# Patient Record
Sex: MALE | Race: BLACK OR AFRICAN AMERICAN | NOT HISPANIC OR LATINO | Employment: OTHER | URBAN - METROPOLITAN AREA
[De-identification: names, ages, dates, MRNs, and addresses within clinical notes are randomized per-mention and may not be internally consistent; named-entity substitution may affect disease eponyms.]

---

## 2017-01-01 ENCOUNTER — HOSPITAL ENCOUNTER (OUTPATIENT)
Dept: RADIOLOGY | Facility: HOSPITAL | Age: 57
Discharge: HOME | End: 2017-12-14
Attending: STUDENT IN AN ORGANIZED HEALTH CARE EDUCATION/TRAINING PROGRAM
Payer: MEDICARE

## 2017-01-01 ENCOUNTER — TELEPHONE (OUTPATIENT)
Dept: RHEUMATOLOGY | Facility: CLINIC | Age: 57
End: 2017-01-01

## 2017-01-01 ENCOUNTER — OFFICE VISIT (OUTPATIENT)
Dept: RHEUMATOLOGY | Facility: CLINIC | Age: 57
End: 2017-01-01
Payer: MEDICARE

## 2017-01-01 ENCOUNTER — TELEPHONE (OUTPATIENT)
Dept: PHYSICAL MEDICINE AND REHAB | Facility: CLINIC | Age: 57
End: 2017-01-01

## 2017-01-01 ENCOUNTER — LAB VISIT (OUTPATIENT)
Dept: LAB | Facility: HOSPITAL | Age: 57
End: 2017-01-01
Attending: INTERNAL MEDICINE
Payer: MEDICARE

## 2017-01-01 VITALS
BODY MASS INDEX: 20.64 KG/M2 | HEIGHT: 70 IN | WEIGHT: 144.13 LBS | DIASTOLIC BLOOD PRESSURE: 69 MMHG | SYSTOLIC BLOOD PRESSURE: 122 MMHG | HEART RATE: 60 BPM

## 2017-01-01 DIAGNOSIS — M79.671 RIGHT FOOT PAIN: ICD-10-CM

## 2017-01-01 DIAGNOSIS — D50.0 IRON DEFICIENCY ANEMIA DUE TO CHRONIC BLOOD LOSS: ICD-10-CM

## 2017-01-01 DIAGNOSIS — Z79.1 NSAID LONG-TERM USE: ICD-10-CM

## 2017-01-01 DIAGNOSIS — G89.29 CHRONIC PAIN OF RIGHT ANKLE: ICD-10-CM

## 2017-01-01 DIAGNOSIS — M45.0 ANKYLOSING SPONDYLITIS OF MULTIPLE SITES IN SPINE: Primary | ICD-10-CM

## 2017-01-01 DIAGNOSIS — M25.571 CHRONIC PAIN OF RIGHT ANKLE: ICD-10-CM

## 2017-01-01 DIAGNOSIS — M45.0 ANKYLOSING SPONDYLITIS OF MULTIPLE SITES IN SPINE: ICD-10-CM

## 2017-01-01 DIAGNOSIS — E78.49 OTHER HYPERLIPIDEMIA: ICD-10-CM

## 2017-01-01 LAB
ALBUMIN SERPL BCP-MCNC: 2.8 G/DL
ALP SERPL-CCNC: 122 U/L
ALT SERPL W/O P-5'-P-CCNC: 14 U/L
ANION GAP SERPL CALC-SCNC: 8 MMOL/L
AST SERPL-CCNC: 15 U/L
BASOPHILS # BLD AUTO: 0.05 K/UL
BASOPHILS NFR BLD: 0.5 %
BILIRUB SERPL-MCNC: 0.3 MG/DL
BUN SERPL-MCNC: 11 MG/DL
CALCIUM SERPL-MCNC: 9.7 MG/DL
CHLORIDE SERPL-SCNC: 103 MMOL/L
CO2 SERPL-SCNC: 29 MMOL/L
CREAT SERPL-MCNC: 0.9 MG/DL
CRP SERPL-MCNC: 111 MG/L
DIFFERENTIAL METHOD: ABNORMAL
EOSINOPHIL # BLD AUTO: 0.3 K/UL
EOSINOPHIL NFR BLD: 2.9 %
ERYTHROCYTE [DISTWIDTH] IN BLOOD BY AUTOMATED COUNT: 21.6 %
ERYTHROCYTE [SEDIMENTATION RATE] IN BLOOD BY WESTERGREN METHOD: 106 MM/HR
EST. GFR  (AFRICAN AMERICAN): >60 ML/MIN/1.73 M^2
EST. GFR  (NON AFRICAN AMERICAN): >60 ML/MIN/1.73 M^2
GLUCOSE SERPL-MCNC: 87 MG/DL
HCT VFR BLD AUTO: 31.9 %
HGB BLD-MCNC: 10 G/DL
IMM GRANULOCYTES # BLD AUTO: 0.04 K/UL
IMM GRANULOCYTES NFR BLD AUTO: 0.4 %
LYMPHOCYTES # BLD AUTO: 2.8 K/UL
LYMPHOCYTES NFR BLD: 28.2 %
MCH RBC QN AUTO: 24 PG
MCHC RBC AUTO-ENTMCNC: 31.3 G/DL
MCV RBC AUTO: 77 FL
MONOCYTES # BLD AUTO: 1.1 K/UL
MONOCYTES NFR BLD: 11.2 %
NEUTROPHILS # BLD AUTO: 5.5 K/UL
NEUTROPHILS NFR BLD: 56.8 %
NRBC BLD-RTO: 0 /100 WBC
PLATELET # BLD AUTO: 472 K/UL
PMV BLD AUTO: 10.2 FL
POTASSIUM SERPL-SCNC: 4.1 MMOL/L
PROT SERPL-MCNC: 8.3 G/DL
RBC # BLD AUTO: 4.17 M/UL
SODIUM SERPL-SCNC: 140 MMOL/L
WBC # BLD AUTO: 9.75 K/UL

## 2017-01-01 PROCEDURE — 99214 OFFICE O/P EST MOD 30 MIN: CPT | Mod: S$GLB,,, | Performed by: INTERNAL MEDICINE

## 2017-01-01 PROCEDURE — 85025 COMPLETE CBC W/AUTO DIFF WBC: CPT

## 2017-01-01 PROCEDURE — 36415 COLL VENOUS BLD VENIPUNCTURE: CPT | Mod: PO

## 2017-01-01 PROCEDURE — 73630 X-RAY EXAM OF FOOT: CPT | Mod: TC,PO,RT

## 2017-01-01 PROCEDURE — 99499 UNLISTED E&M SERVICE: CPT | Mod: S$PBB,,, | Performed by: INTERNAL MEDICINE

## 2017-01-01 PROCEDURE — 85651 RBC SED RATE NONAUTOMATED: CPT

## 2017-01-01 PROCEDURE — 80053 COMPREHEN METABOLIC PANEL: CPT

## 2017-01-01 PROCEDURE — 73630 X-RAY EXAM OF FOOT: CPT | Mod: 26,RT,, | Performed by: RADIOLOGY

## 2017-01-01 PROCEDURE — 86140 C-REACTIVE PROTEIN: CPT

## 2017-01-01 PROCEDURE — 99999 PR PBB SHADOW E&M-EST. PATIENT-LVL III: CPT | Mod: PBBFAC,,, | Performed by: INTERNAL MEDICINE

## 2017-01-01 RX ORDER — CELECOXIB 100 MG/1
200 CAPSULE ORAL 2 TIMES DAILY
Qty: 180 CAPSULE | Refills: 3 | Status: SHIPPED | OUTPATIENT
Start: 2017-01-01 | End: 2017-01-01 | Stop reason: SDUPTHER

## 2017-01-01 RX ORDER — CELECOXIB 100 MG/1
100 CAPSULE ORAL 2 TIMES DAILY PRN
Qty: 180 CAPSULE | Refills: 1 | Status: SHIPPED | OUTPATIENT
Start: 2017-01-01 | End: 2018-01-01

## 2017-01-01 RX ORDER — SULFASALAZINE 500 MG/1
1500 TABLET, DELAYED RELEASE ORAL 2 TIMES DAILY
Qty: 540 TABLET | Refills: 0 | Status: SHIPPED | OUTPATIENT
Start: 2017-01-01 | End: 2018-01-01 | Stop reason: SDUPTHER

## 2017-01-01 ASSESSMENT — ROUTINE ASSESSMENT OF PATIENT INDEX DATA (RAPID3)
AM STIFFNESS SCORE: 1, YES
PATIENT GLOBAL ASSESSMENT SCORE: 5
TOTAL RAPID3 SCORE: 4.78
MDHAQ FUNCTION SCORE: 1
WHEN YOU AWAKENED IN THE MORNING OVER THE LAST WEEK, PLEASE INDICATE THE AMOUNT OF TIME IT TAKES UNTIL YOU ARE AS LIMBER AS YOU WILL BE FOR THE DAY: 1 HR
PSYCHOLOGICAL DISTRESS SCORE: 2.2
FATIGUE SCORE: 5
PAIN SCORE: 6

## 2017-01-01 ASSESSMENT — ANKYLOSING SPONDYLITIS DISEASE ACTIVITY SCORE (ASDAS-CRP)
CRP_MG_PER_LITER: 111
GLOBAL_ACTIVITY: 5
MORNING_STIFFNESS: 5
NBH_PAIN: 5
TOTAL_SCORE: 4.61
PAIN_SWELLING: 6

## 2017-01-17 ENCOUNTER — OFFICE VISIT (OUTPATIENT)
Dept: PHYSICAL MEDICINE AND REHAB | Facility: CLINIC | Age: 57
End: 2017-01-17
Payer: MEDICARE

## 2017-01-17 VITALS
BODY MASS INDEX: 22.86 KG/M2 | DIASTOLIC BLOOD PRESSURE: 71 MMHG | HEIGHT: 68 IN | WEIGHT: 150.81 LBS | HEART RATE: 69 BPM | SYSTOLIC BLOOD PRESSURE: 121 MMHG

## 2017-01-17 DIAGNOSIS — G89.29 CHRONIC PAIN OF RIGHT KNEE: ICD-10-CM

## 2017-01-17 DIAGNOSIS — M25.561 CHRONIC PAIN OF BOTH KNEES: Primary | ICD-10-CM

## 2017-01-17 DIAGNOSIS — M54.50 CHRONIC BILATERAL LOW BACK PAIN WITHOUT SCIATICA: ICD-10-CM

## 2017-01-17 DIAGNOSIS — G89.29 CHRONIC PAIN OF BOTH KNEES: Primary | ICD-10-CM

## 2017-01-17 DIAGNOSIS — M45.9 AS (ANKYLOSING SPONDYLITIS): ICD-10-CM

## 2017-01-17 DIAGNOSIS — M62.838 MUSCLE SPASMS OF NECK: ICD-10-CM

## 2017-01-17 DIAGNOSIS — G89.4 CHRONIC PAIN SYNDROME: ICD-10-CM

## 2017-01-17 DIAGNOSIS — M54.2 NECK PAIN: ICD-10-CM

## 2017-01-17 DIAGNOSIS — M25.561 CHRONIC PAIN OF RIGHT KNEE: ICD-10-CM

## 2017-01-17 DIAGNOSIS — M25.562 CHRONIC PAIN OF BOTH KNEES: Primary | ICD-10-CM

## 2017-01-17 DIAGNOSIS — G89.29 CHRONIC BILATERAL LOW BACK PAIN WITHOUT SCIATICA: ICD-10-CM

## 2017-01-17 PROCEDURE — 99499 UNLISTED E&M SERVICE: CPT | Mod: S$PBB,,, | Performed by: PHYSICAL MEDICINE & REHABILITATION

## 2017-01-17 PROCEDURE — 99214 OFFICE O/P EST MOD 30 MIN: CPT | Mod: S$GLB,,, | Performed by: PHYSICAL MEDICINE & REHABILITATION

## 2017-01-17 PROCEDURE — 99999 PR PBB SHADOW E&M-EST. PATIENT-LVL III: CPT | Mod: PBBFAC,,, | Performed by: PHYSICAL MEDICINE & REHABILITATION

## 2017-01-17 PROCEDURE — 1159F MED LIST DOCD IN RCRD: CPT | Mod: S$GLB,,, | Performed by: PHYSICAL MEDICINE & REHABILITATION

## 2017-01-17 PROCEDURE — 3074F SYST BP LT 130 MM HG: CPT | Mod: S$GLB,,, | Performed by: PHYSICAL MEDICINE & REHABILITATION

## 2017-01-17 PROCEDURE — 3078F DIAST BP <80 MM HG: CPT | Mod: S$GLB,,, | Performed by: PHYSICAL MEDICINE & REHABILITATION

## 2017-01-17 RX ORDER — DULOXETIN HYDROCHLORIDE 30 MG/1
30 CAPSULE, DELAYED RELEASE ORAL DAILY
Qty: 30 CAPSULE | Refills: 2 | Status: SHIPPED | OUTPATIENT
Start: 2017-01-17 | End: 2017-04-18 | Stop reason: SDUPTHER

## 2017-01-17 RX ORDER — HYDROCODONE BITARTRATE AND ACETAMINOPHEN 10; 325 MG/1; MG/1
1 TABLET ORAL EVERY 6 HOURS PRN
Qty: 120 TABLET | Refills: 0 | Status: SHIPPED | OUTPATIENT
Start: 2017-02-17 | End: 2017-03-19

## 2017-01-17 RX ORDER — HYDROCODONE BITARTRATE AND ACETAMINOPHEN 10; 325 MG/1; MG/1
1 TABLET ORAL EVERY 6 HOURS PRN
Qty: 120 TABLET | Refills: 0 | Status: SHIPPED | OUTPATIENT
Start: 2017-01-17 | End: 2017-04-18 | Stop reason: SDUPTHER

## 2017-01-17 RX ORDER — HYDROCODONE BITARTRATE AND ACETAMINOPHEN 10; 325 MG/1; MG/1
1 TABLET ORAL EVERY 6 HOURS PRN
Qty: 120 TABLET | Refills: 0 | Status: SHIPPED | OUTPATIENT
Start: 2017-03-17 | End: 2017-04-16

## 2017-01-17 RX ORDER — CELECOXIB 200 MG/1
200 CAPSULE ORAL 2 TIMES DAILY
Qty: 180 CAPSULE | Refills: 1 | Status: SHIPPED | OUTPATIENT
Start: 2017-01-17 | End: 2017-04-28

## 2017-01-17 NOTE — PROGRESS NOTES
Subjective:       Patient ID: Micheal Chirinos Jr. is a 56 y.o. male.    Chief Complaint: Back Pain; Neck Pain; and Hand Pain    Back Pain   Pertinent negatives include no abdominal pain, chest pain, fever or headaches.   Neck Pain    Pertinent negatives include no chest pain, fever, headaches or trouble swallowing.   Hand Pain    Pertinent negatives include no chest pain.   Knee Pain      Shoulder Pain    Pertinent negatives include no fever or headaches.   Hip Pain      Mr. Chirinos, returns to clinic for chronic neck, and back pain.  LCV 10/17/16.   He is on chronic pain management with me, takes Hydrocodone 10/325 mg , 3-4x/day, and Flexeril at bedtime for several years , very   Controled, with no need for increase dose, nor frequency.   He reports being same, with reasonable pain control. He also states that he has his good,a nd bad days.   Today, he complains about neck, right elbow, right shoulder and low back pain.   He also c/o swelling ad pain of Right arm, and knee.    Today neck pain is 4/10,and the worst  10/10.  Neck pain is present t the both side of the neck R > L, it feels as muscle spasms/ tightness,  It radiates to Right  shoulders, and shoulder blade  not to arm/hand.   No numbness, paraesthesias in arms/ hands.   Neck pain is on/ off, worse at night time, sometimes he has trouble placing head on pillow.   Neck pain is running to shoulder blades, not to arms, pain is more localized in joints, on right side,   Right shoulder, elbow, swell, when he does more exercise, and it is always on right side.     Current low  back pain is 4, worst pain lately 7/10.   No radiation to buttocks, legs, but back of legs are very tight.  He also c/o right knee pain, current pain is 6 ( hurts worst today).   Usually it swells, but it is not swollen today.   It crackles, but no clicking, popping or locking in Rt knee.   NO instability, no recent nor old injury to knee.   He has a severe ankylosing spondylitis  with axial neck and back  pain and upper thoracic and lower pelvic girdle pain.   He takes hydrocodone 10 mg and he usually takes three tablets a day; however, on bad days he takes  four tablets a day.    He failed almost all muscle relaxants Dantrium secondary to making him tired, Flexeril, makes him sleepy. (although still taking it).    He was given Celebrex by dr. Navas, but he is afraidl secondary to GI bleed, a year ago.  He is taking only Sulfasalazine and Celebrex 200 daily for his severe AS, that is always with high infllamatory markers,    Patient reports that he completed Physical therapy for neck pain, at [facility].   He does his own stretching,and goes 3x/week to fitness to [place].  Exercises regularly, and stretches his neck/upper/lower spine, lying prone on table for 20-30 minutes daily.   Here for follow up, re  evaluation and medications  adjustment.  Review of Systems   Constitutional: Negative for activity change, appetite change, chills, diaphoresis, fatigue, fever and unexpected weight change.   HENT: Negative for trouble swallowing and voice change.    Eyes: Negative for pain and visual disturbance.   Respiratory: Negative for chest tightness, shortness of breath and wheezing.    Cardiovascular: Negative for chest pain, palpitations and leg swelling.   Gastrointestinal: Negative for abdominal pain, constipation and diarrhea.   Genitourinary: Negative for difficulty urinating, frequency and urgency.   Musculoskeletal: Positive for back pain and neck pain. Negative for joint swelling, myalgias and neck stiffness. Arthralgias:  left knee hurts, no swelling, no warmth, no popping, clicking.    Skin: Negative for rash and wound.   Neurological: Negative for dizziness, facial asymmetry, speech difficulty, light-headedness and headaches.   Hematological: Negative for adenopathy.   Psychiatric/Behavioral: Negative for agitation, behavioral problems, confusion, decreased concentration,  dysphoric mood and sleep disturbance.           Objective:      Physical Exam    MUSCULOSKELETAL EXAM:  Gait is wobbling on wide bases with rotating pelvis        secondary to bilateral SI joints fusion and severe ankylosing spondylitis with      bamboo spine and moderate-to-severe kyphosis on the thoracic spine.    Center of gravity is moved forward         Walks with widely spread upper extremity to maintain dynamic balance.   He had no loss of balance.  Steps are wider and annette is normal.   The patient is not able to stand upright touching with his occiput the wall.   There is  significantly increased distance from the occiput to the wall, more than 20 cm.    Cervical spine has very limited range of motion, especially extension that lacks more than 30-35 degrees from neutral.    Cervical spine flexion is still down to 50, extension again lacks 30-35 degrees.    There is still very limited motion in flexion and extension as well as a decreased motion in     rotation and side bending, probably 10-15 degrees bilaterally.    The patient has moderate  tenderness in paravertebral cervical musculature and upper   trapezius.  No tenderness in palpation of the cervical or thoracic spine.    Full range of motion of bilateral upper and lower extremities,except for   Bilateral shoulders that have decreased abduction to 70 degrees, and flex forward to 80 degrees,  Decreased internal and external rotation to 30 degrees.  Decreased right elbow extension to 160-170 degrees.   FROM in both knees, no edema, no laxity, moderate  tenderness at medical plato line.   Muscle  strength is 5/5 throughout x4 extremities.    Minimal tenderness at right lateral epicondyle, with resisted wrist extension.  No joint laxity throughout x4  extremities.    Straight leg raising negative bilaterally.    Lumbar spine  flexion to 90 with holding on to table, extension to 0, side bending and     rotation decreased to about 20-25 degrees  bilaterally.    Fused SI joints  without tenderness on palpation.                                             NEUROLOGIC:  Cranial nerves II through XII intact.    Deep tendon reflexes increased +3, symmetrical throughout x4 extremities.    Sensory is intact to light touch and pinprick throughout x4 extremities.    Tight posterior  hamstrings and increased tone in muscles in posterior hamstrings group and   gastrocnemius soleus group on Sobia scale, 1+/4.      Assessment:       1. Chronic pain of both knees    2. AS (ankylosing spondylitis)    3. Muscle spasms of neck    4. Neck pain    5. Chronic pain of right knee    6. Chronic bilateral low back pain without sciatica    7. Chronic pain syndrome        Plan:       Chronic pain of both knees  -     duloxetine (CYMBALTA) 30 MG capsule; Take 1 capsule (30 mg total) by mouth once daily.  Dispense: 30 capsule; Refill: 2    AS (ankylosing spondylitis)  -     hydrocodone-acetaminophen 10-325mg (NORCO)  mg Tab; Take 1 tablet by mouth every 6 (six) hours as needed (msk pain).  Dispense: 120 tablet; Refill: 0  -     hydrocodone-acetaminophen 10-325mg (NORCO)  mg Tab; Take 1 tablet by mouth every 6 (six) hours as needed (msk pain).  Dispense: 120 tablet; Refill: 0  -     hydrocodone-acetaminophen 10-325mg (NORCO)  mg Tab; Take 1 tablet by mouth every 6 (six) hours as needed (msk pain).  Dispense: 120 tablet; Refill: 0  -     duloxetine (CYMBALTA) 30 MG capsule; Take 1 capsule (30 mg total) by mouth once daily.  Dispense: 30 capsule; Refill: 2    Muscle spasms of neck  -     hydrocodone-acetaminophen 10-325mg (NORCO)  mg Tab; Take 1 tablet by mouth every 6 (six) hours as needed (msk pain).  Dispense: 120 tablet; Refill: 0  -     hydrocodone-acetaminophen 10-325mg (NORCO)  mg Tab; Take 1 tablet by mouth every 6 (six) hours as needed (msk pain).  Dispense: 120 tablet; Refill: 0  -     hydrocodone-acetaminophen 10-325mg (NORCO)  mg Tab;  Take 1 tablet by mouth every 6 (six) hours as needed (msk pain).  Dispense: 120 tablet; Refill: 0  -     duloxetine (CYMBALTA) 30 MG capsule; Take 1 capsule (30 mg total) by mouth once daily.  Dispense: 30 capsule; Refill: 2    Neck pain  -     hydrocodone-acetaminophen 10-325mg (NORCO)  mg Tab; Take 1 tablet by mouth every 6 (six) hours as needed (msk pain).  Dispense: 120 tablet; Refill: 0  -     hydrocodone-acetaminophen 10-325mg (NORCO)  mg Tab; Take 1 tablet by mouth every 6 (six) hours as needed (msk pain).  Dispense: 120 tablet; Refill: 0  -     hydrocodone-acetaminophen 10-325mg (NORCO)  mg Tab; Take 1 tablet by mouth every 6 (six) hours as needed (msk pain).  Dispense: 120 tablet; Refill: 0  -     duloxetine (CYMBALTA) 30 MG capsule; Take 1 capsule (30 mg total) by mouth once daily.  Dispense: 30 capsule; Refill: 2    Chronic pain of right knee  -     hydrocodone-acetaminophen 10-325mg (NORCO)  mg Tab; Take 1 tablet by mouth every 6 (six) hours as needed (msk pain).  Dispense: 120 tablet; Refill: 0  -     hydrocodone-acetaminophen 10-325mg (NORCO)  mg Tab; Take 1 tablet by mouth every 6 (six) hours as needed (msk pain).  Dispense: 120 tablet; Refill: 0  -     hydrocodone-acetaminophen 10-325mg (NORCO)  mg Tab; Take 1 tablet by mouth every 6 (six) hours as needed (msk pain).  Dispense: 120 tablet; Refill: 0  -     duloxetine (CYMBALTA) 30 MG capsule; Take 1 capsule (30 mg total) by mouth once daily.  Dispense: 30 capsule; Refill: 2    Chronic bilateral low back pain without sciatica  -     duloxetine (CYMBALTA) 30 MG capsule; Take 1 capsule (30 mg total) by mouth once daily.  Dispense: 30 capsule; Refill: 2    Chronic pain syndrome    Other orders  -     celecoxib (CELEBREX) 200 MG capsule; Take 1 capsule (200 mg total) by mouth 2 (two) times daily.  Dispense: 180 capsule; Refill: 1      Patient with severe  AS, cx. With chronic neck and back pain associated with cervical  and lumbar spondylosis and muscle spasms - spasticity.     1. Chronic pain management.    The patient will resume Hydrocodone 10/325 mg one p.o. 6 hours p.r.n.( #120 tabs, with 2 refills).   Muscle relaxant Flexeril 5 mg  at bedtime, prn (helps better with relaxation and sleep).    Added Cymbalta ( trial) 30- mg daily.      2. Patient completed  outpatient Physical therapy for neck pain at [facility].   Now started [employer] .   Exercises regularly, and stretches his neck/upper/lower spine,   lying prone on table for 20-30 minutes daily.     3. Ankylosing Spondylitis, follows with Rheumatology, Dr. Navas.    Last appointment with Dr. Navas in 7/16.   His right hand pain and swelling, and Left foot pain and swelling, are most likely secondary to primary dz, AS.   Reviewing labs, he has CRP, and ESR permanently elevated.   He is currently taking less than prescribed Sulfasalazine, instead of 3 tabs BID, he takes it once a day, and also Celebrex instead BID, he takes once /day.   This is mainly secondary to UGI bleed one yr ago. Since on PPI  He has no problems.   I suggested him to take Celebrex 200 mg BID for next 5-7 days, until simptoms in hand./foot resolved, and to follow up with Dr. Navas.   Refused DMAR th.       RTC in 3 months.     Total time spent face to face with patient was 25 minutes.   More than 50% of that time was spent in counseling on diagnosis , prognosis and treatment options.   I also caunsel patient  on common and most usual side effect of prescribed medications.   Risk and benefits of opiates, possible risk of developing opiate dependence and tolerance, need of strict compliance with prescribed medications.  I reviewed Primary care , and other specialty's notes to better coordinate patient's  care.   All questions were answered, and patient voiced understanding.

## 2017-01-17 NOTE — MR AVS SNAPSHOT
[facility]  [address]  [facility] [address]  Phone: [phone]                  Micheal Chirinos Jr.   2017 9:00 AM   Office Visit    Description:  Male : 1960   Provider:  Janel Valdez MD   Department:  [facility]           Reason for Visit     Back Pain     Neck Pain     Hand Pain           Diagnoses this Visit        Comments    Chronic pain of both knees    -  Primary     AS (ankylosing spondylitis)         Muscle spasms of neck         Neck pain         Chronic pain of right knee         Chronic bilateral low back pain without sciatica         Chronic pain syndrome                To Do List           Goals (5 Years of Data)     Increase physical activity       Follow-Up and Disposition     Return in about 3 months (around 2017).       These Medications        Disp Refills Start End    celecoxib (CELEBREX) 200 MG capsule 180 capsule 1 2017    Take 1 capsule (200 mg total) by mouth 2 (two) times daily. - Oral    Pharmacy: [facility] [address] [place] [address] AVE AT [facility] Ph #: [phone]       hydrocodone-acetaminophen 10-325mg (NORCO)  mg Tab 120 tablet 0 2017    Take 1 tablet by mouth every 6 (six) hours as needed (msk pain). - Oral    Pharmacy: [facility] [address] [facility] [address] AT [facility] & [facility] Ph #: [phone]       hydrocodone-acetaminophen 10-325mg (NORCO)  mg Tab 120 tablet 0 2017 3/19/2017    Take 1 tablet by mouth every 6 (six) hours as needed (msk pain). - Oral    Pharmacy: [facility] [address] AT [facility] & [facility] Ph #: [phone]       hydrocodone-acetaminophen 10-325mg (NORCO)  mg Tab 120 tablet 0 3/17/2017 2017    Take 1 tablet by mouth every 6 (six) hours as needed  (msk pain). - Oral    Pharmacy: [facility] [address] [place], [place] - [address] AT [facility] Ph #: [phone]       duloxetine (CYMBALTA) 30 MG capsule 30 capsule 2 1/17/2017 2/16/2017    Take 1 capsule (30 mg total) by mouth once daily. - Oral    Pharmacy: [facility] [address] [place], [place] - [address] AT [facility] Ph #: [phone]         [facility] On [facility]     [facility] On Call Nurse Care Line - 24/7 Assistance  Registered nurses in the [facility] On Call Center provide clinical advisement, health education, appointment booking, and other advisory services.  Call for this free service at [phone].             Medications           Message regarding Medications     Verify the changes and/or additions to your medication regime listed below are the same as discussed with your clinician today.  If any of these changes or additions are incorrect, please notify your healthcare provider.        START taking these NEW medications        Refills    hydrocodone-acetaminophen 10-325mg (NORCO)  mg Tab 0    Starting on: 2/17/2017    Sig: Take 1 tablet by mouth every 6 (six) hours as needed (msk pain).    Class: Print    Route: Oral    hydrocodone-acetaminophen 10-325mg (NORCO)  mg Tab 0    Starting on: 3/17/2017    Sig: Take 1 tablet by mouth every 6 (six) hours as needed (msk pain).    Class: Print    Route: Oral    duloxetine (CYMBALTA) 30 MG capsule 2    Sig: Take 1 capsule (30 mg total) by mouth once daily.    Class: Print    Route: Oral           Verify that the below list of medications is an accurate representation of the medications you are currently taking.  If none reported, the list may be blank. If incorrect, please contact your healthcare provider. Carry this list with you in case of emergency.           Current Medications     amlodipine (NORVASC) 5 MG tablet Take 1 tablet (5 mg total) by mouth once  "daily.    celecoxib (CELEBREX) 200 MG capsule Take 1 capsule (200 mg total) by mouth 2 (two) times daily.    cetirizine (ZYRTEC) 10 mg TbDL 1 Tablet(s) Oral PRN Every day.      doxycycline (VIBRA-TABS) 100 MG tablet Take 1 tablet (100 mg total) by mouth every 12 (twelve) hours.    omeprazole (PRILOSEC) 20 MG capsule Take 1 capsule (20 mg total) by mouth once daily.    sulfaSALAzine (AZULFIDINE) 500 MG TbEC TAKE 3 TABLETS BY MOUTH TWICE DAILY    cyclobenzaprine (FLEXERIL) 10 MG tablet Take 1 tablet (10 mg total) by mouth 3 (three) times daily as needed for Muscle spasms.    diclofenac sodium (VOLTAREN) 1 % Gel Apply 2 g topically 4 (four) times daily. Apply topical to neck, elbow, shoulders, knee.    duloxetine (CYMBALTA) 30 MG capsule Take 1 capsule (30 mg total) by mouth once daily.    hydrocodone-acetaminophen 10-325mg (NORCO)  mg Tab Take 1 tablet by mouth every 6 (six) hours as needed (msk pain).    hydrocodone-acetaminophen 10-325mg (NORCO)  mg Tab Starting on Feb 17, 2017. Take 1 tablet by mouth every 6 (six) hours as needed (msk pain).    hydrocodone-acetaminophen 10-325mg (NORCO)  mg Tab Starting on Mar 17, 2017. Take 1 tablet by mouth every 6 (six) hours as needed (msk pain).           Clinical Reference Information           Vital Signs - Last Recorded  Most recent update: 1/17/2017  9:20 AM by Smita La MA    BP Pulse Ht Wt BMI    121/71 69 5' 8" (1.727 m) 68.4 kg (150 lb 12.7 oz) 22.93 kg/m2      Blood Pressure          Most Recent Value    BP  121/71      Allergies as of 1/17/2017     No Known Drug Allergies      Immunizations Administered on Date of Encounter - 1/17/2017     None      [facility] Sign-Up     Activating your [facility] account is as easy as 1-2-3!     1) Visit [email or web address], select Sign Up Now, enter this activation code and your date of birth, then select Next.  [email or web address]  Expires: 3/3/2017 10:06 AM      2) Create a username and password to use when you " visit [facility] in the future and select a security question in case you lose your password and select Next.    3) Enter your e-mail address and click Sign Up!    Additional Information  If you have questions, please e-mail [email or web address] or call [phone] to talk to our [facility] staff. Remember, [facility] is NOT to be used for urgent needs. For medical emergencies, dial 911.

## 2017-03-17 ENCOUNTER — HOSPITAL ENCOUNTER (OUTPATIENT)
Dept: RADIOLOGY | Facility: HOSPITAL | Age: 57
Discharge: HOME | End: 2017-03-17
Attending: FAMILY MEDICINE
Payer: MEDICARE

## 2017-03-17 ENCOUNTER — OFFICE VISIT (OUTPATIENT)
Dept: FAMILY MEDICINE | Facility: CLINIC | Age: 57
End: 2017-03-17
Payer: MEDICARE

## 2017-03-17 VITALS
BODY MASS INDEX: 24.08 KG/M2 | TEMPERATURE: 98 F | RESPIRATION RATE: 17 BRPM | HEART RATE: 78 BPM | OXYGEN SATURATION: 99 % | DIASTOLIC BLOOD PRESSURE: 80 MMHG | HEIGHT: 67 IN | SYSTOLIC BLOOD PRESSURE: 120 MMHG | WEIGHT: 153.44 LBS

## 2017-03-17 DIAGNOSIS — G89.29 CHRONIC WRIST PAIN, RIGHT: ICD-10-CM

## 2017-03-17 DIAGNOSIS — M45.9 AS (ANKYLOSING SPONDYLITIS): Primary | ICD-10-CM

## 2017-03-17 DIAGNOSIS — M25.531 CHRONIC WRIST PAIN, RIGHT: ICD-10-CM

## 2017-03-17 DIAGNOSIS — I10 ESSENTIAL HYPERTENSION: ICD-10-CM

## 2017-03-17 PROCEDURE — 3079F DIAST BP 80-89 MM HG: CPT | Mod: S$GLB,,, | Performed by: FAMILY MEDICINE

## 2017-03-17 PROCEDURE — 73110 X-RAY EXAM OF WRIST: CPT | Mod: 26,RT,, | Performed by: RADIOLOGY

## 2017-03-17 PROCEDURE — 1160F RVW MEDS BY RX/DR IN RCRD: CPT | Mod: S$GLB,,, | Performed by: FAMILY MEDICINE

## 2017-03-17 PROCEDURE — 73110 X-RAY EXAM OF WRIST: CPT | Mod: TC,PO,RT

## 2017-03-17 PROCEDURE — 99214 OFFICE O/P EST MOD 30 MIN: CPT | Mod: S$GLB,,, | Performed by: FAMILY MEDICINE

## 2017-03-17 PROCEDURE — 3074F SYST BP LT 130 MM HG: CPT | Mod: S$GLB,,, | Performed by: FAMILY MEDICINE

## 2017-03-17 PROCEDURE — 99499 UNLISTED E&M SERVICE: CPT | Mod: S$PBB,,, | Performed by: FAMILY MEDICINE

## 2017-03-17 PROCEDURE — 99999 PR PBB SHADOW E&M-EST. PATIENT-LVL III: CPT | Mod: PBBFAC,,, | Performed by: FAMILY MEDICINE

## 2017-03-17 RX ORDER — CYCLOBENZAPRINE HCL 10 MG
TABLET ORAL
Refills: 6 | COMMUNITY
Start: 2017-01-16 | End: 2017-04-28 | Stop reason: SDUPTHER

## 2017-03-17 NOTE — PROGRESS NOTES
Chief Complaint   Patient presents with    rt hand pain     RT HAND PAIN FOR PAST 2 MONTHS        Micheal Chirinos Jr. is a 56 y.o. male who presents per the Chief Complaint.  Pt is known to me and was last seen by me on 3/22/2016.  All known chronic medical issues have been documented.       Wrist Pain    The pain is present in the right wrist. This is a chronic problem. The current episode started more than 1 month ago. There has been no history of extremity trauma. The problem occurs daily. The problem has been waxing and waning. The quality of the pain is described as aching and sharp. The pain is at a severity of 5/10. The pain is moderate. Associated symptoms include joint swelling, a limited range of motion and stiffness. Pertinent negatives include no fever, inability to bear weight, itching, joint locking, numbness or tingling.        ROS  Review of Systems   Constitutional: Negative.  Negative for activity change, appetite change, chills, diaphoresis, fatigue, fever and unexpected weight change.   HENT: Negative.  Negative for congestion, ear pain, hearing loss, nosebleeds, postnasal drip, rhinorrhea, sinus pressure, sneezing, sore throat and trouble swallowing.    Eyes: Negative for pain and visual disturbance.   Respiratory: Negative for cough, choking, chest tightness and shortness of breath.    Cardiovascular: Negative for chest pain, palpitations and leg swelling.   Gastrointestinal: Negative for abdominal pain, constipation, diarrhea, nausea and vomiting.   Genitourinary: Negative for difficulty urinating, dysuria, frequency and urgency.   Musculoskeletal: Positive for back pain (stiff), myalgias, neck pain, neck stiffness and stiffness. Negative for arthralgias (right wrist), gait problem and joint swelling.   Skin: Negative.  Negative for itching.   Allergic/Immunologic: Negative for environmental allergies and food allergies.   Neurological: Negative.  Negative for dizziness, tingling,  "seizures, syncope, weakness, light-headedness, numbness and headaches.   Psychiatric/Behavioral: Negative.  Negative for confusion, decreased concentration, dysphoric mood and sleep disturbance. The patient is not nervous/anxious.        Physical Exam  Vitals:    03/17/17 1052   BP: 120/80   Pulse: 78   Resp: 17   Temp: 97.8 °F (36.6 °C)    Body mass index is 24.03 kg/(m^2).  Weight: 69.6 kg (153 lb 7 oz)   Height: 5' 7" (170.2 cm)     Physical Exam   Constitutional: He is oriented to person, place, and time. He appears well-developed and well-nourished. He is active and cooperative.  Non-toxic appearance. He does not have a sickly appearance. He does not appear ill. No distress.   HENT:   Head: Normocephalic and atraumatic.   Right Ear: Hearing, external ear and ear canal normal. No decreased hearing is noted.   Left Ear: Hearing and external ear normal. No decreased hearing is noted.   Nose: Nose normal. No rhinorrhea or nasal deformity.   Mouth/Throat: Uvula is midline and oropharynx is clear and moist. He does not have dentures. Normal dentition.   Eyes: Conjunctivae, EOM and lids are normal. Pupils are equal, round, and reactive to light. Right eye exhibits no chemosis, no discharge and no exudate. No foreign body present in the right eye. Left eye exhibits no chemosis, no discharge and no exudate. No foreign body present in the left eye. No scleral icterus.   Neck: Normal range of motion and full passive range of motion without pain. Neck supple. No thyromegaly present.   Cardiovascular: Normal rate, regular rhythm, S1 normal, S2 normal and normal heart sounds.  Exam reveals no gallop and no friction rub.    No murmur heard.  Pulmonary/Chest: Effort normal and breath sounds normal. No accessory muscle usage. No respiratory distress. He has no decreased breath sounds. He has no wheezes. He has no rhonchi. He has no rales.   Abdominal: Soft. Normal appearance. He exhibits no distension and no mass. There is no " hepatosplenomegaly. There is no tenderness. There is no rigidity, no rebound and no guarding.   Musculoskeletal:        Right wrist: He exhibits decreased range of motion, tenderness, bony tenderness and swelling. He exhibits no effusion, no crepitus, no deformity and no laceration.        Cervical back: He exhibits decreased range of motion, pain and spasm. He exhibits normal pulse.        Thoracic back: He exhibits decreased range of motion, bony tenderness, pain and spasm. He exhibits no tenderness.        Left foot: Normal. There is normal range of motion, no tenderness, no bony tenderness, no swelling, normal capillary refill, no crepitus, no deformity and no laceration.   Neurological: He is alert and oriented to person, place, and time. He has normal strength. No cranial nerve deficit or sensory deficit. He exhibits normal muscle tone. He displays no seizure activity. Coordination and gait normal.   Skin: Skin is warm, dry and intact. No rash noted. He is not diaphoretic. No pallor.   Psychiatric: He has a normal mood and affect. His speech is normal and behavior is normal. Judgment and thought content normal. Cognition and memory are normal. He is attentive.       Assessment & Plan    1. AS (ankylosing spondylitis)  Stable; no therapeutic changes at this time.  Managed by Rheumatology.  The current medical regimen is effective at this time and no changes to present plan or medications will be made at this visit.       2. Chronic wrist pain, right  X-ray was reviewed by me and shown to patient.  Findings were reviewed by me and discussed with patient.  No bony abnormality; likely soft tissue tendonitis.  Does not appear to be associated with AS.  - X-Ray Wrist Complete Right; Future    3. Essential hypertension  Patient was counseled and encouraged to maintain a low sodium diet, as well as increasing physical activity.  Recommend random BP checks at home on a regular basis.  Will hold medication at this time,  and follow up as recommended, or sooner if blood pressure is not well controlled.  Patient does not take medication consistently and rarely has BP above 130/80.        Follow up documented    ACTIVE MEDICAL ISSUES:  Documented in Problem List    PAST MEDICAL HISTORY  Documented    PAST SURGICAL HISTORY:  Documented    SOCIAL HISTORY:  Documented    FAMILY HISTORY:  Documented    ALLERGIES AND MEDICATIONS: updated and reviewed.  Documented    Health Maintenance       Date Due Completion Date    Influenza Vaccine 8/1/2016 3/22/2016 (Done)    Override on 3/22/2016: Done (not at this present time)    TETANUS VACCINE 2/14/2018 2/14/2008    Override on 2/14/2008: Done    Lipid Panel 7/26/2021 7/26/2016    Override on 3/22/2016: Done (future)    Colonoscopy 3/18/2023 3/18/2013

## 2017-04-18 ENCOUNTER — HOSPITAL ENCOUNTER (OUTPATIENT)
Dept: RADIOLOGY | Facility: HOSPITAL | Age: 57
Discharge: HOME | End: 2017-04-18
Attending: PHYSICAL MEDICINE & REHABILITATION
Payer: MEDICARE

## 2017-04-18 ENCOUNTER — OFFICE VISIT (OUTPATIENT)
Dept: PHYSICAL MEDICINE AND REHAB | Facility: CLINIC | Age: 57
End: 2017-04-18
Payer: MEDICARE

## 2017-04-18 VITALS
HEART RATE: 85 BPM | WEIGHT: 149.38 LBS | SYSTOLIC BLOOD PRESSURE: 125 MMHG | BODY MASS INDEX: 23.45 KG/M2 | DIASTOLIC BLOOD PRESSURE: 77 MMHG | HEIGHT: 67 IN

## 2017-04-18 DIAGNOSIS — G89.29 CHRONIC PAIN OF BOTH KNEES: ICD-10-CM

## 2017-04-18 DIAGNOSIS — M62.838 MUSCLE SPASMS OF NECK: ICD-10-CM

## 2017-04-18 DIAGNOSIS — G89.29 CHRONIC PAIN OF RIGHT KNEE: ICD-10-CM

## 2017-04-18 DIAGNOSIS — M25.561 CHRONIC PAIN OF BOTH KNEES: ICD-10-CM

## 2017-04-18 DIAGNOSIS — M25.551 HIP PAIN, BILATERAL: ICD-10-CM

## 2017-04-18 DIAGNOSIS — M19.019 SHOULDER ARTHRITIS: ICD-10-CM

## 2017-04-18 DIAGNOSIS — M25.551 HIP PAIN, BILATERAL: Primary | ICD-10-CM

## 2017-04-18 DIAGNOSIS — M45.9 ANKYLOSING SPONDYLITIS, UNSPECIFIED SITE OF SPINE: ICD-10-CM

## 2017-04-18 DIAGNOSIS — M54.2 NECK PAIN: ICD-10-CM

## 2017-04-18 DIAGNOSIS — G89.29 CHRONIC BILATERAL LOW BACK PAIN WITHOUT SCIATICA: ICD-10-CM

## 2017-04-18 DIAGNOSIS — M54.50 CHRONIC BILATERAL LOW BACK PAIN WITHOUT SCIATICA: ICD-10-CM

## 2017-04-18 DIAGNOSIS — M25.552 HIP PAIN, BILATERAL: Primary | ICD-10-CM

## 2017-04-18 DIAGNOSIS — M25.552 HIP PAIN, BILATERAL: ICD-10-CM

## 2017-04-18 DIAGNOSIS — M25.561 CHRONIC PAIN OF RIGHT KNEE: ICD-10-CM

## 2017-04-18 DIAGNOSIS — M25.562 CHRONIC PAIN OF BOTH KNEES: ICD-10-CM

## 2017-04-18 PROCEDURE — 99999 PR PBB SHADOW E&M-EST. PATIENT-LVL III: CPT | Mod: PBBFAC,,, | Performed by: PHYSICAL MEDICINE & REHABILITATION

## 2017-04-18 PROCEDURE — 3074F SYST BP LT 130 MM HG: CPT | Mod: S$GLB,,, | Performed by: PHYSICAL MEDICINE & REHABILITATION

## 2017-04-18 PROCEDURE — 1160F RVW MEDS BY RX/DR IN RCRD: CPT | Mod: S$GLB,,, | Performed by: PHYSICAL MEDICINE & REHABILITATION

## 2017-04-18 PROCEDURE — 99499 UNLISTED E&M SERVICE: CPT | Mod: S$PBB,,, | Performed by: PHYSICAL MEDICINE & REHABILITATION

## 2017-04-18 PROCEDURE — 73521 X-RAY EXAM HIPS BI 2 VIEWS: CPT | Mod: TC

## 2017-04-18 PROCEDURE — 3078F DIAST BP <80 MM HG: CPT | Mod: S$GLB,,, | Performed by: PHYSICAL MEDICINE & REHABILITATION

## 2017-04-18 PROCEDURE — 73521 X-RAY EXAM HIPS BI 2 VIEWS: CPT | Mod: 26,,, | Performed by: RADIOLOGY

## 2017-04-18 PROCEDURE — 99214 OFFICE O/P EST MOD 30 MIN: CPT | Mod: S$GLB,,, | Performed by: PHYSICAL MEDICINE & REHABILITATION

## 2017-04-18 RX ORDER — HYDROCODONE BITARTRATE AND ACETAMINOPHEN 10; 325 MG/1; MG/1
1 TABLET ORAL EVERY 6 HOURS PRN
Qty: 120 TABLET | Refills: 0 | Status: SHIPPED | OUTPATIENT
Start: 2017-06-18 | End: 2017-07-18 | Stop reason: SDUPTHER

## 2017-04-18 RX ORDER — HYDROCODONE BITARTRATE AND ACETAMINOPHEN 10; 325 MG/1; MG/1
1 TABLET ORAL EVERY 6 HOURS PRN
Qty: 120 TABLET | Refills: 0 | Status: SHIPPED | OUTPATIENT
Start: 2017-05-18 | End: 2017-06-17

## 2017-04-18 RX ORDER — DULOXETIN HYDROCHLORIDE 30 MG/1
30 CAPSULE, DELAYED RELEASE ORAL DAILY
Qty: 30 CAPSULE | Refills: 5 | Status: SHIPPED | OUTPATIENT
Start: 2017-04-18 | End: 2017-07-18

## 2017-04-18 RX ORDER — HYDROCODONE BITARTRATE AND ACETAMINOPHEN 10; 325 MG/1; MG/1
1 TABLET ORAL EVERY 6 HOURS PRN
Qty: 120 TABLET | Refills: 0 | Status: SHIPPED | OUTPATIENT
Start: 2017-04-18 | End: 2017-05-18

## 2017-04-18 NOTE — PROGRESS NOTES
Subjective:       Patient ID: Micheal Chirinos Jr. is a 56 y.o. male.    Chief Complaint: Wrist Pain and Hip Pain    Wrist Pain    Pertinent negatives include no fever.   Hip Pain      Back Pain   Pertinent negatives include no abdominal pain, chest pain, fever or headaches.   Neck Pain    Pertinent negatives include no chest pain, fever, headaches or trouble swallowing.   Hand Pain    Pertinent negatives include no chest pain.   Knee Pain      Shoulder Pain    Pertinent negatives include no fever or headaches.   Mr. Chirinos, returns to clinic for multiple MSK pains, chronic neck, and back pain.  LCV 01/17/17.   He states that he has his good, and bad days.   Today, he complains also about Rt wrist, and both groin's pain.  Chronic neck and low back pain, are always present.      #1 Neck pain: current pain is 4/10,and the worst  10/10.  Neck pain is present t the both side of the neck R > L, it feels as muscle spasms/ tightness,  It radiates to Right  shoulders, and shoulder blade  not to arm/hand.   No numbness, paraesthesias in arms/ hands.   Neck pain is on/ off, worse at night time, sometimes he has trouble placing head on pillow.   Neck pain is running to shoulder blades, not to arms, pain is more localized in joints, on right side,   Right shoulder, elbow, swell, when he does more exercise, and it is always on right side.     #2 Low  back pain, current pain is 4, worst pain lately 7/10.   No radiation to buttocks, legs, but back of legs are very tight.    #3 Groin pain, in both groin.   Current pain is 2 , worst pain was 10.   He states that groin pain started > 2 months ago, and was so severe that he could not walk, he states that he could not raise leg.  Pain was worst early in morning, could not walk, was very stiff.  Pain in groin was worse with walking, and moving.     #4 Rt wrist pain.   Current pain is 5, the worst pain is 5.  Rt hand and wrist were swollen, and now it feels much better.   Pain is  mainly in joint, and was hurting him to move hand, cannot hold anything heavier > 2 lbs.      He has a severe ankylosing spondylitis with axial neck and back  pain and upper thoracic and lower pelvic girdle pain.   He takes hydrocodone 10 mg and he usually takes three tablets a day; however, on bad days he takes  four tablets a day.    He failed almost all muscle relaxants Dantrium secondary to making him tired, Flexeril, makes him sleepy. (although still taking it).   He was given Celebrex by dr. Navas, but he is afraidl secondary to GI bleed, a year ago.  He is taking only Sulfasalazine and Celebrex 200 daily for his severe AS, that is always with high infllamatory markers,    Patient completed Physical therapy for neck pain, at [facility].   He does his own stretching,and goes 3x/week to fitness.  Exercises regularly, and stretches his neck/upper/lower spine, lying prone on table for 20-30 minutes daily.     He is on chronic pain management with me, takes Hydrocodone 10/325 mg , 3-4x/day, and Flexeril at bedtime for several years ,   with no need for increase dose, nor frequency. States that hydrocodone helps him to move, with reasonable pain control.   Here for follow up, re  evaluation and medications  adjustment.  Review of Systems   Constitutional: Negative for activity change, appetite change, chills, diaphoresis, fatigue, fever and unexpected weight change.   HENT: Negative for trouble swallowing and voice change.    Eyes: Negative for pain and visual disturbance.   Respiratory: Negative for chest tightness, shortness of breath and wheezing.    Cardiovascular: Negative for chest pain, palpitations and leg swelling.   Gastrointestinal: Negative for abdominal pain, constipation and diarrhea.   Genitourinary: Negative for difficulty urinating, frequency and urgency.   Musculoskeletal: Positive for back pain and neck pain. Negative for joint swelling, myalgias and neck stiffness. Arthralgias:  left  knee hurts, no swelling, no warmth, no popping, clicking.    Skin: Negative for rash and wound.   Neurological: Negative for dizziness, facial asymmetry, speech difficulty, light-headedness and headaches.   Hematological: Negative for adenopathy.   Psychiatric/Behavioral: Negative for agitation, behavioral problems, confusion, decreased concentration, dysphoric mood and sleep disturbance.           Objective:      Physical Exam    MUSCULOSKELETAL EXAM:  Gait is wobbling on wide bases with rotating pelvis        secondary to bilateral SI joints fusion and severe ankylosing spondylitis with      bamboo spine and moderate-to-severe kyphosis on the thoracic spine.    Center of gravity is moved forward         Walks with widely spread upper extremity to maintain dynamic balance.   He had no loss of balance.  Steps are wider and nanette is normal.   The patient is not able to stand upright touching with his occiput the wall.   There is  significantly increased distance from the occiput to the wall, more than 20 cm.    Cervical spine has very limited range of motion, especially extension that lacks more than 30-35 degrees from neutral.    Cervical spine flexion is still down to 50, extension again lacks 30-35 degrees.    There is still very limited motion in flexion and extension as well as a decreased motion in     rotation and side bending, probably 10-15 degrees bilaterally.    The patient has moderate  tenderness in paravertebral cervical musculature and upper   trapezius.  No tenderness in palpation of the cervical or thoracic spine.    Decreased active range of motion of bilateral upper and lower extremities.  Bilateral shoulders that have decreased abduction to 70 degrees, and flex forward to 80 degrees,  Decreased internal and external rotation to 30 degrees.  Decreased right elbow extension to 160-170 degrees.   FROM in both knees, no edema, no laxity, moderate  tenderness at medical plato line.   + pain with IR  in both hips, decreased AROM in both hips.  Minimally decreased AROM in Rt wrist.  Muscle  strength is 5/5 throughout x4 extremities.    Minimal tenderness at right lateral epicondyle, with resisted wrist extension.  No joint laxity throughout x4  extremities.    Straight leg raising negative bilaterally.    Lumbar spine  flexion to 90 with holding on to table, extension to 0, side bending and     rotation decreased to about 20-25 degrees bilaterally.    Fused SI joints  without tenderness on palpation.                                             NEUROLOGIC:  Cranial nerves II through XII intact.    Deep tendon reflexes increased +3, symmetrical throughout x4 extremities.    Sensory is intact to light touch and pinprick throughout x4 extremities.    Tight posterior  hamstrings and increased tone in muscles in posterior hamstrings group and   gastrocnemius soleus group on Sobia scale, 1+/4.          Assessment:       1. Neck pain    2. Chronic bilateral low back pain without sciatica    3. Chronic pain of both knees    4. Hip pain, bilateral    5. Ankylosing spondylitis, unspecified site of spine        Plan:       Hip pain, bilateral  -     hydrocodone-acetaminophen 10-325mg (NORCO)  mg Tab; Take 1 tablet by mouth every 6 (six) hours as needed (msk pain).  Dispense: 120 tablet; Refill: 0  -     hydrocodone-acetaminophen 10-325mg (NORCO)  mg Tab; Take 1 tablet by mouth every 6 (six) hours as needed (msk pain).  Dispense: 120 tablet; Refill: 0  -     hydrocodone-acetaminophen 10-325mg (NORCO)  mg Tab; Take 1 tablet by mouth every 6 (six) hours as needed (msk pain).  Dispense: 120 tablet; Refill: 0  -     X-Ray Hips Bilateral 2 View Inc AP Pelvis; Future    Neck pain  -     hydrocodone-acetaminophen 10-325mg (NORCO)  mg Tab; Take 1 tablet by mouth every 6 (six) hours as needed (msk pain).  Dispense: 120 tablet; Refill: 0  -     hydrocodone-acetaminophen 10-325mg (NORCO)  mg Tab; Take 1  tablet by mouth every 6 (six) hours as needed (msk pain).  Dispense: 120 tablet; Refill: 0  -     hydrocodone-acetaminophen 10-325mg (NORCO)  mg Tab; Take 1 tablet by mouth every 6 (six) hours as needed (msk pain).  Dispense: 120 tablet; Refill: 0  -     duloxetine (CYMBALTA) 30 MG capsule; Take 1 capsule (30 mg total) by mouth once daily.  Dispense: 30 capsule; Refill: 5  -     X-Ray Hips Bilateral 2 [employer] AP Pelvis; Future    Chronic bilateral low back pain without sciatica  -     hydrocodone-acetaminophen 10-325mg (NORCO)  mg Tab; Take 1 tablet by mouth every 6 (six) hours as needed (msk pain).  Dispense: 120 tablet; Refill: 0  -     hydrocodone-acetaminophen 10-325mg (NORCO)  mg Tab; Take 1 tablet by mouth every 6 (six) hours as needed (msk pain).  Dispense: 120 tablet; Refill: 0  -     hydrocodone-acetaminophen 10-325mg (NORCO)  mg Tab; Take 1 tablet by mouth every 6 (six) hours as needed (msk pain).  Dispense: 120 tablet; Refill: 0  -     duloxetine (CYMBALTA) 30 MG capsule; Take 1 capsule (30 mg total) by mouth once daily.  Dispense: 30 capsule; Refill: 5  -     X-Ray Hips Bilateral 2 View [employer] AP Pelvis; Future    Chronic pain of both knees  -     hydrocodone-acetaminophen 10-325mg (NORCO)  mg Tab; Take 1 tablet by mouth every 6 (six) hours as needed (msk pain).  Dispense: 120 tablet; Refill: 0  -     hydrocodone-acetaminophen 10-325mg (NORCO)  mg Tab; Take 1 tablet by mouth every 6 (six) hours as needed (msk pain).  Dispense: 120 tablet; Refill: 0  -     hydrocodone-acetaminophen 10-325mg (NORCO)  mg Tab; Take 1 tablet by mouth every 6 (six) hours as needed (msk pain).  Dispense: 120 tablet; Refill: 0  -     duloxetine (CYMBALTA) 30 MG capsule; Take 1 capsule (30 mg total) by mouth once daily.  Dispense: 30 capsule; Refill: 5  -     X-Ray Hips Bilateral 2 View [employer] AP Pelvis; Future    Ankylosing spondylitis, unspecified site of spine  -      hydrocodone-acetaminophen 10-325mg (NORCO)  mg Tab; Take 1 tablet by mouth every 6 (six) hours as needed (msk pain).  Dispense: 120 tablet; Refill: 0  -     hydrocodone-acetaminophen 10-325mg (NORCO)  mg Tab; Take 1 tablet by mouth every 6 (six) hours as needed (msk pain).  Dispense: 120 tablet; Refill: 0  -     hydrocodone-acetaminophen 10-325mg (NORCO)  mg Tab; Take 1 tablet by mouth every 6 (six) hours as needed (msk pain).  Dispense: 120 tablet; Refill: 0  -     duloxetine (CYMBALTA) 30 MG capsule; Take 1 capsule (30 mg total) by mouth once daily.  Dispense: 30 capsule; Refill: 5  -     X-Ray Hips Bilateral 2 View [employer] AP Pelvis; Future    Muscle spasms of neck  -     hydrocodone-acetaminophen 10-325mg (NORCO)  mg Tab; Take 1 tablet by mouth every 6 (six) hours as needed (msk pain).  Dispense: 120 tablet; Refill: 0  -     hydrocodone-acetaminophen 10-325mg (NORCO)  mg Tab; Take 1 tablet by mouth every 6 (six) hours as needed (msk pain).  Dispense: 120 tablet; Refill: 0  -     hydrocodone-acetaminophen 10-325mg (NORCO)  mg Tab; Take 1 tablet by mouth every 6 (six) hours as needed (msk pain).  Dispense: 120 tablet; Refill: 0  -     duloxetine (CYMBALTA) 30 MG capsule; Take 1 capsule (30 mg total) by mouth once daily.  Dispense: 30 capsule; Refill: 5  -     X-Ray Hips Bilateral 2 View [employer] AP Pelvis; Future    Chronic pain of right knee  -     hydrocodone-acetaminophen 10-325mg (NORCO)  mg Tab; Take 1 tablet by mouth every 6 (six) hours as needed (msk pain).  Dispense: 120 tablet; Refill: 0  -     hydrocodone-acetaminophen 10-325mg (NORCO)  mg Tab; Take 1 tablet by mouth every 6 (six) hours as needed (msk pain).  Dispense: 120 tablet; Refill: 0  -     hydrocodone-acetaminophen 10-325mg (NORCO)  mg Tab; Take 1 tablet by mouth every 6 (six) hours as needed (msk pain).  Dispense: 120 tablet; Refill: 0  -     duloxetine (CYMBALTA) 30 MG capsule; Take 1 capsule (30 mg  total) by mouth once daily.  Dispense: 30 capsule; Refill: 5  -     X-Ray Hips Bilateral 2 View Inc AP Pelvis; Future    Shoulder arthritis  -     X-Ray Hips Bilateral 2 View Inc AP Pelvis; Future      Patient with severe  AS, cx. with chronic neck and back pain associated with cervical and lumbar spondylosis and muscle spasms - tightness in paravertebral cervical  muscles, and hamstrings.   Today, wit Rt wrist pain that is edematous, but per patient is getting better.   Last week with b/l groin pain, most likely secondary to both hips DJD/OA.    1. Imaging: will order Xray of pelvis to evaluate hips.    2. Chronic pain management.    The patient will resume Hydrocodone 10/325 mg one p.o. 6 hours p.r.n.( #120 tabs, with 2 refills).   Muscle relaxant Flexeril 5 mg  at bedtime, prn (helps better with relaxation and sleep).    Added Cymbalta ( trial) 30 mg daily.      3. Patient completed PT, and going regularly  to gym/fitness .   Exercises regularly, and stretches his neck/upper/lower spine,   lying prone on table for 20-30 minutes daily.     4. Ankylosing Spondylitis, follows with Rheumatology, Dr. Navas.    Last appointment with Dr. Navas in 7/16.   His right wrist pain and swelling, and groin, are most likely secondary to primary dz, AS.   Reviewing labs, he has CRP, and ESR permanently elevated.   He is currently taking less than prescribed Sulfasalazine, instead of 3 tabs BID, he takes it once a day, and also Celebrex instead BID, he takes once /day.   This is mainly secondary to UGI bleed one yr ago. Since on PPI  He has no problems.   I suggested him to take Celebrex 200 mg qd for next 5-7 days, until simptoms in hand./foot resolved, and to follow up with Dr. Navas.   He refused DMAR th.       RTC in 3 months.     Total time spent face to face with patient was 25 minutes.   More than 50% of that time was spent in counseling on diagnosis , prognosis and treatment options.   I also caunsel patient  on common  and most usual side effect of prescribed medications.   Risk and benefits of opiates, possible risk of developing opiate dependence and tolerance, need of strict compliance with prescribed medications.  I reviewed Primary care , and other specialty's notes to better coordinate patient's  care.   All questions were answered, and patient voiced understanding.

## 2017-04-18 NOTE — MR AVS SNAPSHOT
[facility]  [address]  [facility] [address]  Phone: [phone]                  Micheal Chirinos Jr.   2017 8:40 AM   Office Visit    Description:  Male : 1960   Provider:  Janel Valdez MD   Department:  [facility]           Reason for Visit     Wrist Pain     Hip Pain           Diagnoses this Visit        Comments    Neck pain    -  Primary     Chronic bilateral low back pain without sciatica         Chronic pain of both knees         Hip pain, bilateral         Ankylosing spondylitis, unspecified site of spine         Muscle spasms of neck         Chronic pain of right knee         Shoulder arthritis                To Do List           Goals (5 Years of Data)     Increase physical activity       Follow-Up and Disposition     Return in about 3 months (around 2017).       These Medications        Disp Refills Start End    hydrocodone-acetaminophen 10-325mg (NORCO)  mg Tab 120 tablet 0 2017    Take 1 tablet by mouth every 6 (six) hours as needed (msk pain). - Oral    hydrocodone-acetaminophen 10-325mg (NORCO)  mg Tab 120 tablet 0 2017    Take 1 tablet by mouth every 6 (six) hours as needed (msk pain). - Oral    hydrocodone-acetaminophen 10-325mg (NORCO)  mg Tab 120 tablet 0 2017    Take 1 tablet by mouth every 6 (six) hours as needed (msk pain). - Oral    duloxetine (CYMBALTA) 30 MG capsule 30 capsule 5 2017    Take 1 capsule (30 mg total) by mouth once daily. - Oral    Pharmacy: [facility] [address] [address] AT [address] Ph #: [phone]         [facility]     [facility] On [facility] Nurse Care Line -  Assistance  Unless otherwise directed by your provider, please contact [facility], our nurse care line that is available for  assistance.     Registered nurses in the [facility] Center provide: appointment scheduling, clinical advisement, health education, and other advisory services.  Call: [phone] (toll free)               Medications           Message regarding Medications     Verify the changes and/or additions to your medication regime listed below are the same as discussed with your clinician today.  If any of these changes or additions are incorrect, please notify your healthcare provider.        START taking these NEW medications        Refills    hydrocodone-acetaminophen 10-325mg (NORCO)  mg Tab 0    Starting on: 5/18/2017    Sig: Take 1 tablet by mouth every 6 (six) hours as needed (msk pain).    Class: Print    Route: Oral    hydrocodone-acetaminophen 10-325mg (NORCO)  mg Tab 0    Starting on: 6/18/2017    Sig: Take 1 tablet by mouth every 6 (six) hours as needed (msk pain).    Class: Print    Route: Oral           Verify that the below list of medications is an accurate representation of the medications you are currently taking.  If none reported, the list may be blank. If incorrect, please contact your healthcare provider. Carry this list with you in case of emergency.           Current Medications     amlodipine (NORVASC) 5 MG tablet Take 1 tablet (5 mg total) by mouth once daily.    cetirizine (ZYRTEC) 10 mg TbDL 1 Tablet(s) Oral PRN Every day.      cyclobenzaprine (FLEXERIL) 10 MG tablet TK 1 T  PO TID PRF MUSCLE SPASMS    doxycycline (VIBRA-TABS) 100 MG tablet Take 1 tablet (100 mg total) by mouth every 12 (twelve) hours.    omeprazole (PRILOSEC) 20 MG capsule Take 1 capsule (20 mg total) by mouth once daily.    sulfaSALAzine (AZULFIDINE) 500 MG TbEC TAKE 3 TABLETS BY MOUTH TWICE DAILY    celecoxib (CELEBREX) 200 MG capsule Take 1 capsule (200 mg total) by mouth 2 (two) times daily.    diclofenac sodium (VOLTAREN) 1 % Gel Apply 2 g topically 4 (four) times daily. Apply topical to neck, elbow, shoulders, knee.    duloxetine (CYMBALTA) 30 MG capsule  "Take 1 capsule (30 mg total) by mouth once daily.    hydrocodone-acetaminophen 10-325mg (NORCO)  mg Tab Take 1 tablet by mouth every 6 (six) hours as needed (msk pain).    hydrocodone-acetaminophen 10-325mg (NORCO)  mg Tab Starting on May 18, 2017. Take 1 tablet by mouth every 6 (six) hours as needed (msk pain).    hydrocodone-acetaminophen 10-325mg (NORCO)  mg Tab Starting on Jun 18, 2017. Take 1 tablet by mouth every 6 (six) hours as needed (msk pain).           Clinical Reference Information           Your Vitals Were     BP Pulse Height Weight BMI    125/77 85 5' 7" (1.702 m) 67.8 kg (149 lb 5.8 oz) 23.39 kg/m2      Blood Pressure          Most Recent Value    BP  125/77      Allergies as of 4/18/2017     No Known Drug Allergies      Immunizations Administered on Date of Encounter - 4/18/2017     None      [facility] Sign-Up     Activating your [facility] account is as easy as 1-2-3!     1) Visit [email or web address], select Sign Up Now, enter this activation code and your date of birth, then select Next.  [email or web address]  Expires: 6/2/2017  9:14 AM      2) Create a username and password to use when you visit [facility] in the future and select a security question in case you lose your password and select Next.    3) Enter your e-mail address and click Sign Up!    Additional Information  If you have questions, please e-mail [email or web address] or call [phone] to talk to our [facility] staff. Remember, [facility] is NOT to be used for urgent needs. For medical emergencies, dial 911.         Language Assistance Services     ATTENTION: Language assistance services are available, free of charge. Please call [phone].      ATENCIÓN: Si habla español, tiene a vazquez disposición servicios gratuitos de asistencia lingüística. Llame al [phone].     CHÚ Ý: N?u b?n nói Ti?ng Vi?t, có các d?ch v? h? tr? ngôn ng? mi?n phí dành cho b?n. G?i s? [phone].         [facility] " complies with applicable Federal civil rights laws and does not discriminate on the basis of race, color, national origin, age, disability, or sex.

## 2017-04-20 ENCOUNTER — LAB VISIT (OUTPATIENT)
Dept: LAB | Facility: HOSPITAL | Age: 57
End: 2017-04-20
Attending: INTERNAL MEDICINE
Payer: MEDICARE

## 2017-04-20 ENCOUNTER — TELEPHONE (OUTPATIENT)
Dept: RHEUMATOLOGY | Facility: CLINIC | Age: 57
End: 2017-04-20

## 2017-04-20 DIAGNOSIS — D50.0 IRON DEFICIENCY ANEMIA DUE TO CHRONIC BLOOD LOSS: Primary | ICD-10-CM

## 2017-04-20 DIAGNOSIS — Z79.1 NSAID LONG-TERM USE: ICD-10-CM

## 2017-04-20 LAB
ALBUMIN SERPL BCP-MCNC: 2.9 G/DL
ALP SERPL-CCNC: 83 U/L
ALT SERPL W/O P-5'-P-CCNC: 8 U/L
ANION GAP SERPL CALC-SCNC: 13 MMOL/L
AST SERPL-CCNC: 10 U/L
BASOPHILS # BLD AUTO: 0.03 K/UL
BASOPHILS NFR BLD: 0.3 %
BILIRUB SERPL-MCNC: 0.2 MG/DL
BUN SERPL-MCNC: 12 MG/DL
CALCIUM SERPL-MCNC: 9.4 MG/DL
CHLORIDE SERPL-SCNC: 102 MMOL/L
CO2 SERPL-SCNC: 25 MMOL/L
CREAT SERPL-MCNC: 0.8 MG/DL
CRP SERPL-MCNC: 114.8 MG/L
DIFFERENTIAL METHOD: ABNORMAL
EOSINOPHIL # BLD AUTO: 0.3 K/UL
EOSINOPHIL NFR BLD: 2.6 %
ERYTHROCYTE [DISTWIDTH] IN BLOOD BY AUTOMATED COUNT: 18.8 %
ERYTHROCYTE [SEDIMENTATION RATE] IN BLOOD BY WESTERGREN METHOD: 17 MM/HR
EST. GFR  (AFRICAN AMERICAN): >60 ML/MIN/1.73 M^2
EST. GFR  (NON AFRICAN AMERICAN): >60 ML/MIN/1.73 M^2
FERRITIN SERPL-MCNC: 88 NG/ML
GLUCOSE SERPL-MCNC: 82 MG/DL
HCT VFR BLD AUTO: 30.2 %
HGB BLD-MCNC: 9.5 G/DL
IRON SERPL-MCNC: 14 UG/DL
LYMPHOCYTES # BLD AUTO: 2.5 K/UL
LYMPHOCYTES NFR BLD: 25.7 %
MCH RBC QN AUTO: 24.3 PG
MCHC RBC AUTO-ENTMCNC: 31.5 %
MCV RBC AUTO: 77 FL
MONOCYTES # BLD AUTO: 1.2 K/UL
MONOCYTES NFR BLD: 12.6 %
NEUTROPHILS # BLD AUTO: 5.6 K/UL
NEUTROPHILS NFR BLD: 58.4 %
PLATELET # BLD AUTO: 515 K/UL
PMV BLD AUTO: 9.4 FL
POTASSIUM SERPL-SCNC: 4.2 MMOL/L
PROT SERPL-MCNC: 8 G/DL
RBC # BLD AUTO: 3.91 M/UL
SATURATED IRON: 5 %
SODIUM SERPL-SCNC: 140 MMOL/L
TOTAL IRON BINDING CAPACITY: 260 UG/DL
TRANSFERRIN SERPL-MCNC: 176 MG/DL
WBC # BLD AUTO: 9.65 K/UL

## 2017-04-20 PROCEDURE — 86140 C-REACTIVE PROTEIN: CPT

## 2017-04-20 PROCEDURE — 85651 RBC SED RATE NONAUTOMATED: CPT

## 2017-04-20 PROCEDURE — 82728 ASSAY OF FERRITIN: CPT

## 2017-04-20 PROCEDURE — 80053 COMPREHEN METABOLIC PANEL: CPT

## 2017-04-20 PROCEDURE — 36415 COLL VENOUS BLD VENIPUNCTURE: CPT | Mod: PO

## 2017-04-20 PROCEDURE — 85025 COMPLETE CBC W/AUTO DIFF WBC: CPT

## 2017-04-20 PROCEDURE — 83540 ASSAY OF IRON: CPT

## 2017-04-20 NOTE — TELEPHONE ENCOUNTER
Please tell pt the iron deficiency anemia is worse. Please add ferritin, Fe/TIBC, folate, B12  to current labs and schedule ref to Gastro for iron deficiency anemia r/o GI bleeding. Thanks. SOFI

## 2017-04-21 ENCOUNTER — TELEPHONE (OUTPATIENT)
Dept: RHEUMATOLOGY | Facility: CLINIC | Age: 57
End: 2017-04-21

## 2017-04-21 NOTE — TELEPHONE ENCOUNTER
----- Message from Cr Navas MD sent at 4/21/2017  7:47 AM CDT -----  Please tell pt the iron is low with saturatio jof only 5% confirming iron deficiency. Please be sure he has appt next week in gastro. Tell him needs to stop Celebrex and continue omeprazole until he sees them next week. Thanks. SOFI

## 2017-04-28 ENCOUNTER — TELEPHONE (OUTPATIENT)
Dept: GASTROENTEROLOGY | Facility: CLINIC | Age: 57
End: 2017-04-28

## 2017-04-28 ENCOUNTER — OFFICE VISIT (OUTPATIENT)
Dept: RHEUMATOLOGY | Facility: CLINIC | Age: 57
End: 2017-04-28
Payer: MEDICARE

## 2017-04-28 VITALS
DIASTOLIC BLOOD PRESSURE: 69 MMHG | SYSTOLIC BLOOD PRESSURE: 115 MMHG | HEIGHT: 70 IN | WEIGHT: 151 LBS | BODY MASS INDEX: 21.62 KG/M2

## 2017-04-28 DIAGNOSIS — K92.2 UPPER GI BLEEDING: ICD-10-CM

## 2017-04-28 DIAGNOSIS — K21.9 GASTROESOPHAGEAL REFLUX DISEASE WITHOUT ESOPHAGITIS: ICD-10-CM

## 2017-04-28 DIAGNOSIS — D50.0 IRON DEFICIENCY ANEMIA DUE TO CHRONIC BLOOD LOSS: ICD-10-CM

## 2017-04-28 DIAGNOSIS — M45.9 ANKYLOSING SPONDYLITIS, UNSPECIFIED SITE OF SPINE: ICD-10-CM

## 2017-04-28 PROBLEM — D50.9 IRON DEFICIENCY ANEMIA: Status: ACTIVE | Noted: 2017-04-28

## 2017-04-28 PROCEDURE — 99214 OFFICE O/P EST MOD 30 MIN: CPT | Mod: S$GLB,,, | Performed by: INTERNAL MEDICINE

## 2017-04-28 PROCEDURE — 3074F SYST BP LT 130 MM HG: CPT | Mod: S$GLB,,, | Performed by: INTERNAL MEDICINE

## 2017-04-28 PROCEDURE — 3078F DIAST BP <80 MM HG: CPT | Mod: S$GLB,,, | Performed by: INTERNAL MEDICINE

## 2017-04-28 PROCEDURE — 1160F RVW MEDS BY RX/DR IN RCRD: CPT | Mod: S$GLB,,, | Performed by: INTERNAL MEDICINE

## 2017-04-28 PROCEDURE — 99499 UNLISTED E&M SERVICE: CPT | Mod: S$PBB,,, | Performed by: INTERNAL MEDICINE

## 2017-04-28 PROCEDURE — 99999 PR PBB SHADOW E&M-EST. PATIENT-LVL III: CPT | Mod: PBBFAC,,, | Performed by: INTERNAL MEDICINE

## 2017-04-28 RX ORDER — SULFASALAZINE 500 MG/1
1500 TABLET, DELAYED RELEASE ORAL 2 TIMES DAILY
Qty: 540 TABLET | Refills: 0 | Status: SHIPPED | OUTPATIENT
Start: 2017-04-28 | End: 2017-01-01 | Stop reason: SDUPTHER

## 2017-04-28 RX ORDER — OMEPRAZOLE 20 MG/1
20 CAPSULE, DELAYED RELEASE ORAL 2 TIMES DAILY
Qty: 180 CAPSULE | Refills: 1 | Status: SHIPPED | OUTPATIENT
Start: 2017-04-28 | End: 2017-04-28 | Stop reason: SDUPTHER

## 2017-04-28 RX ORDER — OMEPRAZOLE 20 MG/1
20 CAPSULE, DELAYED RELEASE ORAL 2 TIMES DAILY
Qty: 180 CAPSULE | Refills: 1 | Status: SHIPPED | OUTPATIENT
Start: 2017-04-28 | End: 2017-08-08 | Stop reason: SDUPTHER

## 2017-04-28 RX ORDER — CYCLOBENZAPRINE HCL 10 MG
10 TABLET ORAL 2 TIMES DAILY
Qty: 180 TABLET | Refills: 1 | Status: SHIPPED | OUTPATIENT
Start: 2017-04-28 | End: 2017-07-18 | Stop reason: SDUPTHER

## 2017-04-28 RX ORDER — SULFASALAZINE 500 MG/1
1500 TABLET, DELAYED RELEASE ORAL 2 TIMES DAILY
Qty: 540 TABLET | Refills: 0 | Status: SHIPPED | OUTPATIENT
Start: 2017-04-28 | End: 2017-04-28 | Stop reason: SDUPTHER

## 2017-04-28 ASSESSMENT — ANKYLOSING SPONDYLITIS DISEASE ACTIVITY SCORE (ASDAS-CRP)
MORNING_STIFFNESS: 5
GLOBAL_ACTIVITY: 4
NBH_PAIN: 5
TOTAL_SCORE: 4.58
PAIN_SWELLING: 7
CRP_MG_PER_LITER: 114

## 2017-04-28 ASSESSMENT — ROUTINE ASSESSMENT OF PATIENT INDEX DATA (RAPID3)
MDHAQ FUNCTION SCORE: .6
PATIENT GLOBAL ASSESSMENT SCORE: 5
TOTAL RAPID3 SCORE: 4
PAIN SCORE: 5
PSYCHOLOGICAL DISTRESS SCORE: 0
AM STIFFNESS SCORE: 1, YES
FATIGUE SCORE: 0
WHEN YOU AWAKENED IN THE MORNING OVER THE LAST WEEK, PLEASE INDICATE THE AMOUNT OF TIME IT TAKES UNTIL YOU ARE AS LIMBER AS YOU WILL BE FOR THE DAY: 1HR

## 2017-04-28 NOTE — PROGRESS NOTES
Subjective:       Patient ID: Micheal Chirinos Jr. is a 56 y.o. male.    Chief Complaint: AS    HPI    Had 2 day flare inner aspect left hip about a month ago, now subsided. Some pain and swelling right wrist, right>left knee. Some right elbow swelling and contracture but little pain. Back is unchanged. Has noted melena last week, intermittently. No hematochezia or hematemesis, not n/v no abd pain or cramps.. Stopped celecoxib 4/21 remains on omeprazole 20mg before breakfast. Off amlodipine as his BP has been normal. Dr. Lombard decreased his sulfasalazine to 1000mg twice daily for unclear reasons.      Review of Systems   Constitutional: Negative for appetite change, chills, fatigue, fever and unexpected weight change.   HENT: Negative for mouth sores.    Eyes: Negative for pain, redness and visual disturbance.   Respiratory: Negative for cough, shortness of breath and wheezing.    Cardiovascular: Negative for chest pain, palpitations and leg swelling.   Gastrointestinal: Negative for abdominal pain, blood in stool, constipation, diarrhea and nausea.   Genitourinary: Negative for difficulty urinating, dysuria and hematuria.   Musculoskeletal: Negative for arthralgias, back pain, gait problem, joint swelling, myalgias, neck pain and neck stiffness.   Skin: Negative for color change, pallor and rash.   Neurological: Negative for weakness and headaches.   Psychiatric/Behavioral: Negative for dysphoric mood and sleep disturbance.         Objective:   There were no vitals taken for this visit.     Physical Exam   Constitutional: He is oriented to person, place, and time and well-developed, well-nourished, and in no distress.   HENT:   Head: Normocephalic and atraumatic.   Mouth/Throat: Oropharynx is clear and moist.   Eyes: Conjunctivae and EOM are normal. Pupils are equal, round, and reactive to light.   Neck: Normal range of motion. Neck supple. No thyromegaly present.   No cervical bruits   Cardiovascular:  Normal rate, regular rhythm, normal heart sounds and intact distal pulses.  Exam reveals no gallop and no friction rub.    No murmur heard.  Pulmonary/Chest: Breath sounds normal. He has no wheezes. He has no rales. He exhibits no tenderness.   Abdominal: Soft. He exhibits no distension and no mass. There is no tenderness.       Right Side Rheumatological Exam     The patient is tender to palpation of the wrist and knee    He has swelling of the elbow, wrist and knee    The patient has an enlarged elbow, wrist and knee    Hip Exam   Tenderness Location: no tenderness    Range of Motion   Extension: normal   Internal Rotation:  10 abnormal   External Rotation:  10 abnormal     Left Side Rheumatological Exam     He has swelling of the knee    The patient has an enlarged knee.    Hip Exam     Range of Motion   Extension: normal   Internal Rotation: 10 abnormal   External Rotation:  10 abnormal       Lymphadenopathy:     He has no cervical adenopathy.   Neurological: He is alert and oriented to person, place, and time. He displays normal reflexes. He exhibits normal muscle tone. Gait normal.   Motor strength nl. UE and LE bilateral   Skin: Skin is warm and dry. No rash noted. No erythema. No pallor.     Psychiatric: Mood, memory, affect and judgment normal.         Schober's: 10-11.5 cm  Fixed forward flexion, -30 extension, 5  Lat flex  Chest expansion 2 cm  O-W 26 cm  Fabere neg     Results for CHELSEA BOYD JR. (MRN [id number]) as of 4/28/2017 11:06   Ref. Range 4/20/2017 08:38   WBC Latest Ref Range: 3.90 - 12.70 K/uL 9.65   RBC Latest Ref Range: 4.60 - 6.20 M/uL 3.91 (L)   Hemoglobin Latest Ref Range: 14.0 - 18.0 g/dL 9.5 (L)   Hematocrit Latest Ref Range: 40.0 - 54.0 % 30.2 (L)   MCV Latest Ref Range: 82 - 98 fL 77 (L)   MCH Latest Ref Range: 27.0 - 31.0 pg 24.3 (L)   MCHC Latest Ref Range: 32.0 - 36.0 % 31.5 (L)   RDW Latest Ref Range: 11.5 - 14.5 % 18.8 (H)   Platelets Latest Ref Range: 150 - 350 K/uL  515 (H)   MPV Latest Ref Range: 9.2 - 12.9 fL 9.4   Gran% Latest Ref Range: 38.0 - 73.0 % 58.4   Gran # Latest Ref Range: 1.8 - 7.7 K/uL 5.6   Lymph% Latest Ref Range: 18.0 - 48.0 % 25.7   Lymph # Latest Ref Range: 1.0 - 4.8 K/uL 2.5   Mono% Latest Ref Range: 4.0 - 15.0 % 12.6   Mono # Latest Ref Range: 0.3 - 1.0 K/uL 1.2 (H)   Eosinophil% Latest Ref Range: 0.0 - 8.0 % 2.6   Eos # Latest Ref Range: 0.0 - 0.5 K/uL 0.3   Basophil% Latest Ref Range: 0.0 - 1.9 % 0.3   Baso # Latest Ref Range: 0.00 - 0.20 K/uL 0.03   Iron Latest Ref Range: 45 - 160 ug/dL 14 (L)   TIBC Latest Ref Range: 250 - 450 ug/dL 260   Saturated Iron Latest Ref Range: 20 - 50 % 5 (L)   Transferrin Latest Ref Range: 200 - 375 mg/dL 176 (L)   Ferritin Latest Ref Range: 20.0 - 300.0 ng/mL 88   Sed Rate Latest Ref Range: 0 - 10 mm/Hr 17 (H)   Sodium Latest Ref Range: 136 - 145 mmol/L 140   Potassium Latest Ref Range: 3.5 - 5.1 mmol/L 4.2   Chloride Latest Ref Range: 95 - 110 mmol/L 102   CO2 Latest Ref Range: 23 - 29 mmol/L 25   Anion Gap Latest Ref Range: 8 - 16 mmol/L 13   BUN, Bld Latest Ref Range: 6 - 20 mg/dL 12   Creatinine Latest Ref Range: 0.5 - 1.4 mg/dL 0.8   eGFR if non African American Latest Ref Range: >60 mL/min/1.73 m^2 >60.0   eGFR if African American Latest Ref Range: >60 mL/min/1.73 m^2 >60.0   Glucose Latest Ref Range: 70 - 110 mg/dL 82   Calcium Latest Ref Range: 8.7 - 10.5 mg/dL 9.4   Alkaline Phosphatase Latest Ref Range: 55 - 135 U/L 83   Total Protein Latest Ref Range: 6.0 - 8.4 g/dL 8.0   Albumin Latest Ref Range: 3.5 - 5.2 g/dL 2.9 (L)   Total Bilirubin Latest Ref Range: 0.1 - 1.0 mg/dL 0.2   AST Latest Ref Range: 10 - 40 U/L 10   ALT Latest Ref Range: 10 - 44 U/L 8 (L)   CRP Latest Ref Range: 0.0 - 8.2 mg/L 114.8 (H)   Differential Method Unknown Automated     Patient Name: Micheal Chirinos  Procedure Date: 3/18/2013 11:43 AM  MRN: [id number]  Account Number: [id number]  YOB: 1960  Age: 52  Room: [facility]                      Gender: Male  Attending MD: Radames Huston MD  Procedure:            Colonoscopy  Indications:          Hematochezia  Providers:            Radames Huston MD, Maurice Maddox RN, [facility],                         Technologist  Referring MD:         Erika Samson  Complications:        No immediate complications.  Medicines:            Monitored Anesthesia Care  Procedure:            Pre-Anesthesia Assessment:                        - Prior to the procedure, a History and Physical                         was performed, and patient medications and                         allergies were reviewed. The patient is competent.                         The risks and benefits of the procedure and the                         sedation options and risks were discussed with the                         patient. All questions were answered and informed                         consent was obtained. Patient identification and                         proposed procedure were verified by the physician,                         the nurse and the anesthesiologist in the                         pre-procedure area in the procedure room. Mental                         Status Examination: alert and oriented. Airway                         Examination: normal oropharyngeal airway and neck                         mobility. Respiratory Examination: clear to                         auscultation. CV Examination: normal. ASA Grade                         Assessment: III - A patient with severe systemic                         disease. After reviewing the risks and benefits,                         the patient was deemed in satisfactory condition to                         undergo the procedure. The anesthesia plan was to                         use monitored anesthesia care (MAC). Immediately                         prior to administration of medications, the patient                         was re-assessed for adequacy to receive  sedatives.                         The heart rate, respiratory rate, oxygen                         saturations, blood pressure, adequacy of pulmonary                         ventilation, and response to care were monitored                         throughout the procedure. The physical status of                         the patient was re-assessed after the procedure.                        After I obtained informed consent, the scope was                         passed under direct vision. Throughout the                         procedure, the patient's blood pressure, pulse, and                         oxygen saturations were monitored continuously.                        The Olympus scope CF-H180AL ([id number]) was                         introduced through the anus and advanced to the                         cecum, identified by appendiceal orifice &                         ileocecal valve. The colonoscopy was performed with                         ease. The patient tolerated the procedure well. The                         quality of the bowel preparation was good. The                         ileocecal valve, appendiceal orifice and rectum                         were photographed.  Findings:       The perianal and digital rectal examinations were normal. Pertinent        negatives include normal sphincter tone. Non-bleeding internal        hemorrhoids were found during retroflexion and were small. A flat        polyp was found in the proximal ascending colon. The polyp was 6 mm        in size. The polyp was removed with a hot snare. Resection was        complete, but the polyp tissue was only partially retrieved.        Coagulation for tissue destruction using snare was successful.  Impression:           - Non-bleeding internal hemorrhoids.                        - One 6 mm polyp in the proximal ascending colon.                         Complete resection. Partial retrieval.                        - Coagulation for  tissue destruction was successful.  Recommendation:       - Discharge patient to home.                        - High fiber diet indefinitely.                        - Await pathology results.                        - Repeat colonoscopy in 3 - 5 years for                         surveillance.                        - Patient has a contact number available for                         emergencies. The signs and symptoms of potential                         delayed complications were discussed with the                         patient. Return to normal activities tomorrow.                         Written discharge instructions were provided to the                         patient.                        - Continue present medications.  Attending Participation:       I personally performed the entire procedure.       I personally performed the entire procedure.  Radames Huston MD  3/18/2013 12:07 PM  This report has been verified and signed electronically.  Number of Addenda: 0  Note Initiated On: 3/18/2013 11:43 AM  Estimated Blood Loss: Estimated blood loss: none.      Specimen Collected: 03/18/13 11:43 AM Last Resulted: 03/18/13 12:08 PM Order Details View Encounter Lab and Collection Details           Assessment:       AS: ASDAS-CRP: 4.58(high activity) BASDAI: 5.5(high activity) BASFI 7.1(high functional limitation, )  H/o AAU  Improved right elbow pain still flexion deformity  Bilateral knee effusions right>left t, warmth left> right, mild tenderness  Hyperlipidemia > statin threshold for mod-high dose statin  Worsening iron deficiency anemia  Hypertension, controlled off amlodipine         Plan:   Gastro consult asap for egd  Stay off celecoxib  Increase omeprazole to 20mg twice daily  Cont HEP  Increase sulfasalazine-EN back to  1500mg twice daily for peripheral spondyloarthritis clearly worse on reduced dose  Declines biologics(TNFi, secukinumab)  RTC 3 months discuss starting mod-high dose statin, given est 10  year ASCVD of 9.5% and will also need Prevnar 13 as previously ordered.  Increase omeprazole to twice daily     Ref to Gastro for worsening iron deficiency anemia, h/o GI bleeding last colonoscopy 2013, hemorrhoids, single polyp

## 2017-04-28 NOTE — MR AVS SNAPSHOT
[facility]  [address] [facility] [address]  Phone: [phone]  Fax: [phone]                  Micheal Chirinos Jr.   2017 11:00 AM   Office Visit    Description:  Male : 1960   Provider:  Cr Navas MD   Department:  [facility] - [facility]ology           Diagnoses this Visit        Comments    Gastroesophageal reflux disease without esophagitis         Ankylosing spondylitis, unspecified site of spine         Iron deficiency anemia due to chronic blood loss         Upper GI bleeding                To Do List           Future Appointments        Provider Department Dept Phone    2017 10:00 AM Janel Valdez MD [facility] [phone]      Goals (5 Years of Data)     Increase physical activity       Follow-Up and Disposition     Return in about 3 months (around 2017).       These Medications        Disp Refills Start End    cyclobenzaprine (FLEXERIL) 10 MG tablet 180 tablet 1 2017     Take 1 tablet (10 mg total) by mouth 2 (two) times daily. - Oral    [facility] [facility] [address] [place] [address] [address] AT [facility] Ph #: [phone]       sulfaSALAzine (AZULFIDINE) 500 MG TbEC 540 tablet 0 2017     Take 3 tablets (1,500 mg total) by mouth 2 (two) times daily. - Oral    Pharmacy: [facility] [employer] [facility] [address] [place] [address] [address] AT [facility] Ph #: [phone]       omeprazole (PRILOSEC) 20 MG capsule 180 capsule 1 2017     Take 1 capsule (20 mg total) by mouth 2 (two) times daily. - Oral    Pharmacy: [facility] [employer] [facility] [address] [place] [address] [address] AT [facility] Ph #: [phone]         [facility]     [facility] Care [facility] -  Assistance  Unless otherwise directed by your provider, please contact [facility], our nurse care line that is available for   assistance.     Registered nurses in the [facility] provide: appointment scheduling, clinical advisement, health education, and other advisory services.  Call: [phone] (toll free)               Medications           Message regarding Medications     Verify the changes and/or additions to your medication regime listed below are the same as discussed with your clinician today.  If any of these changes or additions are incorrect, please notify your healthcare provider.        CHANGE how you are taking these medications     Start Taking Instead of    cyclobenzaprine (FLEXERIL) 10 MG tablet cyclobenzaprine (FLEXERIL) 10 MG tablet    Dosage:  Take 1 tablet (10 mg total) by mouth 2 (two) times daily. Dosage:  TK 1 T  PO TID PRF MUSCLE SPASMS    Reason for Change:  Reorder     sulfaSALAzine (AZULFIDINE) 500 MG TbEC sulfaSALAzine (AZULFIDINE) 500 MG TbEC    Dosage:  Take 3 tablets (1,500 mg total) by mouth 2 (two) times daily. Dosage:  TAKE 3 TABLETS BY MOUTH TWICE DAILY    Reason for Change:  Reorder     omeprazole (PRILOSEC) 20 MG capsule omeprazole (PRILOSEC) 20 MG capsule    Dosage:  Take 1 capsule (20 mg total) by mouth 2 (two) times daily. Dosage:  Take 1 capsule (20 mg total) by mouth once daily.    Reason for Change:  Reorder       STOP taking these medications     celecoxib (CELEBREX) 200 MG capsule Take 1 capsule (200 mg total) by mouth 2 (two) times daily.    amlodipine (NORVASC) 5 MG tablet Take 1 tablet (5 mg total) by mouth once daily.    doxycycline (VIBRA-TABS) 100 MG tablet Take 1 tablet (100 mg total) by mouth every 12 (twelve) hours.    diclofenac sodium (VOLTAREN) 1 % Gel Apply 2 g topically 4 (four) times daily. Apply topical to neck, elbow, shoulders, knee.           Verify that the below list of medications is an accurate representation of the medications you are currently taking.  If none reported, the list may be blank. If incorrect, please contact your healthcare provider. Carry  "this list with you in case of emergency.           Current Medications     cetirizine (ZYRTEC) 10 mg TbDL 1 Tablet(s) Oral PRN Every day.      cyclobenzaprine (FLEXERIL) 10 MG tablet Take 1 tablet (10 mg total) by mouth 2 (two) times daily.    duloxetine (CYMBALTA) 30 MG capsule Take 1 capsule (30 mg total) by mouth once daily.    hydrocodone-acetaminophen 10-325mg (NORCO)  mg Tab Take 1 tablet by mouth every 6 (six) hours as needed (msk pain).    hydrocodone-acetaminophen 10-325mg (NORCO)  mg Tab Starting on May 18, 2017. Take 1 tablet by mouth every 6 (six) hours as needed (msk pain).    hydrocodone-acetaminophen 10-325mg (NORCO)  mg Tab Starting on Jun 18, 2017. Take 1 tablet by mouth every 6 (six) hours as needed (msk pain).    omeprazole (PRILOSEC) 20 MG capsule Take 1 capsule (20 mg total) by mouth 2 (two) times daily.    sulfaSALAzine (AZULFIDINE) 500 MG TbEC Take 3 tablets (1,500 mg total) by mouth 2 (two) times daily.           Clinical Reference Information           Your Vitals Were     BP Height Weight BMI       115/69 5' 9.6" (1.768 m) 68.5 kg (151 lb) 21.92 kg/m2       Blood Pressure          Most Recent Value    BP  115/69      Allergies as of 4/28/2017     No Known Drug Allergies      Immunizations Administered on Date of Encounter - 4/28/2017     None      [facility] Sign-Up     Activating your [facility] account is as easy as 1-2-3!     1) Visit [email or web address], select Sign Up Now, enter this activation code and your date of birth, then select Next.  [email or web address]  Expires: 6/2/2017  9:14 AM      2) Create a username and password to use when you visit [facility] in the future and select a security question in case you lose your password and select Next.    3) Enter your e-mail address and click Sign Up!    Additional Information  If you have questions, please e-mail [email or web address] or call [phone] to talk to our [facility] staff. Remember, [facility] is NOT to be used for " urgent needs. For medical emergencies, dial 911.         Language Assistance Services     ATTENTION: Language assistance services are available, free of charge. Please call [phone].      ATENCIÓN: Si habla nemo, tiene a vazquez disposición servicios gratuitos de asistencia lingüística. Llame al [phone].     CHÚ Ý: N?u b?n nói Ti?ng Vi?t, có các d?ch v? h? tr? ngôn ng? mi?n phí dành cho b?n. G?i s? [phone].         [facility] complies with applicable Federal civil rights laws and does not discriminate on the basis of race, color, national origin, age, disability, or sex.

## 2017-05-01 ENCOUNTER — OFFICE VISIT (OUTPATIENT)
Dept: GASTROENTEROLOGY | Facility: CLINIC | Age: 57
End: 2017-05-01
Payer: MEDICARE

## 2017-05-01 VITALS
WEIGHT: 149.25 LBS | HEIGHT: 71 IN | HEART RATE: 64 BPM | BODY MASS INDEX: 20.9 KG/M2 | DIASTOLIC BLOOD PRESSURE: 77 MMHG | SYSTOLIC BLOOD PRESSURE: 131 MMHG

## 2017-05-01 DIAGNOSIS — D50.9 IRON DEFICIENCY ANEMIA, UNSPECIFIED IRON DEFICIENCY ANEMIA TYPE: Primary | ICD-10-CM

## 2017-05-01 PROCEDURE — 3075F SYST BP GE 130 - 139MM HG: CPT | Mod: S$GLB,,, | Performed by: INTERNAL MEDICINE

## 2017-05-01 PROCEDURE — 1160F RVW MEDS BY RX/DR IN RCRD: CPT | Mod: S$GLB,,, | Performed by: INTERNAL MEDICINE

## 2017-05-01 PROCEDURE — 99999 PR PBB SHADOW E&M-EST. PATIENT-LVL II: CPT | Mod: PBBFAC,,, | Performed by: INTERNAL MEDICINE

## 2017-05-01 PROCEDURE — 99214 OFFICE O/P EST MOD 30 MIN: CPT | Mod: S$GLB,,, | Performed by: INTERNAL MEDICINE

## 2017-05-01 PROCEDURE — 3078F DIAST BP <80 MM HG: CPT | Mod: S$GLB,,, | Performed by: INTERNAL MEDICINE

## 2017-05-01 NOTE — MR AVS SNAPSHOT
[facility]  [address]  [place] [address]  Phone: [phone]  Fax: [phone]                  Micheal Chirinos Jr.   2017 9:30 AM   Office Visit    Description:  Male : 1960   Provider:  Jaswant Godfrey MD   Department:  [facility]           Diagnoses this Visit        Comments    Iron deficiency anemia, unspecified iron deficiency anemia type    -  Primary            To Do List           Future Appointments        Provider Department Dept Phone    2017 10:00 AM MD [facility] [phone]      Goals (5 Years of Data)     Increase physical activity       [facility]     [facility] Nurse Care Line -  Assistance  Unless otherwise directed by your provider, please contact [facility], our nurse care line that is available for  assistance.     Registered nurses in the [facility] Center provide: appointment scheduling, clinical advisement, health education, and other advisory services.  Call: [phone] (toll free)               Medications           Message regarding Medications     Verify the changes and/or additions to your medication regime listed below are the same as discussed with your clinician today.  If any of these changes or additions are incorrect, please notify your healthcare provider.             Verify that the below list of medications is an accurate representation of the medications you are currently taking.  If none reported, the list may be blank. If incorrect, please contact your healthcare provider. Carry this list with you in case of emergency.           Current Medications     cetirizine (ZYRTEC) 10 mg TbDL 1 Tablet(s) Oral PRN Every day.      cyclobenzaprine (FLEXERIL) 10 MG tablet Take 1 tablet (10 mg total) by mouth 2 (two) times daily.    duloxetine (CYMBALTA) 30 MG capsule Take 1 capsule (30 mg total) by mouth once daily.    hydrocodone-acetaminophen  "10-325mg (NORCO)  mg Tab Take 1 tablet by mouth every 6 (six) hours as needed (msk pain).    hydrocodone-acetaminophen 10-325mg (NORCO)  mg Tab Starting on May 18, 2017. Take 1 tablet by mouth every 6 (six) hours as needed (msk pain).    hydrocodone-acetaminophen 10-325mg (NORCO)  mg Tab Starting on Jun 18, 2017. Take 1 tablet by mouth every 6 (six) hours as needed (msk pain).    omeprazole (PRILOSEC) 20 MG capsule Take 1 capsule (20 mg total) by mouth 2 (two) times daily.    sulfaSALAzine (AZULFIDINE) 500 MG TbEC Take 3 tablets (1,500 mg total) by mouth 2 (two) times daily.           Clinical Reference Information           Your Vitals Were     BP Pulse Height Weight BMI    131/77 64 5' 11" (1.803 m) 67.7 kg (149 lb 4 oz) 20.82 kg/m2      Blood Pressure          Most Recent Value    BP  131/77      Allergies as of 5/1/2017     No Known Drug Allergies      Immunizations Administered on Date of Encounter - 5/1/2017     None      Orders Placed During Today's Visit      Normal Orders This Visit    Case request GI: COLONOSCOPY     Future Labs/Procedures Expected by Expires    H.Pylori Antibody IgG  5/1/2017 6/30/2018      Language Assistance Services     ATTENTION: Language assistance services are available, free of charge. Please call [phone].      ATENCIÓN: Si habla español, tiene a vazquez disposición servicios gratuitos de asistencia lingüística. Llame al [phone].     LIBBY Ý: N?u b?n nói Ti?ng Vi?t, có các d?ch v? h? tr? ngôn ng? mi?n phí dành cho b?n. G?i s? [phone].         [facility] complies with applicable Federal civil rights laws and does not discriminate on the basis of race, color, national origin, age, disability, or sex.        "

## 2017-05-01 NOTE — LETTER
May 1, 2017      Cr Navas MD  [address] [facility] [address]           [facility]  [address]  [facility] [address]  Phone: [phone]  Fax: [phone]          Patient: Micheal Chirinos Jr.   MR Number: [id number]   YOB: 1960   Date of Visit: 5/1/2017       Dear Dr. Cr Navas:    Thank you for referring Micheal Chirinos to me for evaluation. Attached you will find relevant portions of my assessment and plan of care.    If you have questions, please do not hesitate to call me. I look forward to following Micheal Chirinos along with you.    Sincerely,    Jaswant Godfrey MD    Enclosure  CC:  No Recipients    If you would like to receive this communication electronically, please contact [email or web address] or [phone] to request more information on [employer] [facility] access.    For providers and/or their staff who would like to refer a patient to [facility], please contact us through our one-stop-shop provider referral line, [facility], at [phone].    If you feel you have received this communication in error or would no longer like to receive these types of communications, please e-mail [email or web address]

## 2017-05-01 NOTE — PROGRESS NOTES
[facility] Established Patient Visit    Reason for Visit:  The encounter diagnosis was Iron deficiency anemia, unspecified iron deficiency anemia type.    PCP: Azikiwe K Lombard    HPI:  This is a 56 y.o. male here for Fe Def anemia.  Did see some bright red blood a few weeks ago which resolved withd rinking some almond milk, turneric, and ginger.  He has been on and off celebrex.  Takes norco for AS.  Denies any other GI symptoms or complaints.    ROS:  Constitutional: No fevers, chills, weight loss  ENT: No allergies  CV: No chest pain  Pulm: No cough, shortness of breath  Ophtho: No vision changes  GI: see HPI  Derm: No rash  Heme: No lymphadenopathy  MSK: + arthritis  : No dysuria, no hematuria  Endo: No hot or cold intolerance  Neuro: No syncope, no seizure  Psych: No anxiety, no depression    PMHX:  has a past medical history of Degenerative disc disease; Hemorrhage of rectum and anus; Hypertension; Idiopathic ankylosing spondylitis (2000); and Joint pain.    PSHX:  has a past surgical history that includes Mouth surgery.    The patient's social and family histories were reviewed by me and updated in the appropriate section of the electronic medical record.    Review of patient's allergies indicates:   Allergen Reactions    No known drug allergies        Current Outpatient Prescriptions   Medication Sig Dispense Refill    cetirizine (ZYRTEC) 10 mg TbDL 1 Tablet(s) Oral PRN Every day.        cyclobenzaprine (FLEXERIL) 10 MG tablet Take 1 tablet (10 mg total) by mouth 2 (two) times daily. 180 tablet 1    duloxetine (CYMBALTA) 30 MG capsule Take 1 capsule (30 mg total) by mouth once daily. 30 capsule 5    hydrocodone-acetaminophen 10-325mg (NORCO)  mg Tab Take 1 tablet by mouth every 6 (six) hours as needed (msk pain). 120 tablet 0    [START ON 5/18/2017] hydrocodone-acetaminophen 10-325mg (NORCO)  mg Tab Take 1 tablet by mouth every 6 (six) hours as needed (msk pain). 120  "tablet 0    [START ON 6/18/2017] hydrocodone-acetaminophen 10-325mg (NORCO)  mg Tab Take 1 tablet by mouth every 6 (six) hours as needed (msk pain). 120 tablet 0    omeprazole (PRILOSEC) 20 MG capsule Take 1 capsule (20 mg total) by mouth 2 (two) times daily. 180 capsule 1    sulfaSALAzine (AZULFIDINE) 500 MG TbEC Take 3 tablets (1,500 mg total) by mouth 2 (two) times daily. 540 tablet 0     No current facility-administered medications for this visit.          Objective Findings:    Vital Signs:  /77  Pulse 64  Ht 5' 11" (1.803 m)  Wt 67.7 kg (149 lb 4 oz)  BMI 20.82 kg/m2 Body mass index is 20.82 kg/(m^2).    Physical Exam:  General Appearance: Well appearing in no acute distress  Head:   Normocephalic, without obvious abnormality  Eyes:    No scleral icterus, EOMI  Throat: Lips, mucosa, and tongue normal; teeth and gums normal  Lungs: CTA bilaterally in anterior and posterior fields, no wheezes, no crackles.  Heart:  Regular rate and rhythm, S1, S2 normal, no murmurs heard  Abdomen: Soft, non tender, non distended with positive bowel sounds in all four quadrants. No hepatosplenomegaly, ascites, or mass  Extremities:    2+ pulses, no clubbing, cyanosis or edema  Skin: No rash  Neurologic: CN II-XII intact, no asterixis      Labs:  Lab Results   Component Value Date    WBC 9.65 04/20/2017    HGB 9.5 (L) 04/20/2017    HCT 30.2 (L) 04/20/2017     (H) 04/20/2017    CHOL 144 07/26/2016    TRIG 75 07/26/2016    HDL 32 (L) 07/26/2016    ALT 8 (L) 04/20/2017    AST 10 04/20/2017     04/20/2017    K 4.2 04/20/2017     04/20/2017    CREATININE 0.8 04/20/2017    BUN 12 04/20/2017    CO2 25 04/20/2017    TSH 0.927 03/02/2010    PSA 2.0 03/23/2016    INR 1.2 03/02/2010       Endoscopy:   Colon 2013 - polyp partially removed    Imaging:      Assessment:    1. Iron deficiency anemia, unspecified iron deficiency anemia type      His anemia has been stable and he has known hemorrhoids. He did " have a polyp partially removed and no adenoma seen with recommended repeat 3-5 years (now)    Recommendations:  1. Will do colonoscopy  2.H pylori IgG. I think an EGD would be very hard with his limited neck mobility    No Follow-up on file.    Order summary:  Orders Placed This Encounter   Procedures    H.Pylori Antibody IgG       Thank you for allowing me to participate in the care of Micheal Godfrey MD

## 2017-05-13 DIAGNOSIS — M62.838 MUSCLE SPASMS OF NECK: ICD-10-CM

## 2017-05-13 DIAGNOSIS — M54.2 NECK PAIN: ICD-10-CM

## 2017-05-13 DIAGNOSIS — M45.9 AS (ANKYLOSING SPONDYLITIS): ICD-10-CM

## 2017-05-13 DIAGNOSIS — Z74.09 IMPAIRED FUNCTIONAL MOBILITY, BALANCE, GAIT, AND ENDURANCE: ICD-10-CM

## 2017-05-13 DIAGNOSIS — M25.561 RIGHT KNEE PAIN: ICD-10-CM

## 2017-05-13 DIAGNOSIS — M54.9 CHRONIC BACK PAIN: ICD-10-CM

## 2017-05-13 DIAGNOSIS — M25.552 LEFT HIP PAIN: ICD-10-CM

## 2017-05-13 DIAGNOSIS — M19.019 SHOULDER ARTHRITIS: ICD-10-CM

## 2017-05-13 DIAGNOSIS — G89.29 CHRONIC BACK PAIN: ICD-10-CM

## 2017-05-14 RX ORDER — CYCLOBENZAPRINE HCL 10 MG
TABLET ORAL
Qty: 90 TABLET | Refills: 0 | Status: SHIPPED | OUTPATIENT
Start: 2017-05-14 | End: 2017-07-18 | Stop reason: SDUPTHER

## 2017-06-02 ENCOUNTER — TELEPHONE (OUTPATIENT)
Dept: ENDOSCOPY | Facility: HOSPITAL | Age: 57
End: 2017-06-02

## 2017-06-02 NOTE — TELEPHONE ENCOUNTER
Contacted patient to schedule a colonoscopy. Patient stated he is not ready to schedule, but will call back when ready.

## 2017-07-01 ENCOUNTER — OFFICE VISIT (OUTPATIENT)
Dept: FAMILY MEDICINE | Facility: CLINIC | Age: 57
End: 2017-07-01
Payer: MEDICARE

## 2017-07-01 VITALS
TEMPERATURE: 98 F | DIASTOLIC BLOOD PRESSURE: 62 MMHG | WEIGHT: 149.25 LBS | OXYGEN SATURATION: 99 % | HEIGHT: 71 IN | BODY MASS INDEX: 20.9 KG/M2 | SYSTOLIC BLOOD PRESSURE: 108 MMHG | HEART RATE: 65 BPM

## 2017-07-01 DIAGNOSIS — K12.2 ORAL ABSCESS: Primary | ICD-10-CM

## 2017-07-01 PROCEDURE — 99213 OFFICE O/P EST LOW 20 MIN: CPT | Mod: S$GLB,,, | Performed by: NURSE PRACTITIONER

## 2017-07-01 PROCEDURE — 99999 PR PBB SHADOW E&M-EST. PATIENT-LVL III: CPT | Mod: PBBFAC,,,

## 2017-07-01 RX ORDER — CEPHALEXIN 500 MG/1
500 CAPSULE ORAL EVERY 6 HOURS
Qty: 28 CAPSULE | Refills: 0 | Status: SHIPPED | OUTPATIENT
Start: 2017-07-01 | End: 2017-07-08

## 2017-07-01 NOTE — PROGRESS NOTES
Subjective:       Patient ID: Micheal Chirinos Jr. is a 56 y.o. male.    Chief Complaint: Facial Swelling (x1d)    Micheal Chirinos Jr. is a 56 y.o. male  He is here today 2/2 facial swelling x 1 day.  Denies pain to site; no injury or fall.  He can feel a bump on inside of his mouth.  Denies a took ache/pain.  Ok to eat and drink  Took a pain pill today.        Past Medical History:  No date: Degenerative disc disease  No date: Hemorrhage of rectum and anus  No date: Hypertension  2000: Idiopathic ankylosing spondylitis  No date: Joint pain    Past Surgical History:  No date: MOUTH SURGERY    Review of patient's family history indicates:    Arthritis                      Mother                    Kidney disease                 Father                    No Known Problems              Sister                    No Known Problems              Brother                   No Known Problems              Maternal Aunt             No Known Problems              Maternal Uncle            No Known Problems              Paternal Aunt             No Known Problems              Paternal Uncle            No Known Problems              Maternal Grandmother      No Known Problems              Maternal Grandfather      No Known Problems              Paternal Grandmother      No Known Problems              Paternal Grandfather      Celiac disease                 Neg Hx                    Cirrhosis                      Neg Hx                    Colon cancer                   Neg Hx                    Colon polyps                   Neg Hx                    Crohn's disease                Neg Hx                    Cystic fibrosis                Neg Hx                    Esophageal cancer              Neg Hx                    Hemochromatosis                Neg Hx                    Inflammatory bowel disease     Neg Hx                    Irritable bowel syndrome       Neg Hx                    Liver cancer                    Neg Hx                    Liver disease                  Neg Hx                    Rectal cancer                  Neg Hx                    Stomach cancer                 Neg Hx                    Ulcerative colitis             Neg Hx                    Jorge's disease               Neg Hx                    Melanoma                       Neg Hx                    Psoriasis                      Neg Hx                    Lupus                          Neg Hx                    Eczema                         Neg Hx                    Acne                           Neg Hx                    Amblyopia                      Neg Hx                    Blindness                      Neg Hx                    Cataracts                      Neg Hx                    Glaucoma                       Neg Hx                    Macular degeneration           Neg Hx                    Retinal detachment             Neg Hx                    Strabismus                     Neg Hx                    Cancer                         Neg Hx                    Diabetes                       Neg Hx                    Hypertension                   Neg Hx                    Stroke                         Neg Hx                    Thyroid disease                Neg Hx                      Social History    Marital status:              Spouse name:                       Years of education:                 Number of children:               Occupational History  Occupation          Employer            Comment               Disabled                                    Social History Main Topics    Smoking status: Never Smoker                                                                Smokeless tobacco: Never Used                        Alcohol use: Yes           0.0 oz/week       Comment: one's in the blue moon    Drug use: Yes           Frequency: 2.0 times per week       Types: Marijuana    Sexual activity: Not on file          Other  Topics            Concern    None on file    Social History Narrative    None on file        Allergies:  No known drug allergies    Medications:  Current Outpatient Prescriptions:   cyclobenzaprine (FLEXERIL) 10 MG tablet, Take 1 tablet (10 mg total) by mouth 2 (two) times daily., Disp: 180 tablet, Rfl: 1  cyclobenzaprine (FLEXERIL) 10 MG tablet, TAKE 1 TABLET BY MOUTH THREE TIMES DAILY AS NEEDED FOR MUSCLE SPASMS, Disp: 90 tablet, Rfl: 0  hydrocodone-acetaminophen 10-325mg (NORCO)  mg Tab, Take 1 tablet by mouth every 6 (six) hours as needed (msk pain)., Disp: 120 tablet, Rfl: 0  sulfaSALAzine (AZULFIDINE) 500 MG TbEC, Take 3 tablets (1,500 mg total) by mouth 2 (two) times daily., Disp: 540 tablet, Rfl: 0  cetirizine (ZYRTEC) 10 mg TbDL, 1 Tablet(s) Oral PRN Every day.  , Disp: , Rfl:   duloxetine (CYMBALTA) 30 MG capsule, Take 1 capsule (30 mg total) by mouth once daily., Disp: 30 capsule, Rfl: 5  omeprazole (PRILOSEC) 20 MG capsule, Take 1 capsule (20 mg total) by mouth 2 (two) times daily., Disp: 180 capsule, Rfl: 1            Review of Systems   Constitutional: Negative.  Negative for activity change, appetite change, fatigue and fever.   HENT: Positive for facial swelling and mouth sores. Negative for congestion, ear pain, nosebleeds, postnasal drip, rhinorrhea, sinus pressure, sore throat and trouble swallowing.    Eyes: Negative.  Negative for photophobia, redness, itching and visual disturbance.   Respiratory: Negative.  Negative for cough, chest tightness, shortness of breath and wheezing.    Cardiovascular: Negative for chest pain.   Gastrointestinal: Negative.  Negative for abdominal pain, constipation, diarrhea, nausea and vomiting.   Genitourinary: Negative for difficulty urinating and enuresis.   Musculoskeletal: Positive for arthralgias and back pain. Negative for gait problem, joint swelling, myalgias and neck stiffness.   Skin: Negative.  Negative for rash and wound.   Neurological:  Negative.  Negative for dizziness, seizures, syncope, weakness, light-headedness and headaches.   Psychiatric/Behavioral: Negative.  Negative for agitation, confusion, decreased concentration, self-injury, sleep disturbance and suicidal ideas. The patient is not nervous/anxious.        Objective:      Physical Exam   Nursing note and vitals reviewed.  Constitutional: He is oriented to person, place, and time. Vital signs are normal. He appears well-developed and well-nourished. He is active and cooperative.  Non-toxic appearance. He does not have a sickly appearance.   HENT:   Head: Normocephalic and atraumatic.       Right Ear: Hearing, tympanic membrane, external ear and ear canal normal. No foreign bodies. Tympanic membrane is not perforated, not erythematous, not retracted and not bulging.   Left Ear: Hearing, tympanic membrane, external ear and ear canal normal. No foreign bodies. Tympanic membrane is not perforated, not erythematous, not retracted and not bulging.   Nose: Nose normal. No rhinorrhea or nose lacerations. No epistaxis.  No foreign bodies.   Mouth/Throat: Uvula is midline, oropharynx is clear and moist and mucous membranes are normal. He does not have dentures. Oral lesions present. Abnormal dentition. No dental abscesses, uvula swelling or lacerations. No oropharyngeal exudate.   (+) induration noted to left inner check; no pustular/fluctuantion noted.  No dental abscess noted.      Eyes: Conjunctivae, EOM and lids are normal. Pupils are equal, round, and reactive to light. No scleral icterus.   Neck: Trachea normal, normal range of motion and full passive range of motion without pain. Neck supple. No JVD present. No tracheal deviation present.   Cardiovascular: Normal rate, regular rhythm, S1 normal, S2 normal and normal heart sounds.    Pulmonary/Chest: Effort normal and breath sounds normal. No respiratory distress. He exhibits no tenderness.   Abdominal: Soft. Normal appearance and bowel  sounds are normal. There is no hepatosplenomegaly. There is no tenderness. There is no rebound, no guarding and no CVA tenderness.   Musculoskeletal: Normal range of motion.   Lymphadenopathy:        Head (right side): No submental, no submandibular, no tonsillar, no preauricular, no posterior auricular and no occipital adenopathy present.        Head (left side): No submental, no submandibular, no tonsillar, no preauricular, no posterior auricular and no occipital adenopathy present.     He has no cervical adenopathy.        Right: No supraclavicular adenopathy present.        Left: No supraclavicular adenopathy present.   Neurological: He is alert and oriented to person, place, and time. He has normal strength.   Skin: Skin is warm, dry and intact.     Psychiatric: He has a normal mood and affect. His behavior is normal. Thought content normal.       Assessment:       1. Oral abscess        Plan:         I spent 20 minutes with the patient of which more than half was spent in direct consultation with the patient in regards to our treatment and plan.    Education and recommendations of today's plan of care including home remedies.  Discussed findings  Recommend seeing dentist for oral evaluation  Keflex every 6 hours x 7 days;  Warm compress; warm salt water gargles.  Voices understanding

## 2017-07-03 ENCOUNTER — TELEPHONE (OUTPATIENT)
Dept: FAMILY MEDICINE | Facility: CLINIC | Age: 57
End: 2017-07-03

## 2017-07-03 ENCOUNTER — TELEPHONE (OUTPATIENT)
Dept: UROLOGY | Facility: CLINIC | Age: 57
End: 2017-07-03

## 2017-07-03 NOTE — TELEPHONE ENCOUNTER
Jenni/patient's wife advised, per Dr. Lombard, that pus is reabsorbed in white blood cells and then expelled and bacteria is killed off by antibiotics.  Would like to know how long patient's face will be swollen.  Advised patient that patient's facial swelling should go down gradually as antibiotics are taken.  Patient's wife verbalized understanding.

## 2017-07-03 NOTE — TELEPHONE ENCOUNTER
----- Message from Mari Alejandro sent at 7/3/2017  8:24 AM CDT -----  Contact: spouse-julio Arteaga called concerning drainage. She would like to know where the drainage/infection from the pt's jaw is draining. [phone]

## 2017-07-03 NOTE — TELEPHONE ENCOUNTER
----- Message from Geetha Maya sent at 7/3/2017  8:40 AM CDT -----  Contact: wife   Jenni   [phone]  Wife is requesting to speak to you regarding her  was seen at  on 7-1-17, she wants to speak to you about drainage. Pls call wife Jenni [phone]. Thanks.......Janae

## 2017-07-18 ENCOUNTER — OFFICE VISIT (OUTPATIENT)
Dept: PHYSICAL MEDICINE AND REHAB | Facility: CLINIC | Age: 57
End: 2017-07-18
Payer: MEDICARE

## 2017-07-18 VITALS
WEIGHT: 149.69 LBS | DIASTOLIC BLOOD PRESSURE: 61 MMHG | SYSTOLIC BLOOD PRESSURE: 107 MMHG | HEART RATE: 64 BPM | BODY MASS INDEX: 20.96 KG/M2 | HEIGHT: 71 IN

## 2017-07-18 DIAGNOSIS — M45.9 ANKYLOSING SPONDYLITIS, UNSPECIFIED SITE OF SPINE: ICD-10-CM

## 2017-07-18 DIAGNOSIS — M19.019 SHOULDER ARTHRITIS: ICD-10-CM

## 2017-07-18 DIAGNOSIS — M54.40 CHRONIC LOW BACK PAIN WITH SCIATICA, SCIATICA LATERALITY UNSPECIFIED, UNSPECIFIED BACK PAIN LATERALITY: ICD-10-CM

## 2017-07-18 DIAGNOSIS — M25.561 RIGHT KNEE PAIN, UNSPECIFIED CHRONICITY: ICD-10-CM

## 2017-07-18 DIAGNOSIS — G89.29 CHRONIC BILATERAL LOW BACK PAIN WITHOUT SCIATICA: ICD-10-CM

## 2017-07-18 DIAGNOSIS — M54.2 NECK PAIN: ICD-10-CM

## 2017-07-18 DIAGNOSIS — G89.29 CHRONIC LOW BACK PAIN WITH SCIATICA, SCIATICA LATERALITY UNSPECIFIED, UNSPECIFIED BACK PAIN LATERALITY: ICD-10-CM

## 2017-07-18 DIAGNOSIS — M62.838 MUSCLE SPASMS OF NECK: Primary | ICD-10-CM

## 2017-07-18 DIAGNOSIS — M25.551 HIP PAIN, BILATERAL: ICD-10-CM

## 2017-07-18 DIAGNOSIS — G89.29 CHRONIC PAIN OF BOTH KNEES: ICD-10-CM

## 2017-07-18 DIAGNOSIS — M25.562 CHRONIC PAIN OF BOTH KNEES: ICD-10-CM

## 2017-07-18 DIAGNOSIS — M25.561 CHRONIC PAIN OF RIGHT KNEE: ICD-10-CM

## 2017-07-18 DIAGNOSIS — G89.29 CHRONIC PAIN OF RIGHT KNEE: ICD-10-CM

## 2017-07-18 DIAGNOSIS — M25.552 HIP PAIN, BILATERAL: ICD-10-CM

## 2017-07-18 DIAGNOSIS — M25.561 CHRONIC PAIN OF BOTH KNEES: ICD-10-CM

## 2017-07-18 DIAGNOSIS — M54.50 CHRONIC BILATERAL LOW BACK PAIN WITHOUT SCIATICA: ICD-10-CM

## 2017-07-18 DIAGNOSIS — M25.552 LEFT HIP PAIN: ICD-10-CM

## 2017-07-18 DIAGNOSIS — Z74.09 IMPAIRED FUNCTIONAL MOBILITY, BALANCE, GAIT, AND ENDURANCE: ICD-10-CM

## 2017-07-18 DIAGNOSIS — M45.0 ANKYLOSING SPONDYLITIS OF MULTIPLE SITES IN SPINE: ICD-10-CM

## 2017-07-18 PROCEDURE — 99999 PR PBB SHADOW E&M-EST. PATIENT-LVL III: CPT | Mod: PBBFAC,,, | Performed by: PHYSICAL MEDICINE & REHABILITATION

## 2017-07-18 PROCEDURE — 99499 UNLISTED E&M SERVICE: CPT | Mod: S$PBB,,, | Performed by: PHYSICAL MEDICINE & REHABILITATION

## 2017-07-18 PROCEDURE — 99214 OFFICE O/P EST MOD 30 MIN: CPT | Mod: S$GLB,,, | Performed by: PHYSICAL MEDICINE & REHABILITATION

## 2017-07-18 RX ORDER — HYDROCODONE BITARTRATE AND ACETAMINOPHEN 10; 325 MG/1; MG/1
1 TABLET ORAL EVERY 6 HOURS PRN
Qty: 100 TABLET | Refills: 0 | Status: SHIPPED | OUTPATIENT
Start: 2017-09-18 | End: 2017-10-18 | Stop reason: SDUPTHER

## 2017-07-18 RX ORDER — CYCLOBENZAPRINE HCL 10 MG
10 TABLET ORAL 3 TIMES DAILY PRN
Qty: 90 TABLET | Refills: 11 | Status: SHIPPED | OUTPATIENT
Start: 2017-07-18 | End: 2018-01-01 | Stop reason: SDUPTHER

## 2017-07-18 RX ORDER — HYDROCODONE BITARTRATE AND ACETAMINOPHEN 10; 325 MG/1; MG/1
1 TABLET ORAL EVERY 6 HOURS PRN
Qty: 100 TABLET | Refills: 0 | Status: SHIPPED | OUTPATIENT
Start: 2017-08-18 | End: 2017-09-17

## 2017-07-18 RX ORDER — HYDROCODONE BITARTRATE AND ACETAMINOPHEN 10; 325 MG/1; MG/1
1 TABLET ORAL EVERY 6 HOURS PRN
Qty: 120 TABLET | Refills: 0 | Status: SHIPPED | OUTPATIENT
Start: 2017-07-18 | End: 2017-08-17

## 2017-07-18 NOTE — PROGRESS NOTES
Subjective:       Patient ID: Micheal Chirinos Jr. is a 56 y.o. male.    Chief Complaint: Neck Pain and Knee Pain    Wrist Pain    Pertinent negatives include no fever.   Hip Pain      Back Pain   Pertinent negatives include no abdominal pain, chest pain, fever or headaches.   Neck Pain    Pertinent negatives include no chest pain, fever, headaches or trouble swallowing.   Hand Pain    Pertinent negatives include no chest pain.   Knee Pain      Shoulder Pain    Pertinent negatives include no fever or headaches.   Mr. Chirinos, returns to clinic for multiple MSK pains, chronic neck, and back pain.  He has a severe ankylosing spondylitis with axial neck and back  pain and upper thoracic and lower pelvic girdle pain.  LCV 04/18/17.   He states that he has his good, and bad days.   Today, he complains also about chronic neck, shoulders, low back pain,and Rt knee pain.     #1 Neck pain: current pain is 4/10,and the worst  10/10.  Neck pain is present t the both side of the neck R > L, it feels as muscle spasms/ tightness,  It radiates to Right  shoulders, and shoulder blade  not to arm/hand.   No numbness, paraesthesias in arms/ hands.   Neck pain is on/ off, worse at night time, sometimes he has trouble placing head on pillow.   Neck pain is running to shoulder blades, not to arms, pain is more localized in joints, on right side,   Right shoulder, elbow, swell, when he does more exercise, and it is always on right side.     #2 Low  back pain, current pain is 4, worst pain lately 7/10.   No radiation to buttocks, legs, but back of legs are very tight.    #3 Groin pain, in both groin.   Current pain is 2 , worst pain was 10.   He states that groin pain started > 2 months ago, and was so severe that he could not walk, he states that he could not raise leg.  Pain was worst early in morning, could not walk, was very stiff.  Pain in groin was worse with walking, and moving.     #4 Rt wrist pain.   Current pain is 5, the  worst pain is 5.  Rt hand and wrist were swollen, and now it feels much better.   Pain is mainly in joint, and was hurting him to move hand, cannot hold anything heavier > 2 lbs.      He has a severe ankylosing spondylitis with axial neck and back  pain and upper thoracic and lower pelvic girdle pain.   He takes hydrocodone 10 mg and he usually takes three tablets a day; however, on bad days he takes  four tablets a day.    He failed almost all muscle relaxants Dantrium secondary to making him tired, Flexeril, makes him sleepy. (although still taking it).   He was given Celebrex by dr. Navas, but he is afraidl secondary to GI bleed, a year ago.  He is taking only Sulfasalazine and Celebrex 200 daily for his severe AS, that is always with high infllamatory markers,    Patient completed Physical therapy for neck pain, at [facility].   He does his own stretching,and goes 3x/week to fitness.  Exercises regularly, and stretches his neck/upper/lower spine, lying prone on table for 20-30 minutes daily.     He is on chronic pain management with me, takes Hydrocodone 10/325 mg , 3-4x/day, and Flexeril at bedtime for several years ,   with no need for increase dose, nor frequency. States that hydrocodone helps him to move, with reasonable pain control.   Here for follow up, and chronic pain management with opiates.  Past Medical History:   Diagnosis Date    Degenerative disc disease     Hemorrhage of rectum and anus     Hypertension     Idiopathic ankylosing spondylitis 2000    Joint pain        Past Surgical History:   Procedure Laterality Date    MOUTH SURGERY         Family History   Problem Relation Age of Onset    Arthritis Mother     Kidney disease Father     No Known Problems Sister     No Known Problems Brother     No Known Problems Maternal Aunt     No Known Problems Maternal Uncle     No Known Problems Paternal Aunt     No Known Problems Paternal Uncle     No Known Problems Maternal  Grandmother     No Known Problems Maternal Grandfather     No Known Problems Paternal Grandmother     No Known Problems Paternal Grandfather     Celiac disease Neg Hx     Cirrhosis Neg Hx     Colon cancer Neg Hx     Colon polyps Neg Hx     Crohn's disease Neg Hx     Cystic fibrosis Neg Hx     Esophageal cancer Neg Hx     Hemochromatosis Neg Hx     Inflammatory bowel disease Neg Hx     Irritable bowel syndrome Neg Hx     Liver cancer Neg Hx     Liver disease Neg Hx     Rectal cancer Neg Hx     Stomach cancer Neg Hx     Ulcerative colitis Neg Hx     Jorge's disease Neg Hx     Melanoma Neg Hx     Psoriasis Neg Hx     Lupus Neg Hx     Eczema Neg Hx     Acne Neg Hx     Amblyopia Neg Hx     Blindness Neg Hx     Cataracts Neg Hx     Glaucoma Neg Hx     Macular degeneration Neg Hx     Retinal detachment Neg Hx     Strabismus Neg Hx     Cancer Neg Hx     Diabetes Neg Hx     Hypertension Neg Hx     Stroke Neg Hx     Thyroid disease Neg Hx        Social History     Social History    Marital status:      Spouse name: N/A    Number of children: N/A    Years of education: N/A     Occupational History    Disabled      Social History Main Topics    Smoking status: Never Smoker    Smokeless tobacco: Never Used    Alcohol use 0.0 oz/week      Comment: one's in the blue moon    Drug use:      Frequency: 2.0 times per week     Types: Marijuana    Sexual activity: Not Asked     Other Topics Concern    None     Social History Narrative    None       Current Outpatient Prescriptions   Medication Sig Dispense Refill    cetirizine (ZYRTEC) 10 mg TbDL 1 Tablet(s) Oral PRN Every day.        cyclobenzaprine (FLEXERIL) 10 MG tablet Take 1 tablet (10 mg total) by mouth 3 (three) times daily as needed. 90 tablet 11    hydrocodone-acetaminophen 10-325mg (NORCO)  mg Tab Take 1 tablet by mouth every 6 (six) hours as needed (msk pain). 120 tablet 0    [START ON 8/18/2017]  hydrocodone-acetaminophen 10-325mg (NORCO)  mg Tab Take 1 tablet by mouth every 6 (six) hours as needed for Pain (msk pain). 100 tablet 0    [START ON 9/18/2017] hydrocodone-acetaminophen 10-325mg (NORCO)  mg Tab Take 1 tablet by mouth every 6 (six) hours as needed (msk pain). 100 tablet 0    omeprazole (PRILOSEC) 20 MG capsule Take 1 capsule (20 mg total) by mouth 2 (two) times daily. 180 capsule 1    sulfaSALAzine (AZULFIDINE) 500 MG TbEC Take 3 tablets (1,500 mg total) by mouth 2 (two) times daily. 540 tablet 0     No current facility-administered medications for this visit.        Review of patient's allergies indicates:   Allergen Reactions    No known drug allergies          Review of Systems   Constitutional: Negative for activity change, appetite change, chills, diaphoresis, fatigue, fever and unexpected weight change.   HENT: Negative for trouble swallowing and voice change.    Eyes: Negative for pain and visual disturbance.   Respiratory: Negative for chest tightness, shortness of breath and wheezing.    Cardiovascular: Negative for chest pain, palpitations and leg swelling.   Gastrointestinal: Negative for abdominal pain, constipation and diarrhea.   Genitourinary: Negative for difficulty urinating, frequency and urgency.   Musculoskeletal: Positive for back pain and neck pain. Negative for joint swelling, myalgias and neck stiffness. Arthralgias:  left knee hurts, no swelling, no warmth, no popping, clicking.    Skin: Negative for rash and wound.   Neurological: Negative for dizziness, facial asymmetry, speech difficulty, light-headedness and headaches.   Hematological: Negative for adenopathy.   Psychiatric/Behavioral: Negative for agitation, behavioral problems, confusion, decreased concentration, dysphoric mood and sleep disturbance.           Objective:      Physical Exam    MUSCULOSKELETAL EXAM:  Gait is wobbling on wide bases with rotating pelvis        secondary to bilateral SI  joints fusion and severe ankylosing spondylitis with      bamboo spine and moderate-to-severe kyphosis on the thoracic spine.    Center of gravity is moved forward         Walks with widely spread upper extremity to maintain dynamic balance.   He had no loss of balance.  Steps are wider and nanette is normal.   The patient is not able to stand upright touching with his occiput the wall.   There is  significantly increased distance from the occiput to the wall, more than 20 cm.    Cervical spine has very limited range of motion, especially extension that lacks more than 30-35 degrees from neutral.    Cervical spine flexion is still down to 50, extension again lacks 30-35 degrees.    There is still very limited motion in flexion and extension as well as a decreased motion in     rotation and side bending, probably 10-15 degrees bilaterally.    The patient has moderate  tenderness in paravertebral cervical musculature and upper   trapezius.  No tenderness in palpation of the cervical or thoracic spine.    Decreased active range of motion of bilateral upper and lower extremities.  Bilateral shoulders that have decreased abduction to 70 degrees, and flex forward to 80 degrees,  Decreased internal and external rotation to 30 degrees.  Decreased right elbow extension to 160-170 degrees.   FROM in both knees, no edema, no laxity, moderate  tenderness at medical plato line.   + pain with IR in both hips, decreased AROM in both hips.  Minimally decreased AROM in Rt wrist.  Muscle  strength is 5/5 throughout x4 extremities.    Minimal tenderness at right lateral epicondyle, with resisted wrist extension.  No joint laxity throughout x4  extremities.    Straight leg raising negative bilaterally.    Lumbar spine  flexion to 90 with holding on to table, extension to 0, side bending and     rotation decreased to about 20-25 degrees bilaterally.    Fused SI joints  without tenderness on palpation.                                              NEUROLOGIC:  Cranial nerves II through XII intact.    Deep tendon reflexes increased +3, symmetrical throughout x4 extremities.    Sensory is intact to light touch and pinprick throughout x4 extremities.    Tight posterior  hamstrings and increased tone in muscles in posterior hamstrings group and   gastrocnemius soleus group on Sobia scale, 1+/4.          Assessment:       1. Muscle spasms of neck    2. Neck pain    3. Ankylosing spondylitis of multiple sites in spine    4. Chronic low back pain with sciatica, sciatica laterality unspecified, unspecified back pain laterality    5. Right knee pain, unspecified chronicity    6. Ankylosing spondylitis, unspecified site of spine    7. Chronic pain of right knee    8. Chronic bilateral low back pain without sciatica    9. Chronic pain of both knees    10. Hip pain, bilateral    11. Shoulder arthritis    12. Left hip pain    13. Impaired functional mobility, balance, gait, and endurance        Plan:       Muscle spasms of neck  -     hydrocodone-acetaminophen 10-325mg (NORCO)  mg Tab; Take 1 tablet by mouth every 6 (six) hours as needed (msk pain).  Dispense: 120 tablet; Refill: 0  -     hydrocodone-acetaminophen 10-325mg (NORCO)  mg Tab; Take 1 tablet by mouth every 6 (six) hours as needed for Pain (msk pain).  Dispense: 100 tablet; Refill: 0  -     hydrocodone-acetaminophen 10-325mg (NORCO)  mg Tab; Take 1 tablet by mouth every 6 (six) hours as needed (msk pain).  Dispense: 100 tablet; Refill: 0  -     cyclobenzaprine (FLEXERIL) 10 MG tablet; Take 1 tablet (10 mg total) by mouth 3 (three) times daily as needed.  Dispense: 90 tablet; Refill: 11    Neck pain  -     hydrocodone-acetaminophen 10-325mg (NORCO)  mg Tab; Take 1 tablet by mouth every 6 (six) hours as needed (msk pain).  Dispense: 120 tablet; Refill: 0  -     hydrocodone-acetaminophen 10-325mg (NORCO)  mg Tab; Take 1 tablet by mouth every 6 (six) hours as needed for Pain  (msk pain).  Dispense: 100 tablet; Refill: 0  -     hydrocodone-acetaminophen 10-325mg (NORCO)  mg Tab; Take 1 tablet by mouth every 6 (six) hours as needed (msk pain).  Dispense: 100 tablet; Refill: 0  -     cyclobenzaprine (FLEXERIL) 10 MG tablet; Take 1 tablet (10 mg total) by mouth 3 (three) times daily as needed.  Dispense: 90 tablet; Refill: 11    Ankylosing spondylitis of multiple sites in spine  -     hydrocodone-acetaminophen 10-325mg (NORCO)  mg Tab; Take 1 tablet by mouth every 6 (six) hours as needed (msk pain).  Dispense: 120 tablet; Refill: 0  -     hydrocodone-acetaminophen 10-325mg (NORCO)  mg Tab; Take 1 tablet by mouth every 6 (six) hours as needed for Pain (msk pain).  Dispense: 100 tablet; Refill: 0  -     hydrocodone-acetaminophen 10-325mg (NORCO)  mg Tab; Take 1 tablet by mouth every 6 (six) hours as needed (msk pain).  Dispense: 100 tablet; Refill: 0  -     cyclobenzaprine (FLEXERIL) 10 MG tablet; Take 1 tablet (10 mg total) by mouth 3 (three) times daily as needed.  Dispense: 90 tablet; Refill: 11    Chronic low back pain with sciatica, sciatica laterality unspecified, unspecified back pain laterality  -     hydrocodone-acetaminophen 10-325mg (NORCO)  mg Tab; Take 1 tablet by mouth every 6 (six) hours as needed (msk pain).  Dispense: 120 tablet; Refill: 0  -     hydrocodone-acetaminophen 10-325mg (NORCO)  mg Tab; Take 1 tablet by mouth every 6 (six) hours as needed for Pain (msk pain).  Dispense: 100 tablet; Refill: 0  -     hydrocodone-acetaminophen 10-325mg (NORCO)  mg Tab; Take 1 tablet by mouth every 6 (six) hours as needed (msk pain).  Dispense: 100 tablet; Refill: 0  -     cyclobenzaprine (FLEXERIL) 10 MG tablet; Take 1 tablet (10 mg total) by mouth 3 (three) times daily as needed.  Dispense: 90 tablet; Refill: 11    Right knee pain, unspecified chronicity  -     hydrocodone-acetaminophen 10-325mg (NORCO)  mg Tab; Take 1 tablet by mouth  every 6 (six) hours as needed (msk pain).  Dispense: 120 tablet; Refill: 0  -     hydrocodone-acetaminophen 10-325mg (NORCO)  mg Tab; Take 1 tablet by mouth every 6 (six) hours as needed for Pain (msk pain).  Dispense: 100 tablet; Refill: 0  -     hydrocodone-acetaminophen 10-325mg (NORCO)  mg Tab; Take 1 tablet by mouth every 6 (six) hours as needed (msk pain).  Dispense: 100 tablet; Refill: 0  -     cyclobenzaprine (FLEXERIL) 10 MG tablet; Take 1 tablet (10 mg total) by mouth 3 (three) times daily as needed.  Dispense: 90 tablet; Refill: 11    Ankylosing spondylitis, unspecified site of spine  -     hydrocodone-acetaminophen 10-325mg (NORCO)  mg Tab; Take 1 tablet by mouth every 6 (six) hours as needed (msk pain).  Dispense: 120 tablet; Refill: 0  -     hydrocodone-acetaminophen 10-325mg (NORCO)  mg Tab; Take 1 tablet by mouth every 6 (six) hours as needed for Pain (msk pain).  Dispense: 100 tablet; Refill: 0  -     hydrocodone-acetaminophen 10-325mg (NORCO)  mg Tab; Take 1 tablet by mouth every 6 (six) hours as needed (msk pain).  Dispense: 100 tablet; Refill: 0  -     cyclobenzaprine (FLEXERIL) 10 MG tablet; Take 1 tablet (10 mg total) by mouth 3 (three) times daily as needed.  Dispense: 90 tablet; Refill: 11    Chronic pain of right knee  -     hydrocodone-acetaminophen 10-325mg (NORCO)  mg Tab; Take 1 tablet by mouth every 6 (six) hours as needed (msk pain).  Dispense: 120 tablet; Refill: 0  -     hydrocodone-acetaminophen 10-325mg (NORCO)  mg Tab; Take 1 tablet by mouth every 6 (six) hours as needed for Pain (msk pain).  Dispense: 100 tablet; Refill: 0  -     hydrocodone-acetaminophen 10-325mg (NORCO)  mg Tab; Take 1 tablet by mouth every 6 (six) hours as needed (msk pain).  Dispense: 100 tablet; Refill: 0  -     cyclobenzaprine (FLEXERIL) 10 MG tablet; Take 1 tablet (10 mg total) by mouth 3 (three) times daily as needed.  Dispense: 90 tablet; Refill:  11    Chronic bilateral low back pain without sciatica  -     hydrocodone-acetaminophen 10-325mg (NORCO)  mg Tab; Take 1 tablet by mouth every 6 (six) hours as needed (msk pain).  Dispense: 120 tablet; Refill: 0  -     hydrocodone-acetaminophen 10-325mg (NORCO)  mg Tab; Take 1 tablet by mouth every 6 (six) hours as needed for Pain (msk pain).  Dispense: 100 tablet; Refill: 0  -     hydrocodone-acetaminophen 10-325mg (NORCO)  mg Tab; Take 1 tablet by mouth every 6 (six) hours as needed (msk pain).  Dispense: 100 tablet; Refill: 0  -     cyclobenzaprine (FLEXERIL) 10 MG tablet; Take 1 tablet (10 mg total) by mouth 3 (three) times daily as needed.  Dispense: 90 tablet; Refill: 11    Chronic pain of both knees  -     hydrocodone-acetaminophen 10-325mg (NORCO)  mg Tab; Take 1 tablet by mouth every 6 (six) hours as needed (msk pain).  Dispense: 120 tablet; Refill: 0  -     hydrocodone-acetaminophen 10-325mg (NORCO)  mg Tab; Take 1 tablet by mouth every 6 (six) hours as needed for Pain (msk pain).  Dispense: 100 tablet; Refill: 0  -     hydrocodone-acetaminophen 10-325mg (NORCO)  mg Tab; Take 1 tablet by mouth every 6 (six) hours as needed (msk pain).  Dispense: 100 tablet; Refill: 0  -     cyclobenzaprine (FLEXERIL) 10 MG tablet; Take 1 tablet (10 mg total) by mouth 3 (three) times daily as needed.  Dispense: 90 tablet; Refill: 11    Hip pain, bilateral  -     hydrocodone-acetaminophen 10-325mg (NORCO)  mg Tab; Take 1 tablet by mouth every 6 (six) hours as needed (msk pain).  Dispense: 120 tablet; Refill: 0  -     hydrocodone-acetaminophen 10-325mg (NORCO)  mg Tab; Take 1 tablet by mouth every 6 (six) hours as needed for Pain (msk pain).  Dispense: 100 tablet; Refill: 0  -     hydrocodone-acetaminophen 10-325mg (NORCO)  mg Tab; Take 1 tablet by mouth every 6 (six) hours as needed (msk pain).  Dispense: 100 tablet; Refill: 0  -     cyclobenzaprine (FLEXERIL) 10 MG  tablet; Take 1 tablet (10 mg total) by mouth 3 (three) times daily as needed.  Dispense: 90 tablet; Refill: 11    Shoulder arthritis  -     cyclobenzaprine (FLEXERIL) 10 MG tablet; Take 1 tablet (10 mg total) by mouth 3 (three) times daily as needed.  Dispense: 90 tablet; Refill: 11    Left hip pain  -     cyclobenzaprine (FLEXERIL) 10 MG tablet; Take 1 tablet (10 mg total) by mouth 3 (three) times daily as needed.  Dispense: 90 tablet; Refill: 11    Impaired functional mobility, balance, gait, and endurance  -     cyclobenzaprine (FLEXERIL) 10 MG tablet; Take 1 tablet (10 mg total) by mouth 3 (three) times daily as needed.  Dispense: 90 tablet; Refill: 11      Patient with severe  AS, cx. with chronic neck and back pain associated with cervical and lumbar spondylosis and muscle spasms - tightness in paravertebral cervical  muscles, and hamstrings.   Today,c/o Rt wrist pain that is edematous, but per patient is getting better.   On Sulfasalazine,and Celebrex as needed ( takes rarely sec. To bleeding PUD) for AS.      1. Chronic pain management.    The patient will resume Hydrocodone 10/325 mg one p.o. 6 hours p.r.n.( #120 tabs, and agreeable to taper down to 100 tabs/mionth, given 2 refills).   Muscle relaxant Flexeril 10 mg , TID  (helps better with relaxation than other muscle relaxants that he tried).    Does not take Cymbalta .     2. Patient completed PT, and needs to restart regularly  gym/fitness .   Exercises regularly, and stretches his neck/upper/lower spine,   lying prone on table for 20-30 minutes daily.     3. Ankylosing Spondylitis, follows with Rheumatology, Dr. Navas.    Next appointment with Dr. Navas in August, when he will re check CRP/ ESR.   His right wrist pain and swelling, and groin, are most likely secondary to primary dz, AS, with multi joint involvement.   Reviewing labs, he has CRP, and ESR  chronically  elevated.   He is currently taking less than prescribed Sulfasalazine, 3 tabs (  1500 mg )  BID,  and also Celebrex rarely,  once /day.   He has h/o UGI bleed,a nd soon has an hollie. With GI.   He refused DMAR th.       RTC in 3 months.     Total time spent face to face with patient was 25 minutes.   More than 50% of that time was spent in counseling on diagnosis , prognosis and treatment options.   I also caunsel patient  on common and most usual side effect of prescribed medications.   Risk and benefits of opiates, possible risk of developing opiate dependence and tolerance, need of strict compliance with prescribed medications.  Pain contract, rules and obligations were discussed with patient in details.  He is aware that I would be the only doctor prescribing him pain medications and ED in a case of emergency.  I reviewed Primary care , and other specialty's notes to better coordinate patient's  care.   All questions were answered, and patient voiced understanding.

## 2017-08-04 ENCOUNTER — LAB VISIT (OUTPATIENT)
Dept: LAB | Facility: HOSPITAL | Age: 57
End: 2017-08-04
Attending: INTERNAL MEDICINE
Payer: MEDICARE

## 2017-08-04 DIAGNOSIS — Z79.1 NSAID LONG-TERM USE: ICD-10-CM

## 2017-08-04 LAB
ALBUMIN SERPL BCP-MCNC: 2.9 G/DL
ALP SERPL-CCNC: 108 U/L
ALT SERPL W/O P-5'-P-CCNC: 9 U/L
ANION GAP SERPL CALC-SCNC: 10 MMOL/L
AST SERPL-CCNC: 11 U/L
BASOPHILS # BLD AUTO: 0.05 K/UL
BASOPHILS NFR BLD: 0.6 %
BILIRUB SERPL-MCNC: 0.4 MG/DL
BUN SERPL-MCNC: 12 MG/DL
CALCIUM SERPL-MCNC: 9.3 MG/DL
CHLORIDE SERPL-SCNC: 104 MMOL/L
CO2 SERPL-SCNC: 27 MMOL/L
CREAT SERPL-MCNC: 0.9 MG/DL
CRP SERPL-MCNC: 108.6 MG/L
DIFFERENTIAL METHOD: ABNORMAL
EOSINOPHIL # BLD AUTO: 0.2 K/UL
EOSINOPHIL NFR BLD: 2.1 %
ERYTHROCYTE [DISTWIDTH] IN BLOOD BY AUTOMATED COUNT: 19.3 %
ERYTHROCYTE [SEDIMENTATION RATE] IN BLOOD BY WESTERGREN METHOD: 97 MM/HR
EST. GFR  (AFRICAN AMERICAN): >60 ML/MIN/1.73 M^2
EST. GFR  (NON AFRICAN AMERICAN): >60 ML/MIN/1.73 M^2
GLUCOSE SERPL-MCNC: 86 MG/DL
HCT VFR BLD AUTO: 31.2 %
HGB BLD-MCNC: 10 G/DL
LYMPHOCYTES # BLD AUTO: 2.7 K/UL
LYMPHOCYTES NFR BLD: 29.7 %
MCH RBC QN AUTO: 23.8 PG
MCHC RBC AUTO-ENTMCNC: 32.1 G/DL
MCV RBC AUTO: 74 FL
MONOCYTES # BLD AUTO: 1.1 K/UL
MONOCYTES NFR BLD: 12.8 %
NEUTROPHILS # BLD AUTO: 4.9 K/UL
NEUTROPHILS NFR BLD: 54.7 %
PLATELET # BLD AUTO: 450 K/UL
PMV BLD AUTO: 10.2 FL
POTASSIUM SERPL-SCNC: 4.2 MMOL/L
PROT SERPL-MCNC: 7.8 G/DL
RBC # BLD AUTO: 4.2 M/UL
SODIUM SERPL-SCNC: 141 MMOL/L
WBC # BLD AUTO: 8.91 K/UL

## 2017-08-04 PROCEDURE — 80053 COMPREHEN METABOLIC PANEL: CPT

## 2017-08-04 PROCEDURE — 86140 C-REACTIVE PROTEIN: CPT

## 2017-08-04 PROCEDURE — 85025 COMPLETE CBC W/AUTO DIFF WBC: CPT

## 2017-08-04 PROCEDURE — 85651 RBC SED RATE NONAUTOMATED: CPT

## 2017-08-04 PROCEDURE — 36415 COLL VENOUS BLD VENIPUNCTURE: CPT | Mod: PO

## 2017-08-07 ENCOUNTER — TELEPHONE (OUTPATIENT)
Dept: RHEUMATOLOGY | Facility: CLINIC | Age: 57
End: 2017-08-07

## 2017-08-07 NOTE — TELEPHONE ENCOUNTER
----- Message from Cr Navas MD sent at 8/4/2017  5:33 PM CDT -----  Please tell pt the sed rate remains highly elevated, as is the crp, both within usual range. The liver and kidney function tests remain normal. The cbc shows moderate and stable anemia. Would proceed with EGD and colonoscopy as ordered by Dr. Godfrey in [facility]

## 2017-08-08 ENCOUNTER — OFFICE VISIT (OUTPATIENT)
Dept: RHEUMATOLOGY | Facility: CLINIC | Age: 57
End: 2017-08-08
Payer: MEDICARE

## 2017-08-08 VITALS
BODY MASS INDEX: 20.99 KG/M2 | HEART RATE: 71 BPM | SYSTOLIC BLOOD PRESSURE: 117 MMHG | WEIGHT: 146.63 LBS | HEIGHT: 70 IN | DIASTOLIC BLOOD PRESSURE: 66 MMHG

## 2017-08-08 DIAGNOSIS — D50.0 IRON DEFICIENCY ANEMIA DUE TO CHRONIC BLOOD LOSS: ICD-10-CM

## 2017-08-08 DIAGNOSIS — K21.9 GASTROESOPHAGEAL REFLUX DISEASE WITHOUT ESOPHAGITIS: ICD-10-CM

## 2017-08-08 DIAGNOSIS — M45.0 ANKYLOSING SPONDYLITIS OF MULTIPLE SITES IN SPINE: Primary | ICD-10-CM

## 2017-08-08 DIAGNOSIS — E78.5 HYPERLIPIDEMIA, UNSPECIFIED HYPERLIPIDEMIA TYPE: ICD-10-CM

## 2017-08-08 PROCEDURE — 99499 UNLISTED E&M SERVICE: CPT | Mod: S$PBB,,, | Performed by: INTERNAL MEDICINE

## 2017-08-08 PROCEDURE — 3008F BODY MASS INDEX DOCD: CPT | Mod: S$GLB,,, | Performed by: INTERNAL MEDICINE

## 2017-08-08 PROCEDURE — 3074F SYST BP LT 130 MM HG: CPT | Mod: S$GLB,,, | Performed by: INTERNAL MEDICINE

## 2017-08-08 PROCEDURE — 99999 PR PBB SHADOW E&M-EST. PATIENT-LVL III: CPT | Mod: PBBFAC,,, | Performed by: INTERNAL MEDICINE

## 2017-08-08 PROCEDURE — 99214 OFFICE O/P EST MOD 30 MIN: CPT | Mod: S$GLB,,, | Performed by: INTERNAL MEDICINE

## 2017-08-08 PROCEDURE — 3078F DIAST BP <80 MM HG: CPT | Mod: S$GLB,,, | Performed by: INTERNAL MEDICINE

## 2017-08-08 RX ORDER — CELECOXIB 200 MG/1
200 CAPSULE ORAL DAILY
Qty: 90 CAPSULE | Refills: 1 | Status: SHIPPED | OUTPATIENT
Start: 2017-08-08 | End: 2017-01-01 | Stop reason: SDUPTHER

## 2017-08-08 RX ORDER — OMEPRAZOLE 20 MG/1
20 CAPSULE, DELAYED RELEASE ORAL 2 TIMES DAILY
Qty: 180 CAPSULE | Refills: 1 | Status: SHIPPED | OUTPATIENT
Start: 2017-08-08 | End: 2018-01-01 | Stop reason: SDUPTHER

## 2017-08-08 ASSESSMENT — ROUTINE ASSESSMENT OF PATIENT INDEX DATA (RAPID3)
TOTAL RAPID3 SCORE: 4.33
PSYCHOLOGICAL DISTRESS SCORE: 2.2
AM STIFFNESS SCORE: 1, YES
FATIGUE SCORE: 2
WHEN YOU AWAKENED IN THE MORNING OVER THE LAST WEEK, PLEASE INDICATE THE AMOUNT OF TIME IT TAKES UNTIL YOU ARE AS LIMBER AS YOU WILL BE FOR THE DAY: 1 HR
PATIENT GLOBAL ASSESSMENT SCORE: 4
MDHAQ FUNCTION SCORE: 1.2
PAIN SCORE: 5

## 2017-08-08 ASSESSMENT — ANKYLOSING SPONDYLITIS DISEASE ACTIVITY SCORE (ASDAS-CRP)
TOTAL_SCORE: 4.76
NBH_PAIN: 7
MORNING_STIFFNESS: 5
PAIN_SWELLING: 5
GLOBAL_ACTIVITY: 5
CRP_MG_PER_LITER: 108.6

## 2017-08-08 NOTE — PROGRESS NOTES
Subjective:       Patient ID: Micheal Chirinos Jr. is a 56 y.o. male.    Chief Complaint: AS, recurrent AAU    HPI    Right elbow with chronic flexion contracture but no swelling or pain.   Some pain and swelling right wrist. Some intermittent pain left knee without swelling.  Has not been going to his regular gym as no longer takes [employer], plans to switch to [employer].   Saw Nataly Santillan NP 7/1/17: for oral abscess with apparent facial swelling, treated with cephalexin, warm compresses and warm water gargles, dental referral Never saw dentist as no tooth there and fully resolved with cephalexin alone  No other infections  Saw Dr. Godfrey in [facility] 5/1/17: Endoscopy:   Colon 2013 - polyp partially removed     Imaging:        Assessment:     1. Iron deficiency anemia, unspecified iron deficiency anemia type       His anemia has been stable and he has known hemorrhoids. He did have a polyp partially removed and no adenoma seen with recommended repeat 3-5 years (now)   Recommendations:  1. Will do colonoscopy ( patient deferred)  2.H pylori IgG. I think an EGD would be very hard with his limited neck mobility     Jaswant Godfrey MD     Patient denies n/v, dyspepsia, abd pain or cramps, change in BMs, no melena or hematochezia                    Review of Systems   Constitutional: Positive for fatigue. Negative for appetite change, chills, fever and unexpected weight change.   HENT: Negative for mouth sores.         Dry   Eyes: Negative for pain, redness and visual disturbance.   Respiratory: Negative for cough, shortness of breath and wheezing.    Cardiovascular: Negative for chest pain, palpitations and leg swelling.   Gastrointestinal: Negative for abdominal pain, blood in stool, constipation, diarrhea and nausea.   Genitourinary: Negative for difficulty urinating, dysuria and hematuria.   Musculoskeletal: Negative for arthralgias, back pain, gait problem, joint swelling, myalgias, neck pain and neck  stiffness.   Skin: Negative for color change, pallor and rash.   Neurological: Negative for weakness and headaches.   Psychiatric/Behavioral: Negative for dysphoric mood and sleep disturbance.         Objective:   There were no vitals taken for this visit.     Physical Exam   Constitutional: He is oriented to person, place, and time and well-developed, well-nourished, and in no distress.   HENT:   Head: Normocephalic and atraumatic.   Mouth/Throat: Oropharynx is clear and moist.   Neck fixed in slight flexion   Eyes: Conjunctivae and EOM are normal. Pupils are equal, round, and reactive to light.   Neck: Normal range of motion. Neck supple. No thyromegaly present.   Neck forward flexed, 5 rom all directions only   Cardiovascular: Normal rate, regular rhythm, normal heart sounds and intact distal pulses.  Exam reveals no gallop and no friction rub.    No murmur heard.  Pulmonary/Chest: Breath sounds normal. He has no wheezes. He has no rales. He exhibits no tenderness.   Abdominal: Soft. He exhibits no distension and no mass. There is no tenderness.       Right Side Rheumatological Exam     Examination finds the shoulder, 1st PIP, 1st MCP, 2nd PIP, 2nd MCP, 3rd PIP, 3rd MCP, 4th PIP, 4th MCP and 5th MCP normal.    The patient is tender to palpation of the wrist    He has swelling of the wrist    The patient has an enlarged elbow, wrist and knee    Left Side Rheumatological Exam     Examination finds the shoulder, 1st PIP, 1st MCP, 2nd PIP, 2nd MCP, 3rd PIP, 3rd MCP, 4th PIP, 4th MCP, 5th PIP and 5th MCP normal.    The patient has an enlarged elbow, wrist and knee.      Lymphadenopathy:     He has no cervical adenopathy.   Neurological: He is alert and oriented to person, place, and time. He displays normal reflexes. He exhibits normal muscle tone. Gait normal.   Motor strength nl. UE and LE bilateral   Skin: Skin is warm and dry. No rash noted. No erythema. No pallor.     Psychiatric: Mood, memory, affect and  judgment normal.         Neck rom 5 degrees all rom  Chest expansion 2 cm  Schober's 0 cm, lateral flexion 0 degrees  15 flexion deformity right elbow no tenderness or synovitis       Results for CHELSEA BOYD JR. (MRN [id number]) as of 8/8/2017 10:54   Ref. Range 8/4/2017 08:35   WBC Latest Ref Range: 3.90 - 12.70 K/uL 8.91   RBC Latest Ref Range: 4.60 - 6.20 M/uL 4.20 (L)   Hemoglobin Latest Ref Range: 14.0 - 18.0 g/dL 10.0 (L)   Hematocrit Latest Ref Range: 40.0 - 54.0 % 31.2 (L)   MCV Latest Ref Range: 82 - 98 fL 74 (L)   MCH Latest Ref Range: 27.0 - 31.0 pg 23.8 (L)   MCHC Latest Ref Range: 32.0 - 36.0 g/dL 32.1   RDW Latest Ref Range: 11.5 - 14.5 % 19.3 (H)   Platelets Latest Ref Range: 150 - 350 K/uL 450 (H)   MPV Latest Ref Range: 9.2 - 12.9 fL 10.2   Gran% Latest Ref Range: 38.0 - 73.0 % 54.7   Gran # Latest Ref Range: 1.8 - 7.7 K/uL 4.9   Lymph% Latest Ref Range: 18.0 - 48.0 % 29.7   Lymph # Latest Ref Range: 1.0 - 4.8 K/uL 2.7   Mono% Latest Ref Range: 4.0 - 15.0 % 12.8   Mono # Latest Ref Range: 0.3 - 1.0 K/uL 1.1 (H)   Eosinophil% Latest Ref Range: 0.0 - 8.0 % 2.1   Eos # Latest Ref Range: 0.0 - 0.5 K/uL 0.2   Basophil% Latest Ref Range: 0.0 - 1.9 % 0.6   Baso # Latest Ref Range: 0.00 - 0.20 K/uL 0.05   Sed Rate Latest Ref Range: 0 - 10 mm/Hr 97 (H)   Sodium Latest Ref Range: 136 - 145 mmol/L 141   Potassium Latest Ref Range: 3.5 - 5.1 mmol/L 4.2   Chloride Latest Ref Range: 95 - 110 mmol/L 104   CO2 Latest Ref Range: 23 - 29 mmol/L 27   Anion Gap Latest Ref Range: 8 - 16 mmol/L 10   BUN, Bld Latest Ref Range: 6 - 20 mg/dL 12   Creatinine Latest Ref Range: 0.5 - 1.4 mg/dL 0.9   eGFR if non African American Latest Ref Range: >60 mL/min/1.73 m^2 >60.0   eGFR if African American Latest Ref Range: >60 mL/min/1.73 m^2 >60.0   Glucose Latest Ref Range: 70 - 110 mg/dL 86   Calcium Latest Ref Range: 8.7 - 10.5 mg/dL 9.3   Alkaline Phosphatase Latest Ref Range: 55 - 135 U/L 108   Total Protein Latest Ref  Range: 6.0 - 8.4 g/dL 7.8   Albumin Latest Ref Range: 3.5 - 5.2 g/dL 2.9 (L)   Total Bilirubin Latest Ref Range: 0.1 - 1.0 mg/dL 0.4   AST Latest Ref Range: 10 - 40 U/L 11   ALT Latest Ref Range: 10 - 44 U/L 9 (L)   CRP Latest Ref Range: 0.0 - 8.2 mg/L 108.6 (H)   Differential Method Unknown Automated         Assessment:     AS: ASDAS-CRP: 4.76(high activity) BASDAI: 4.7(high activity) BASFI 7.15(high functional limitation, )  H/o AAU not recent  Improved right elbow pain still flexion deformity  Resolved Bilateral knee effusions   Hyperlipidemia > statin threshold for mod-high dose statin repeat due  Worsening iron deficiency anemia saw Dr. Godfrey to have H.Pylori today, schedule colonoscopy  Hypertension, normotensive off amlodipine    Plan:   *Prevnar 13 today  Lipid panel, H pylori, B12, folate today  Celecoxib 200mg daily WM  Omeprazole 20mg twice daily  Schedule colonoscopy as ordered by Dr. 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

## 2017-08-09 ENCOUNTER — TELEPHONE (OUTPATIENT)
Dept: RHEUMATOLOGY | Facility: CLINIC | Age: 57
End: 2017-08-09

## 2017-08-09 NOTE — TELEPHONE ENCOUNTER
----- Message from Cr Navas MD sent at 8/8/2017  4:05 PM CDT -----  Please tell pt the vitamin B12 and folate levels are  Normal. The lipid panel is slightly better than last year. Would try and adhere to low animal product diet: more vegetables, fish, nuts, olive oil, beans. SOFI

## 2017-08-10 ENCOUNTER — TELEPHONE (OUTPATIENT)
Dept: GASTROENTEROLOGY | Facility: CLINIC | Age: 57
End: 2017-08-10

## 2017-08-10 RX ORDER — AMOXICILLIN 500 MG/1
1000 TABLET, FILM COATED ORAL EVERY 12 HOURS
Qty: 56 TABLET | Refills: 0 | Status: SHIPPED | OUTPATIENT
Start: 2017-08-10 | End: 2017-08-24

## 2017-08-10 RX ORDER — CLARITHROMYCIN 500 MG/1
500 TABLET, FILM COATED ORAL EVERY 12 HOURS
Qty: 28 TABLET | Refills: 0 | Status: SHIPPED | OUTPATIENT
Start: 2017-08-10 | End: 2017-08-24

## 2017-08-11 DIAGNOSIS — D50.9 IRON DEFICIENCY ANEMIA, UNSPECIFIED IRON DEFICIENCY ANEMIA TYPE: Primary | ICD-10-CM

## 2017-08-11 RX ORDER — POLYETHYLENE GLYCOL 3350, SODIUM SULFATE ANHYDROUS, SODIUM BICARBONATE, SODIUM CHLORIDE, POTASSIUM CHLORIDE 236; 22.74; 6.74; 5.86; 2.97 G/4L; G/4L; G/4L; G/4L; G/4L
4 POWDER, FOR SOLUTION ORAL ONCE
Qty: 4000 ML | Refills: 0 | Status: SHIPPED | OUTPATIENT
Start: 2017-08-11 | End: 2017-08-11

## 2017-09-28 ENCOUNTER — TELEPHONE (OUTPATIENT)
Dept: GASTROENTEROLOGY | Facility: CLINIC | Age: 57
End: 2017-09-28

## 2017-10-18 ENCOUNTER — OFFICE VISIT (OUTPATIENT)
Dept: PHYSICAL MEDICINE AND REHAB | Facility: CLINIC | Age: 57
End: 2017-10-18
Payer: MEDICARE

## 2017-10-18 VITALS
HEART RATE: 64 BPM | WEIGHT: 144.63 LBS | HEIGHT: 70 IN | SYSTOLIC BLOOD PRESSURE: 128 MMHG | DIASTOLIC BLOOD PRESSURE: 73 MMHG | BODY MASS INDEX: 20.7 KG/M2

## 2017-10-18 DIAGNOSIS — M45.9 ANKYLOSING SPONDYLITIS, UNSPECIFIED SITE OF SPINE: ICD-10-CM

## 2017-10-18 DIAGNOSIS — M25.562 CHRONIC PAIN OF BOTH KNEES: ICD-10-CM

## 2017-10-18 DIAGNOSIS — M54.50 CHRONIC BILATERAL LOW BACK PAIN WITHOUT SCIATICA: ICD-10-CM

## 2017-10-18 DIAGNOSIS — M54.2 NECK PAIN: ICD-10-CM

## 2017-10-18 DIAGNOSIS — M47.812 SPONDYLOSIS OF CERVICAL REGION WITHOUT MYELOPATHY OR RADICULOPATHY: Primary | ICD-10-CM

## 2017-10-18 DIAGNOSIS — M45.0 ANKYLOSING SPONDYLITIS OF MULTIPLE SITES IN SPINE: ICD-10-CM

## 2017-10-18 DIAGNOSIS — M25.561 RIGHT KNEE PAIN, UNSPECIFIED CHRONICITY: ICD-10-CM

## 2017-10-18 DIAGNOSIS — M25.561 CHRONIC PAIN OF BOTH KNEES: ICD-10-CM

## 2017-10-18 DIAGNOSIS — G89.29 CHRONIC BILATERAL LOW BACK PAIN WITHOUT SCIATICA: ICD-10-CM

## 2017-10-18 DIAGNOSIS — M62.838 MUSCLE SPASMS OF NECK: ICD-10-CM

## 2017-10-18 DIAGNOSIS — G89.29 CHRONIC PAIN OF BOTH KNEES: ICD-10-CM

## 2017-10-18 DIAGNOSIS — G89.29 CHRONIC PAIN OF RIGHT KNEE: ICD-10-CM

## 2017-10-18 DIAGNOSIS — G89.29 CHRONIC LOW BACK PAIN WITH SCIATICA, SCIATICA LATERALITY UNSPECIFIED, UNSPECIFIED BACK PAIN LATERALITY: ICD-10-CM

## 2017-10-18 DIAGNOSIS — M25.561 CHRONIC PAIN OF RIGHT KNEE: ICD-10-CM

## 2017-10-18 DIAGNOSIS — M54.40 CHRONIC LOW BACK PAIN WITH SCIATICA, SCIATICA LATERALITY UNSPECIFIED, UNSPECIFIED BACK PAIN LATERALITY: ICD-10-CM

## 2017-10-18 DIAGNOSIS — M25.551 HIP PAIN, BILATERAL: ICD-10-CM

## 2017-10-18 DIAGNOSIS — M19.019 SHOULDER ARTHRITIS: ICD-10-CM

## 2017-10-18 DIAGNOSIS — M25.552 HIP PAIN, BILATERAL: ICD-10-CM

## 2017-10-18 PROCEDURE — 99999 PR PBB SHADOW E&M-EST. PATIENT-LVL III: CPT | Mod: PBBFAC,,, | Performed by: PHYSICAL MEDICINE & REHABILITATION

## 2017-10-18 PROCEDURE — 99214 OFFICE O/P EST MOD 30 MIN: CPT | Mod: S$GLB,,, | Performed by: PHYSICAL MEDICINE & REHABILITATION

## 2017-10-18 PROCEDURE — 99499 UNLISTED E&M SERVICE: CPT | Mod: S$PBB,,, | Performed by: PHYSICAL MEDICINE & REHABILITATION

## 2017-10-18 RX ORDER — HYDROCODONE BITARTRATE AND ACETAMINOPHEN 10; 325 MG/1; MG/1
1 TABLET ORAL EVERY 6 HOURS PRN
Qty: 100 TABLET | Refills: 0 | Status: SHIPPED | OUTPATIENT
Start: 2017-01-01 | End: 2017-10-18 | Stop reason: DRUGHIGH

## 2017-10-18 RX ORDER — HYDROCODONE BITARTRATE AND ACETAMINOPHEN 10; 325 MG/1; MG/1
1 TABLET ORAL EVERY 6 HOURS PRN
Qty: 100 TABLET | Refills: 0 | Status: SHIPPED | OUTPATIENT
Start: 2017-11-18 | End: 2017-10-18 | Stop reason: DRUGHIGH

## 2017-10-18 RX ORDER — HYDROCODONE BITARTRATE AND ACETAMINOPHEN 10; 325 MG/1; MG/1
1 TABLET ORAL EVERY 6 HOURS PRN
Qty: 100 TABLET | Refills: 0 | Status: SHIPPED | OUTPATIENT
Start: 2017-10-18 | End: 2017-10-18 | Stop reason: SDUPTHER

## 2017-10-18 RX ORDER — HYDROCODONE BITARTRATE AND ACETAMINOPHEN 10; 325 MG/1; MG/1
1 TABLET ORAL EVERY 6 HOURS PRN
Qty: 120 TABLET | Refills: 0 | Status: SHIPPED | OUTPATIENT
Start: 2017-10-18 | End: 2018-01-01 | Stop reason: SDUPTHER

## 2017-10-18 RX ORDER — HYDROCODONE BITARTRATE AND ACETAMINOPHEN 10; 325 MG/1; MG/1
1 TABLET ORAL EVERY 6 HOURS PRN
Qty: 120 TABLET | Refills: 0 | Status: SHIPPED | OUTPATIENT
Start: 2017-11-18 | End: 2017-01-01

## 2017-10-18 RX ORDER — HYDROCODONE BITARTRATE AND ACETAMINOPHEN 10; 325 MG/1; MG/1
1 TABLET ORAL EVERY 6 HOURS PRN
Qty: 120 TABLET | Refills: 0 | Status: SHIPPED | OUTPATIENT
Start: 2017-01-01 | End: 2018-01-01

## 2017-10-18 NOTE — PROGRESS NOTES
Subjective:       Patient ID: Micheal Chirinos Jr. is a 56 y.o. male.    Chief Complaint: Knee Pain (R knee); Neck Pain; and Shoulder Pain    Neck Pain    Pertinent negatives include no chest pain, fever, headaches or trouble swallowing.   Knee Pain      Wrist Pain    Pertinent negatives include no fever.   Hip Pain      Back Pain   Pertinent negatives include no abdominal pain, chest pain, fever or headaches.   Hand Pain    Pertinent negatives include no chest pain.   Shoulder Pain    Pertinent negatives include no fever or headaches.   Mr. Chirinos, returns to clinic for multiple MSK pains, chronic neck, back pain, and multiple joint pain.   He has a severe ankylosing spondylitis with axial neck and back  pain and upper thoracic and lower pelvic girdle pain.  LCV 07/18/17.    Today, he complains also about:  1. Rt knee pain.  2. Chronic neck pain, and shoulders,  3. low back pain,    He states that he has his good, and bad days.      #1 Rt knee pain  Current pain is 4, the worst pain is 5/10.  He reports no injury.  Rt knee pain is dull ache, no swelling, + cracking, no giving out on him, does not lock.  Pain is mainly in joint, does not radiate up/down.     #2.neck pain,   current pain is 5/10, and the worst pain is 10/10.  Neck pain is present t the both side of the neck R > L, it feels as muscle spasms/ tightness,  It radiates to Right  shoulders, and shoulder blade  not to arm/hand.   No numbness, paraesthesias in arms/ hands.   Neck pain is on/ off, worse at night time, sometimes he has trouble placing head on pillow.   Neck pain is running to shoulder blades, not to arms, pain is more localized in joints, on right side,   Right shoulder, elbow, swell, when he does more exercise, and it is always on right side.     #3 Low  back pain,   current pain is 4, worst pain lately 7/10.   Pain is localized mainly in mid spine , in axial distribution, and just across low back bilateral.   Pain is off/om,   No  radiation to buttocks, legs, but back of legs are very tight.    He has a severe ankylosing spondylitis with axial neck and back  pain and upper thoracic and lower pelvic girdle pain.   He takes hydrocodone 10 mg and he usually takes three tablets a day; however, on bad days he takes  four tablets a day.    He failed almost all muscle relaxants Dantrium secondary to making him tired, Flexeril, makes him sleepy. (although still taking it).   He was given Celebrex by dr. Navas, but he is afraidl secondary to GI bleed, a year ago.  He is taking only Sulfasalazine and Celebrex 200 daily for his severe AS, that is always with high infllamatory markers,    Patient completed Physical therapy for neck pain, at [facility].   He does his own stretching,and goes 3x/week to fitness.  Exercises regularly, and stretches his neck/upper/lower spine, lying prone on table for 20-30 minutes daily.     He is on chronic pain management with me, takes Hydrocodone 10/325 mg , 3-4x/day, and Flexeril at bedtime for several years ,   with no need for increase dose, nor frequency. States that hydrocodone helps him to move, with reasonable pain control.   Here for follow up, and chronic pain management with opiates.  Past Medical History:   Diagnosis Date    Degenerative disc disease     Hemorrhage of rectum and anus     Hypertension     Idiopathic ankylosing spondylitis 2000    Joint pain        Past Surgical History:   Procedure Laterality Date    MOUTH SURGERY         Family History   Problem Relation Age of Onset    Arthritis Mother     Kidney disease Father     No Known Problems Sister     No Known Problems Brother     No Known Problems Maternal Aunt     No Known Problems Maternal Uncle     No Known Problems Paternal Aunt     No Known Problems Paternal Uncle     No Known Problems Maternal Grandmother     No Known Problems Maternal Grandfather     No Known Problems Paternal Grandmother     No Known Problems  Paternal Grandfather     Celiac disease Neg Hx     Cirrhosis Neg Hx     Colon cancer Neg Hx     Colon polyps Neg Hx     Crohn's disease Neg Hx     Cystic fibrosis Neg Hx     Esophageal cancer Neg Hx     Hemochromatosis Neg Hx     Inflammatory bowel disease Neg Hx     Irritable bowel syndrome Neg Hx     Liver cancer Neg Hx     Liver disease Neg Hx     Rectal cancer Neg Hx     Stomach cancer Neg Hx     Ulcerative colitis Neg Hx     Jorge's disease Neg Hx     Melanoma Neg Hx     Psoriasis Neg Hx     Lupus Neg Hx     Eczema Neg Hx     Acne Neg Hx     Amblyopia Neg Hx     Blindness Neg Hx     Cataracts Neg Hx     Glaucoma Neg Hx     Macular degeneration Neg Hx     Retinal detachment Neg Hx     Strabismus Neg Hx     Cancer Neg Hx     Diabetes Neg Hx     Hypertension Neg Hx     Stroke Neg Hx     Thyroid disease Neg Hx        Social History     Social History    Marital status:      Spouse name: N/A    Number of children: N/A    Years of education: N/A     Occupational History    Disabled      Social History Main Topics    Smoking status: Never Smoker    Smokeless tobacco: Never Used    Alcohol use 0.0 oz/week      Comment: one's in the blue moon    Drug use:      Frequency: 2.0 times per week     Types: Marijuana    Sexual activity: Not Asked     Other Topics Concern    None     Social History Narrative    None       Current Outpatient Prescriptions   Medication Sig Dispense Refill    celecoxib (CELEBREX) 200 MG capsule Take 1 capsule (200 mg total) by mouth once daily. 90 capsule 1    cetirizine (ZYRTEC) 10 mg TbDL 1 Tablet(s) Oral PRN Every day.        cyclobenzaprine (FLEXERIL) 10 MG tablet Take 1 tablet (10 mg total) by mouth 3 (three) times daily as needed. 90 tablet 11    hydrocodone-acetaminophen 10-325mg (NORCO)  mg Tab Take 1 tablet by mouth every 6 (six) hours as needed (msk pain). 100 tablet 0    [START ON 11/18/2017] hydrocodone-acetaminophen  10-325mg (NORCO)  mg Tab Take 1 tablet by mouth every 6 (six) hours as needed (msk pain). 100 tablet 0    [START ON 12/18/2017] hydrocodone-acetaminophen 10-325mg (NORCO)  mg Tab Take 1 tablet by mouth every 6 (six) hours as needed (msk pain). 100 tablet 0    omeprazole (PRILOSEC) 20 MG capsule Take 1 capsule (20 mg total) by mouth 2 (two) times daily. 180 capsule 1    sulfaSALAzine (AZULFIDINE) 500 MG TbEC Take 3 tablets (1,500 mg total) by mouth 2 (two) times daily. 540 tablet 0     No current facility-administered medications for this visit.        Review of patient's allergies indicates:   Allergen Reactions    No known drug allergies          Review of Systems   Constitutional: Negative for activity change, appetite change, chills, diaphoresis, fatigue, fever and unexpected weight change.   HENT: Negative for trouble swallowing and voice change.    Eyes: Negative for pain and visual disturbance.   Respiratory: Negative for chest tightness, shortness of breath and wheezing.    Cardiovascular: Negative for chest pain, palpitations and leg swelling.   Gastrointestinal: Negative for abdominal pain, constipation and diarrhea.   Genitourinary: Negative for difficulty urinating, frequency and urgency.   Musculoskeletal: Positive for back pain and neck pain. Negative for joint swelling, myalgias and neck stiffness. Arthralgias:  left knee hurts, no swelling, no warmth, no popping, clicking.    Skin: Negative for rash and wound.   Neurological: Negative for dizziness, facial asymmetry, speech difficulty, light-headedness and headaches.   Hematological: Negative for adenopathy.   Psychiatric/Behavioral: Negative for agitation, behavioral problems, confusion, decreased concentration, dysphoric mood and sleep disturbance.           Objective:      Physical Exam    GENERAL: The patient is alert, oriented, pleasant.   HEENT: PERRLA  NECK: supple, no masses   CV: S1S2, RRR,   LUNGS: CTA bilateral.   ABDOMEN:  soft, non-tender, bowel sounds nl.  EXTREMITIES: no edema, +2 pulses DP, b/l  SKIN: no skin rash, no skin breakdowns.   MUSCULOSKELETAL EXAM:  Gait is wobbling on wide bases with rotating pelvis        secondary to bilateral SI joints fusion and severe ankylosing spondylitis with      bamboo spine and moderate-to-severe kyphosis on the thoracic spine.    Center of gravity is moved forward         Walks with widely spread upper extremity to maintain dynamic balance.   He had no loss of balance.  Steps are wider and nanette is normal.   The patient is not able to stand upright touching with his occiput the wall.   There is  significantly increased distance from the occiput to the wall, more than 20 cm.    Cervical spine has very limited range of motion, especially extension that lacks more than 30-35 degrees from neutral.    Cervical spine flexion is still down to 50, extension again lacks 30-35 degrees.    There is still very limited motion in flexion and extension as well as a decreased motion in     rotation and side bending, probably 10-15 degrees bilaterally.    The patient has moderate  tenderness in paravertebral cervical musculature and upper   trapezius.  No tenderness in palpation of the cervical or thoracic spine.    Decreased active range of motion of bilateral upper and lower extremities.  Bilateral shoulders that have decreased abduction to 70 degrees, and flex forward to 80 degrees,  Decreased internal and external rotation to 30 degrees.  Decreased right elbow extension to 160-170 degrees.   FROM in both knees, no edema, no laxity, moderate  tenderness at medical plato line.   + pain with IR in both hips, decreased AROM in both hips.  Minimally decreased AROM in Rt wrist.  Muscle  strength is 5/5 throughout x4 extremities.    Minimal tenderness at right lateral epicondyle, with resisted wrist extension.  No joint laxity throughout x4  extremities.    Straight leg raising negative bilaterally.    Lumbar  spine  flexion to 90 with holding on to table, extension to 0, side bending and     rotation decreased to about 20-25 degrees bilaterally.    Fused SI joints  without tenderness on palpation.                                             NEUROLOGIC:  Cranial nerves II through XII intact.    Deep tendon reflexes increased +3, symmetrical throughout x4 extremities.    Sensory is intact to light touch and pinprick throughout x4 extremities.    Tight posterior  hamstrings and increased tone in muscles in posterior hamstrings group and   gastrocnemius soleus group on Sobia scale, 1+/4.          Assessment:       1. Spondylosis of cervical region without myelopathy or radiculopathy    2. Right knee pain, unspecified chronicity    3. Chronic pain of both knees    4. Neck pain    5. Muscle spasms of neck    6. Shoulder arthritis    7. Ankylosing spondylitis of multiple sites in spine    8. Ankylosing spondylitis, unspecified site of spine    9. Chronic pain of right knee    10. Chronic bilateral low back pain without sciatica    11. Hip pain, bilateral    12. Chronic low back pain with sciatica, sciatica laterality unspecified, unspecified back pain laterality        Plan:       Spondylosis of cervical region without myelopathy or radiculopathy  -     hydrocodone-acetaminophen 10-325mg (NORCO)  mg Tab; Take 1 tablet by mouth every 6 (six) hours as needed (msk pain).  Dispense: 100 tablet; Refill: 0  -     hydrocodone-acetaminophen 10-325mg (NORCO)  mg Tab; Take 1 tablet by mouth every 6 (six) hours as needed (msk pain).  Dispense: 100 tablet; Refill: 0  -     hydrocodone-acetaminophen 10-325mg (NORCO)  mg Tab; Take 1 tablet by mouth every 6 (six) hours as needed (msk pain).  Dispense: 100 tablet; Refill: 0    Right knee pain, unspecified chronicity  -     hydrocodone-acetaminophen 10-325mg (NORCO)  mg Tab; Take 1 tablet by mouth every 6 (six) hours as needed (msk pain).  Dispense: 100 tablet;  Refill: 0  -     hydrocodone-acetaminophen 10-325mg (NORCO)  mg Tab; Take 1 tablet by mouth every 6 (six) hours as needed (msk pain).  Dispense: 100 tablet; Refill: 0  -     hydrocodone-acetaminophen 10-325mg (NORCO)  mg Tab; Take 1 tablet by mouth every 6 (six) hours as needed (msk pain).  Dispense: 100 tablet; Refill: 0    Chronic pain of both knees  -     hydrocodone-acetaminophen 10-325mg (NORCO)  mg Tab; Take 1 tablet by mouth every 6 (six) hours as needed (msk pain).  Dispense: 100 tablet; Refill: 0  -     hydrocodone-acetaminophen 10-325mg (NORCO)  mg Tab; Take 1 tablet by mouth every 6 (six) hours as needed (msk pain).  Dispense: 100 tablet; Refill: 0  -     hydrocodone-acetaminophen 10-325mg (NORCO)  mg Tab; Take 1 tablet by mouth every 6 (six) hours as needed (msk pain).  Dispense: 100 tablet; Refill: 0    Neck pain  -     hydrocodone-acetaminophen 10-325mg (NORCO)  mg Tab; Take 1 tablet by mouth every 6 (six) hours as needed (msk pain).  Dispense: 100 tablet; Refill: 0  -     hydrocodone-acetaminophen 10-325mg (NORCO)  mg Tab; Take 1 tablet by mouth every 6 (six) hours as needed (msk pain).  Dispense: 100 tablet; Refill: 0  -     hydrocodone-acetaminophen 10-325mg (NORCO)  mg Tab; Take 1 tablet by mouth every 6 (six) hours as needed (msk pain).  Dispense: 100 tablet; Refill: 0    Muscle spasms of neck  -     hydrocodone-acetaminophen 10-325mg (NORCO)  mg Tab; Take 1 tablet by mouth every 6 (six) hours as needed (msk pain).  Dispense: 100 tablet; Refill: 0  -     hydrocodone-acetaminophen 10-325mg (NORCO)  mg Tab; Take 1 tablet by mouth every 6 (six) hours as needed (msk pain).  Dispense: 100 tablet; Refill: 0  -     hydrocodone-acetaminophen 10-325mg (NORCO)  mg Tab; Take 1 tablet by mouth every 6 (six) hours as needed (msk pain).  Dispense: 100 tablet; Refill: 0    Shoulder arthritis  -     hydrocodone-acetaminophen 10-325mg (NORCO)   mg Tab; Take 1 tablet by mouth every 6 (six) hours as needed (msk pain).  Dispense: 100 tablet; Refill: 0  -     hydrocodone-acetaminophen 10-325mg (NORCO)  mg Tab; Take 1 tablet by mouth every 6 (six) hours as needed (msk pain).  Dispense: 100 tablet; Refill: 0  -     hydrocodone-acetaminophen 10-325mg (NORCO)  mg Tab; Take 1 tablet by mouth every 6 (six) hours as needed (msk pain).  Dispense: 100 tablet; Refill: 0    Ankylosing spondylitis of multiple sites in spine  -     hydrocodone-acetaminophen 10-325mg (NORCO)  mg Tab; Take 1 tablet by mouth every 6 (six) hours as needed (msk pain).  Dispense: 100 tablet; Refill: 0  -     hydrocodone-acetaminophen 10-325mg (NORCO)  mg Tab; Take 1 tablet by mouth every 6 (six) hours as needed (msk pain).  Dispense: 100 tablet; Refill: 0  -     hydrocodone-acetaminophen 10-325mg (NORCO)  mg Tab; Take 1 tablet by mouth every 6 (six) hours as needed (msk pain).  Dispense: 100 tablet; Refill: 0    Ankylosing spondylitis, unspecified site of spine  -     hydrocodone-acetaminophen 10-325mg (NORCO)  mg Tab; Take 1 tablet by mouth every 6 (six) hours as needed (msk pain).  Dispense: 100 tablet; Refill: 0  -     hydrocodone-acetaminophen 10-325mg (NORCO)  mg Tab; Take 1 tablet by mouth every 6 (six) hours as needed (msk pain).  Dispense: 100 tablet; Refill: 0  -     hydrocodone-acetaminophen 10-325mg (NORCO)  mg Tab; Take 1 tablet by mouth every 6 (six) hours as needed (msk pain).  Dispense: 100 tablet; Refill: 0    Chronic pain of right knee  -     hydrocodone-acetaminophen 10-325mg (NORCO)  mg Tab; Take 1 tablet by mouth every 6 (six) hours as needed (msk pain).  Dispense: 100 tablet; Refill: 0  -     hydrocodone-acetaminophen 10-325mg (NORCO)  mg Tab; Take 1 tablet by mouth every 6 (six) hours as needed (msk pain).  Dispense: 100 tablet; Refill: 0  -     hydrocodone-acetaminophen 10-325mg (NORCO)  mg Tab; Take 1  tablet by mouth every 6 (six) hours as needed (msk pain).  Dispense: 100 tablet; Refill: 0    Chronic bilateral low back pain without sciatica  -     hydrocodone-acetaminophen 10-325mg (NORCO)  mg Tab; Take 1 tablet by mouth every 6 (six) hours as needed (msk pain).  Dispense: 100 tablet; Refill: 0  -     hydrocodone-acetaminophen 10-325mg (NORCO)  mg Tab; Take 1 tablet by mouth every 6 (six) hours as needed (msk pain).  Dispense: 100 tablet; Refill: 0  -     hydrocodone-acetaminophen 10-325mg (NORCO)  mg Tab; Take 1 tablet by mouth every 6 (six) hours as needed (msk pain).  Dispense: 100 tablet; Refill: 0    Hip pain, bilateral  -     hydrocodone-acetaminophen 10-325mg (NORCO)  mg Tab; Take 1 tablet by mouth every 6 (six) hours as needed (msk pain).  Dispense: 100 tablet; Refill: 0  -     hydrocodone-acetaminophen 10-325mg (NORCO)  mg Tab; Take 1 tablet by mouth every 6 (six) hours as needed (msk pain).  Dispense: 100 tablet; Refill: 0  -     hydrocodone-acetaminophen 10-325mg (NORCO)  mg Tab; Take 1 tablet by mouth every 6 (six) hours as needed (msk pain).  Dispense: 100 tablet; Refill: 0    Chronic low back pain with sciatica, sciatica laterality unspecified, unspecified back pain laterality  -     hydrocodone-acetaminophen 10-325mg (NORCO)  mg Tab; Take 1 tablet by mouth every 6 (six) hours as needed (msk pain).  Dispense: 100 tablet; Refill: 0  -     hydrocodone-acetaminophen 10-325mg (NORCO)  mg Tab; Take 1 tablet by mouth every 6 (six) hours as needed (msk pain).  Dispense: 100 tablet; Refill: 0  -     hydrocodone-acetaminophen 10-325mg (NORCO)  mg Tab; Take 1 tablet by mouth every 6 (six) hours as needed (msk pain).  Dispense: 100 tablet; Refill: 0      Patient with severe  AS, cx. with chronic neck and back pain associated with cervical and lumbar spondylosis and muscle spasms -   tightness in paravertebral cervical  muscles, and hamstrings.   On  Sulfasalazine,and Celebrex as needed ( takes rarely sec. To bleeding PUD) for AS.      1. Chronic pain management.    The patient will resume Hydrocodone 10/325 mg one p.o. 6 hours p.r.n.( #120 tabs, and agreeable to taper down to 100 tabs/mionth, given 2 refills).   Muscle relaxant Flexeril 10 mg , TID  (helps better with relaxation than other muscle relaxants that he tried).    Does not take Cymbalta .     2. Patient completed PT.  Exercises regularly, and stretches his neck/upper/lower spine,   lying prone on table for 20-30 minutes daily.     3. Ankylosing Spondylitis, follows with Rheumatology, Dr. Navas.    Next appointment with Dr. Navas in August, when he will re check CRP/ ESR.   His right wrist pain and swelling, and groin, are most likely secondary to primary dz, AS, with multi joint involvement.   Reviewing labs, he has CRP, and ESR  chronically  elevated.   He is currently taking less than prescribed Sulfasalazine, 3 tabs ( 1500 mg )  BID,  and also Celebrex rarely,  once /day.   He has h/o UGI bleed,a nd soon has an hollie. With GI.   He refused DMAR th.       RTC in 3 months.     Total time spent face to face with patient was 25 minutes.   More than 50% of that time was spent in counseling on diagnosis , prognosis and treatment options.   I also caunsel patient  on common and most usual side effect of prescribed medications.   Risk and benefits of opiates, possible risk of developing opiate dependence and tolerance, need of strict compliance with prescribed medications.  Pain contract, rules and obligations were discussed with patient in details.  He is aware that I would be the only doctor prescribing him pain medications and ED in a case of emergency.  I reviewed Primary care , and other specialty's notes to better coordinate patient's  care.   All questions were answered, and patient voiced understanding.

## 2017-10-25 ENCOUNTER — OFFICE VISIT (OUTPATIENT)
Dept: GASTROENTEROLOGY | Facility: CLINIC | Age: 57
End: 2017-10-25
Payer: MEDICARE

## 2017-10-25 VITALS
HEIGHT: 70 IN | DIASTOLIC BLOOD PRESSURE: 69 MMHG | WEIGHT: 143.31 LBS | SYSTOLIC BLOOD PRESSURE: 129 MMHG | BODY MASS INDEX: 20.52 KG/M2 | HEART RATE: 68 BPM

## 2017-10-25 DIAGNOSIS — Z86.010 HISTORY OF COLON POLYPS: ICD-10-CM

## 2017-10-25 DIAGNOSIS — Z86.19 HISTORY OF HELICOBACTER PYLORI INFECTION: Primary | ICD-10-CM

## 2017-10-25 PROCEDURE — 99999 PR PBB SHADOW E&M-EST. PATIENT-LVL III: CPT | Mod: PBBFAC,,, | Performed by: NURSE PRACTITIONER

## 2017-10-25 PROCEDURE — 99213 OFFICE O/P EST LOW 20 MIN: CPT | Mod: S$GLB,,, | Performed by: NURSE PRACTITIONER

## 2017-10-25 NOTE — PROGRESS NOTES
[facility] Note    Reason for Visit:  The primary encounter diagnosis was History of Helicobacter pylori infection. A diagnosis of History of colon polyps was also pertinent to this visit.    PCP:   Azikiwe K Lombard       Referring MD:  No referring provider defined for this encounter.    HPI:  This is a 56 y.o. male here for follow up evaluation of H. Pylori. Was last seen in clinic 5/2017 with Dr. Godfrey for LAURI. Following up with Rheumatology for anemia. Mr. Chirinos is new to me. Last visit H. Pylori positive completed triple therapy.     Currently, pt reports having a normal formed stool daily. Denies abdominal pain, blood in stool, and black tarry stools.     Denies hx of reflux and dysphagia/odynophagia. NSAID usage- on and off Celebrex. Taking Prilosec daily followed by rheumatology at [facility].     ROS:  Constitutional: No fevers, no chills, No unintentional weight loss, no fatigue   ENT: No allergies  CV: No chest pain, no palpitations, no perif. edema  Pulm: No cough, No shortness of breath, no wheezes, no sputum  Ophtho: No vision changes  GI: see HPI; also no nausea, no vomiting, no change in appetite  Derm: No rash  Heme: No lymphadenopathy, No bruising  MSK: No arthritis, no muscle pain, no muscle weakness  : No dysuria, No hematuria  Endo: No hot or cold intolerance  Neuro: No syncope, No seizure     Medical History:  has a past medical history of Degenerative disc disease; Hemorrhage of rectum and anus; Hypertension; Idiopathic ankylosing spondylitis (2000); and Joint pain.    Surgical History:  has a past surgical history that includes Mouth surgery.    Family History: family history includes Arthritis in his mother; Kidney disease in his father; No Known Problems in his brother, maternal aunt, maternal grandfather, maternal grandmother, maternal uncle, paternal aunt, paternal grandfather, paternal grandmother, paternal uncle, and sister..     Social History:  reports that  "he has never smoked. He has never used smokeless tobacco. He reports that he drinks alcohol. He reports that he uses drugs, including Marijuana, about 2 times per week.    Review of patient's allergies indicates:   Allergen Reactions    No known drug allergies      Current Outpatient Prescriptions   Medication Sig    cetirizine (ZYRTEC) 10 mg TbDL 1 Tablet(s) Oral PRN Every day.      hydrocodone-acetaminophen 10-325mg (NORCO)  mg Tab Take 1 tablet by mouth every 6 (six) hours as needed for Pain (msk pain).    [START ON 11/18/2017] hydrocodone-acetaminophen 10-325mg (NORCO)  mg Tab Take 1 tablet by mouth every 6 (six) hours as needed for Pain (msk pain).    [START ON 12/18/2017] hydrocodone-acetaminophen 10-325mg (NORCO)  mg Tab Take 1 tablet by mouth every 6 (six) hours as needed for Pain (msk pain).    omeprazole (PRILOSEC) 20 MG capsule Take 1 capsule (20 mg total) by mouth 2 (two) times daily.    sulfaSALAzine (AZULFIDINE) 500 MG TbEC Take 3 tablets (1,500 mg total) by mouth 2 (two) times daily.    celecoxib (CELEBREX) 200 MG capsule Take 1 capsule (200 mg total) by mouth once daily.    cyclobenzaprine (FLEXERIL) 10 MG tablet Take 1 tablet (10 mg total) by mouth 3 (three) times daily as needed.     No current facility-administered medications for this visit.      Objective Findings:    Vital Signs:  /69   Pulse 68   Ht 5' 10" (1.778 m)   Wt 65 kg (143 lb 4.8 oz)   BMI 20.56 kg/m²   Body mass index is 20.56 kg/m².    Physical Exam:  General Appearance: Well appearing in no acute distress  Head: Normocephalic, without obvious abnormality  Eyes: No scleral icterus, EOMI  ENT: Neck supple, Lips, mucosa, and tongue normal; teeth and gums normal  Lungs: CTA bilaterally in anterior and posterior fields, no wheezes, no crackles.  Heart: Regular rate and rhythm, no murmurs heard  Abdomen: Soft, non tender, non distended with positive bowel sounds in all four quadrants.  Extremities: No " clubbing, cyanosis or edema  Skin: No rash to exposed areas  Neurologic: AAOx4    Labs:  Lab Results   Component Value Date    WBC 8.91 08/04/2017    HGB 10.0 (L) 08/04/2017    HCT 31.2 (L) 08/04/2017     (H) 08/04/2017    CHOL 140 08/08/2017    TRIG 76 08/08/2017    HDL 34 (L) 08/08/2017    ALT 9 (L) 08/04/2017    AST 11 08/04/2017     08/04/2017    K 4.2 08/04/2017     08/04/2017    CREATININE 0.9 08/04/2017    BUN 12 08/04/2017    CO2 27 08/04/2017    TSH 0.927 03/02/2010    PSA 2.0 03/23/2016    INR 1.2 03/02/2010     Endoscopy:    EGD- none    Colonoscopy- 3/18/2013- Non-bleeding internal hemorrhoids. 6 mm polyp in ascending colon partial retrieval. Coagulation for tissue destruction was successful. Repeat in 3-5 years.     Assessment:  1. History of Helicobacter pylori infection    2. History of colon polyps      Recommendations:  1. Order H. Pylori stool to conform eradication.   2. Repeat colonoscopy in 3-5 years.      Follow up pending H. Pylori stool.     Order summary:  Orders Placed This Encounter    H. pylori antigen, stool     Thank you so much for allowing me to participate in the care of PRIYA Weston Jr., FNP-C

## 2017-11-09 ENCOUNTER — TELEPHONE (OUTPATIENT)
Dept: GASTROENTEROLOGY | Facility: CLINIC | Age: 57
End: 2017-11-09

## 2017-11-09 NOTE — TELEPHONE ENCOUNTER
Aware that a stool specimen will need to be submitted to test for H-Pylori.  Aware he can turn in the specimen to any [facility] facility that has a lab.  Patient expressed understanding.

## 2017-11-09 NOTE — TELEPHONE ENCOUNTER
----- Message from Paige Bonner sent at 11/9/2017  8:20 AM CST -----  Contact: Self- [phone]  Heath- pt called to speak with Fanta about scheduling lab work- please call pt back at [phone]

## 2017-12-13 PROBLEM — M25.571 RIGHT ANKLE PAIN: Status: ACTIVE | Noted: 2017-01-01

## 2017-12-13 NOTE — TELEPHONE ENCOUNTER
----- Message from Cr Navas MD sent at 12/12/2017  3:46 PM CST -----  Please tell pt the crp remains highly elevated. The liver and kidney tests are normal except for low albumin due to severe inflammation. The iron deficiency anemia is stable. SOFI

## 2017-12-13 NOTE — PROGRESS NOTES
"Subjective:       Patient ID: Micheal Chirinos Jr. is a 57 y.o. male.    Chief Complaint: AS    HPI     Mr. Micheal Chirinos Jr. is a 57 y.o. who is here for follow up on AS . Patient states that he takes celebrex every other day and does not use any over the counter medication. He states that he has been having problems with is right foot swelling and pain. It occurs at night mostly and has been bothering him for a couple weeks. Other than his rigth foot he has not been having any pain anywhere and has been trying to exercise his neck and use a stationary bike. He denies any SOB, palpitations or chest pain   Review of Systems   Constitutional: Negative for appetite change, chills, fatigue, fever and unexpected weight change.   HENT: Negative for mouth sores.         Dry   Eyes: Negative for pain, redness and visual disturbance.   Respiratory: Negative for cough, shortness of breath and wheezing.    Cardiovascular: Negative for chest pain, palpitations and leg swelling.   Gastrointestinal: Negative for abdominal pain, blood in stool, constipation, diarrhea and nausea.   Genitourinary: Negative for difficulty urinating, dysuria and hematuria.   Musculoskeletal: Positive for arthralgias and joint swelling. Negative for back pain, gait problem, myalgias, neck pain and neck stiffness.   Skin: Negative for color change, pallor and rash.   Neurological: Negative for weakness and headaches.   Psychiatric/Behavioral: Negative for dysphoric mood and sleep disturbance.         Objective:   /69   Pulse 60   Ht 5' 9.6" (1.768 m)   Wt 65.4 kg (144 lb 1.6 oz)   BMI 20.91 kg/m²      Physical Exam   Constitutional: He is oriented to person, place, and time and well-developed, well-nourished, and in no distress.   HENT:   Head: Normocephalic and atraumatic.   Mouth/Throat: Oropharynx is clear and moist.   Neck fixed in slight flexion   Eyes: Conjunctivae and EOM are normal. Pupils are equal, round, and " reactive to light.   Neck: Normal range of motion. Neck supple. No thyromegaly present.   Neck forward flexed, 5 rom all directions only   Cardiovascular: Normal rate, regular rhythm, normal heart sounds and intact distal pulses.  Exam reveals no gallop and no friction rub.    No murmur heard.  Pulmonary/Chest: Breath sounds normal. He has no wheezes. He has no rales. He exhibits no tenderness.   Abdominal: Soft. He exhibits no distension and no mass. There is no tenderness.       Right Side Rheumatological Exam     Examination finds the shoulder, elbow, wrist, knee, 1st PIP, 1st MCP, 2nd PIP, 2nd MCP, 3rd PIP, 3rd MCP, 4th PIP, 4th MCP, 5th PIP and 5th MCP normal.    Left Side Rheumatological Exam     Examination finds the shoulder, elbow, wrist, knee, 1st PIP, 1st MCP, 2nd PIP, 2nd MCP, 3rd PIP, 3rd MCP, 4th PIP, 4th MCP, 5th PIP and 5th MCP normal.      Lymphadenopathy:     He has no cervical adenopathy.   Neurological: He is alert and oriented to person, place, and time. He displays normal reflexes. He exhibits normal muscle tone. Gait normal.   Motor strength nl. UE and LE bilateral   Skin: Skin is warm and dry. No rash noted. No erythema. No pallor.     Psychiatric: Mood, memory, affect and judgment normal.   Musculoskeletal:   Right foot swelling and tender to palpation   Left hip pain with external rotation                Results for DEB CHELSEA RAMOSQUOC AN (MRN [id number]) as of 12/13/2017 10:53   Ref. Range 12/12/2017 08:07   WBC Latest Ref Range: 3.90 - 12.70 K/uL 9.75   RBC Latest Ref Range: 4.60 - 6.20 M/uL 4.17 (L)   Hemoglobin Latest Ref Range: 14.0 - 18.0 g/dL 10.0 (L)   Hematocrit Latest Ref Range: 40.0 - 54.0 % 31.9 (L)   MCV Latest Ref Range: 82 - 98 fL 77 (L)   MCH Latest Ref Range: 27.0 - 31.0 pg 24.0 (L)   MCHC Latest Ref Range: 32.0 - 36.0 g/dL 31.3 (L)   RDW Latest Ref Range: 11.5 - 14.5 % 21.6 (H)   Platelets Latest Ref Range: 150 - 350 K/uL 472 (H)   MPV Latest Ref Range: 9.2 - 12.9 fL  10.2   Gran% Latest Ref Range: 38.0 - 73.0 % 56.8   Gran # Latest Ref Range: 1.8 - 7.7 K/uL 5.5   Immature Granulocytes Latest Ref Range: 0.0 - 0.5 % 0.4   Immature Grans (Abs) Latest Ref Range: 0.00 - 0.04 K/uL 0.04   Lymph% Latest Ref Range: 18.0 - 48.0 % 28.2   Lymph # Latest Ref Range: 1.0 - 4.8 K/uL 2.8   Mono% Latest Ref Range: 4.0 - 15.0 % 11.2   Mono # Latest Ref Range: 0.3 - 1.0 K/uL 1.1 (H)   Eosinophil% Latest Ref Range: 0.0 - 8.0 % 2.9   Eos # Latest Ref Range: 0.0 - 0.5 K/uL 0.3   Basophil% Latest Ref Range: 0.0 - 1.9 % 0.5   Baso # Latest Ref Range: 0.00 - 0.20 K/uL 0.05   nRBC Latest Ref Range: 0 /100 WBC 0         Results for CHELSEA BOYD JR. (MRN [id number]) as of 12/13/2017 10:53   Ref. Range 7/26/2016 08:34 4/20/2017 08:38 8/4/2017 08:35 8/8/2017 11:54 12/12/2017 08:07   Sed Rate Latest Ref Range: 0 - 10 mm/Hr 116 (H) 17 (H) 97 (H)  106 (H)       Results for CHELSEA BOYD JR. (MRN [id number]) as of 12/13/2017 10:53   Ref. Range 1/19/2016 08:58 7/26/2016 08:34 4/20/2017 08:38 8/4/2017 08:35 12/12/2017 08:07   CRP Latest Ref Range: 0.0 - 8.2 mg/L 89.0 (H) 96.7 (H) 114.8 (H) 108.6 (H) 111.0 (H)       Results for CHELSEA BOYD JR. (MRN [id number]) as of 12/13/2017 10:53   Ref. Range 12/12/2017 08:07   Sodium Latest Ref Range: 136 - 145 mmol/L 140   Potassium Latest Ref Range: 3.5 - 5.1 mmol/L 4.1   Chloride Latest Ref Range: 95 - 110 mmol/L 103   CO2 Latest Ref Range: 23 - 29 mmol/L 29   Anion Gap Latest Ref Range: 8 - 16 mmol/L 8   BUN, Bld Latest Ref Range: 6 - 20 mg/dL 11   Creatinine Latest Ref Range: 0.5 - 1.4 mg/dL 0.9   eGFR if non African American Latest Ref Range: >60 mL/min/1.73 m^2 >60.0   eGFR if African American Latest Ref Range: >60 mL/min/1.73 m^2 >60.0   Glucose Latest Ref Range: 70 - 110 mg/dL 87   Calcium Latest Ref Range: 8.7 - 10.5 mg/dL 9.7   Alkaline Phosphatase Latest Ref Range: 55 - 135 U/L 122   Total Protein Latest Ref Range: 6.0 - 8.4 g/dL 8.3    Albumin Latest Ref Range: 3.5 - 5.2 g/dL 2.8 (L)   Total Bilirubin Latest Ref Range: 0.1 - 1.0 mg/dL 0.3   AST Latest Ref Range: 10 - 40 U/L 15   ALT Latest Ref Range: 10 - 44 U/L 14   CRP Latest Ref Range: 0.0 - 8.2 mg/L 111.0 (H)     Assessment/Plan   Mr. Chirinos is a 57 y.o. male here for follow up on AS       Problem List Items Addressed This Visit        Oncology    Iron deficiency anemia       Orthopedic    AS (ankylosing spondylitis) - Primary    Relevant Medications    sulfaSALAzine (AZULFIDINE) 500 MG TbEC    celecoxib (CELEBREX) 100 MG capsule    Other Relevant Orders    Influenza - Quadrivalent (3 years & older)    Ambulatory Referral to Physical/Occupational Therapy    X-Ray Foot Complete Right      Other Visit Diagnoses     Right foot pain                  Plan:  - Flu and pneumonia vaccine today   - Xray of foot   - Celebrex to be taken daily while he has a flare  - Colonoscopy for his LAURI   - PT referral to help with exercises   - Continue sulfasalazine   - RTC in 3 month   - Call if having any flares.     Patient seen and examined this morning. Discussed with Dr. Navas   - staff attestation to follow.      Henrietta Dejesus MD, MPH  Internal Medicine PGY1  [address] [place] [address]

## 2017-12-13 NOTE — PROGRESS NOTES
I have personally taken the history and examined the patient and agree with the resident,s note as stated above     Neck rom:0  Chest expansion: 3 cm  Fabere + left  Severe decrease int rot 20 and ext 20 both hips  Right elbow 15 contracture  Decreased rom wrists  Right ankle with 1 + cool effusion, pain TT not ST          Results for CHELSEA BOYD JR. (MRN [id number]) as of 12/13/2017 11:12   Ref. Range 8/8/2017 11:54   Cholesterol Latest Ref Range: 120 - 199 mg/dL 140   HDL Latest Ref Range: 40 - 75 mg/dL 34 (L)   LDL Cholesterol Latest Ref Range: 63.0 - 159.0 mg/dL 90.8   Total Cholesterol/HDL Ratio Latest Ref Range: 2.0 - 5.0  4.1   Triglycerides Latest Ref Range: 30 - 150 mg/dL 76     Saw Fanta Joe in [facility]astro 10/25/17 for iron deficiency anemia:    Colonoscopy- 3/18/2013- Non-bleeding internal hemorrhoids. 6 mm polyp in ascending colon partial retrieval. Coagulation for tissue destruction was successful. Repeat in 3-5 years.      Assessment:  1. History of Helicobacter pylori infection    2. History of colon polyps       Recommendations:  1. Order H. Pylori stool to conform eradication.   2. Repeat colonoscopy in 3-5 years.       Follow up pending H. Pylori stool.        Results for CHELSEA BOYD JR. (MRN [id number]) as of 12/13/2017 11:12   Ref. Range 12/12/2017 08:07   WBC Latest Ref Range: 3.90 - 12.70 K/uL 9.75   RBC Latest Ref Range: 4.60 - 6.20 M/uL 4.17 (L)   Hemoglobin Latest Ref Range: 14.0 - 18.0 g/dL 10.0 (L)   Hematocrit Latest Ref Range: 40.0 - 54.0 % 31.9 (L)   MCV Latest Ref Range: 82 - 98 fL 77 (L)   MCH Latest Ref Range: 27.0 - 31.0 pg 24.0 (L)   MCHC Latest Ref Range: 32.0 - 36.0 g/dL 31.3 (L)   RDW Latest Ref Range: 11.5 - 14.5 % 21.6 (H)   Platelets Latest Ref Range: 150 - 350 K/uL 472 (H)   MPV Latest Ref Range: 9.2 - 12.9 fL 10.2   Gran% Latest Ref Range: 38.0 - 73.0 % 56.8   Gran # Latest Ref Range: 1.8 - 7.7 K/uL 5.5   Immature Granulocytes Latest Ref Range: 0.0 -  0.5 % 0.4   Immature Grans (Abs) Latest Ref Range: 0.00 - 0.04 K/uL 0.04   Lymph% Latest Ref Range: 18.0 - 48.0 % 28.2   Lymph # Latest Ref Range: 1.0 - 4.8 K/uL 2.8   Mono% Latest Ref Range: 4.0 - 15.0 % 11.2   Mono # Latest Ref Range: 0.3 - 1.0 K/uL 1.1 (H)   Eosinophil% Latest Ref Range: 0.0 - 8.0 % 2.9   Eos # Latest Ref Range: 0.0 - 0.5 K/uL 0.3   Basophil% Latest Ref Range: 0.0 - 1.9 % 0.5   Baso # Latest Ref Range: 0.00 - 0.20 K/uL 0.05   nRBC Latest Ref Range: 0 /100 WBC 0   Sed Rate Latest Ref Range: 0 - 10 mm/Hr 106 (H)   Sodium Latest Ref Range: 136 - 145 mmol/L 140   Potassium Latest Ref Range: 3.5 - 5.1 mmol/L 4.1   Chloride Latest Ref Range: 95 - 110 mmol/L 103   CO2 Latest Ref Range: 23 - 29 mmol/L 29   Anion Gap Latest Ref Range: 8 - 16 mmol/L 8   BUN, Bld Latest Ref Range: 6 - 20 mg/dL 11   Creatinine Latest Ref Range: 0.5 - 1.4 mg/dL 0.9   eGFR if non African American Latest Ref Range: >60 mL/min/1.73 m^2 >60.0   eGFR if African American Latest Ref Range: >60 mL/min/1.73 m^2 >60.0   Glucose Latest Ref Range: 70 - 110 mg/dL 87   Calcium Latest Ref Range: 8.7 - 10.5 mg/dL 9.7   Alkaline Phosphatase Latest Ref Range: 55 - 135 U/L 122   Total Protein Latest Ref Range: 6.0 - 8.4 g/dL 8.3   Albumin Latest Ref Range: 3.5 - 5.2 g/dL 2.8 (L)   Total Bilirubin Latest Ref Range: 0.1 - 1.0 mg/dL 0.3   AST Latest Ref Range: 10 - 40 U/L 15   ALT Latest Ref Range: 10 - 44 U/L 14   CRP Latest Ref Range: 0.0 - 8.2 mg/L 111.0 (H)         AS: ASDAS-CRP: 4.61(very high activity) BASDAI: 5.5(high activity) BASFI 5.95(high functional limitation )  Right ankle arthritis due to AS  H/o AAU not recent  Improved right elbow pain still flexion deformity  Resolved Bilateral knee effusions  Iron deficiency anemia , H/o H. Pylori   Hyperlipidemia > statin threshold for mod-high dose statin repeat due(6.5%)  Stable moderate iron deficiency anemia saw Dr. Godfrey in past to have H.Pylori today, schedule colonoscopy had polyp 2013 due  for surveillance  Hypertension, normotensive off amlodipine    Declines injection right ankle(TTJ)  *Prevnar 13 and quadrivalent influenza today  Celecoxib 100mg once or twice daily WM according to symptoms, rather than 200mg every other day.  Omeprazole 20mg twice daily  Schedule colonoscopy as ordered by Dr. Godfrey 5/1/17  Cont HEP  Ref to PT right ankle  X-ray right ankle   sulfasalazine-EN  1500mg twice daily for peripheral spondyloarthritis   Declines biologics(TNFi, secukinumab) especially with recent recurrent spontaneous left dental abscess and facial cellulitis  RTC 3 months with standing labs

## 2017-12-26 NOTE — TELEPHONE ENCOUNTER
----- Message from Tato Herring sent at 12/21/2017  4:41 PM CST -----  Contact: Jenni ( spouse ) @  [phone]  Caller is requesting a return call concerning the renewal of the handicap licence plate, pls call

## 2018-01-01 ENCOUNTER — OFFICE VISIT (OUTPATIENT)
Dept: PHYSICAL MEDICINE AND REHAB | Facility: CLINIC | Age: 58
End: 2018-01-01
Payer: MEDICARE

## 2018-01-01 ENCOUNTER — TELEPHONE (OUTPATIENT)
Dept: ENDOSCOPY | Facility: HOSPITAL | Age: 58
End: 2018-01-01

## 2018-01-01 ENCOUNTER — ANESTHESIA EVENT (OUTPATIENT)
Dept: SURGERY | Facility: HOSPITAL | Age: 58
DRG: 283 | End: 2018-01-01
Payer: MEDICARE

## 2018-01-01 ENCOUNTER — LAB VISIT (OUTPATIENT)
Dept: LAB | Facility: HOSPITAL | Age: 58
End: 2018-01-01
Attending: INTERNAL MEDICINE
Payer: MEDICARE

## 2018-01-01 ENCOUNTER — CLINICAL SUPPORT (OUTPATIENT)
Dept: CARDIOLOGY | Facility: CLINIC | Age: 58
DRG: 283 | End: 2018-01-01
Attending: PSYCHIATRY & NEUROLOGY
Payer: MEDICARE

## 2018-01-01 ENCOUNTER — TELEPHONE (OUTPATIENT)
Dept: FAMILY MEDICINE | Facility: CLINIC | Age: 58
End: 2018-01-01

## 2018-01-01 ENCOUNTER — OFFICE VISIT (OUTPATIENT)
Dept: GASTROENTEROLOGY | Facility: CLINIC | Age: 58
End: 2018-01-01
Payer: MEDICARE

## 2018-01-01 ENCOUNTER — TELEPHONE (OUTPATIENT)
Dept: GASTROENTEROLOGY | Facility: CLINIC | Age: 58
End: 2018-01-01

## 2018-01-01 ENCOUNTER — TELEPHONE (OUTPATIENT)
Dept: RHEUMATOLOGY | Facility: CLINIC | Age: 58
End: 2018-01-01

## 2018-01-01 ENCOUNTER — OFFICE VISIT (OUTPATIENT)
Dept: FAMILY MEDICINE | Facility: CLINIC | Age: 58
End: 2018-01-01
Payer: MEDICARE

## 2018-01-01 ENCOUNTER — OFFICE VISIT (OUTPATIENT)
Dept: RHEUMATOLOGY | Facility: CLINIC | Age: 58
End: 2018-01-01
Payer: MEDICARE

## 2018-01-01 ENCOUNTER — ANESTHESIA EVENT (OUTPATIENT)
Dept: ENDOSCOPY | Facility: HOSPITAL | Age: 58
End: 2018-01-01
Payer: MEDICARE

## 2018-01-01 ENCOUNTER — ANESTHESIA (OUTPATIENT)
Dept: ENDOSCOPY | Facility: HOSPITAL | Age: 58
End: 2018-01-01
Payer: MEDICARE

## 2018-01-01 ENCOUNTER — HOSPITAL ENCOUNTER (OUTPATIENT)
Facility: HOSPITAL | Age: 58
Discharge: HOME | End: 2018-10-31
Attending: INTERNAL MEDICINE | Admitting: INTERNAL MEDICINE
Payer: MEDICARE

## 2018-01-01 ENCOUNTER — ANESTHESIA EVENT (OUTPATIENT)
Dept: SURGERY | Facility: HOSPITAL | Age: 58
End: 2018-01-01

## 2018-01-01 ENCOUNTER — TELEPHONE (OUTPATIENT)
Dept: RADIOLOGY | Facility: HOSPITAL | Age: 58
End: 2018-01-01

## 2018-01-01 ENCOUNTER — ANESTHESIA (OUTPATIENT)
Dept: ENDOSCOPY | Facility: HOSPITAL | Age: 58
End: 2018-01-01

## 2018-01-01 ENCOUNTER — CLINICAL SUPPORT (OUTPATIENT)
Dept: FAMILY MEDICINE | Facility: CLINIC | Age: 58
End: 2018-01-01
Payer: MEDICARE

## 2018-01-01 ENCOUNTER — LAB VISIT (OUTPATIENT)
Dept: LAB | Facility: HOSPITAL | Age: 58
End: 2018-01-01
Attending: PHYSICAL MEDICINE & REHABILITATION
Payer: MEDICARE

## 2018-01-01 ENCOUNTER — ANESTHESIA EVENT (OUTPATIENT)
Dept: ENDOSCOPY | Facility: HOSPITAL | Age: 58
End: 2018-01-01

## 2018-01-01 ENCOUNTER — HOSPITAL ENCOUNTER (EMERGENCY)
Facility: HOSPITAL | Age: 58
Discharge: HOME | End: 2018-05-12
Attending: EMERGENCY MEDICINE
Payer: MEDICARE

## 2018-01-01 ENCOUNTER — ANESTHESIA (OUTPATIENT)
Dept: SURGERY | Facility: HOSPITAL | Age: 58
DRG: 283 | End: 2018-01-01
Payer: MEDICARE

## 2018-01-01 ENCOUNTER — ANESTHESIA (OUTPATIENT)
Dept: SURGERY | Facility: HOSPITAL | Age: 58
End: 2018-01-01

## 2018-01-01 ENCOUNTER — HOSPITAL ENCOUNTER (INPATIENT)
Facility: HOSPITAL | Age: 58
LOS: 14 days | DRG: 283 | End: 2018-12-09
Attending: EMERGENCY MEDICINE | Admitting: EMERGENCY MEDICINE
Payer: MEDICARE

## 2018-01-01 VITALS
WEIGHT: 150 LBS | OXYGEN SATURATION: 100 % | TEMPERATURE: 99 F | HEIGHT: 70 IN | SYSTOLIC BLOOD PRESSURE: 157 MMHG | BODY MASS INDEX: 21.47 KG/M2 | DIASTOLIC BLOOD PRESSURE: 86 MMHG | RESPIRATION RATE: 18 BRPM | HEART RATE: 68 BPM

## 2018-01-01 VITALS
WEIGHT: 144.38 LBS | DIASTOLIC BLOOD PRESSURE: 81 MMHG | BODY MASS INDEX: 21.38 KG/M2 | HEIGHT: 69 IN | HEART RATE: 76 BPM | SYSTOLIC BLOOD PRESSURE: 140 MMHG

## 2018-01-01 VITALS
DIASTOLIC BLOOD PRESSURE: 70 MMHG | BODY MASS INDEX: 20.78 KG/M2 | WEIGHT: 145.13 LBS | HEIGHT: 70 IN | HEART RATE: 64 BPM | SYSTOLIC BLOOD PRESSURE: 131 MMHG

## 2018-01-01 VITALS
DIASTOLIC BLOOD PRESSURE: 67 MMHG | WEIGHT: 143.19 LBS | HEART RATE: 69 BPM | BODY MASS INDEX: 20.05 KG/M2 | HEIGHT: 71 IN | SYSTOLIC BLOOD PRESSURE: 119 MMHG

## 2018-01-01 VITALS
SYSTOLIC BLOOD PRESSURE: 125 MMHG | OXYGEN SATURATION: 97 % | HEIGHT: 70 IN | TEMPERATURE: 98 F | DIASTOLIC BLOOD PRESSURE: 78 MMHG | BODY MASS INDEX: 21.05 KG/M2 | HEART RATE: 75 BPM | WEIGHT: 147 LBS | RESPIRATION RATE: 16 BRPM

## 2018-01-01 VITALS
DIASTOLIC BLOOD PRESSURE: 72 MMHG | SYSTOLIC BLOOD PRESSURE: 120 MMHG | HEART RATE: 85 BPM | HEIGHT: 69 IN | WEIGHT: 145 LBS | BODY MASS INDEX: 21.48 KG/M2

## 2018-01-01 VITALS
BODY MASS INDEX: 20.83 KG/M2 | HEART RATE: 83 BPM | DIASTOLIC BLOOD PRESSURE: 88 MMHG | HEIGHT: 69 IN | SYSTOLIC BLOOD PRESSURE: 168 MMHG | WEIGHT: 140.63 LBS

## 2018-01-01 VITALS
DIASTOLIC BLOOD PRESSURE: 64 MMHG | OXYGEN SATURATION: 77 % | HEART RATE: 111 BPM | SYSTOLIC BLOOD PRESSURE: 100 MMHG | BODY MASS INDEX: 22 KG/M2 | TEMPERATURE: 99 F | WEIGHT: 148.56 LBS | HEIGHT: 69 IN | RESPIRATION RATE: 27 BRPM

## 2018-01-01 VITALS
BODY MASS INDEX: 21.49 KG/M2 | HEART RATE: 71 BPM | WEIGHT: 145.06 LBS | SYSTOLIC BLOOD PRESSURE: 149 MMHG | BODY MASS INDEX: 21.84 KG/M2 | DIASTOLIC BLOOD PRESSURE: 86 MMHG | WEIGHT: 147.5 LBS | OXYGEN SATURATION: 98 % | HEART RATE: 65 BPM | RESPIRATION RATE: 20 BRPM | TEMPERATURE: 98 F | HEIGHT: 69 IN | SYSTOLIC BLOOD PRESSURE: 138 MMHG | DIASTOLIC BLOOD PRESSURE: 83 MMHG | HEIGHT: 69 IN

## 2018-01-01 DIAGNOSIS — M47.812 SPONDYLOSIS OF CERVICAL REGION WITHOUT MYELOPATHY OR RADICULOPATHY: Primary | ICD-10-CM

## 2018-01-01 DIAGNOSIS — M25.531 WRIST PAIN, ACUTE, RIGHT: ICD-10-CM

## 2018-01-01 DIAGNOSIS — M62.838 MUSCLE SPASMS OF NECK: ICD-10-CM

## 2018-01-01 DIAGNOSIS — G89.29 CHRONIC PAIN OF BOTH KNEES: ICD-10-CM

## 2018-01-01 DIAGNOSIS — M25.561 CHRONIC PAIN OF BOTH KNEES: ICD-10-CM

## 2018-01-01 DIAGNOSIS — D50.9 IRON DEFICIENCY ANEMIA, UNSPECIFIED IRON DEFICIENCY ANEMIA TYPE: Primary | ICD-10-CM

## 2018-01-01 DIAGNOSIS — J11.1 INFLUENZA: Primary | ICD-10-CM

## 2018-01-01 DIAGNOSIS — M54.40 CHRONIC LOW BACK PAIN WITH SCIATICA, SCIATICA LATERALITY UNSPECIFIED, UNSPECIFIED BACK PAIN LATERALITY: ICD-10-CM

## 2018-01-01 DIAGNOSIS — M25.552 LEFT HIP PAIN: ICD-10-CM

## 2018-01-01 DIAGNOSIS — M54.50 CHRONIC BILATERAL LOW BACK PAIN WITHOUT SCIATICA: ICD-10-CM

## 2018-01-01 DIAGNOSIS — M45.9 ANKYLOSING SPONDYLITIS, UNSPECIFIED SITE OF SPINE: ICD-10-CM

## 2018-01-01 DIAGNOSIS — M45.0 ANKYLOSING SPONDYLITIS OF MULTIPLE SITES IN SPINE: ICD-10-CM

## 2018-01-01 DIAGNOSIS — G89.4 CHRONIC PAIN SYNDROME: ICD-10-CM

## 2018-01-01 DIAGNOSIS — M25.561 RIGHT KNEE PAIN, UNSPECIFIED CHRONICITY: ICD-10-CM

## 2018-01-01 DIAGNOSIS — Z79.1 NSAID LONG-TERM USE: ICD-10-CM

## 2018-01-01 DIAGNOSIS — R41.89 UNRESPONSIVE: ICD-10-CM

## 2018-01-01 DIAGNOSIS — M25.551 HIP PAIN, BILATERAL: ICD-10-CM

## 2018-01-01 DIAGNOSIS — G89.29 CHRONIC LOW BACK PAIN WITH SCIATICA, SCIATICA LATERALITY UNSPECIFIED, UNSPECIFIED BACK PAIN LATERALITY: Primary | ICD-10-CM

## 2018-01-01 DIAGNOSIS — G89.29 CHRONIC PAIN OF RIGHT KNEE: ICD-10-CM

## 2018-01-01 DIAGNOSIS — M19.019 SHOULDER ARTHRITIS: ICD-10-CM

## 2018-01-01 DIAGNOSIS — Z12.11 COLON CANCER SCREENING: ICD-10-CM

## 2018-01-01 DIAGNOSIS — I10 ESSENTIAL HYPERTENSION: ICD-10-CM

## 2018-01-01 DIAGNOSIS — G89.29 CHRONIC LOW BACK PAIN WITH SCIATICA, SCIATICA LATERALITY UNSPECIFIED, UNSPECIFIED BACK PAIN LATERALITY: ICD-10-CM

## 2018-01-01 DIAGNOSIS — D50.9 MICROCYTIC ANEMIA: Primary | ICD-10-CM

## 2018-01-01 DIAGNOSIS — G89.29 CHRONIC BILATERAL LOW BACK PAIN WITHOUT SCIATICA: ICD-10-CM

## 2018-01-01 DIAGNOSIS — M25.552 HIP PAIN, BILATERAL: ICD-10-CM

## 2018-01-01 DIAGNOSIS — Z12.11 SPECIAL SCREENING FOR MALIGNANT NEOPLASMS, COLON: Primary | ICD-10-CM

## 2018-01-01 DIAGNOSIS — M54.2 NECK PAIN: ICD-10-CM

## 2018-01-01 DIAGNOSIS — I49.9 VENTRICULAR ARRHYTHMIA: ICD-10-CM

## 2018-01-01 DIAGNOSIS — I49.01 VENTRICULAR FIBRILLATION: ICD-10-CM

## 2018-01-01 DIAGNOSIS — M25.562 CHRONIC PAIN OF BOTH KNEES: ICD-10-CM

## 2018-01-01 DIAGNOSIS — Z23 NEED FOR VACCINATION AGAINST STREPTOCOCCUS PNEUMONIAE USING PNEUMOCOCCAL CONJUGATE VACCINE 13: ICD-10-CM

## 2018-01-01 DIAGNOSIS — D50.0 IRON DEFICIENCY ANEMIA DUE TO CHRONIC BLOOD LOSS: Primary | ICD-10-CM

## 2018-01-01 DIAGNOSIS — K21.9 GASTROESOPHAGEAL REFLUX DISEASE WITHOUT ESOPHAGITIS: ICD-10-CM

## 2018-01-01 DIAGNOSIS — Z79.899 OTHER LONG TERM (CURRENT) DRUG THERAPY: ICD-10-CM

## 2018-01-01 DIAGNOSIS — I49.01 CARDIAC ARREST WITH VENTRICULAR FIBRILLATION: ICD-10-CM

## 2018-01-01 DIAGNOSIS — F11.90 OPIATE USE: ICD-10-CM

## 2018-01-01 DIAGNOSIS — M62.838 MUSCLE SPASMS OF NECK: Primary | ICD-10-CM

## 2018-01-01 DIAGNOSIS — M47.812 SPONDYLOSIS OF CERVICAL REGION WITHOUT MYELOPATHY OR RADICULOPATHY: ICD-10-CM

## 2018-01-01 DIAGNOSIS — I82.423 ACUTE BILATERAL DEEP VEIN THROMBOSIS (DVT) OF ILIAC VEINS OF LOWER EXTREMITIES: ICD-10-CM

## 2018-01-01 DIAGNOSIS — I46.9 CARDIAC ARREST WITH VENTRICULAR FIBRILLATION: ICD-10-CM

## 2018-01-01 DIAGNOSIS — E78.5 HYPERLIPIDEMIA, UNSPECIFIED HYPERLIPIDEMIA TYPE: ICD-10-CM

## 2018-01-01 DIAGNOSIS — R00.1 BRADYCARDIA: ICD-10-CM

## 2018-01-01 DIAGNOSIS — M45.0 ANKYLOSING SPONDYLITIS OF MULTIPLE SITES IN SPINE: Chronic | ICD-10-CM

## 2018-01-01 DIAGNOSIS — D50.9 MICROCYTIC ANEMIA: ICD-10-CM

## 2018-01-01 DIAGNOSIS — F11.90 OPIATE USE: Chronic | ICD-10-CM

## 2018-01-01 DIAGNOSIS — M25.561 CHRONIC PAIN OF RIGHT KNEE: ICD-10-CM

## 2018-01-01 DIAGNOSIS — R07.89 CHEST WALL PAIN: Primary | ICD-10-CM

## 2018-01-01 DIAGNOSIS — R07.9 CHEST PAIN: ICD-10-CM

## 2018-01-01 DIAGNOSIS — Z12.5 SPECIAL SCREENING FOR MALIGNANT NEOPLASM OF PROSTATE: ICD-10-CM

## 2018-01-01 DIAGNOSIS — G89.4 CHRONIC PAIN SYNDROME: Primary | ICD-10-CM

## 2018-01-01 DIAGNOSIS — A04.8 H. PYLORI INFECTION: ICD-10-CM

## 2018-01-01 DIAGNOSIS — D72.829 LEUKOCYTOSIS, UNSPECIFIED TYPE: Primary | ICD-10-CM

## 2018-01-01 DIAGNOSIS — Z23 NEED FOR TDAP VACCINATION: Primary | ICD-10-CM

## 2018-01-01 DIAGNOSIS — I46.9 CARDIAC ARREST: ICD-10-CM

## 2018-01-01 DIAGNOSIS — E87.20 METABOLIC ACIDOSIS: ICD-10-CM

## 2018-01-01 DIAGNOSIS — G93.1 ANOXIC BRAIN INJURY: ICD-10-CM

## 2018-01-01 DIAGNOSIS — D50.9 IRON DEFICIENCY ANEMIA, UNSPECIFIED IRON DEFICIENCY ANEMIA TYPE: ICD-10-CM

## 2018-01-01 DIAGNOSIS — R09.02 HYPOXIA: ICD-10-CM

## 2018-01-01 DIAGNOSIS — M45.0 ANKYLOSING SPONDYLITIS OF MULTIPLE SITES IN SPINE: Primary | ICD-10-CM

## 2018-01-01 DIAGNOSIS — G40.901 STATUS EPILEPTICUS: ICD-10-CM

## 2018-01-01 DIAGNOSIS — M54.40 CHRONIC LOW BACK PAIN WITH SCIATICA, SCIATICA LATERALITY UNSPECIFIED, UNSPECIFIED BACK PAIN LATERALITY: Primary | ICD-10-CM

## 2018-01-01 LAB
6MAM UR QL: NOT DETECTED
7AMINOCLONAZEPAM UR QL: NOT DETECTED
A-OH ALPRAZ UR QL: NOT DETECTED
ABO + RH BLD: NORMAL
ALBUMIN SERPL BCP-MCNC: 1.4 G/DL
ALBUMIN SERPL BCP-MCNC: 1.5 G/DL
ALBUMIN SERPL BCP-MCNC: 1.6 G/DL
ALBUMIN SERPL BCP-MCNC: 1.7 G/DL
ALBUMIN SERPL BCP-MCNC: 1.8 G/DL
ALBUMIN SERPL BCP-MCNC: 1.9 G/DL
ALBUMIN SERPL BCP-MCNC: 2 G/DL
ALBUMIN SERPL BCP-MCNC: 2.1 G/DL
ALBUMIN SERPL BCP-MCNC: 2.1 G/DL
ALBUMIN SERPL BCP-MCNC: 2.5 G/DL
ALBUMIN SERPL BCP-MCNC: 2.8 G/DL
ALBUMIN SERPL BCP-MCNC: 2.8 G/DL
ALBUMIN SERPL BCP-MCNC: 2.9 G/DL
ALBUMIN SERPL BCP-MCNC: 2.9 G/DL
ALBUMIN SERPL BCP-MCNC: 3.1 G/DL
ALBUMIN SERPL BCP-MCNC: 3.2 G/DL
ALLENS TEST: ABNORMAL
ALP SERPL-CCNC: 109 U/L
ALP SERPL-CCNC: 116 U/L
ALP SERPL-CCNC: 137 U/L
ALP SERPL-CCNC: 164 U/L
ALP SERPL-CCNC: 190 U/L
ALP SERPL-CCNC: 200 U/L
ALP SERPL-CCNC: 207 U/L
ALP SERPL-CCNC: 219 U/L
ALP SERPL-CCNC: 226 U/L
ALP SERPL-CCNC: 232 U/L
ALP SERPL-CCNC: 236 U/L
ALP SERPL-CCNC: 239 U/L
ALP SERPL-CCNC: 242 U/L
ALP SERPL-CCNC: 272 U/L
ALP SERPL-CCNC: 298 U/L
ALP SERPL-CCNC: 317 U/L
ALP SERPL-CCNC: 342 U/L
ALP SERPL-CCNC: 95 U/L
ALPRAZ UR QL: NOT DETECTED
ALT SERPL W/O P-5'-P-CCNC: 10 U/L
ALT SERPL W/O P-5'-P-CCNC: 101 U/L
ALT SERPL W/O P-5'-P-CCNC: 111 U/L
ALT SERPL W/O P-5'-P-CCNC: 12 U/L
ALT SERPL W/O P-5'-P-CCNC: 121 U/L
ALT SERPL W/O P-5'-P-CCNC: 124 U/L
ALT SERPL W/O P-5'-P-CCNC: 22 U/L
ALT SERPL W/O P-5'-P-CCNC: 25 U/L
ALT SERPL W/O P-5'-P-CCNC: 31 U/L
ALT SERPL W/O P-5'-P-CCNC: 39 U/L
ALT SERPL W/O P-5'-P-CCNC: 45 U/L
ALT SERPL W/O P-5'-P-CCNC: 46 U/L
ALT SERPL W/O P-5'-P-CCNC: 5 U/L
ALT SERPL W/O P-5'-P-CCNC: 54 U/L
ALT SERPL W/O P-5'-P-CCNC: 63 U/L
ALT SERPL W/O P-5'-P-CCNC: 7 U/L
ALT SERPL W/O P-5'-P-CCNC: 8 U/L
ALT SERPL W/O P-5'-P-CCNC: 9 U/L
AMORPH CRY URNS QL MICRO: ABNORMAL
AMPHET UR QL SCN: NOT DETECTED
AMPHET+METHAMPHET UR QL: NEGATIVE
ANION GAP SERPL CALC-SCNC: 10 MMOL/L
ANION GAP SERPL CALC-SCNC: 11 MMOL/L
ANION GAP SERPL CALC-SCNC: 12 MMOL/L
ANION GAP SERPL CALC-SCNC: 13 MMOL/L
ANION GAP SERPL CALC-SCNC: 15 MMOL/L
ANION GAP SERPL CALC-SCNC: 18 MMOL/L
ANION GAP SERPL CALC-SCNC: 8 MMOL/L
ANION GAP SERPL CALC-SCNC: 9 MMOL/L
ANISOCYTOSIS BLD QL SMEAR: SLIGHT
ANNOTATION COMMENT IMP: NORMAL
ANNOTATION COMMENT IMP: NORMAL
AORTIC ROOT ANNULUS: 3.23 CM
AORTIC VALVE CUSP SEPERATION: 1.91 CM
APTT BLDCRRT: 30.8 SEC
APTT BLDCRRT: 32.8 SEC
APTT BLDCRRT: 35.2 SEC
APTT BLDCRRT: 35.5 SEC
APTT BLDCRRT: 37.6 SEC
APTT BLDCRRT: 38 SEC
APTT BLDCRRT: 39.4 SEC
APTT BLDCRRT: 42.2 SEC
APTT BLDCRRT: 42.3 SEC
APTT BLDCRRT: 47.8 SEC
AST SERPL-CCNC: 10 U/L
AST SERPL-CCNC: 105 U/L
AST SERPL-CCNC: 114 U/L
AST SERPL-CCNC: 16 U/L
AST SERPL-CCNC: 16 U/L
AST SERPL-CCNC: 205 U/L
AST SERPL-CCNC: 207 U/L
AST SERPL-CCNC: 213 U/L
AST SERPL-CCNC: 215 U/L
AST SERPL-CCNC: 44 U/L
AST SERPL-CCNC: 45 U/L
AST SERPL-CCNC: 50 U/L
AST SERPL-CCNC: 52 U/L
AST SERPL-CCNC: 60 U/L
AST SERPL-CCNC: 66 U/L
AST SERPL-CCNC: 69 U/L
AST SERPL-CCNC: 70 U/L
AST SERPL-CCNC: 80 U/L
AV INDEX (PROSTH): 0.47
AV MEAN GRADIENT: 2.89 MMHG
AV PEAK GRADIENT: 5.02 MMHG
AV VALVE AREA: 1.78 CM2
BACTERIA #/AREA URNS AUTO: ABNORMAL /HPF
BACTERIA #/AREA URNS HPF: ABNORMAL /HPF
BACTERIA BLD CULT: NORMAL
BACTERIA SPEC AEROBE CULT: NORMAL
BACTERIA SPEC AEROBE CULT: NORMAL
BACTERIA UR CULT: NO GROWTH
BACTERIA UR CULT: NO GROWTH
BARBITURATES UR QL SCN>200 NG/ML: NEGATIVE
BARBITURATES UR QL: NOT DETECTED
BASO STIPL BLD QL SMEAR: ABNORMAL
BASOPHILS # BLD AUTO: 0.01 K/UL
BASOPHILS # BLD AUTO: 0.02 K/UL
BASOPHILS # BLD AUTO: 0.03 K/UL
BASOPHILS # BLD AUTO: 0.04 K/UL
BASOPHILS # BLD AUTO: 0.05 K/UL
BASOPHILS # BLD AUTO: 0.06 K/UL
BASOPHILS # BLD AUTO: 0.07 K/UL
BASOPHILS NFR BLD: 0 %
BASOPHILS NFR BLD: 0 %
BASOPHILS NFR BLD: 0.1 %
BASOPHILS NFR BLD: 0.2 %
BASOPHILS NFR BLD: 0.3 %
BASOPHILS NFR BLD: 0.5 %
BASOPHILS NFR BLD: 0.6 %
BASOPHILS NFR BLD: 0.7 %
BENZODIAZ UR QL SCN>200 NG/ML: NEGATIVE
BILIRUB DIRECT SERPL-MCNC: 0.2 MG/DL
BILIRUB DIRECT SERPL-MCNC: 0.3 MG/DL
BILIRUB DIRECT SERPL-MCNC: 0.4 MG/DL
BILIRUB DIRECT SERPL-MCNC: 0.5 MG/DL
BILIRUB DIRECT SERPL-MCNC: 0.5 MG/DL
BILIRUB DIRECT SERPL-MCNC: 0.9 MG/DL
BILIRUB SERPL-MCNC: 0.3 MG/DL
BILIRUB SERPL-MCNC: 0.3 MG/DL
BILIRUB SERPL-MCNC: 0.4 MG/DL
BILIRUB SERPL-MCNC: 0.5 MG/DL
BILIRUB SERPL-MCNC: 0.6 MG/DL
BILIRUB SERPL-MCNC: 0.6 MG/DL
BILIRUB SERPL-MCNC: 0.7 MG/DL
BILIRUB SERPL-MCNC: 0.8 MG/DL
BILIRUB SERPL-MCNC: 1 MG/DL
BILIRUB UR QL STRIP: NEGATIVE
BILIRUB UR QL STRIP: NEGATIVE
BLD GP AB SCN CELLS X3 SERPL QL: NORMAL
BNP SERPL-MCNC: 158 PG/ML
BNP SERPL-MCNC: 263 PG/ML
BSA FOR ECHO PROCEDURE: 1.79 M2
BUN SERPL-MCNC: 10 MG/DL
BUN SERPL-MCNC: 11 MG/DL
BUN SERPL-MCNC: 11 MG/DL
BUN SERPL-MCNC: 13 MG/DL
BUN SERPL-MCNC: 14 MG/DL
BUN SERPL-MCNC: 17 MG/DL
BUN SERPL-MCNC: 19 MG/DL
BUN SERPL-MCNC: 19 MG/DL
BUN SERPL-MCNC: 20 MG/DL
BUN SERPL-MCNC: 20 MG/DL
BUN SERPL-MCNC: 21 MG/DL
BUN SERPL-MCNC: 21 MG/DL
BUN SERPL-MCNC: 22 MG/DL
BUN SERPL-MCNC: 23 MG/DL
BUN SERPL-MCNC: 24 MG/DL
BUN SERPL-MCNC: 24 MG/DL
BUN SERPL-MCNC: 25 MG/DL
BUN SERPL-MCNC: 25 MG/DL
BUN SERPL-MCNC: 35 MG/DL
BUN SERPL-MCNC: 40 MG/DL
BUN SERPL-MCNC: 40 MG/DL
BUPRENORPHINE UR QL: NOT DETECTED
BURR CELLS BLD QL SMEAR: ABNORMAL
BZE UR QL SCN: NEGATIVE
BZE UR QL: NOT DETECTED
CALCIUM SERPL-MCNC: 7.6 MG/DL
CALCIUM SERPL-MCNC: 8.3 MG/DL
CALCIUM SERPL-MCNC: 8.3 MG/DL
CALCIUM SERPL-MCNC: 8.4 MG/DL
CALCIUM SERPL-MCNC: 8.5 MG/DL
CALCIUM SERPL-MCNC: 8.6 MG/DL
CALCIUM SERPL-MCNC: 8.7 MG/DL
CALCIUM SERPL-MCNC: 8.8 MG/DL
CALCIUM SERPL-MCNC: 8.9 MG/DL
CALCIUM SERPL-MCNC: 9 MG/DL
CALCIUM SERPL-MCNC: 9.1 MG/DL
CALCIUM SERPL-MCNC: 9.3 MG/DL
CALCIUM SERPL-MCNC: 9.3 MG/DL
CALCIUM SERPL-MCNC: 9.8 MG/DL
CALCIUM SERPL-MCNC: 9.8 MG/DL
CANNABINOIDS UR QL SCN: NORMAL
CARBOXYTHC UR QL: NOT DETECTED
CARISOPRODOL UR QL: NOT DETECTED
CHLORIDE SERPL-SCNC: 101 MMOL/L
CHLORIDE SERPL-SCNC: 103 MMOL/L
CHLORIDE SERPL-SCNC: 104 MMOL/L
CHLORIDE SERPL-SCNC: 105 MMOL/L
CHLORIDE SERPL-SCNC: 106 MMOL/L
CHLORIDE SERPL-SCNC: 107 MMOL/L
CHLORIDE SERPL-SCNC: 108 MMOL/L
CHLORIDE SERPL-SCNC: 108 MMOL/L
CHLORIDE SERPL-SCNC: 109 MMOL/L
CHLORIDE SERPL-SCNC: 110 MMOL/L
CHLORIDE SERPL-SCNC: 110 MMOL/L
CHLORIDE SERPL-SCNC: 111 MMOL/L
CHLORIDE SERPL-SCNC: 112 MMOL/L
CHOLEST SERPL-MCNC: 135 MG/DL
CHOLEST/HDLC SERPL: 4 {RATIO}
CK MB SERPL-MCNC: 0.6 NG/ML
CK MB SERPL-RTO: 0.7 %
CK SERPL-CCNC: 255 U/L
CK SERPL-CCNC: 2731 U/L
CK SERPL-CCNC: 515 U/L
CK SERPL-CCNC: 86 U/L
CK SERPL-CCNC: 86 U/L
CLARITY UR REFRACT.AUTO: ABNORMAL
CLARITY UR: ABNORMAL
CLONAZEPAM UR QL: NOT DETECTED
CO2 SERPL-SCNC: 17 MMOL/L
CO2 SERPL-SCNC: 18 MMOL/L
CO2 SERPL-SCNC: 18 MMOL/L
CO2 SERPL-SCNC: 19 MMOL/L
CO2 SERPL-SCNC: 20 MMOL/L
CO2 SERPL-SCNC: 21 MMOL/L
CO2 SERPL-SCNC: 22 MMOL/L
CO2 SERPL-SCNC: 23 MMOL/L
CO2 SERPL-SCNC: 23 MMOL/L
CO2 SERPL-SCNC: 24 MMOL/L
CO2 SERPL-SCNC: 26 MMOL/L
CO2 SERPL-SCNC: 26 MMOL/L
CO2 SERPL-SCNC: 27 MMOL/L
CO2 SERPL-SCNC: 27 MMOL/L
CO2 SERPL-SCNC: 28 MMOL/L
CO2 SERPL-SCNC: 28 MMOL/L
CODEINE UR QL: NOT DETECTED
COLOR UR AUTO: YELLOW
COLOR UR: YELLOW
COMPLEXED PSA SERPL-MCNC: 1.7 NG/ML
CREAT SERPL-MCNC: 0.8 MG/DL
CREAT SERPL-MCNC: 0.9 MG/DL
CREAT SERPL-MCNC: 0.9 MG/DL
CREAT SERPL-MCNC: 1.1 MG/DL
CREAT SERPL-MCNC: 1.2 MG/DL
CREAT SERPL-MCNC: 1.3 MG/DL
CREAT SERPL-MCNC: 1.4 MG/DL
CREAT SERPL-MCNC: 1.5 MG/DL
CREAT SERPL-MCNC: 1.5 MG/DL
CREAT SERPL-MCNC: 1.7 MG/DL
CREAT SERPL-MCNC: 1.7 MG/DL
CREAT UR-MCNC: 52.3 MG/DL (ref 20–400)
CREAT UR-MCNC: 63 MG/DL
CRP SERPL-MCNC: 104.2 MG/L
CRP SERPL-MCNC: 349.76 MG/L
CRP SERPL-MCNC: 90.1 MG/L
CV ECHO LV RWT: 0.31 CM
DACRYOCYTES BLD QL SMEAR: ABNORMAL
DACRYOCYTES BLD QL SMEAR: ABNORMAL
DELSYS: ABNORMAL
DIAZEPAM UR QL: NOT DETECTED
DIFFERENTIAL METHOD: ABNORMAL
DOP CALC AO PEAK VEL: 1.12 M/S
DOP CALC AO VTI: 27.96 CM
DOP CALC LVOT AREA: 3.8 CM2
DOP CALC LVOT DIAMETER: 2.2 CM
DOP CALC LVOT STROKE VOLUME: 49.73 CM3
DOP CALCLVOT PEAK VEL VTI: 13.09 CM
E WAVE DECELERATION TIME: 268.26 MSEC
E/A RATIO: 1.63
ECHO LV POSTERIOR WALL: 0.94 CM (ref 0.6–1.1)
EOSINOPHIL # BLD AUTO: 0 K/UL
EOSINOPHIL # BLD AUTO: 0.1 K/UL
EOSINOPHIL # BLD AUTO: 0.1 K/UL
EOSINOPHIL # BLD AUTO: 0.2 K/UL
EOSINOPHIL # BLD AUTO: 0.2 K/UL
EOSINOPHIL # BLD AUTO: 0.3 K/UL
EOSINOPHIL NFR BLD: 0 %
EOSINOPHIL NFR BLD: 0.1 %
EOSINOPHIL NFR BLD: 0.2 %
EOSINOPHIL NFR BLD: 0.4 %
EOSINOPHIL NFR BLD: 0.5 %
EOSINOPHIL NFR BLD: 1 %
EOSINOPHIL NFR BLD: 1.8 %
EOSINOPHIL NFR BLD: 2.4 %
EOSINOPHIL NFR BLD: 2.8 %
EOSINOPHIL NFR BLD: 3.4 %
EOSINOPHIL NFR BLD: 3.4 %
ERYTHROCYTE [DISTWIDTH] IN BLOOD BY AUTOMATED COUNT: 18.1 %
ERYTHROCYTE [DISTWIDTH] IN BLOOD BY AUTOMATED COUNT: 18.2 %
ERYTHROCYTE [DISTWIDTH] IN BLOOD BY AUTOMATED COUNT: 18.5 %
ERYTHROCYTE [DISTWIDTH] IN BLOOD BY AUTOMATED COUNT: 18.6 %
ERYTHROCYTE [DISTWIDTH] IN BLOOD BY AUTOMATED COUNT: 18.7 %
ERYTHROCYTE [DISTWIDTH] IN BLOOD BY AUTOMATED COUNT: 18.8 %
ERYTHROCYTE [DISTWIDTH] IN BLOOD BY AUTOMATED COUNT: 18.9 %
ERYTHROCYTE [DISTWIDTH] IN BLOOD BY AUTOMATED COUNT: 18.9 %
ERYTHROCYTE [DISTWIDTH] IN BLOOD BY AUTOMATED COUNT: 19 %
ERYTHROCYTE [DISTWIDTH] IN BLOOD BY AUTOMATED COUNT: 19.1 %
ERYTHROCYTE [DISTWIDTH] IN BLOOD BY AUTOMATED COUNT: 19.1 %
ERYTHROCYTE [DISTWIDTH] IN BLOOD BY AUTOMATED COUNT: 19.3 %
ERYTHROCYTE [DISTWIDTH] IN BLOOD BY AUTOMATED COUNT: 19.5 %
ERYTHROCYTE [DISTWIDTH] IN BLOOD BY AUTOMATED COUNT: 21.7 %
ERYTHROCYTE [DISTWIDTH] IN BLOOD BY AUTOMATED COUNT: 22 %
ERYTHROCYTE [DISTWIDTH] IN BLOOD BY AUTOMATED COUNT: 22.3 %
ERYTHROCYTE [SEDIMENTATION RATE] IN BLOOD BY WESTERGREN METHOD: 106 MM/HR
ERYTHROCYTE [SEDIMENTATION RATE] IN BLOOD BY WESTERGREN METHOD: 113 MM/HR
ERYTHROCYTE [SEDIMENTATION RATE] IN BLOOD BY WESTERGREN METHOD: 12 MM/H
ERYTHROCYTE [SEDIMENTATION RATE] IN BLOOD BY WESTERGREN METHOD: 15 MM/H
ERYTHROCYTE [SEDIMENTATION RATE] IN BLOOD BY WESTERGREN METHOD: 16 MM/H
ERYTHROCYTE [SEDIMENTATION RATE] IN BLOOD BY WESTERGREN METHOD: 18 MM/H
ERYTHROCYTE [SEDIMENTATION RATE] IN BLOOD BY WESTERGREN METHOD: 20 MM/H
ERYTHROCYTE [SEDIMENTATION RATE] IN BLOOD BY WESTERGREN METHOD: 89 MM/HR
EST. GFR  (AFRICAN AMERICAN): 50.6 ML/MIN/1.73 M^2
EST. GFR  (AFRICAN AMERICAN): 50.6 ML/MIN/1.73 M^2
EST. GFR  (AFRICAN AMERICAN): 58.9 ML/MIN/1.73 M^2
EST. GFR  (AFRICAN AMERICAN): 59 ML/MIN/1.73 M^2
EST. GFR  (AFRICAN AMERICAN): >60 ML/MIN/1.73 M^2
EST. GFR  (NON AFRICAN AMERICAN): 43.8 ML/MIN/1.73 M^2
EST. GFR  (NON AFRICAN AMERICAN): 43.8 ML/MIN/1.73 M^2
EST. GFR  (NON AFRICAN AMERICAN): 50.9 ML/MIN/1.73 M^2
EST. GFR  (NON AFRICAN AMERICAN): 51 ML/MIN/1.73 M^2
EST. GFR  (NON AFRICAN AMERICAN): 55 ML/MIN/1.73 M^2
EST. GFR  (NON AFRICAN AMERICAN): 55 ML/MIN/1.73 M^2
EST. GFR  (NON AFRICAN AMERICAN): 55.4 ML/MIN/1.73 M^2
EST. GFR  (NON AFRICAN AMERICAN): >60 ML/MIN/1.73 M^2
ETHANOL UR-MCNC: <10 MG/DL
ETHYL GLUCURONIDE UR QL: NOT DETECTED
FACT X PPP CHRO-ACNC: 0.75 IU/ML
FENTANYL UR QL: NOT DETECTED
FERRITIN SERPL-MCNC: 44 NG/ML
FERRITIN SERPL-MCNC: 73 NG/ML
FIO2: 100
FIO2: 40
FIO2: 50
FIO2: 60
FIO2: 60
FIO2: 70
FLOW: 46
FOLATE SERPL-MCNC: 5.4 NG/ML
FRACTIONAL SHORTENING: 12 % (ref 28–44)
GIANT PLATELETS BLD QL SMEAR: PRESENT
GLUCOSE SERPL-MCNC: 101 MG/DL
GLUCOSE SERPL-MCNC: 102 MG/DL
GLUCOSE SERPL-MCNC: 105 MG/DL
GLUCOSE SERPL-MCNC: 114 MG/DL
GLUCOSE SERPL-MCNC: 115 MG/DL
GLUCOSE SERPL-MCNC: 115 MG/DL
GLUCOSE SERPL-MCNC: 116 MG/DL
GLUCOSE SERPL-MCNC: 120 MG/DL
GLUCOSE SERPL-MCNC: 122 MG/DL
GLUCOSE SERPL-MCNC: 125 MG/DL
GLUCOSE SERPL-MCNC: 126 MG/DL
GLUCOSE SERPL-MCNC: 202 MG/DL
GLUCOSE SERPL-MCNC: 71 MG/DL
GLUCOSE SERPL-MCNC: 73 MG/DL
GLUCOSE SERPL-MCNC: 78 MG/DL
GLUCOSE SERPL-MCNC: 80 MG/DL
GLUCOSE SERPL-MCNC: 80 MG/DL
GLUCOSE SERPL-MCNC: 82 MG/DL
GLUCOSE SERPL-MCNC: 83 MG/DL
GLUCOSE SERPL-MCNC: 83 MG/DL
GLUCOSE SERPL-MCNC: 84 MG/DL
GLUCOSE SERPL-MCNC: 84 MG/DL
GLUCOSE SERPL-MCNC: 85 MG/DL
GLUCOSE SERPL-MCNC: 85 MG/DL
GLUCOSE SERPL-MCNC: 86 MG/DL
GLUCOSE SERPL-MCNC: 87 MG/DL
GLUCOSE SERPL-MCNC: 87 MG/DL
GLUCOSE SERPL-MCNC: 88 MG/DL
GLUCOSE SERPL-MCNC: 89 MG/DL
GLUCOSE SERPL-MCNC: 91 MG/DL
GLUCOSE SERPL-MCNC: 96 MG/DL
GLUCOSE SERPL-MCNC: 97 MG/DL
GLUCOSE SERPL-MCNC: 98 MG/DL
GLUCOSE SERPL-MCNC: 99 MG/DL
GLUCOSE UR QL STRIP: ABNORMAL
GLUCOSE UR QL STRIP: NEGATIVE
GRAM STN SPEC: NORMAL
GRAN CASTS #/AREA URNS LPF: 1 /LPF
HCO3 UR-SCNC: 19.8 MMOL/L (ref 24–28)
HCO3 UR-SCNC: 22 MMOL/L (ref 24–28)
HCO3 UR-SCNC: 22.2 MMOL/L (ref 24–28)
HCO3 UR-SCNC: 22.5 MMOL/L (ref 24–28)
HCO3 UR-SCNC: 22.5 MMOL/L (ref 24–28)
HCO3 UR-SCNC: 22.7 MMOL/L (ref 24–28)
HCO3 UR-SCNC: 23.4 MMOL/L (ref 24–28)
HCO3 UR-SCNC: 24.9 MMOL/L (ref 24–28)
HCO3 UR-SCNC: 25.3 MMOL/L (ref 24–28)
HCO3 UR-SCNC: 26.3 MMOL/L (ref 24–28)
HCO3 UR-SCNC: 27.1 MMOL/L (ref 24–28)
HCO3 UR-SCNC: 28.6 MMOL/L (ref 24–28)
HCO3 UR-SCNC: 28.7 MMOL/L (ref 24–28)
HCO3 UR-SCNC: 30.9 MMOL/L (ref 24–28)
HCO3 UR-SCNC: 31.7 MMOL/L (ref 24–28)
HCO3 UR-SCNC: 33 MMOL/L (ref 24–28)
HCT VFR BLD AUTO: 19.8 %
HCT VFR BLD AUTO: 20.5 %
HCT VFR BLD AUTO: 21.5 %
HCT VFR BLD AUTO: 23.2 %
HCT VFR BLD AUTO: 23.2 %
HCT VFR BLD AUTO: 23.6 %
HCT VFR BLD AUTO: 24.4 %
HCT VFR BLD AUTO: 24.8 %
HCT VFR BLD AUTO: 25 %
HCT VFR BLD AUTO: 26.3 %
HCT VFR BLD AUTO: 26.5 %
HCT VFR BLD AUTO: 28.8 %
HCT VFR BLD AUTO: 29 %
HCT VFR BLD AUTO: 31.7 %
HCT VFR BLD AUTO: 32.6 %
HCT VFR BLD AUTO: 32.6 %
HCT VFR BLD AUTO: 33.4 %
HCT VFR BLD AUTO: 34.8 %
HDLC SERPL-MCNC: 34 MG/DL
HDLC SERPL: 25.2 %
HGB BLD-MCNC: 10 G/DL
HGB BLD-MCNC: 10.3 G/DL
HGB BLD-MCNC: 10.5 G/DL
HGB BLD-MCNC: 10.6 G/DL
HGB BLD-MCNC: 10.7 G/DL
HGB BLD-MCNC: 6.5 G/DL
HGB BLD-MCNC: 6.8 G/DL
HGB BLD-MCNC: 7.2 G/DL
HGB BLD-MCNC: 7.3 G/DL
HGB BLD-MCNC: 7.5 G/DL
HGB BLD-MCNC: 7.6 G/DL
HGB BLD-MCNC: 7.8 G/DL
HGB BLD-MCNC: 8.1 G/DL
HGB BLD-MCNC: 8.3 G/DL
HGB BLD-MCNC: 8.3 G/DL
HGB BLD-MCNC: 8.7 G/DL
HGB BLD-MCNC: 9.3 G/DL
HGB BLD-MCNC: 9.3 G/DL
HGB UR QL STRIP: ABNORMAL
HGB UR QL STRIP: ABNORMAL
HYALINE CASTS #/AREA URNS LPF: 0 /LPF
HYALINE CASTS UR QL AUTO: 0 /LPF
HYDROCODONE UR QL: PRESENT
HYDROMORPHONE UR QL: PRESENT
HYPOCHROMIA BLD QL SMEAR: ABNORMAL
IMM GRANULOCYTES # BLD AUTO: 0.03 K/UL
IMM GRANULOCYTES # BLD AUTO: 0.03 K/UL
IMM GRANULOCYTES # BLD AUTO: 0.09 K/UL
IMM GRANULOCYTES # BLD AUTO: 0.1 K/UL
IMM GRANULOCYTES # BLD AUTO: 0.11 K/UL
IMM GRANULOCYTES # BLD AUTO: 0.12 K/UL
IMM GRANULOCYTES # BLD AUTO: 0.32 K/UL
IMM GRANULOCYTES # BLD AUTO: 0.35 K/UL
IMM GRANULOCYTES # BLD AUTO: 0.36 K/UL
IMM GRANULOCYTES # BLD AUTO: 0.54 K/UL
IMM GRANULOCYTES # BLD AUTO: 0.55 K/UL
IMM GRANULOCYTES # BLD AUTO: 0.69 K/UL
IMM GRANULOCYTES # BLD AUTO: 0.81 K/UL
IMM GRANULOCYTES NFR BLD AUTO: 0.3 %
IMM GRANULOCYTES NFR BLD AUTO: 0.3 %
IMM GRANULOCYTES NFR BLD AUTO: 0.7 %
IMM GRANULOCYTES NFR BLD AUTO: 0.8 %
IMM GRANULOCYTES NFR BLD AUTO: 0.9 %
IMM GRANULOCYTES NFR BLD AUTO: 1.1 %
IMM GRANULOCYTES NFR BLD AUTO: 1.6 %
IMM GRANULOCYTES NFR BLD AUTO: 1.7 %
IMM GRANULOCYTES NFR BLD AUTO: 1.9 %
IMM GRANULOCYTES NFR BLD AUTO: 2.2 %
IMM GRANULOCYTES NFR BLD AUTO: 2.3 %
IMM GRANULOCYTES NFR BLD AUTO: 2.3 %
IMM GRANULOCYTES NFR BLD AUTO: 2.6 %
INR PPP: 1
INR PPP: 1.1
INR PPP: 1.2
INR PPP: 1.3
INTERVENTRICULAR SEPTUM: 1.08 CM (ref 0.6–1.1)
IRON SERPL-MCNC: 15 UG/DL
IRON SERPL-MCNC: 16 UG/DL
IVRT: 0.14 MSEC
KETONES UR QL STRIP: ABNORMAL
KETONES UR QL STRIP: NEGATIVE
LA MAJOR: 5.89 CM
LA MINOR: 6.02 CM
LA WIDTH: 4.65 CM
LACTATE SERPL-SCNC: 3.6 MMOL/L
LACTATE SERPL-SCNC: 6.5 MMOL/L
LDLC SERPL CALC-MCNC: 87 MG/DL
LEFT ATRIUM SIZE: 3.99 CM
LEFT ATRIUM VOLUME INDEX: 52.5 ML/M2
LEFT ATRIUM VOLUME: 93.9 CM3
LEFT INTERNAL DIMENSION IN SYSTOLE: 5.4 CM (ref 2.1–4)
LEFT VENTRICLE DIASTOLIC VOLUME INDEX: 105.56 ML/M2
LEFT VENTRICLE DIASTOLIC VOLUME: 188.95 ML
LEFT VENTRICLE MASS INDEX: 144.9 G/M2
LEFT VENTRICLE SYSTOLIC VOLUME INDEX: 78.9 ML/M2
LEFT VENTRICLE SYSTOLIC VOLUME: 141.19 ML
LEFT VENTRICULAR INTERNAL DIMENSION IN DIASTOLE: 6.13 CM (ref 3.5–6)
LEFT VENTRICULAR MASS: 259.35 G
LEUKOCYTE ESTERASE UR QL STRIP: ABNORMAL
LEUKOCYTE ESTERASE UR QL STRIP: ABNORMAL
LORAZEPAM UR QL: NOT DETECTED
LYMPHOCYTES # BLD AUTO: 0.7 K/UL
LYMPHOCYTES # BLD AUTO: 0.8 K/UL
LYMPHOCYTES # BLD AUTO: 0.9 K/UL
LYMPHOCYTES # BLD AUTO: 1 K/UL
LYMPHOCYTES # BLD AUTO: 1 K/UL
LYMPHOCYTES # BLD AUTO: 1.1 K/UL
LYMPHOCYTES # BLD AUTO: 1.3 K/UL
LYMPHOCYTES # BLD AUTO: 1.6 K/UL
LYMPHOCYTES # BLD AUTO: 1.6 K/UL
LYMPHOCYTES # BLD AUTO: 1.7 K/UL
LYMPHOCYTES # BLD AUTO: 1.8 K/UL
LYMPHOCYTES # BLD AUTO: 2.8 K/UL
LYMPHOCYTES # BLD AUTO: 3.2 K/UL
LYMPHOCYTES # BLD AUTO: 3.5 K/UL
LYMPHOCYTES NFR BLD: 10.6 %
LYMPHOCYTES NFR BLD: 11.9 %
LYMPHOCYTES NFR BLD: 12.4 %
LYMPHOCYTES NFR BLD: 15.4 %
LYMPHOCYTES NFR BLD: 16.2 %
LYMPHOCYTES NFR BLD: 2.3 %
LYMPHOCYTES NFR BLD: 2.7 %
LYMPHOCYTES NFR BLD: 21 %
LYMPHOCYTES NFR BLD: 29.4 %
LYMPHOCYTES NFR BLD: 3 %
LYMPHOCYTES NFR BLD: 3.9 %
LYMPHOCYTES NFR BLD: 3.9 %
LYMPHOCYTES NFR BLD: 32.1 %
LYMPHOCYTES NFR BLD: 36.3 %
LYMPHOCYTES NFR BLD: 4.4 %
LYMPHOCYTES NFR BLD: 6.7 %
LYMPHOCYTES NFR BLD: 8.2 %
LYMPHOCYTES NFR BLD: 9.7 %
MAGNESIUM SERPL-MCNC: 1.8 MG/DL
MAGNESIUM SERPL-MCNC: 1.8 MG/DL
MAGNESIUM SERPL-MCNC: 1.9 MG/DL
MAGNESIUM SERPL-MCNC: 1.9 MG/DL
MAGNESIUM SERPL-MCNC: 2 MG/DL
MAGNESIUM SERPL-MCNC: 2.1 MG/DL
MAGNESIUM SERPL-MCNC: 2.2 MG/DL
MAGNESIUM SERPL-MCNC: 2.3 MG/DL
MAGNESIUM SERPL-MCNC: 2.4 MG/DL
MAGNESIUM SERPL-MCNC: 2.4 MG/DL
MCH RBC QN AUTO: 23 PG
MCH RBC QN AUTO: 23.1 PG
MCH RBC QN AUTO: 23.6 PG
MCH RBC QN AUTO: 23.6 PG
MCH RBC QN AUTO: 23.7 PG
MCH RBC QN AUTO: 23.8 PG
MCH RBC QN AUTO: 23.9 PG
MCH RBC QN AUTO: 24.1 PG
MCH RBC QN AUTO: 24.2 PG
MCH RBC QN AUTO: 24.3 PG
MCH RBC QN AUTO: 24.4 PG
MCH RBC QN AUTO: 24.4 PG
MCH RBC QN AUTO: 24.5 PG
MCH RBC QN AUTO: 24.7 PG
MCH RBC QN AUTO: 24.7 PG
MCH RBC QN AUTO: 25.3 PG
MCHC RBC AUTO-ENTMCNC: 30.5 G/DL
MCHC RBC AUTO-ENTMCNC: 30.8 G/DL
MCHC RBC AUTO-ENTMCNC: 31.5 G/DL
MCHC RBC AUTO-ENTMCNC: 31.5 G/DL
MCHC RBC AUTO-ENTMCNC: 31.6 G/DL
MCHC RBC AUTO-ENTMCNC: 31.8 G/DL
MCHC RBC AUTO-ENTMCNC: 32 G/DL
MCHC RBC AUTO-ENTMCNC: 32.1 G/DL
MCHC RBC AUTO-ENTMCNC: 32.2 G/DL
MCHC RBC AUTO-ENTMCNC: 32.3 G/DL
MCHC RBC AUTO-ENTMCNC: 32.7 G/DL
MCHC RBC AUTO-ENTMCNC: 32.8 G/DL
MCHC RBC AUTO-ENTMCNC: 33.2 G/DL
MCHC RBC AUTO-ENTMCNC: 33.2 G/DL
MCHC RBC AUTO-ENTMCNC: 33.5 G/DL
MCV RBC AUTO: 72 FL
MCV RBC AUTO: 72 FL
MCV RBC AUTO: 73 FL
MCV RBC AUTO: 74 FL
MCV RBC AUTO: 75 FL
MCV RBC AUTO: 75 FL
MCV RBC AUTO: 76 FL
MCV RBC AUTO: 77 FL
MCV RBC AUTO: 78 FL
MCV RBC AUTO: 80 FL
MDA UR QL: NOT DETECTED
MDEA UR QL: NOT DETECTED
MDMA UR QL: NOT DETECTED
ME-PHENIDATE UR QL: NOT DETECTED
MEPERIDINE UR QL: NOT DETECTED
METHADONE UR QL SCN>300 NG/ML: NEGATIVE
METHADONE UR QL: NOT DETECTED
METHAMPHET UR QL: NOT DETECTED
MICROSCOPIC COMMENT: ABNORMAL
MICROSCOPIC COMMENT: ABNORMAL
MIDAZOLAM UR QL SCN: NOT DETECTED
MIN VOL: 10.5
MIN VOL: 12.1
MIN VOL: 7
MIN VOL: 8.6
MODE: ABNORMAL
MONOCYTES # BLD AUTO: 1 K/UL
MONOCYTES # BLD AUTO: 1.2 K/UL
MONOCYTES # BLD AUTO: 1.4 K/UL
MONOCYTES # BLD AUTO: 1.9 K/UL
MONOCYTES # BLD AUTO: 2.2 K/UL
MONOCYTES # BLD AUTO: 2.5 K/UL
MONOCYTES # BLD AUTO: 2.7 K/UL
MONOCYTES # BLD AUTO: 2.9 K/UL
MONOCYTES # BLD AUTO: 3.5 K/UL
MONOCYTES # BLD AUTO: 3.9 K/UL
MONOCYTES NFR BLD: 10.1 %
MONOCYTES NFR BLD: 10.4 %
MONOCYTES NFR BLD: 10.8 %
MONOCYTES NFR BLD: 11.1 %
MONOCYTES NFR BLD: 11.8 %
MONOCYTES NFR BLD: 11.9 %
MONOCYTES NFR BLD: 12 %
MONOCYTES NFR BLD: 12.6 %
MONOCYTES NFR BLD: 13.4 %
MONOCYTES NFR BLD: 14.1 %
MONOCYTES NFR BLD: 5.7 %
MONOCYTES NFR BLD: 5.8 %
MONOCYTES NFR BLD: 6.1 %
MONOCYTES NFR BLD: 7 %
MONOCYTES NFR BLD: 7.4 %
MONOCYTES NFR BLD: 8.5 %
MONOCYTES NFR BLD: 9.4 %
MONOCYTES NFR BLD: 9.7 %
MORPHINE UR QL: NOT DETECTED
MV PEAK A VEL: 0.43 M/S
MV PEAK E VEL: 0.7 M/S
NEUTROPHILS # BLD AUTO: 10 K/UL
NEUTROPHILS # BLD AUTO: 10.8 K/UL
NEUTROPHILS # BLD AUTO: 13 K/UL
NEUTROPHILS # BLD AUTO: 13.4 K/UL
NEUTROPHILS # BLD AUTO: 14.4 K/UL
NEUTROPHILS # BLD AUTO: 18.6 K/UL
NEUTROPHILS # BLD AUTO: 19.7 K/UL
NEUTROPHILS # BLD AUTO: 21.7 K/UL
NEUTROPHILS # BLD AUTO: 22.4 K/UL
NEUTROPHILS # BLD AUTO: 25.3 K/UL
NEUTROPHILS # BLD AUTO: 26.4 K/UL
NEUTROPHILS # BLD AUTO: 26.8 K/UL
NEUTROPHILS # BLD AUTO: 4.4 K/UL
NEUTROPHILS # BLD AUTO: 5.4 K/UL
NEUTROPHILS # BLD AUTO: 5.4 K/UL
NEUTROPHILS # BLD AUTO: 6.8 K/UL
NEUTROPHILS # BLD AUTO: 7.6 K/UL
NEUTROPHILS NFR BLD: 45.4 %
NEUTROPHILS NFR BLD: 53 %
NEUTROPHILS NFR BLD: 54 %
NEUTROPHILS NFR BLD: 56.7 %
NEUTROPHILS NFR BLD: 68.1 %
NEUTROPHILS NFR BLD: 70.9 %
NEUTROPHILS NFR BLD: 71.7 %
NEUTROPHILS NFR BLD: 76 %
NEUTROPHILS NFR BLD: 77.7 %
NEUTROPHILS NFR BLD: 82.3 %
NEUTROPHILS NFR BLD: 82.4 %
NEUTROPHILS NFR BLD: 82.7 %
NEUTROPHILS NFR BLD: 83.1 %
NEUTROPHILS NFR BLD: 83.2 %
NEUTROPHILS NFR BLD: 84.1 %
NEUTROPHILS NFR BLD: 85.5 %
NEUTROPHILS NFR BLD: 88 %
NEUTROPHILS NFR BLD: 90.5 %
NEUTS BAND NFR BLD MANUAL: 18 %
NITRITE UR QL STRIP: NEGATIVE
NITRITE UR QL STRIP: NEGATIVE
NONHDLC SERPL-MCNC: 101 MG/DL
NORBUPRENORPHINE UR QL CFM: NOT DETECTED
NORDIAZEPAM UR QL: NOT DETECTED
NORFENTANYL UR QL: NOT DETECTED
NORHYDROCODONE UR QL CFM: PRESENT
NOROXYCODONE UR QL CFM: NOT DETECTED
NOROXYMORPHONE: NOT DETECTED
NRBC BLD-RTO: 0 /100 WBC
NSE SERPL-MCNC: 168 NG/ML
NSE SERPL-MCNC: NORMAL UG/L
OPIATES UR QL SCN: NORMAL
OVALOCYTES BLD QL SMEAR: ABNORMAL
OXAZEPAM UR QL: NOT DETECTED
OXYCODONE UR QL: NOT DETECTED
OXYMORPHONE UR QL: NOT DETECTED
PATHOLOGY STUDY: NORMAL
PCO2 BLDA: 31 MMHG (ref 35–45)
PCO2 BLDA: 31.4 MMHG (ref 35–45)
PCO2 BLDA: 32 MMHG (ref 35–45)
PCO2 BLDA: 32.6 MMHG (ref 35–45)
PCO2 BLDA: 34.9 MMHG (ref 35–45)
PCO2 BLDA: 35.7 MMHG (ref 35–45)
PCO2 BLDA: 37.4 MMHG (ref 35–45)
PCO2 BLDA: 37.5 MMHG (ref 35–45)
PCO2 BLDA: 38 MMHG (ref 35–45)
PCO2 BLDA: 38.4 MMHG (ref 35–45)
PCO2 BLDA: 39.5 MMHG (ref 35–45)
PCO2 BLDA: 39.7 MMHG (ref 35–45)
PCO2 BLDA: 39.9 MMHG (ref 35–45)
PCO2 BLDA: 40.4 MMHG (ref 35–45)
PCO2 BLDA: 41.9 MMHG (ref 35–45)
PCO2 BLDA: 43.9 MMHG (ref 35–45)
PCP UR QL SCN>25 NG/ML: NEGATIVE
PCP UR QL: NOT DETECTED
PEEP: 10
PEEP: 15
PEEP: 15
PEEP: 5
PEEP: 8
PH SMN: 7.37 [PH] (ref 7.35–7.45)
PH SMN: 7.38 [PH] (ref 7.35–7.45)
PH SMN: 7.4 [PH] (ref 7.35–7.45)
PH SMN: 7.41 [PH] (ref 7.35–7.45)
PH SMN: 7.43 [PH] (ref 7.35–7.45)
PH SMN: 7.44 [PH] (ref 7.35–7.45)
PH SMN: 7.45 [PH] (ref 7.35–7.45)
PH SMN: 7.45 [PH] (ref 7.35–7.45)
PH SMN: 7.47 [PH] (ref 7.35–7.45)
PH SMN: 7.49 [PH] (ref 7.35–7.45)
PH SMN: 7.51 [PH] (ref 7.35–7.45)
PH UR STRIP: 6 [PH] (ref 5–8)
PH UR STRIP: 7 [PH] (ref 5–8)
PHENTERMINE UR QL: NOT DETECTED
PHOSPHATE SERPL-MCNC: 1.6 MG/DL
PHOSPHATE SERPL-MCNC: 2.4 MG/DL
PHOSPHATE SERPL-MCNC: 2.6 MG/DL
PHOSPHATE SERPL-MCNC: 2.7 MG/DL
PHOSPHATE SERPL-MCNC: 2.8 MG/DL
PHOSPHATE SERPL-MCNC: 3 MG/DL
PHOSPHATE SERPL-MCNC: 3 MG/DL
PHOSPHATE SERPL-MCNC: 3.2 MG/DL
PHOSPHATE SERPL-MCNC: 3.4 MG/DL
PHOSPHATE SERPL-MCNC: 3.6 MG/DL
PHOSPHATE SERPL-MCNC: 3.7 MG/DL
PHOSPHATE SERPL-MCNC: 3.8 MG/DL
PHOSPHATE SERPL-MCNC: 3.9 MG/DL
PHOSPHATE SERPL-MCNC: 4.2 MG/DL
PHOSPHATE SERPL-MCNC: 4.2 MG/DL
PIP: 14
PIP: 20
PIP: 22
PIP: 25
PIP: 31
PIP: 32
PISA TR MAX VEL: 2.76 M/S
PLATELET # BLD AUTO: 268 K/UL
PLATELET # BLD AUTO: 277 K/UL
PLATELET # BLD AUTO: 279 K/UL
PLATELET # BLD AUTO: 280 K/UL
PLATELET # BLD AUTO: 280 K/UL
PLATELET # BLD AUTO: 321 K/UL
PLATELET # BLD AUTO: 322 K/UL
PLATELET # BLD AUTO: 334 K/UL
PLATELET # BLD AUTO: 344 K/UL
PLATELET # BLD AUTO: 344 K/UL
PLATELET # BLD AUTO: 377 K/UL
PLATELET # BLD AUTO: 396 K/UL
PLATELET # BLD AUTO: 404 K/UL
PLATELET # BLD AUTO: 407 K/UL
PLATELET # BLD AUTO: 441 K/UL
PLATELET # BLD AUTO: 462 K/UL
PLATELET # BLD AUTO: 470 K/UL
PLATELET # BLD AUTO: 517 K/UL
PLATELET BLD QL SMEAR: ABNORMAL
PMV BLD AUTO: 10.1 FL
PMV BLD AUTO: 10.3 FL
PMV BLD AUTO: 10.4 FL
PMV BLD AUTO: 10.4 FL
PMV BLD AUTO: 10.5 FL
PMV BLD AUTO: 10.6 FL
PMV BLD AUTO: 10.7 FL
PMV BLD AUTO: 10.8 FL
PMV BLD AUTO: 10.9 FL
PMV BLD AUTO: 11 FL
PMV BLD AUTO: 11.2 FL
PMV BLD AUTO: 11.4 FL
PO2 BLDA: 110 MMHG (ref 80–100)
PO2 BLDA: 113 MMHG (ref 80–100)
PO2 BLDA: 115 MMHG (ref 80–100)
PO2 BLDA: 117 MMHG (ref 80–100)
PO2 BLDA: 125 MMHG (ref 80–100)
PO2 BLDA: 126 MMHG (ref 80–100)
PO2 BLDA: 144 MMHG (ref 80–100)
PO2 BLDA: 148 MMHG (ref 80–100)
PO2 BLDA: 155 MMHG (ref 80–100)
PO2 BLDA: 155 MMHG (ref 80–100)
PO2 BLDA: 162 MMHG (ref 80–100)
PO2 BLDA: 168 MMHG (ref 80–100)
PO2 BLDA: 212 MMHG (ref 80–100)
PO2 BLDA: 212 MMHG (ref 80–100)
PO2 BLDA: 232 MMHG (ref 80–100)
PO2 BLDA: 97 MMHG (ref 80–100)
POC BE: -1 MMOL/L
POC BE: -2 MMOL/L
POC BE: -4 MMOL/L
POC BE: 1 MMOL/L
POC BE: 1 MMOL/L
POC BE: 10 MMOL/L
POC BE: 2 MMOL/L
POC BE: 3 MMOL/L
POC BE: 5 MMOL/L
POC BE: 6 MMOL/L
POC BE: 8 MMOL/L
POC BE: 8 MMOL/L
POC SATURATED O2: 100 % (ref 95–100)
POC SATURATED O2: 98 % (ref 95–100)
POC SATURATED O2: 99 % (ref 95–100)
POC TCO2: 21 MMOL/L (ref 23–27)
POC TCO2: 23 MMOL/L (ref 23–27)
POC TCO2: 24 MMOL/L (ref 23–27)
POC TCO2: 25 MMOL/L (ref 23–27)
POC TCO2: 26 MMOL/L (ref 23–27)
POC TCO2: 26 MMOL/L (ref 23–27)
POC TCO2: 27 MMOL/L (ref 23–27)
POC TCO2: 28 MMOL/L (ref 23–27)
POC TCO2: 30 MMOL/L (ref 23–27)
POC TCO2: 30 MMOL/L (ref 23–27)
POC TCO2: 32 MMOL/L (ref 23–27)
POC TCO2: 33 MMOL/L (ref 23–27)
POC TCO2: 34 MMOL/L (ref 23–27)
POCT GLUCOSE: 102 MG/DL (ref 70–110)
POCT GLUCOSE: 104 MG/DL (ref 70–110)
POCT GLUCOSE: 107 MG/DL (ref 70–110)
POCT GLUCOSE: 109 MG/DL (ref 70–110)
POCT GLUCOSE: 110 MG/DL (ref 70–110)
POCT GLUCOSE: 112 MG/DL (ref 70–110)
POCT GLUCOSE: 113 MG/DL (ref 70–110)
POCT GLUCOSE: 113 MG/DL (ref 70–110)
POCT GLUCOSE: 114 MG/DL (ref 70–110)
POCT GLUCOSE: 116 MG/DL (ref 70–110)
POCT GLUCOSE: 117 MG/DL (ref 70–110)
POCT GLUCOSE: 117 MG/DL (ref 70–110)
POCT GLUCOSE: 120 MG/DL (ref 70–110)
POCT GLUCOSE: 121 MG/DL (ref 70–110)
POCT GLUCOSE: 121 MG/DL (ref 70–110)
POCT GLUCOSE: 122 MG/DL (ref 70–110)
POCT GLUCOSE: 123 MG/DL (ref 70–110)
POCT GLUCOSE: 123 MG/DL (ref 70–110)
POCT GLUCOSE: 124 MG/DL (ref 70–110)
POCT GLUCOSE: 127 MG/DL (ref 70–110)
POCT GLUCOSE: 128 MG/DL (ref 70–110)
POCT GLUCOSE: 134 MG/DL (ref 70–110)
POCT GLUCOSE: 140 MG/DL (ref 70–110)
POCT GLUCOSE: 60 MG/DL (ref 70–110)
POCT GLUCOSE: 72 MG/DL (ref 70–110)
POCT GLUCOSE: 73 MG/DL (ref 70–110)
POCT GLUCOSE: 74 MG/DL (ref 70–110)
POCT GLUCOSE: 76 MG/DL (ref 70–110)
POCT GLUCOSE: 77 MG/DL (ref 70–110)
POCT GLUCOSE: 82 MG/DL (ref 70–110)
POCT GLUCOSE: 82 MG/DL (ref 70–110)
POCT GLUCOSE: 83 MG/DL (ref 70–110)
POCT GLUCOSE: 84 MG/DL (ref 70–110)
POCT GLUCOSE: 85 MG/DL (ref 70–110)
POCT GLUCOSE: 86 MG/DL (ref 70–110)
POCT GLUCOSE: 87 MG/DL (ref 70–110)
POCT GLUCOSE: 88 MG/DL (ref 70–110)
POCT GLUCOSE: 89 MG/DL (ref 70–110)
POCT GLUCOSE: 90 MG/DL (ref 70–110)
POCT GLUCOSE: 90 MG/DL (ref 70–110)
POCT GLUCOSE: 91 MG/DL (ref 70–110)
POCT GLUCOSE: 91 MG/DL (ref 70–110)
POCT GLUCOSE: 92 MG/DL (ref 70–110)
POCT GLUCOSE: 93 MG/DL (ref 70–110)
POCT GLUCOSE: 95 MG/DL (ref 70–110)
POCT GLUCOSE: 96 MG/DL (ref 70–110)
POCT GLUCOSE: 96 MG/DL (ref 70–110)
POCT GLUCOSE: 98 MG/DL (ref 70–110)
POIKILOCYTOSIS BLD QL SMEAR: SLIGHT
POLYCHROMASIA BLD QL SMEAR: ABNORMAL
POTASSIUM SERPL-SCNC: 3.2 MMOL/L
POTASSIUM SERPL-SCNC: 3.3 MMOL/L
POTASSIUM SERPL-SCNC: 3.4 MMOL/L
POTASSIUM SERPL-SCNC: 3.6 MMOL/L
POTASSIUM SERPL-SCNC: 3.7 MMOL/L
POTASSIUM SERPL-SCNC: 3.8 MMOL/L
POTASSIUM SERPL-SCNC: 3.9 MMOL/L
POTASSIUM SERPL-SCNC: 4 MMOL/L
POTASSIUM SERPL-SCNC: 4.1 MMOL/L
POTASSIUM SERPL-SCNC: 4.2 MMOL/L
POTASSIUM SERPL-SCNC: 4.3 MMOL/L
PROCALCITONIN SERPL IA-MCNC: 2.41 NG/ML
PROPOXYPH UR QL: NOT DETECTED
PROT SERPL-MCNC: 6.3 G/DL
PROT SERPL-MCNC: 6.4 G/DL
PROT SERPL-MCNC: 6.5 G/DL
PROT SERPL-MCNC: 6.6 G/DL
PROT SERPL-MCNC: 6.8 G/DL
PROT SERPL-MCNC: 6.8 G/DL
PROT SERPL-MCNC: 7.5 G/DL
PROT SERPL-MCNC: 7.8 G/DL
PROT SERPL-MCNC: 7.8 G/DL
PROT SERPL-MCNC: 8 G/DL
PROT SERPL-MCNC: 8 G/DL
PROT SERPL-MCNC: 8.5 G/DL
PROT SERPL-MCNC: 8.6 G/DL
PROT UR QL STRIP: ABNORMAL
PROT UR QL STRIP: ABNORMAL
PROTHROMBIN TIME: 10.5 SEC
PROTHROMBIN TIME: 10.5 SEC
PROTHROMBIN TIME: 10.6 SEC
PROTHROMBIN TIME: 10.6 SEC
PROTHROMBIN TIME: 10.7 SEC
PROTHROMBIN TIME: 11.3 SEC
PROTHROMBIN TIME: 11.4 SEC
PROTHROMBIN TIME: 11.9 SEC
PROTHROMBIN TIME: 12.2 SEC
PROTHROMBIN TIME: 12.6 SEC
PROTHROMBIN TIME: 12.7 SEC
PROTHROMBIN TIME: 12.9 SEC
PS: 10
PV PEAK VELOCITY: 0.64 CM/S
RA MAJOR: 4.09 CM
RA WIDTH: 4.02 CM
RBC # BLD AUTO: 2.75 M/UL
RBC # BLD AUTO: 2.84 M/UL
RBC # BLD AUTO: 2.96 M/UL
RBC # BLD AUTO: 3.12 M/UL
RBC # BLD AUTO: 3.17 M/UL
RBC # BLD AUTO: 3.25 M/UL
RBC # BLD AUTO: 3.29 M/UL
RBC # BLD AUTO: 3.39 M/UL
RBC # BLD AUTO: 3.41 M/UL
RBC # BLD AUTO: 3.51 M/UL
RBC # BLD AUTO: 3.6 M/UL
RBC # BLD AUTO: 3.77 M/UL
RBC # BLD AUTO: 3.86 M/UL
RBC # BLD AUTO: 4.15 M/UL
RBC # BLD AUTO: 4.23 M/UL
RBC # BLD AUTO: 4.25 M/UL
RBC # BLD AUTO: 4.27 M/UL
RBC # BLD AUTO: 4.35 M/UL
RBC #/AREA URNS AUTO: >100 /HPF (ref 0–4)
RBC #/AREA URNS HPF: 30 /HPF (ref 0–4)
RIGHT VENTRICULAR END-DIASTOLIC DIMENSION: 4.22 CM
RV TISSUE DOPPLER FREE WALL SYSTOLIC VELOCITY 1 (APICAL 4 CHAMBER VIEW): 7.33 M/S
SAMPLE: ABNORMAL
SATURATED IRON: 5 %
SATURATED IRON: 6 %
SERVICE CMNT-IMP: NORMAL
SINUS: 3.22 CM
SITE: ABNORMAL
SODIUM SERPL-SCNC: 137 MMOL/L
SODIUM SERPL-SCNC: 138 MMOL/L
SODIUM SERPL-SCNC: 139 MMOL/L
SODIUM SERPL-SCNC: 140 MMOL/L
SODIUM SERPL-SCNC: 141 MMOL/L
SODIUM SERPL-SCNC: 142 MMOL/L
SODIUM SERPL-SCNC: 143 MMOL/L
SODIUM SERPL-SCNC: 143 MMOL/L
SODIUM SERPL-SCNC: 144 MMOL/L
SODIUM SERPL-SCNC: 145 MMOL/L
SODIUM SERPL-SCNC: 146 MMOL/L
SP GR UR STRIP: 1.02 (ref 1–1.03)
SP GR UR STRIP: >=1.03 (ref 1–1.03)
SP02: 100
SP02: 97
SP02: 98
SP02: 99
SPHEROCYTES BLD QL SMEAR: ABNORMAL
SPHEROCYTES BLD QL SMEAR: ABNORMAL
SQUAMOUS #/AREA URNS HPF: 3 /HPF
STJ: 3.26 CM
TAPENTADOL UR QL SCN: NOT DETECTED
TAPENTADOL-O-SULF: NOT DETECTED
TARGETS BLD QL SMEAR: ABNORMAL
TEMAZEPAM UR QL: NOT DETECTED
TOTAL IRON BINDING CAPACITY: 281 UG/DL
TOTAL IRON BINDING CAPACITY: 290 UG/DL
TOXIC GRANULES BLD QL SMEAR: PRESENT
TOXIC GRANULES BLD QL SMEAR: PRESENT
TOXICOLOGY INFORMATION: NORMAL
TR MAX PG: 30.47 MMHG
TRAMADOL UR QL: NOT DETECTED
TRANSFERRIN SERPL-MCNC: 190 MG/DL
TRANSFERRIN SERPL-MCNC: 196 MG/DL
TRICUSPID ANNULAR PLANE SYSTOLIC EXCURSION: 1.91 CM
TRIGL SERPL-MCNC: 123 MG/DL
TRIGL SERPL-MCNC: 70 MG/DL
TROPONIN I SERPL DL<=0.01 NG/ML-MCNC: 0.01 NG/ML
TROPONIN I SERPL DL<=0.01 NG/ML-MCNC: 4.72 NG/ML
TROPONIN I SERPL DL<=0.01 NG/ML-MCNC: 7.13 NG/ML
TROPONIN I SERPL DL<=0.01 NG/ML-MCNC: 9.18 NG/ML
URN SPEC COLLECT METH UR: ABNORMAL
URN SPEC COLLECT METH UR: ABNORMAL
UROBILINOGEN UR STRIP-ACNC: NEGATIVE EU/DL
VALPROATE SERPL-MCNC: 100.8 UG/ML
VALPROATE SERPL-MCNC: 32.8 UG/ML
VALPROATE SERPL-MCNC: 38.5 UG/ML
VALPROATE SERPL-MCNC: 67.8 UG/ML
VALPROATE SERPL-MCNC: 68.3 UG/ML
VALPROATE SERPL-MCNC: 77.7 UG/ML
VALPROATE SERPL-MCNC: 78.1 UG/ML
VANCOMYCIN TROUGH SERPL-MCNC: 16.8 UG/ML
VANCOMYCIN TROUGH SERPL-MCNC: 25.9 UG/ML
VANCOMYCIN TROUGH SERPL-MCNC: 7.6 UG/ML
VIT B12 SERPL-MCNC: 345 PG/ML
VT: 380
VT: 380
VT: 400
VT: 500
WBC # BLD AUTO: 10.03 K/UL
WBC # BLD AUTO: 10.68 K/UL
WBC # BLD AUTO: 13.83 K/UL
WBC # BLD AUTO: 13.9 K/UL
WBC # BLD AUTO: 15.64 K/UL
WBC # BLD AUTO: 16.11 K/UL
WBC # BLD AUTO: 18.85 K/UL
WBC # BLD AUTO: 22.57 K/UL
WBC # BLD AUTO: 23.85 K/UL
WBC # BLD AUTO: 24.83 K/UL
WBC # BLD AUTO: 24.87 K/UL
WBC # BLD AUTO: 30.77 K/UL
WBC # BLD AUTO: 31.06 K/UL
WBC # BLD AUTO: 31.32 K/UL
WBC # BLD AUTO: 31.48 K/UL
WBC # BLD AUTO: 9.56 K/UL
WBC # BLD AUTO: 9.7 K/UL
WBC # BLD AUTO: 9.91 K/UL
WBC #/AREA URNS AUTO: >100 /HPF (ref 0–5)
WBC #/AREA URNS HPF: >100 /HPF (ref 0–5)
ZOLPIDEM UR QL: NOT DETECTED

## 2018-01-01 PROCEDURE — 3078F DIAST BP <80 MM HG: CPT | Mod: CPTII,S$GLB,, | Performed by: INTERNAL MEDICINE

## 2018-01-01 PROCEDURE — 36620 ARTERIAL LINE: ICD-10-PCS | Mod: ,,, | Performed by: NURSE PRACTITIONER

## 2018-01-01 PROCEDURE — C9254 INJECTION, LACOSAMIDE: HCPCS | Performed by: PSYCHIATRY & NEUROLOGY

## 2018-01-01 PROCEDURE — 63600175 PHARM REV CODE 636 W HCPCS: Performed by: NURSE PRACTITIONER

## 2018-01-01 PROCEDURE — 25000003 PHARM REV CODE 250: Performed by: HOSPITALIST

## 2018-01-01 PROCEDURE — 37799 UNLISTED PX VASCULAR SURGERY: CPT

## 2018-01-01 PROCEDURE — 25000003 PHARM REV CODE 250: Performed by: INTERNAL MEDICINE

## 2018-01-01 PROCEDURE — 85025 COMPLETE CBC W/AUTO DIFF WBC: CPT

## 2018-01-01 PROCEDURE — 63600175 PHARM REV CODE 636 W HCPCS: Performed by: ANESTHESIOLOGY

## 2018-01-01 PROCEDURE — 51702 INSERT TEMP BLADDER CATH: CPT

## 2018-01-01 PROCEDURE — 20000000 HC ICU ROOM

## 2018-01-01 PROCEDURE — 25000003 PHARM REV CODE 250: Performed by: PSYCHIATRY & NEUROLOGY

## 2018-01-01 PROCEDURE — 99232 SBSQ HOSP IP/OBS MODERATE 35: CPT | Mod: ,,, | Performed by: PSYCHIATRY & NEUROLOGY

## 2018-01-01 PROCEDURE — 86920 COMPATIBILITY TEST SPIN: CPT

## 2018-01-01 PROCEDURE — 99900026 HC AIRWAY MAINTENANCE (STAT)

## 2018-01-01 PROCEDURE — 80076 HEPATIC FUNCTION PANEL: CPT

## 2018-01-01 PROCEDURE — 25000003 PHARM REV CODE 250: Performed by: NURSE ANESTHETIST, CERTIFIED REGISTERED

## 2018-01-01 PROCEDURE — 27000221 HC OXYGEN, UP TO 24 HOURS

## 2018-01-01 PROCEDURE — 80048 BASIC METABOLIC PNL TOTAL CA: CPT

## 2018-01-01 PROCEDURE — 25000003 PHARM REV CODE 250: Performed by: NURSE PRACTITIONER

## 2018-01-01 PROCEDURE — 95951 PR EEG MONITORING/VIDEORECORD: ICD-10-PCS | Mod: 26,,, | Performed by: PSYCHIATRY & NEUROLOGY

## 2018-01-01 PROCEDURE — 82800 BLOOD PH: CPT

## 2018-01-01 PROCEDURE — 82803 BLOOD GASES ANY COMBINATION: CPT

## 2018-01-01 PROCEDURE — 99999 PR PBB SHADOW E&M-EST. PATIENT-LVL II: CPT | Mod: PBBFAC,,, | Performed by: PHYSICAL MEDICINE & REHABILITATION

## 2018-01-01 PROCEDURE — 82728 ASSAY OF FERRITIN: CPT

## 2018-01-01 PROCEDURE — C9113 INJ PANTOPRAZOLE SODIUM, VIA: HCPCS | Performed by: NURSE PRACTITIONER

## 2018-01-01 PROCEDURE — 93010 EKG 12-LEAD: ICD-10-PCS | Mod: 76,,, | Performed by: INTERNAL MEDICINE

## 2018-01-01 PROCEDURE — 95951 HC EEG MONITORING/VIDEO RECORD: CPT

## 2018-01-01 PROCEDURE — 80053 COMPREHEN METABOLIC PANEL: CPT

## 2018-01-01 PROCEDURE — 82607 VITAMIN B-12: CPT

## 2018-01-01 PROCEDURE — 99233 PR SUBSEQUENT HOSPITAL CARE,LEVL III: ICD-10-PCS | Mod: GC,,, | Performed by: PSYCHIATRY & NEUROLOGY

## 2018-01-01 PROCEDURE — 99214 OFFICE O/P EST MOD 30 MIN: CPT | Mod: S$PBB,,, | Performed by: PHYSICIAN ASSISTANT

## 2018-01-01 PROCEDURE — 82550 ASSAY OF CK (CPK): CPT

## 2018-01-01 PROCEDURE — 71000039 HC RECOVERY, EACH ADD'L HOUR: Performed by: SURGERY

## 2018-01-01 PROCEDURE — 99233 PR SUBSEQUENT HOSPITAL CARE,LEVL III: ICD-10-PCS | Mod: ,,, | Performed by: PSYCHIATRY & NEUROLOGY

## 2018-01-01 PROCEDURE — 83735 ASSAY OF MAGNESIUM: CPT

## 2018-01-01 PROCEDURE — 85610 PROTHROMBIN TIME: CPT

## 2018-01-01 PROCEDURE — 83520 IMMUNOASSAY QUANT NOS NONAB: CPT

## 2018-01-01 PROCEDURE — 93010 EKG 12-LEAD: ICD-10-PCS | Mod: ,,, | Performed by: INTERNAL MEDICINE

## 2018-01-01 PROCEDURE — 87205 SMEAR GRAM STAIN: CPT

## 2018-01-01 PROCEDURE — 63600175 PHARM REV CODE 636 W HCPCS: Performed by: STUDENT IN AN ORGANIZED HEALTH CARE EDUCATION/TRAINING PROGRAM

## 2018-01-01 PROCEDURE — 63600175 PHARM REV CODE 636 W HCPCS

## 2018-01-01 PROCEDURE — 84100 ASSAY OF PHOSPHORUS: CPT

## 2018-01-01 PROCEDURE — 94761 N-INVAS EAR/PLS OXIMETRY MLT: CPT

## 2018-01-01 PROCEDURE — 95951 PR EEG MONITORING/VIDEORECORD: CPT | Mod: 26,,, | Performed by: PSYCHIATRY & NEUROLOGY

## 2018-01-01 PROCEDURE — 99291 CRITICAL CARE FIRST HOUR: CPT | Mod: ,,, | Performed by: INTERNAL MEDICINE

## 2018-01-01 PROCEDURE — 63600175 PHARM REV CODE 636 W HCPCS: Performed by: PSYCHIATRY & NEUROLOGY

## 2018-01-01 PROCEDURE — 37000009 HC ANESTHESIA EA ADD 15 MINS: Performed by: SURGERY

## 2018-01-01 PROCEDURE — 94003 VENT MGMT INPAT SUBQ DAY: CPT

## 2018-01-01 PROCEDURE — 37000008 HC ANESTHESIA 1ST 15 MINUTES: Performed by: SURGERY

## 2018-01-01 PROCEDURE — 94640 AIRWAY INHALATION TREATMENT: CPT

## 2018-01-01 PROCEDURE — 99292 PR CRITICAL CARE, ADDL 30 MIN: ICD-10-PCS | Mod: ,,, | Performed by: INTERNAL MEDICINE

## 2018-01-01 PROCEDURE — 25500020 PHARM REV CODE 255: Performed by: PSYCHIATRY & NEUROLOGY

## 2018-01-01 PROCEDURE — 99900035 HC TECH TIME PER 15 MIN (STAT)

## 2018-01-01 PROCEDURE — 94668 MNPJ CHEST WALL SBSQ: CPT

## 2018-01-01 PROCEDURE — 93010 ELECTROCARDIOGRAM REPORT: CPT | Mod: ,,, | Performed by: INTERNAL MEDICINE

## 2018-01-01 PROCEDURE — 63600175 PHARM REV CODE 636 W HCPCS: Performed by: NURSE ANESTHETIST, CERTIFIED REGISTERED

## 2018-01-01 PROCEDURE — 86140 C-REACTIVE PROTEIN: CPT

## 2018-01-01 PROCEDURE — 3008F BODY MASS INDEX DOCD: CPT | Mod: CPTII,,, | Performed by: FAMILY MEDICINE

## 2018-01-01 PROCEDURE — 25000003 PHARM REV CODE 250

## 2018-01-01 PROCEDURE — 36600 WITHDRAWAL OF ARTERIAL BLOOD: CPT

## 2018-01-01 PROCEDURE — 82947 ASSAY GLUCOSE BLOOD QUANT: CPT | Mod: 91

## 2018-01-01 PROCEDURE — 93005 ELECTROCARDIOGRAM TRACING: CPT

## 2018-01-01 PROCEDURE — 25000003 PHARM REV CODE 250: Performed by: STUDENT IN AN ORGANIZED HEALTH CARE EDUCATION/TRAINING PROGRAM

## 2018-01-01 PROCEDURE — 99223 PR INITIAL HOSPITAL CARE,LEVL III: ICD-10-PCS | Mod: ,,, | Performed by: PSYCHIATRY & NEUROLOGY

## 2018-01-01 PROCEDURE — 88305 TISSUE EXAM BY PATHOLOGIST: CPT | Mod: 26,,, | Performed by: PATHOLOGY

## 2018-01-01 PROCEDURE — 84100 ASSAY OF PHOSPHORUS: CPT | Mod: 91

## 2018-01-01 PROCEDURE — 25000242 PHARM REV CODE 250 ALT 637 W/ HCPCS: Performed by: PSYCHIATRY & NEUROLOGY

## 2018-01-01 PROCEDURE — 99999 PR PBB SHADOW E&M-EST. PATIENT-LVL III: CPT | Mod: PBBFAC,,, | Performed by: PHYSICAL MEDICINE & REHABILITATION

## 2018-01-01 PROCEDURE — 99291 CRITICAL CARE FIRST HOUR: CPT | Mod: GC,,, | Performed by: PSYCHIATRY & NEUROLOGY

## 2018-01-01 PROCEDURE — 83540 ASSAY OF IRON: CPT

## 2018-01-01 PROCEDURE — 71000033 HC RECOVERY, INTIAL HOUR: Performed by: SURGERY

## 2018-01-01 PROCEDURE — 3079F DIAST BP 80-89 MM HG: CPT | Mod: CPTII,,, | Performed by: FAMILY MEDICINE

## 2018-01-01 PROCEDURE — 36415 COLL VENOUS BLD VENIPUNCTURE: CPT

## 2018-01-01 PROCEDURE — 86901 BLOOD TYPING SEROLOGIC RH(D): CPT

## 2018-01-01 PROCEDURE — 95813 EEG EXTND MNTR 61-119 MIN: CPT

## 2018-01-01 PROCEDURE — 83735 ASSAY OF MAGNESIUM: CPT | Mod: 91

## 2018-01-01 PROCEDURE — 99238 HOSP IP/OBS DSCHRG MGMT 30/<: CPT | Mod: ,,, | Performed by: PSYCHIATRY & NEUROLOGY

## 2018-01-01 PROCEDURE — 99214 OFFICE O/P EST MOD 30 MIN: CPT | Mod: S$PBB,,, | Performed by: FAMILY MEDICINE

## 2018-01-01 PROCEDURE — 80053 COMPREHEN METABOLIC PANEL: CPT | Mod: 91

## 2018-01-01 PROCEDURE — 63600175 PHARM REV CODE 636 W HCPCS: Performed by: INTERNAL MEDICINE

## 2018-01-01 PROCEDURE — 90471 IMMUNIZATION ADMIN: CPT | Mod: 59,S$GLB,, | Performed by: FAMILY MEDICINE

## 2018-01-01 PROCEDURE — 99233 SBSQ HOSP IP/OBS HIGH 50: CPT | Mod: ,,, | Performed by: PSYCHIATRY & NEUROLOGY

## 2018-01-01 PROCEDURE — 80307 DRUG TEST PRSMV CHEM ANLYZR: CPT

## 2018-01-01 PROCEDURE — 80048 BASIC METABOLIC PNL TOTAL CA: CPT | Mod: 91

## 2018-01-01 PROCEDURE — 3008F BODY MASS INDEX DOCD: CPT | Mod: CPTII,,, | Performed by: PHYSICIAN ASSISTANT

## 2018-01-01 PROCEDURE — 43235 EGD DIAGNOSTIC BRUSH WASH: CPT | Mod: 51,GC,, | Performed by: INTERNAL MEDICINE

## 2018-01-01 PROCEDURE — 99222 1ST HOSP IP/OBS MODERATE 55: CPT | Mod: ,,, | Performed by: PSYCHIATRY & NEUROLOGY

## 2018-01-01 PROCEDURE — 99238 PR HOSPITAL DISCHARGE DAY,<30 MIN: ICD-10-PCS | Mod: ,,, | Performed by: PSYCHIATRY & NEUROLOGY

## 2018-01-01 PROCEDURE — 80164 ASSAY DIPROPYLACETIC ACD TOT: CPT

## 2018-01-01 PROCEDURE — 36000705 HC OR TIME LEV I EA ADD 15 MIN: Performed by: SURGERY

## 2018-01-01 PROCEDURE — 25000003 PHARM REV CODE 250: Performed by: EMERGENCY MEDICINE

## 2018-01-01 PROCEDURE — 99213 OFFICE O/P EST LOW 20 MIN: CPT | Mod: PBBFAC,PO | Performed by: FAMILY MEDICINE

## 2018-01-01 PROCEDURE — 99232 PR SUBSEQUENT HOSPITAL CARE,LEVL II: ICD-10-PCS | Mod: ,,, | Performed by: INTERNAL MEDICINE

## 2018-01-01 PROCEDURE — 99233 SBSQ HOSP IP/OBS HIGH 50: CPT | Mod: GC,,, | Performed by: PSYCHIATRY & NEUROLOGY

## 2018-01-01 PROCEDURE — D9220A PRA ANESTHESIA: Mod: CRNA,,, | Performed by: NURSE ANESTHETIST, CERTIFIED REGISTERED

## 2018-01-01 PROCEDURE — 85730 THROMBOPLASTIN TIME PARTIAL: CPT

## 2018-01-01 PROCEDURE — 88305 TISSUE EXAM BY PATHOLOGIST: CPT | Performed by: PATHOLOGY

## 2018-01-01 PROCEDURE — 27200966 HC CLOSED SUCTION SYSTEM

## 2018-01-01 PROCEDURE — 85520 HEPARIN ASSAY: CPT

## 2018-01-01 PROCEDURE — 99291 PR CRITICAL CARE, E/M 30-74 MINUTES: ICD-10-PCS | Mod: ,,, | Performed by: INTERNAL MEDICINE

## 2018-01-01 PROCEDURE — 99291 PR CRITICAL CARE, E/M 30-74 MINUTES: ICD-10-PCS | Mod: GC,,, | Performed by: PSYCHIATRY & NEUROLOGY

## 2018-01-01 PROCEDURE — E9220 PRA ENDO ANESTHESIA: HCPCS | Mod: ,,, | Performed by: NURSE ANESTHETIST, CERTIFIED REGISTERED

## 2018-01-01 PROCEDURE — 99214 OFFICE O/P EST MOD 30 MIN: CPT | Mod: S$GLB,,, | Performed by: INTERNAL MEDICINE

## 2018-01-01 PROCEDURE — 87070 CULTURE OTHR SPECIMN AEROBIC: CPT

## 2018-01-01 PROCEDURE — 25000003 PHARM REV CODE 250: Performed by: PHYSICIAN ASSISTANT

## 2018-01-01 PROCEDURE — 85007 BL SMEAR W/DIFF WBC COUNT: CPT

## 2018-01-01 PROCEDURE — 63600175 PHARM REV CODE 636 W HCPCS: Performed by: PHYSICIAN ASSISTANT

## 2018-01-01 PROCEDURE — C9113 INJ PANTOPRAZOLE SODIUM, VIA: HCPCS | Performed by: INTERNAL MEDICINE

## 2018-01-01 PROCEDURE — 3078F DIAST BP <80 MM HG: CPT | Mod: CPTII,S$GLB,, | Performed by: PHYSICAL MEDICINE & REHABILITATION

## 2018-01-01 PROCEDURE — 81001 URINALYSIS AUTO W/SCOPE: CPT

## 2018-01-01 PROCEDURE — 93970 EXTREMITY STUDY: CPT | Mod: 26,,, | Performed by: INTERNAL MEDICINE

## 2018-01-01 PROCEDURE — 86141 C-REACTIVE PROTEIN HS: CPT

## 2018-01-01 PROCEDURE — 84132 ASSAY OF SERUM POTASSIUM: CPT

## 2018-01-01 PROCEDURE — 99999 PR PBB SHADOW E&M-EST. PATIENT-LVL IV: CPT | Mod: PBBFAC,,, | Performed by: PHYSICIAN ASSISTANT

## 2018-01-01 PROCEDURE — 36000704 HC OR TIME LEV I 1ST 15 MIN: Performed by: SURGERY

## 2018-01-01 PROCEDURE — 36000707: Performed by: SURGERY

## 2018-01-01 PROCEDURE — 85027 COMPLETE CBC AUTOMATED: CPT

## 2018-01-01 PROCEDURE — 82746 ASSAY OF FOLIC ACID SERUM: CPT

## 2018-01-01 PROCEDURE — 83880 ASSAY OF NATRIURETIC PEPTIDE: CPT

## 2018-01-01 PROCEDURE — C1751 CATH, INF, PER/CENT/MIDLINE: HCPCS

## 2018-01-01 PROCEDURE — 36415 COLL VENOUS BLD VENIPUNCTURE: CPT | Mod: PO

## 2018-01-01 PROCEDURE — 99222 PR INITIAL HOSPITAL CARE,LEVL II: ICD-10-PCS | Mod: ,,, | Performed by: PSYCHIATRY & NEUROLOGY

## 2018-01-01 PROCEDURE — 84484 ASSAY OF TROPONIN QUANT: CPT

## 2018-01-01 PROCEDURE — 3074F SYST BP LT 130 MM HG: CPT | Mod: CPTII,S$GLB,, | Performed by: PHYSICAL MEDICINE & REHABILITATION

## 2018-01-01 PROCEDURE — 84153 ASSAY OF PSA TOTAL: CPT

## 2018-01-01 PROCEDURE — 25000242 PHARM REV CODE 250 ALT 637 W/ HCPCS: Performed by: NURSE PRACTITIONER

## 2018-01-01 PROCEDURE — 80202 ASSAY OF VANCOMYCIN: CPT

## 2018-01-01 PROCEDURE — 43235 EGD DIAGNOSTIC BRUSH WASH: CPT | Performed by: INTERNAL MEDICINE

## 2018-01-01 PROCEDURE — 99223 PR INITIAL HOSPITAL CARE,LEVL III: ICD-10-PCS | Mod: ,,, | Performed by: INTERNAL MEDICINE

## 2018-01-01 PROCEDURE — 36620 INSERTION CATHETER ARTERY: CPT | Mod: ,,, | Performed by: NURSE PRACTITIONER

## 2018-01-01 PROCEDURE — 99292 CRITICAL CARE ADDL 30 MIN: CPT | Mod: ,,, | Performed by: INTERNAL MEDICINE

## 2018-01-01 PROCEDURE — 3008F BODY MASS INDEX DOCD: CPT | Mod: CPTII,S$GLB,, | Performed by: PHYSICAL MEDICINE & REHABILITATION

## 2018-01-01 PROCEDURE — 27201089 HC SNARE, DISP (ANY): Performed by: INTERNAL MEDICINE

## 2018-01-01 PROCEDURE — 76937 US GUIDE VASCULAR ACCESS: CPT

## 2018-01-01 PROCEDURE — 82553 CREATINE MB FRACTION: CPT

## 2018-01-01 PROCEDURE — 84145 PROCALCITONIN (PCT): CPT

## 2018-01-01 PROCEDURE — 85652 RBC SED RATE AUTOMATED: CPT

## 2018-01-01 PROCEDURE — 99214 OFFICE O/P EST MOD 30 MIN: CPT | Mod: S$GLB,,, | Performed by: PHYSICIAN ASSISTANT

## 2018-01-01 PROCEDURE — 63600175 PHARM REV CODE 636 W HCPCS: Performed by: HOSPITALIST

## 2018-01-01 PROCEDURE — 99900025 HC BRONCHOSCOPY-ASST (STAT)

## 2018-01-01 PROCEDURE — 31622 DX BRONCHOSCOPE/WASH: CPT | Mod: GC,,, | Performed by: PSYCHIATRY & NEUROLOGY

## 2018-01-01 PROCEDURE — 90715 TDAP VACCINE 7 YRS/> IM: CPT | Mod: S$GLB,,, | Performed by: FAMILY MEDICINE

## 2018-01-01 PROCEDURE — 99999 PR PBB SHADOW E&M-EST. PATIENT-LVL IV: CPT | Mod: PBBFAC,,, | Performed by: INTERNAL MEDICINE

## 2018-01-01 PROCEDURE — D9220A PRA ANESTHESIA: Mod: ANES,,, | Performed by: ANESTHESIOLOGY

## 2018-01-01 PROCEDURE — 27100171 HC OXYGEN HIGH FLOW UP TO 24 HOURS

## 2018-01-01 PROCEDURE — 99223 1ST HOSP IP/OBS HIGH 75: CPT | Mod: ,,, | Performed by: PSYCHIATRY & NEUROLOGY

## 2018-01-01 PROCEDURE — 99291 CRITICAL CARE FIRST HOUR: CPT | Mod: ,,, | Performed by: GENERAL ACUTE CARE HOSPITAL

## 2018-01-01 PROCEDURE — 99499 UNLISTED E&M SERVICE: CPT | Mod: S$GLB,,, | Performed by: FAMILY MEDICINE

## 2018-01-01 PROCEDURE — 99499 UNLISTED E&M SERVICE: CPT | Mod: S$GLB,,, | Performed by: PHYSICAL MEDICINE & REHABILITATION

## 2018-01-01 PROCEDURE — 3077F SYST BP >= 140 MM HG: CPT | Mod: CPTII,,, | Performed by: PHYSICIAN ASSISTANT

## 2018-01-01 PROCEDURE — 27202055 HC BRONCHOSCOPE, DISP

## 2018-01-01 PROCEDURE — 93970 EXTREMITY STUDY: CPT | Mod: 50

## 2018-01-01 PROCEDURE — 85651 RBC SED RATE NONAUTOMATED: CPT

## 2018-01-01 PROCEDURE — 3078F DIAST BP <80 MM HG: CPT | Mod: CPTII,S$GLB,, | Performed by: PHYSICIAN ASSISTANT

## 2018-01-01 PROCEDURE — 99291 CRITICAL CARE FIRST HOUR: CPT | Mod: 25,GC,, | Performed by: PSYCHIATRY & NEUROLOGY

## 2018-01-01 PROCEDURE — 36620 INSERTION CATHETER ARTERY: CPT

## 2018-01-01 PROCEDURE — 63600175 PHARM REV CODE 636 W HCPCS: Performed by: EMERGENCY MEDICINE

## 2018-01-01 PROCEDURE — 83605 ASSAY OF LACTIC ACID: CPT | Mod: 91

## 2018-01-01 PROCEDURE — 99232 PR SUBSEQUENT HOSPITAL CARE,LEVL II: ICD-10-PCS | Mod: ,,, | Performed by: PSYCHIATRY & NEUROLOGY

## 2018-01-01 PROCEDURE — 3008F BODY MASS INDEX DOCD: CPT | Mod: CPTII,S$GLB,, | Performed by: INTERNAL MEDICINE

## 2018-01-01 PROCEDURE — 99214 OFFICE O/P EST MOD 30 MIN: CPT | Mod: S$GLB,,, | Performed by: PHYSICAL MEDICINE & REHABILITATION

## 2018-01-01 PROCEDURE — 45385 COLONOSCOPY W/LESION REMOVAL: CPT | Performed by: INTERNAL MEDICINE

## 2018-01-01 PROCEDURE — 99284 EMERGENCY DEPT VISIT MOD MDM: CPT | Mod: 25

## 2018-01-01 PROCEDURE — G0009 ADMIN PNEUMOCOCCAL VACCINE: HCPCS | Mod: 59,S$GLB,, | Performed by: FAMILY MEDICINE

## 2018-01-01 PROCEDURE — 80061 LIPID PANEL: CPT

## 2018-01-01 PROCEDURE — 99213 OFFICE O/P EST LOW 20 MIN: CPT | Mod: PBBFAC | Performed by: PHYSICAL MEDICINE & REHABILITATION

## 2018-01-01 PROCEDURE — 3075F SYST BP GE 130 - 139MM HG: CPT | Mod: CPTII,S$GLB,, | Performed by: INTERNAL MEDICINE

## 2018-01-01 PROCEDURE — 84484 ASSAY OF TROPONIN QUANT: CPT | Mod: 91

## 2018-01-01 PROCEDURE — 99291 PR CRITICAL CARE, E/M 30-74 MINUTES: ICD-10-PCS | Mod: ,,, | Performed by: GENERAL ACUTE CARE HOSPITAL

## 2018-01-01 PROCEDURE — 36569 INSJ PICC 5 YR+ W/O IMAGING: CPT

## 2018-01-01 PROCEDURE — 99999 PR PBB SHADOW E&M-EST. PATIENT-LVL III: CPT | Mod: PBBFAC,,, | Performed by: PHYSICIAN ASSISTANT

## 2018-01-01 PROCEDURE — 84478 ASSAY OF TRIGLYCERIDES: CPT

## 2018-01-01 PROCEDURE — 87040 BLOOD CULTURE FOR BACTERIA: CPT | Mod: 59

## 2018-01-01 PROCEDURE — 94002 VENT MGMT INPAT INIT DAY: CPT

## 2018-01-01 PROCEDURE — 3077F SYST BP >= 140 MM HG: CPT | Mod: CPTII,,, | Performed by: PHYSICAL MEDICINE & REHABILITATION

## 2018-01-01 PROCEDURE — 3079F DIAST BP 80-89 MM HG: CPT | Mod: CPTII,,, | Performed by: PHYSICAL MEDICINE & REHABILITATION

## 2018-01-01 PROCEDURE — 31622 PR BRONCHOSCOPY,DIAGNOSTIC: ICD-10-PCS | Mod: GC,,, | Performed by: PSYCHIATRY & NEUROLOGY

## 2018-01-01 PROCEDURE — 99214 OFFICE O/P EST MOD 30 MIN: CPT | Mod: S$PBB,,, | Performed by: PHYSICAL MEDICINE & REHABILITATION

## 2018-01-01 PROCEDURE — 82947 ASSAY GLUCOSE BLOOD QUANT: CPT

## 2018-01-01 PROCEDURE — 93010 ELECTROCARDIOGRAM REPORT: CPT | Mod: 76,,, | Performed by: INTERNAL MEDICINE

## 2018-01-01 PROCEDURE — 3077F SYST BP >= 140 MM HG: CPT | Mod: CPTII,S$GLB,, | Performed by: PHYSICAL MEDICINE & REHABILITATION

## 2018-01-01 PROCEDURE — 3074F SYST BP LT 130 MM HG: CPT | Mod: CPTII,S$GLB,, | Performed by: PHYSICIAN ASSISTANT

## 2018-01-01 PROCEDURE — 95813 EEG EXTND MNTR 61-119 MIN: CPT | Mod: 26,,, | Performed by: PSYCHIATRY & NEUROLOGY

## 2018-01-01 PROCEDURE — 3008F BODY MASS INDEX DOCD: CPT | Mod: CPTII,,, | Performed by: PHYSICAL MEDICINE & REHABILITATION

## 2018-01-01 PROCEDURE — 90670 PCV13 VACCINE IM: CPT | Mod: S$GLB,,, | Performed by: FAMILY MEDICINE

## 2018-01-01 PROCEDURE — 3008F BODY MASS INDEX DOCD: CPT | Mod: CPTII,S$GLB,, | Performed by: PHYSICIAN ASSISTANT

## 2018-01-01 PROCEDURE — 94667 MNPJ CHEST WALL 1ST: CPT

## 2018-01-01 PROCEDURE — 94760 N-INVAS EAR/PLS OXIMETRY 1: CPT

## 2018-01-01 PROCEDURE — 37000008 HC ANESTHESIA 1ST 15 MINUTES: Performed by: INTERNAL MEDICINE

## 2018-01-01 PROCEDURE — 37000009 HC ANESTHESIA EA ADD 15 MINS: Performed by: INTERNAL MEDICINE

## 2018-01-01 PROCEDURE — 3079F DIAST BP 80-89 MM HG: CPT | Mod: CPTII,S$GLB,, | Performed by: PHYSICAL MEDICINE & REHABILITATION

## 2018-01-01 PROCEDURE — 3075F SYST BP GE 130 - 139MM HG: CPT | Mod: CPTII,,, | Performed by: FAMILY MEDICINE

## 2018-01-01 PROCEDURE — 99999 PR PBB SHADOW E&M-EST. PATIENT-LVL III: CPT | Mod: PBBFAC,,, | Performed by: FAMILY MEDICINE

## 2018-01-01 PROCEDURE — 3079F DIAST BP 80-89 MM HG: CPT | Mod: CPTII,,, | Performed by: PHYSICIAN ASSISTANT

## 2018-01-01 PROCEDURE — 87040 BLOOD CULTURE FOR BACTERIA: CPT

## 2018-01-01 PROCEDURE — 99291 CRITICAL CARE FIRST HOUR: CPT

## 2018-01-01 PROCEDURE — 81000 URINALYSIS NONAUTO W/SCOPE: CPT

## 2018-01-01 PROCEDURE — 99291 PR CRITICAL CARE, E/M 30-74 MINUTES: ICD-10-PCS | Mod: 25,GC,, | Performed by: PSYCHIATRY & NEUROLOGY

## 2018-01-01 PROCEDURE — 45385 COLONOSCOPY W/LESION REMOVAL: CPT | Mod: PT,GC,, | Performed by: INTERNAL MEDICINE

## 2018-01-01 PROCEDURE — 96374 THER/PROPH/DIAG INJ IV PUSH: CPT

## 2018-01-01 PROCEDURE — 99223 1ST HOSP IP/OBS HIGH 75: CPT | Mod: ,,, | Performed by: INTERNAL MEDICINE

## 2018-01-01 PROCEDURE — 36000706: Performed by: SURGERY

## 2018-01-01 PROCEDURE — 83605 ASSAY OF LACTIC ACID: CPT

## 2018-01-01 PROCEDURE — 99213 OFFICE O/P EST LOW 20 MIN: CPT | Mod: PBBFAC | Performed by: PHYSICIAN ASSISTANT

## 2018-01-01 PROCEDURE — 87086 URINE CULTURE/COLONY COUNT: CPT | Mod: 59

## 2018-01-01 PROCEDURE — 99232 SBSQ HOSP IP/OBS MODERATE 35: CPT | Mod: ,,, | Performed by: INTERNAL MEDICINE

## 2018-01-01 PROCEDURE — 95813 PR EEG, EXTENDED, 61-119 MINS: ICD-10-PCS | Mod: 26,,, | Performed by: PSYCHIATRY & NEUROLOGY

## 2018-01-01 RX ORDER — VIGABATRIN 500 MG/1
1500 POWDER, FOR SOLUTION ORAL 2 TIMES DAILY
Status: DISCONTINUED | OUTPATIENT
Start: 2018-01-01 | End: 2018-01-01

## 2018-01-01 RX ORDER — POTASSIUM CHLORIDE 20 MEQ/15ML
40 SOLUTION ORAL ONCE
Status: COMPLETED | OUTPATIENT
Start: 2018-01-01 | End: 2018-01-01

## 2018-01-01 RX ORDER — ROCURONIUM BROMIDE 10 MG/ML
INJECTION, SOLUTION INTRAVENOUS
Status: DISPENSED
Start: 2018-01-01 | End: 2018-01-01

## 2018-01-01 RX ORDER — ONDANSETRON 2 MG/ML
4 INJECTION INTRAMUSCULAR; INTRAVENOUS ONCE
Status: COMPLETED | OUTPATIENT
Start: 2018-01-01 | End: 2018-01-01

## 2018-01-01 RX ORDER — FAMOTIDINE 20 MG/1
20 TABLET, FILM COATED ORAL 2 TIMES DAILY
Status: DISCONTINUED | OUTPATIENT
Start: 2018-01-01 | End: 2018-01-01

## 2018-01-01 RX ORDER — LORAZEPAM 2 MG/ML
2 INJECTION INTRAMUSCULAR ONCE
Status: COMPLETED | OUTPATIENT
Start: 2018-01-01 | End: 2018-01-01

## 2018-01-01 RX ORDER — LORAZEPAM 2 MG/ML
INJECTION INTRAMUSCULAR
Status: COMPLETED
Start: 2018-01-01 | End: 2018-01-01

## 2018-01-01 RX ORDER — KETAMINE HYDROCHLORIDE 50 MG/ML
1 INJECTION, SOLUTION INTRAMUSCULAR; INTRAVENOUS ONCE
Status: COMPLETED | OUTPATIENT
Start: 2018-01-01 | End: 2018-01-01

## 2018-01-01 RX ORDER — HYDROCODONE BITARTRATE AND ACETAMINOPHEN 10; 325 MG/1; MG/1
1 TABLET ORAL EVERY 6 HOURS PRN
Qty: 120 TABLET | Refills: 0 | Status: SHIPPED | OUTPATIENT
Start: 2018-01-01 | End: 2018-01-01 | Stop reason: SDUPTHER

## 2018-01-01 RX ORDER — CLONAZEPAM 0.5 MG/1
2 TABLET ORAL EVERY 8 HOURS
Status: DISCONTINUED | OUTPATIENT
Start: 2018-01-01 | End: 2018-01-01

## 2018-01-01 RX ORDER — POTASSIUM CHLORIDE 20 MEQ/15ML
40 SOLUTION ORAL
Status: DISCONTINUED | OUTPATIENT
Start: 2018-01-01 | End: 2018-01-01

## 2018-01-01 RX ORDER — DICLOFENAC SODIUM 10 MG/G
2 GEL TOPICAL 4 TIMES DAILY
Qty: 100 G | Refills: 5 | Status: SHIPPED | OUTPATIENT
Start: 2018-01-01 | End: 2018-01-01

## 2018-01-01 RX ORDER — LIDOCAINE HYDROCHLORIDE 10 MG/ML
INJECTION, SOLUTION INTRAVENOUS
Status: DISCONTINUED | OUTPATIENT
Start: 2018-01-01 | End: 2018-01-01

## 2018-01-01 RX ORDER — VANCOMYCIN HCL IN 5 % DEXTROSE 1G/250ML
15 PLASTIC BAG, INJECTION (ML) INTRAVENOUS
Status: DISCONTINUED | OUTPATIENT
Start: 2018-01-01 | End: 2018-01-01

## 2018-01-01 RX ORDER — LIDOCAINE HYDROCHLORIDE 20 MG/ML
100 INJECTION, SOLUTION INFILTRATION; PERINEURAL ONCE
Status: DISCONTINUED | OUTPATIENT
Start: 2018-01-01 | End: 2018-01-01

## 2018-01-01 RX ORDER — ROCURONIUM BROMIDE 10 MG/ML
INJECTION, SOLUTION INTRAVENOUS
Status: DISCONTINUED | OUTPATIENT
Start: 2018-01-01 | End: 2018-01-01

## 2018-01-01 RX ORDER — PANTOPRAZOLE SODIUM 40 MG/10ML
40 INJECTION, POWDER, LYOPHILIZED, FOR SOLUTION INTRAVENOUS DAILY
Status: DISCONTINUED | OUTPATIENT
Start: 2018-01-01 | End: 2018-01-01

## 2018-01-01 RX ORDER — LORAZEPAM 2 MG/ML
INJECTION INTRAMUSCULAR
Status: DISPENSED
Start: 2018-01-01 | End: 2018-01-01

## 2018-01-01 RX ORDER — PROPOFOL 10 MG/ML
5 INJECTION, EMULSION INTRAVENOUS CONTINUOUS PRN
Status: DISCONTINUED | OUTPATIENT
Start: 2018-01-01 | End: 2018-01-01

## 2018-01-01 RX ORDER — LORAZEPAM 2 MG/ML
4 INJECTION INTRAMUSCULAR ONCE
Status: COMPLETED | OUTPATIENT
Start: 2018-01-01 | End: 2018-01-01

## 2018-01-01 RX ORDER — POLYETHYLENE GLYCOL 3350 17 G/17G
17 POWDER, FOR SOLUTION ORAL DAILY
Status: DISCONTINUED | OUTPATIENT
Start: 2018-01-01 | End: 2018-01-01

## 2018-01-01 RX ORDER — CYCLOBENZAPRINE HCL 10 MG
10 TABLET ORAL 3 TIMES DAILY PRN
Qty: 90 TABLET | Refills: 5 | Status: SHIPPED | OUTPATIENT
Start: 2018-01-01 | End: 2018-01-01

## 2018-01-01 RX ORDER — POLYETHYLENE GLYCOL 3350, SODIUM SULFATE ANHYDROUS, SODIUM BICARBONATE, SODIUM CHLORIDE, POTASSIUM CHLORIDE 236; 22.74; 6.74; 5.86; 2.97 G/4L; G/4L; G/4L; G/4L; G/4L
4 POWDER, FOR SOLUTION ORAL ONCE
Qty: 4000 ML | Refills: 0 | Status: SHIPPED | OUTPATIENT
Start: 2018-01-01 | End: 2018-01-01

## 2018-01-01 RX ORDER — CEFEPIME HYDROCHLORIDE 1 G/1
1 INJECTION, POWDER, FOR SOLUTION INTRAMUSCULAR; INTRAVENOUS
Status: DISCONTINUED | OUTPATIENT
Start: 2018-01-01 | End: 2018-01-01

## 2018-01-01 RX ORDER — HYDROCODONE BITARTRATE AND ACETAMINOPHEN 10; 325 MG/1; MG/1
1 TABLET ORAL EVERY 6 HOURS PRN
Qty: 100 TABLET | Refills: 0 | Status: SHIPPED | OUTPATIENT
Start: 2018-01-01 | End: 2018-01-01 | Stop reason: SDUPTHER

## 2018-01-01 RX ORDER — MIDAZOLAM HYDROCHLORIDE 1 MG/ML
4 INJECTION INTRAMUSCULAR; INTRAVENOUS ONCE
Status: COMPLETED | OUTPATIENT
Start: 2018-01-01 | End: 2018-01-01

## 2018-01-01 RX ORDER — SULFASALAZINE 500 MG/1
TABLET, DELAYED RELEASE ORAL
Qty: 540 TABLET | Refills: 0 | OUTPATIENT
Start: 2018-01-01

## 2018-01-01 RX ORDER — INDOMETHACIN 25 MG/1
25 CAPSULE ORAL 2 TIMES DAILY WITH MEALS
Qty: 10 CAPSULE | Refills: 0 | Status: SHIPPED | OUTPATIENT
Start: 2018-01-01 | End: 2018-01-01

## 2018-01-01 RX ORDER — GLUCAGON 1 MG
1 KIT INJECTION
Status: DISCONTINUED | OUTPATIENT
Start: 2018-01-01 | End: 2018-01-01

## 2018-01-01 RX ORDER — SODIUM CHLORIDE FOR INHALATION 3 %
4 VIAL, NEBULIZER (ML) INHALATION EVERY 6 HOURS
Status: DISCONTINUED | OUTPATIENT
Start: 2018-01-01 | End: 2018-01-01

## 2018-01-01 RX ORDER — HYDROCODONE BITARTRATE AND ACETAMINOPHEN 10; 325 MG/1; MG/1
1 TABLET ORAL EVERY 6 HOURS PRN
Qty: 120 TABLET | Refills: 0 | Status: SHIPPED | OUTPATIENT
Start: 2018-01-01 | End: 2018-01-01

## 2018-01-01 RX ORDER — GLYCOPYRROLATE 0.2 MG/ML
INJECTION INTRAMUSCULAR; INTRAVENOUS
Status: DISCONTINUED | OUTPATIENT
Start: 2018-01-01 | End: 2018-01-01

## 2018-01-01 RX ORDER — METOPROLOL TARTRATE 25 MG/1
12.5 TABLET ORAL EVERY 12 HOURS
Status: DISCONTINUED | OUTPATIENT
Start: 2018-01-01 | End: 2018-01-01 | Stop reason: HOSPADM

## 2018-01-01 RX ORDER — VANCOMYCIN HCL IN 5 % DEXTROSE 1G/250ML
15 PLASTIC BAG, INJECTION (ML) INTRAVENOUS
Status: CANCELLED | OUTPATIENT
Start: 2018-01-01

## 2018-01-01 RX ORDER — ROCURONIUM BROMIDE 10 MG/ML
75 INJECTION, SOLUTION INTRAVENOUS ONCE
Status: COMPLETED | OUTPATIENT
Start: 2018-01-01 | End: 2018-01-01

## 2018-01-01 RX ORDER — FENTANYL CITRATE-0.9 % NACL/PF 10 MCG/ML
PLASTIC BAG, INJECTION (ML) INTRAVENOUS CONTINUOUS
Status: DISCONTINUED | OUTPATIENT
Start: 2018-01-01 | End: 2018-01-01

## 2018-01-01 RX ORDER — FENTANYL CITRATE 50 UG/ML
INJECTION, SOLUTION INTRAMUSCULAR; INTRAVENOUS
Status: COMPLETED
Start: 2018-01-01 | End: 2018-01-01

## 2018-01-01 RX ORDER — ENOXAPARIN SODIUM 100 MG/ML
40 INJECTION SUBCUTANEOUS EVERY 24 HOURS
Status: DISCONTINUED | OUTPATIENT
Start: 2018-01-01 | End: 2018-01-01

## 2018-01-01 RX ORDER — OMEPRAZOLE 20 MG/1
CAPSULE, DELAYED RELEASE ORAL
Qty: 180 CAPSULE | Refills: 1 | Status: SHIPPED | OUTPATIENT
Start: 2018-01-01

## 2018-01-01 RX ORDER — SODIUM CHLORIDE 9 MG/ML
INJECTION, SOLUTION INTRAVENOUS CONTINUOUS
Status: DISCONTINUED | OUTPATIENT
Start: 2018-01-01 | End: 2018-01-01 | Stop reason: HOSPADM

## 2018-01-01 RX ORDER — AMOXICILLIN 250 MG
1 CAPSULE ORAL 2 TIMES DAILY
Status: DISCONTINUED | OUTPATIENT
Start: 2018-01-01 | End: 2018-01-01

## 2018-01-01 RX ORDER — BENZONATATE 200 MG/1
200 CAPSULE ORAL 3 TIMES DAILY PRN
Qty: 30 CAPSULE | Refills: 0 | Status: SHIPPED | OUTPATIENT
Start: 2018-01-01 | End: 2018-01-01

## 2018-01-01 RX ORDER — MAGNESIUM SULFATE HEPTAHYDRATE 40 MG/ML
2 INJECTION, SOLUTION INTRAVENOUS
Status: COMPLETED | OUTPATIENT
Start: 2018-01-01 | End: 2018-01-01

## 2018-01-01 RX ORDER — ROCURONIUM BROMIDE 10 MG/ML
INJECTION, SOLUTION INTRAVENOUS
Status: COMPLETED
Start: 2018-01-01 | End: 2018-01-01

## 2018-01-01 RX ORDER — SODIUM CHLORIDE 0.9 % (FLUSH) 0.9 %
3 SYRINGE (ML) INJECTION
Status: DISCONTINUED | OUTPATIENT
Start: 2018-01-01 | End: 2018-01-01 | Stop reason: HOSPADM

## 2018-01-01 RX ORDER — FUROSEMIDE 10 MG/ML
20 INJECTION INTRAMUSCULAR; INTRAVENOUS ONCE
Status: COMPLETED | OUTPATIENT
Start: 2018-01-01 | End: 2018-01-01

## 2018-01-01 RX ORDER — CLOPIDOGREL BISULFATE 75 MG/1
75 TABLET ORAL DAILY
Status: DISCONTINUED | OUTPATIENT
Start: 2018-01-01 | End: 2018-01-01

## 2018-01-01 RX ORDER — POTASSIUM CHLORIDE 20 MEQ/15ML
60 SOLUTION ORAL
Status: DISCONTINUED | OUTPATIENT
Start: 2018-01-01 | End: 2018-01-01

## 2018-01-01 RX ORDER — FERROUS SULFATE 325(65) MG
325 TABLET, DELAYED RELEASE (ENTERIC COATED) ORAL 2 TIMES DAILY
Qty: 60 TABLET | Refills: 2 | Status: SHIPPED | OUTPATIENT
Start: 2018-01-01

## 2018-01-01 RX ORDER — METOCLOPRAMIDE HYDROCHLORIDE 5 MG/ML
10 INJECTION INTRAMUSCULAR; INTRAVENOUS EVERY 6 HOURS
Status: DISCONTINUED | OUTPATIENT
Start: 2018-01-01 | End: 2018-01-01 | Stop reason: HOSPADM

## 2018-01-01 RX ORDER — CHLORHEXIDINE GLUCONATE ORAL RINSE 1.2 MG/ML
15 SOLUTION DENTAL 2 TIMES DAILY
Status: CANCELLED | OUTPATIENT
Start: 2018-01-01

## 2018-01-01 RX ORDER — VIGABATRIN 500 MG/1
1500 POWDER, FOR SOLUTION ORAL 3 TIMES DAILY
Status: COMPLETED | OUTPATIENT
Start: 2018-01-01 | End: 2018-01-01

## 2018-01-01 RX ORDER — SODIUM,POTASSIUM PHOSPHATES 280-250MG
2 POWDER IN PACKET (EA) ORAL
Status: DISCONTINUED | OUTPATIENT
Start: 2018-01-01 | End: 2018-01-01

## 2018-01-01 RX ORDER — FENTANYL CITRATE 50 UG/ML
INJECTION, SOLUTION INTRAMUSCULAR; INTRAVENOUS
Status: DISCONTINUED | OUTPATIENT
Start: 2018-01-01 | End: 2018-01-01

## 2018-01-01 RX ORDER — HYDROCODONE BITARTRATE AND ACETAMINOPHEN 10; 325 MG/1; MG/1
1 TABLET ORAL EVERY 8 HOURS PRN
Qty: 90 TABLET | Refills: 0 | Status: SHIPPED | OUTPATIENT
Start: 2018-12-17 | End: 2018-01-01 | Stop reason: SDUPTHER

## 2018-01-01 RX ORDER — SODIUM CHLORIDE 0.9 % (FLUSH) 0.9 %
3 SYRINGE (ML) INJECTION
Status: DISCONTINUED | OUTPATIENT
Start: 2018-01-01 | End: 2018-01-01

## 2018-01-01 RX ORDER — LORAZEPAM 2 MG/ML
2 INJECTION INTRAMUSCULAR
Status: DISCONTINUED | OUTPATIENT
Start: 2018-01-01 | End: 2018-01-01

## 2018-01-01 RX ORDER — LIDOCAINE 50 MG/G
1 PATCH TOPICAL
Status: DISCONTINUED | OUTPATIENT
Start: 2018-01-01 | End: 2018-01-01 | Stop reason: HOSPADM

## 2018-01-01 RX ORDER — LORAZEPAM 2 MG/ML
2 INJECTION INTRAMUSCULAR EVERY 30 MIN PRN
Status: DISCONTINUED | OUTPATIENT
Start: 2018-01-01 | End: 2018-01-01 | Stop reason: HOSPADM

## 2018-01-01 RX ORDER — SULFASALAZINE 500 MG/1
TABLET, DELAYED RELEASE ORAL
Qty: 540 TABLET | Refills: 0 | Status: SHIPPED | OUTPATIENT
Start: 2018-01-01 | End: 2018-01-01 | Stop reason: SDUPTHER

## 2018-01-01 RX ORDER — ATROPINE SULFATE 0.1 MG/ML
0.5 INJECTION INTRAVENOUS
Status: DISCONTINUED | OUTPATIENT
Start: 2018-01-01 | End: 2018-01-01

## 2018-01-01 RX ORDER — IPRATROPIUM BROMIDE AND ALBUTEROL SULFATE 2.5; .5 MG/3ML; MG/3ML
3 SOLUTION RESPIRATORY (INHALATION) EVERY 6 HOURS
Status: DISCONTINUED | OUTPATIENT
Start: 2018-01-01 | End: 2018-01-01

## 2018-01-01 RX ORDER — ACETAMINOPHEN 650 MG/20.3ML
650 LIQUID ORAL EVERY 6 HOURS
Status: DISCONTINUED | OUTPATIENT
Start: 2018-01-01 | End: 2018-01-01

## 2018-01-01 RX ORDER — MORPHINE SULFATE 1 MG/ML
2 INJECTION, SOLUTION INTRAVENOUS CONTINUOUS
Status: DISCONTINUED | OUTPATIENT
Start: 2018-01-01 | End: 2018-01-01 | Stop reason: HOSPADM

## 2018-01-01 RX ORDER — SULFASALAZINE 500 MG/1
1500 TABLET, DELAYED RELEASE ORAL 2 TIMES DAILY
Qty: 540 TABLET | Refills: 0 | Status: SHIPPED | OUTPATIENT
Start: 2018-01-01

## 2018-01-01 RX ORDER — NOREPINEPHRINE BITARTRATE/D5W 4MG/250ML
0.02 PLASTIC BAG, INJECTION (ML) INTRAVENOUS CONTINUOUS
Status: DISCONTINUED | OUTPATIENT
Start: 2018-01-01 | End: 2018-01-01

## 2018-01-01 RX ORDER — PROPOFOL 10 MG/ML
VIAL (ML) INTRAVENOUS CONTINUOUS PRN
Status: DISCONTINUED | OUTPATIENT
Start: 2018-01-01 | End: 2018-01-01

## 2018-01-01 RX ORDER — GLYCOPYRROLATE 0.2 MG/ML
0.1 INJECTION INTRAMUSCULAR; INTRAVENOUS EVERY 4 HOURS PRN
Status: DISCONTINUED | OUTPATIENT
Start: 2018-01-01 | End: 2018-01-01 | Stop reason: HOSPADM

## 2018-01-01 RX ORDER — NOREPINEPHRINE BITARTRATE/D5W 4MG/250ML
PLASTIC BAG, INJECTION (ML) INTRAVENOUS
Status: COMPLETED
Start: 2018-01-01 | End: 2018-01-01

## 2018-01-01 RX ORDER — ASPIRIN 600 MG/1
600 SUPPOSITORY RECTAL
Status: COMPLETED | OUTPATIENT
Start: 2018-01-01 | End: 2018-01-01

## 2018-01-01 RX ORDER — LIDOCAINE HCL/PF 100 MG/5ML
SYRINGE (ML) INTRAVENOUS
Status: COMPLETED
Start: 2018-01-01 | End: 2018-01-01

## 2018-01-01 RX ORDER — PROPOFOL 10 MG/ML
VIAL (ML) INTRAVENOUS
Status: DISCONTINUED | OUTPATIENT
Start: 2018-01-01 | End: 2018-01-01

## 2018-01-01 RX ORDER — VIGABATRIN 500 MG/1
1500 POWDER, FOR SOLUTION ORAL 3 TIMES DAILY
Status: DISCONTINUED | OUTPATIENT
Start: 2018-01-01 | End: 2018-01-01

## 2018-01-01 RX ORDER — FENTANYL CITRATE 50 UG/ML
25 INJECTION, SOLUTION INTRAMUSCULAR; INTRAVENOUS ONCE
Status: COMPLETED | OUTPATIENT
Start: 2018-01-01 | End: 2018-01-01

## 2018-01-01 RX ORDER — LEVETIRACETAM 5 MG/ML
500 INJECTION INTRAVASCULAR ONCE
Status: COMPLETED | OUTPATIENT
Start: 2018-01-01 | End: 2018-01-01

## 2018-01-01 RX ORDER — NAPROXEN SODIUM 220 MG/1
81 TABLET, FILM COATED ORAL DAILY
Status: DISCONTINUED | OUTPATIENT
Start: 2018-01-01 | End: 2018-01-01

## 2018-01-01 RX ORDER — PHENYLEPHRINE HCL IN 0.9% NACL 1 MG/10 ML
SYRINGE (ML) INTRAVENOUS
Status: DISCONTINUED
Start: 2018-01-01 | End: 2018-01-01 | Stop reason: WASHOUT

## 2018-01-01 RX ORDER — LEVETIRACETAM 10 MG/ML
1000 INJECTION INTRAVASCULAR EVERY 12 HOURS
Status: DISCONTINUED | OUTPATIENT
Start: 2018-01-01 | End: 2018-01-01 | Stop reason: HOSPADM

## 2018-01-01 RX ORDER — LISINOPRIL 5 MG/1
5 TABLET ORAL DAILY
Status: DISCONTINUED | OUTPATIENT
Start: 2018-01-01 | End: 2018-01-01

## 2018-01-01 RX ORDER — CIPROFLOXACIN 2 MG/ML
400 INJECTION, SOLUTION INTRAVENOUS
Status: DISCONTINUED | OUTPATIENT
Start: 2018-01-01 | End: 2018-01-01

## 2018-01-01 RX ORDER — PHENYLEPHRINE HCL IN 0.9% NACL 1 MG/10 ML
SYRINGE (ML) INTRAVENOUS
Status: COMPLETED
Start: 2018-01-01 | End: 2018-01-01

## 2018-01-01 RX ORDER — ACETAMINOPHEN 650 MG/20.3ML
650 LIQUID ORAL EVERY 6 HOURS PRN
Status: DISCONTINUED | OUTPATIENT
Start: 2018-01-01 | End: 2018-01-01

## 2018-01-01 RX ORDER — SODIUM CHLORIDE 9 MG/ML
INJECTION, SOLUTION INTRAVENOUS CONTINUOUS
Status: ACTIVE | OUTPATIENT
Start: 2018-01-01 | End: 2018-01-01

## 2018-01-01 RX ORDER — CLONAZEPAM 0.5 MG/1
1 TABLET ORAL EVERY 8 HOURS
Status: DISCONTINUED | OUTPATIENT
Start: 2018-01-01 | End: 2018-01-01

## 2018-01-01 RX ORDER — GLYCOPYRROLATE 0.2 MG/ML
0.2 INJECTION INTRAMUSCULAR; INTRAVENOUS ONCE
Status: COMPLETED | OUTPATIENT
Start: 2018-01-01 | End: 2018-01-01

## 2018-01-01 RX ORDER — LANOLIN ALCOHOL/MO/W.PET/CERES
800 CREAM (GRAM) TOPICAL
Status: DISCONTINUED | OUTPATIENT
Start: 2018-01-01 | End: 2018-01-01

## 2018-01-01 RX ORDER — LEVETIRACETAM 15 MG/ML
1500 INJECTION INTRAVASCULAR EVERY 12 HOURS
Status: DISCONTINUED | OUTPATIENT
Start: 2018-01-01 | End: 2018-01-01

## 2018-01-01 RX ORDER — ENOXAPARIN SODIUM 100 MG/ML
1 INJECTION SUBCUTANEOUS
Status: DISCONTINUED | OUTPATIENT
Start: 2018-01-01 | End: 2018-01-01

## 2018-01-01 RX ORDER — SULFAMETHOXAZOLE AND TRIMETHOPRIM 800; 160 MG/1; MG/1
1 TABLET ORAL 2 TIMES DAILY
Qty: 10 TABLET | Refills: 0 | Status: SHIPPED | OUTPATIENT
Start: 2018-01-01 | End: 2018-01-01

## 2018-01-01 RX ORDER — MIDAZOLAM HYDROCHLORIDE 1 MG/ML
INJECTION INTRAMUSCULAR; INTRAVENOUS
Status: COMPLETED
Start: 2018-01-01 | End: 2018-01-01

## 2018-01-01 RX ORDER — ATORVASTATIN CALCIUM 20 MG/1
80 TABLET, FILM COATED ORAL DAILY
Status: DISCONTINUED | OUTPATIENT
Start: 2018-01-01 | End: 2018-01-01

## 2018-01-01 RX ORDER — LEVETIRACETAM 10 MG/ML
1000 INJECTION INTRAVASCULAR EVERY 12 HOURS
Status: DISCONTINUED | OUTPATIENT
Start: 2018-01-01 | End: 2018-01-01

## 2018-01-01 RX ORDER — METOCLOPRAMIDE HYDROCHLORIDE 5 MG/ML
10 INJECTION INTRAMUSCULAR; INTRAVENOUS EVERY 8 HOURS
Status: DISCONTINUED | OUTPATIENT
Start: 2018-01-01 | End: 2018-01-01

## 2018-01-01 RX ORDER — CLOPIDOGREL BISULFATE 75 MG/1
300 TABLET ORAL ONCE
Status: COMPLETED | OUTPATIENT
Start: 2018-01-01 | End: 2018-01-01

## 2018-01-01 RX ORDER — FENTANYL CITRATE 50 UG/ML
INJECTION, SOLUTION INTRAMUSCULAR; INTRAVENOUS
Status: DISCONTINUED
Start: 2018-01-01 | End: 2018-01-01 | Stop reason: WASHOUT

## 2018-01-01 RX ORDER — OSELTAMIVIR PHOSPHATE 75 MG/1
75 CAPSULE ORAL 2 TIMES DAILY
Qty: 10 CAPSULE | Refills: 0 | Status: SHIPPED | OUTPATIENT
Start: 2018-01-01 | End: 2018-01-01

## 2018-01-01 RX ORDER — HYDROCODONE BITARTRATE AND ACETAMINOPHEN 10; 325 MG/1; MG/1
1 TABLET ORAL EVERY 6 HOURS PRN
Qty: 110 TABLET | Refills: 0 | Status: SHIPPED | OUTPATIENT
Start: 2018-01-01 | End: 2018-01-01

## 2018-01-01 RX ORDER — SODIUM CHLORIDE 9 MG/ML
INJECTION, SOLUTION INTRAVENOUS CONTINUOUS PRN
Status: DISCONTINUED | OUTPATIENT
Start: 2018-01-01 | End: 2018-01-01

## 2018-01-01 RX ORDER — ONDANSETRON 2 MG/ML
8 INJECTION INTRAMUSCULAR; INTRAVENOUS ONCE
Status: COMPLETED | OUTPATIENT
Start: 2018-01-01 | End: 2018-01-01

## 2018-01-01 RX ORDER — HYDROCODONE BITARTRATE AND ACETAMINOPHEN 500; 5 MG/1; MG/1
TABLET ORAL
Status: DISCONTINUED | OUTPATIENT
Start: 2018-01-01 | End: 2018-01-01

## 2018-01-01 RX ORDER — CHLORHEXIDINE GLUCONATE ORAL RINSE 1.2 MG/ML
15 SOLUTION DENTAL 2 TIMES DAILY
Status: DISCONTINUED | OUTPATIENT
Start: 2018-01-01 | End: 2018-01-01

## 2018-01-01 RX ORDER — BUSPIRONE HYDROCHLORIDE 10 MG/1
30 TABLET ORAL ONCE
Status: DISCONTINUED | OUTPATIENT
Start: 2018-01-01 | End: 2018-01-01

## 2018-01-01 RX ORDER — CEFEPIME HYDROCHLORIDE 2 G/1
2 INJECTION, POWDER, FOR SOLUTION INTRAVENOUS
Status: DISCONTINUED | OUTPATIENT
Start: 2018-01-01 | End: 2018-01-01

## 2018-01-01 RX ORDER — CLONAZEPAM 0.5 MG/1
0.5 TABLET ORAL EVERY 8 HOURS
Status: DISCONTINUED | OUTPATIENT
Start: 2018-01-01 | End: 2018-01-01

## 2018-01-01 RX ADMIN — FAMOTIDINE 20 MG: 20 TABLET ORAL at 08:11

## 2018-01-01 RX ADMIN — PROPOFOL 5 MCG/KG/MIN: 10 INJECTION, EMULSION INTRAVENOUS at 08:11

## 2018-01-01 RX ADMIN — ASPIRIN 81 MG CHEWABLE TABLET 81 MG: 81 TABLET CHEWABLE at 08:12

## 2018-01-01 RX ADMIN — CLOPIDOGREL 75 MG: 75 TABLET, FILM COATED ORAL at 09:11

## 2018-01-01 RX ADMIN — POTASSIUM CHLORIDE 40 MEQ: 20 SOLUTION ORAL at 11:11

## 2018-01-01 RX ADMIN — CLONAZEPAM 1 MG: 0.5 TABLET ORAL at 11:12

## 2018-01-01 RX ADMIN — IPRATROPIUM BROMIDE AND ALBUTEROL SULFATE 3 ML: .5; 3 SOLUTION RESPIRATORY (INHALATION) at 07:12

## 2018-01-01 RX ADMIN — SODIUM CHLORIDE SOLN NEBU 3% 4 ML: 3 NEBU SOLN at 02:12

## 2018-01-01 RX ADMIN — SODIUM CHLORIDE 200 MG: 9 INJECTION, SOLUTION INTRAVENOUS at 09:12

## 2018-01-01 RX ADMIN — ENOXAPARIN SODIUM 70 MG: 100 INJECTION SUBCUTANEOUS at 04:11

## 2018-01-01 RX ADMIN — ENOXAPARIN SODIUM 70 MG: 100 INJECTION SUBCUTANEOUS at 03:11

## 2018-01-01 RX ADMIN — SODIUM CHLORIDE 25 MCG/KG/MIN: 0.9 INJECTION, SOLUTION INTRAVENOUS at 02:12

## 2018-01-01 RX ADMIN — VANCOMYCIN HYDROCHLORIDE 1000 MG: 1 INJECTION, POWDER, LYOPHILIZED, FOR SOLUTION INTRAVENOUS at 05:11

## 2018-01-01 RX ADMIN — FAMOTIDINE 20 MG: 20 TABLET ORAL at 08:12

## 2018-01-01 RX ADMIN — LORAZEPAM 2 MG: 2 INJECTION INTRAMUSCULAR; INTRAVENOUS at 05:11

## 2018-01-01 RX ADMIN — SODIUM CHLORIDE SOLN NEBU 3% 4 ML: 3 NEBU SOLN at 07:12

## 2018-01-01 RX ADMIN — ROCURONIUM BROMIDE 75 MG: 10 INJECTION, SOLUTION INTRAVENOUS at 09:11

## 2018-01-01 RX ADMIN — CEFEPIME HYDROCHLORIDE 2 G: 2 INJECTION, POWDER, FOR SOLUTION INTRAVENOUS at 09:12

## 2018-01-01 RX ADMIN — PIPERACILLIN AND TAZOBACTAM 4.5 G: 4; .5 INJECTION, POWDER, LYOPHILIZED, FOR SOLUTION INTRAVENOUS; PARENTERAL at 08:11

## 2018-01-01 RX ADMIN — SODIUM CHLORIDE 200 MG: 9 INJECTION, SOLUTION INTRAVENOUS at 08:11

## 2018-01-01 RX ADMIN — PROPOFOL 50 MCG/KG/MIN: 10 INJECTION, EMULSION INTRAVENOUS at 05:11

## 2018-01-01 RX ADMIN — STANDARDIZED SENNA CONCENTRATE AND DOCUSATE SODIUM 1 TABLET: 8.6; 5 TABLET, FILM COATED ORAL at 09:12

## 2018-01-01 RX ADMIN — PANTOPRAZOLE SODIUM 40 MG: 40 INJECTION, POWDER, FOR SOLUTION INTRAVENOUS at 08:12

## 2018-01-01 RX ADMIN — ONDANSETRON HYDROCHLORIDE 8 MG: 2 INJECTION, SOLUTION INTRAMUSCULAR; INTRAVENOUS at 04:12

## 2018-01-01 RX ADMIN — ENOXAPARIN SODIUM 40 MG: 100 INJECTION SUBCUTANEOUS at 05:12

## 2018-01-01 RX ADMIN — IPRATROPIUM BROMIDE AND ALBUTEROL SULFATE 3 ML: .5; 3 SOLUTION RESPIRATORY (INHALATION) at 12:12

## 2018-01-01 RX ADMIN — LORAZEPAM 2 MG: 2 INJECTION INTRAMUSCULAR; INTRAVENOUS at 04:11

## 2018-01-01 RX ADMIN — VALPROATE SODIUM 1500 MG: 100 INJECTION, SOLUTION INTRAVENOUS at 03:12

## 2018-01-01 RX ADMIN — MORPHINE SULFATE 2 MG/HR: 1 INJECTION, SOLUTION INTRAVENOUS at 01:12

## 2018-01-01 RX ADMIN — KETAMINE HYDROCHLORIDE 100 MCG/KG/MIN: 100 INJECTION, SOLUTION INTRAMUSCULAR; INTRAVENOUS at 11:12

## 2018-01-01 RX ADMIN — CHLORHEXIDINE GLUCONATE 0.12% ORAL RINSE 15 ML: 1.2 LIQUID ORAL at 08:11

## 2018-01-01 RX ADMIN — CLONAZEPAM 2 MG: 0.5 TABLET ORAL at 05:12

## 2018-01-01 RX ADMIN — VALPROATE SODIUM 1500 MG: 100 INJECTION, SOLUTION INTRAVENOUS at 04:12

## 2018-01-01 RX ADMIN — PROPOFOL 50 MCG/KG/MIN: 10 INJECTION, EMULSION INTRAVENOUS at 10:11

## 2018-01-01 RX ADMIN — METOCLOPRAMIDE 10 MG: 5 INJECTION, SOLUTION INTRAMUSCULAR; INTRAVENOUS at 09:12

## 2018-01-01 RX ADMIN — VIGABATRIN 1500 MG: 50 POWDER, FOR SOLUTION ORAL at 09:12

## 2018-01-01 RX ADMIN — CEFEPIME HYDROCHLORIDE 2 G: 2 INJECTION, POWDER, FOR SOLUTION INTRAVENOUS at 07:12

## 2018-01-01 RX ADMIN — VANCOMYCIN HYDROCHLORIDE 1000 MG: 1 INJECTION, POWDER, LYOPHILIZED, FOR SOLUTION INTRAVENOUS at 06:12

## 2018-01-01 RX ADMIN — CHLORHEXIDINE GLUCONATE 0.12% ORAL RINSE 15 ML: 1.2 LIQUID ORAL at 08:12

## 2018-01-01 RX ADMIN — LISINOPRIL 5 MG: 5 TABLET ORAL at 08:12

## 2018-01-01 RX ADMIN — DEXTROSE 1250 MG: 50 INJECTION, SOLUTION INTRAVENOUS at 10:12

## 2018-01-01 RX ADMIN — VIGABATRIN 1500 MG: 50 POWDER, FOR SOLUTION ORAL at 02:12

## 2018-01-01 RX ADMIN — STANDARDIZED SENNA CONCENTRATE AND DOCUSATE SODIUM 1 TABLET: 8.6; 5 TABLET, FILM COATED ORAL at 11:11

## 2018-01-01 RX ADMIN — SODIUM CHLORIDE SOLN NEBU 3% 4 ML: 3 NEBU SOLN at 12:12

## 2018-01-01 RX ADMIN — KETAMINE HYDROCHLORIDE 67.5 MG: 50 INJECTION INTRAMUSCULAR; INTRAVENOUS at 09:12

## 2018-01-01 RX ADMIN — POLYETHYLENE GLYCOL 3350 17 G: 17 POWDER, FOR SOLUTION ORAL at 08:12

## 2018-01-01 RX ADMIN — METOCLOPRAMIDE 10 MG: 5 INJECTION, SOLUTION INTRAMUSCULAR; INTRAVENOUS at 06:12

## 2018-01-01 RX ADMIN — NOREPINEPHRINE-DEXTROSE IV SOLUTION 4 MG/250ML-5% 0.05 MCG/KG/MIN: 4-5/250 SOLUTION at 03:11

## 2018-01-01 RX ADMIN — PROPOFOL 50 MCG/KG/MIN: 10 INJECTION, EMULSION INTRAVENOUS at 09:11

## 2018-01-01 RX ADMIN — FAMOTIDINE 20 MG: 20 TABLET ORAL at 10:11

## 2018-01-01 RX ADMIN — LISINOPRIL 5 MG: 5 TABLET ORAL at 09:12

## 2018-01-01 RX ADMIN — PROPOFOL 50 MCG/KG/MIN: 10 INJECTION, EMULSION INTRAVENOUS at 06:12

## 2018-01-01 RX ADMIN — DEXTROSE 750 MG: 50 INJECTION, SOLUTION INTRAVENOUS at 02:11

## 2018-01-01 RX ADMIN — IOHEXOL 75 ML: 350 INJECTION, SOLUTION INTRAVENOUS at 04:12

## 2018-01-01 RX ADMIN — ATORVASTATIN CALCIUM 80 MG: 20 TABLET, FILM COATED ORAL at 09:12

## 2018-01-01 RX ADMIN — LEVETIRACETAM 1000 MG: 10 INJECTION INTRAVENOUS at 08:12

## 2018-01-01 RX ADMIN — VALPROATE SODIUM 1500 MG: 100 INJECTION, SOLUTION INTRAVENOUS at 06:12

## 2018-01-01 RX ADMIN — ASPIRIN 81 MG CHEWABLE TABLET 81 MG: 81 TABLET CHEWABLE at 09:12

## 2018-01-01 RX ADMIN — POTASSIUM CHLORIDE 40 MEQ: 20 SOLUTION ORAL at 05:11

## 2018-01-01 RX ADMIN — PROPOFOL 50 MCG/KG/MIN: 10 INJECTION, EMULSION INTRAVENOUS at 02:11

## 2018-01-01 RX ADMIN — AMIODARONE HYDROCHLORIDE 0.5 MG/MIN: 1.8 INJECTION, SOLUTION INTRAVENOUS at 12:11

## 2018-01-01 RX ADMIN — LEVETIRACETAM 1500 MG: 15 INJECTION INTRAVENOUS at 06:11

## 2018-01-01 RX ADMIN — FUROSEMIDE 20 MG: 10 INJECTION, SOLUTION INTRAMUSCULAR; INTRAVENOUS at 10:11

## 2018-01-01 RX ADMIN — CLOPIDOGREL 75 MG: 75 TABLET, FILM COATED ORAL at 10:11

## 2018-01-01 RX ADMIN — PROPOFOL 30 MG: 10 INJECTION, EMULSION INTRAVENOUS at 10:10

## 2018-01-01 RX ADMIN — PIPERACILLIN AND TAZOBACTAM 4.5 G: 4; .5 INJECTION, POWDER, LYOPHILIZED, FOR SOLUTION INTRAVENOUS; PARENTERAL at 03:11

## 2018-01-01 RX ADMIN — VALPROATE SODIUM 1500 MG: 100 INJECTION, SOLUTION INTRAVENOUS at 09:12

## 2018-01-01 RX ADMIN — POTASSIUM & SODIUM PHOSPHATES POWDER PACK 280-160-250 MG 2 PACKET: 280-160-250 PACK at 05:11

## 2018-01-01 RX ADMIN — SODIUM CHLORIDE SOLN NEBU 3% 4 ML: 3 NEBU SOLN at 01:12

## 2018-01-01 RX ADMIN — CLOPIDOGREL 75 MG: 75 TABLET, FILM COATED ORAL at 08:12

## 2018-01-01 RX ADMIN — ATORVASTATIN CALCIUM 80 MG: 20 TABLET, FILM COATED ORAL at 08:12

## 2018-01-01 RX ADMIN — ENOXAPARIN SODIUM 40 MG: 100 INJECTION SUBCUTANEOUS at 04:12

## 2018-01-01 RX ADMIN — CEFEPIME 1 G: 1 INJECTION, POWDER, FOR SOLUTION INTRAMUSCULAR; INTRAVENOUS at 07:12

## 2018-01-01 RX ADMIN — IPRATROPIUM BROMIDE AND ALBUTEROL SULFATE 3 ML: .5; 3 SOLUTION RESPIRATORY (INHALATION) at 01:12

## 2018-01-01 RX ADMIN — LEVETIRACETAM 1500 MG: 15 INJECTION INTRAVENOUS at 09:11

## 2018-01-01 RX ADMIN — METOPROLOL TARTRATE 12.5 MG: 25 TABLET, FILM COATED ORAL at 08:12

## 2018-01-01 RX ADMIN — ENOXAPARIN SODIUM 40 MG: 100 INJECTION SUBCUTANEOUS at 05:11

## 2018-01-01 RX ADMIN — DEXTROSE 2000 MG: 5 SOLUTION INTRAVENOUS at 09:12

## 2018-01-01 RX ADMIN — CHLORHEXIDINE GLUCONATE 0.12% ORAL RINSE 15 ML: 1.2 LIQUID ORAL at 09:11

## 2018-01-01 RX ADMIN — CHLORHEXIDINE GLUCONATE 0.12% ORAL RINSE 15 ML: 1.2 LIQUID ORAL at 09:12

## 2018-01-01 RX ADMIN — DEXTROSE 2000 MG: 5 SOLUTION INTRAVENOUS at 08:12

## 2018-01-01 RX ADMIN — DEXTROSE 600 MG: 50 INJECTION, SOLUTION INTRAVENOUS at 01:12

## 2018-01-01 RX ADMIN — CEFEPIME HYDROCHLORIDE 2 G: 2 INJECTION, POWDER, FOR SOLUTION INTRAVENOUS at 08:12

## 2018-01-01 RX ADMIN — DEXTROSE 750 MG: 50 INJECTION, SOLUTION INTRAVENOUS at 05:11

## 2018-01-01 RX ADMIN — VANCOMYCIN HYDROCHLORIDE 1000 MG: 1 INJECTION, POWDER, LYOPHILIZED, FOR SOLUTION INTRAVENOUS at 04:11

## 2018-01-01 RX ADMIN — ACETAMINOPHEN 650 MG: 160 SOLUTION ORAL at 11:11

## 2018-01-01 RX ADMIN — IPRATROPIUM BROMIDE AND ALBUTEROL SULFATE 3 ML: .5; 3 SOLUTION RESPIRATORY (INHALATION) at 02:12

## 2018-01-01 RX ADMIN — DEXTROSE 1200 MG: 50 INJECTION, SOLUTION INTRAVENOUS at 11:11

## 2018-01-01 RX ADMIN — STANDARDIZED SENNA CONCENTRATE AND DOCUSATE SODIUM 1 TABLET: 8.6; 5 TABLET, FILM COATED ORAL at 08:11

## 2018-01-01 RX ADMIN — SODIUM CHLORIDE 200 MG: 9 INJECTION, SOLUTION INTRAVENOUS at 08:12

## 2018-01-01 RX ADMIN — LORAZEPAM 2 MG: 2 INJECTION INTRAMUSCULAR; INTRAVENOUS at 10:12

## 2018-01-01 RX ADMIN — ACETAMINOPHEN 650 MG: 160 SOLUTION ORAL at 05:11

## 2018-01-01 RX ADMIN — METOPROLOL TARTRATE 12.5 MG: 25 TABLET, FILM COATED ORAL at 09:12

## 2018-01-01 RX ADMIN — LORAZEPAM 2 MG: 2 INJECTION INTRAMUSCULAR; INTRAVENOUS at 12:11

## 2018-01-01 RX ADMIN — SODIUM CHLORIDE 200 MG: 9 INJECTION, SOLUTION INTRAVENOUS at 09:11

## 2018-01-01 RX ADMIN — VALPROATE SODIUM 1500 MG: 100 INJECTION, SOLUTION INTRAVENOUS at 02:12

## 2018-01-01 RX ADMIN — VALPROATE SODIUM 1500 MG: 100 INJECTION, SOLUTION INTRAVENOUS at 12:12

## 2018-01-01 RX ADMIN — VALPROATE SODIUM 1500 MG: 100 INJECTION, SOLUTION INTRAVENOUS at 11:12

## 2018-01-01 RX ADMIN — STANDARDIZED SENNA CONCENTRATE AND DOCUSATE SODIUM 1 TABLET: 8.6; 5 TABLET, FILM COATED ORAL at 08:12

## 2018-01-01 RX ADMIN — METOCLOPRAMIDE 10 MG: 5 INJECTION, SOLUTION INTRAMUSCULAR; INTRAVENOUS at 05:12

## 2018-01-01 RX ADMIN — PIPERACILLIN AND TAZOBACTAM 4.5 G: 4; .5 INJECTION, POWDER, LYOPHILIZED, FOR SOLUTION INTRAVENOUS; PARENTERAL at 04:11

## 2018-01-01 RX ADMIN — FENTANYL CITRATE 25 MCG: 50 INJECTION, SOLUTION INTRAMUSCULAR; INTRAVENOUS at 12:12

## 2018-01-01 RX ADMIN — POTASSIUM & SODIUM PHOSPHATES POWDER PACK 280-160-250 MG 2 PACKET: 280-160-250 PACK at 01:11

## 2018-01-01 RX ADMIN — VALPROATE SODIUM 1500 MG: 100 INJECTION, SOLUTION INTRAVENOUS at 08:12

## 2018-01-01 RX ADMIN — POTASSIUM & SODIUM PHOSPHATES POWDER PACK 280-160-250 MG 2 PACKET: 280-160-250 PACK at 08:12

## 2018-01-01 RX ADMIN — PROPOFOL 50 MCG/KG/MIN: 10 INJECTION, EMULSION INTRAVENOUS at 03:11

## 2018-01-01 RX ADMIN — SODIUM CHLORIDE 40 MCG/KG/MIN: 0.9 INJECTION, SOLUTION INTRAVENOUS at 10:12

## 2018-01-01 RX ADMIN — Medication 0.05 MCG/KG/MIN: at 03:11

## 2018-01-01 RX ADMIN — LORAZEPAM 2 MG: 2 INJECTION INTRAMUSCULAR; INTRAVENOUS at 04:12

## 2018-01-01 RX ADMIN — LEVETIRACETAM 500 MG: 5 INJECTION INTRAVENOUS at 10:11

## 2018-01-01 RX ADMIN — LISINOPRIL 5 MG: 5 TABLET ORAL at 11:12

## 2018-01-01 RX ADMIN — KETAMINE HYDROCHLORIDE 60 MCG/KG/MIN: 100 INJECTION, SOLUTION INTRAMUSCULAR; INTRAVENOUS at 04:12

## 2018-01-01 RX ADMIN — MAGNESIUM SULFATE HEPTAHYDRATE 2 G: 40 INJECTION, SOLUTION INTRAVENOUS at 10:11

## 2018-01-01 RX ADMIN — SODIUM CHLORIDE: 0.9 INJECTION, SOLUTION INTRAVENOUS at 08:11

## 2018-01-01 RX ADMIN — LIDOCAINE HYDROCHLORIDE 100 MG: 20 INJECTION, SOLUTION INTRAVENOUS at 12:12

## 2018-01-01 RX ADMIN — KETAMINE HYDROCHLORIDE 100 MCG/KG/MIN: 100 INJECTION, SOLUTION INTRAMUSCULAR; INTRAVENOUS at 10:12

## 2018-01-01 RX ADMIN — CLONAZEPAM 2 MG: 0.5 TABLET ORAL at 02:12

## 2018-01-01 RX ADMIN — PROPOFOL 50 MCG/KG/MIN: 10 INJECTION, EMULSION INTRAVENOUS at 11:11

## 2018-01-01 RX ADMIN — POTASSIUM CHLORIDE 40 MEQ: 20 SOLUTION ORAL at 08:12

## 2018-01-01 RX ADMIN — LEVETIRACETAM 1500 MG: 15 INJECTION INTRAVENOUS at 08:11

## 2018-01-01 RX ADMIN — ACETAMINOPHEN 650 MG: 160 SOLUTION ORAL at 12:11

## 2018-01-01 RX ADMIN — ACETAMINOPHEN 650 MG: 650 SOLUTION ORAL at 09:12

## 2018-01-01 RX ADMIN — METOCLOPRAMIDE 10 MG: 5 INJECTION, SOLUTION INTRAMUSCULAR; INTRAVENOUS at 12:12

## 2018-01-01 RX ADMIN — CLONAZEPAM 2 MG: 0.5 TABLET ORAL at 01:12

## 2018-01-01 RX ADMIN — FENTANYL CITRATE 25 MCG: 50 INJECTION INTRAMUSCULAR; INTRAVENOUS at 12:12

## 2018-01-01 RX ADMIN — CLONAZEPAM 2 MG: 0.5 TABLET ORAL at 09:12

## 2018-01-01 RX ADMIN — GLYCOPYRROLATE 0.2 MG: 0.2 INJECTION, SOLUTION INTRAMUSCULAR; INTRAVENOUS at 11:11

## 2018-01-01 RX ADMIN — PROPOFOL 50 MCG/KG/MIN: 10 INJECTION, EMULSION INTRAVENOUS at 04:11

## 2018-01-01 RX ADMIN — FENTANYL CITRATE 25 MCG: 50 INJECTION INTRAMUSCULAR; INTRAVENOUS at 09:12

## 2018-01-01 RX ADMIN — PROPOFOL 50 MCG/KG/MIN: 10 INJECTION, EMULSION INTRAVENOUS at 08:11

## 2018-01-01 RX ADMIN — CIPROFLOXACIN 400 MG: 2 INJECTION, SOLUTION INTRAVENOUS at 09:12

## 2018-01-01 RX ADMIN — LORAZEPAM 2 MG: 2 INJECTION INTRAMUSCULAR; INTRAVENOUS at 03:11

## 2018-01-01 RX ADMIN — AMIODARONE HYDROCHLORIDE 1 MG/MIN: 1.8 INJECTION, SOLUTION INTRAVENOUS at 06:11

## 2018-01-01 RX ADMIN — CLONAZEPAM 2 MG: 0.5 TABLET ORAL at 06:12

## 2018-01-01 RX ADMIN — PROPOFOL 50 MCG/KG/MIN: 10 INJECTION, EMULSION INTRAVENOUS at 07:11

## 2018-01-01 RX ADMIN — POTASSIUM CHLORIDE 40 MEQ: 20 SOLUTION ORAL at 06:12

## 2018-01-01 RX ADMIN — ASPIRIN 81 MG CHEWABLE TABLET 81 MG: 81 TABLET CHEWABLE at 10:11

## 2018-01-01 RX ADMIN — DEXTROSE 1250 MG: 50 INJECTION, SOLUTION INTRAVENOUS at 09:12

## 2018-01-01 RX ADMIN — LORAZEPAM 4 MG: 2 INJECTION INTRAMUSCULAR; INTRAVENOUS at 09:12

## 2018-01-01 RX ADMIN — LORAZEPAM 2 MG: 2 INJECTION INTRAMUSCULAR; INTRAVENOUS at 05:12

## 2018-01-01 RX ADMIN — DEXTROSE 1000 MG: 50 INJECTION, SOLUTION INTRAVENOUS at 09:11

## 2018-01-01 RX ADMIN — CLOPIDOGREL 75 MG: 75 TABLET, FILM COATED ORAL at 09:12

## 2018-01-01 RX ADMIN — ACETAMINOPHEN 650 MG: 650 SOLUTION ORAL at 11:12

## 2018-01-01 RX ADMIN — METOCLOPRAMIDE 10 MG: 5 INJECTION, SOLUTION INTRAMUSCULAR; INTRAVENOUS at 02:12

## 2018-01-01 RX ADMIN — VIGABATRIN 1500 MG: 50 POWDER, FOR SOLUTION ORAL at 08:12

## 2018-01-01 RX ADMIN — PANTOPRAZOLE SODIUM 40 MG: 40 INJECTION, POWDER, FOR SOLUTION INTRAVENOUS at 01:11

## 2018-01-01 RX ADMIN — SODIUM CHLORIDE: 0.9 INJECTION, SOLUTION INTRAVENOUS at 10:10

## 2018-01-01 RX ADMIN — DEXTROSE 1250 MG: 50 INJECTION, SOLUTION INTRAVENOUS at 08:12

## 2018-01-01 RX ADMIN — ACETAMINOPHEN 650 MG: 160 SOLUTION ORAL at 06:11

## 2018-01-01 RX ADMIN — POTASSIUM CHLORIDE 40 MEQ: 20 SOLUTION ORAL at 02:11

## 2018-01-01 RX ADMIN — PROPOFOL 50 MCG/KG/MIN: 10 INJECTION, EMULSION INTRAVENOUS at 06:11

## 2018-01-01 RX ADMIN — ROCURONIUM BROMIDE 50 MG: 10 INJECTION, SOLUTION INTRAVENOUS at 08:11

## 2018-01-01 RX ADMIN — SODIUM CHLORIDE 400 MG: 9 INJECTION, SOLUTION INTRAVENOUS at 01:11

## 2018-01-01 RX ADMIN — LORAZEPAM 2 MG: 2 INJECTION INTRAMUSCULAR; INTRAVENOUS at 02:12

## 2018-01-01 RX ADMIN — POLYETHYLENE GLYCOL 3350 17 G: 17 POWDER, FOR SOLUTION ORAL at 10:12

## 2018-01-01 RX ADMIN — ASPIRIN 600 MG: 600 SUPPOSITORY RECTAL at 06:11

## 2018-01-01 RX ADMIN — KETAMINE HYDROCHLORIDE 60 MCG/KG/MIN: 100 INJECTION, SOLUTION INTRAMUSCULAR; INTRAVENOUS at 09:12

## 2018-01-01 RX ADMIN — DEXTROSE 1000 MG: 50 INJECTION, SOLUTION INTRAVENOUS at 02:11

## 2018-01-01 RX ADMIN — SODIUM CHLORIDE SOLN NEBU 3% 4 ML: 3 NEBU SOLN at 11:12

## 2018-01-01 RX ADMIN — DEXTROSE 750 MG: 50 INJECTION, SOLUTION INTRAVENOUS at 09:11

## 2018-01-01 RX ADMIN — DEXTROSE 2000 MG: 5 SOLUTION INTRAVENOUS at 08:11

## 2018-01-01 RX ADMIN — ASPIRIN 81 MG CHEWABLE TABLET 81 MG: 81 TABLET CHEWABLE at 09:11

## 2018-01-01 RX ADMIN — VANCOMYCIN HYDROCHLORIDE 1000 MG: 1 INJECTION, POWDER, LYOPHILIZED, FOR SOLUTION INTRAVENOUS at 07:12

## 2018-01-01 RX ADMIN — LEVETIRACETAM 1000 MG: 10 INJECTION INTRAVENOUS at 09:12

## 2018-01-01 RX ADMIN — SODIUM CHLORIDE 200 MG: 9 INJECTION, SOLUTION INTRAVENOUS at 10:11

## 2018-01-01 RX ADMIN — PANTOPRAZOLE SODIUM 40 MG: 40 INJECTION, POWDER, FOR SOLUTION INTRAVENOUS at 08:11

## 2018-01-01 RX ADMIN — METOCLOPRAMIDE 10 MG: 5 INJECTION, SOLUTION INTRAMUSCULAR; INTRAVENOUS at 11:12

## 2018-01-01 RX ADMIN — FENTANYL CITRATE 100 MCG: 50 INJECTION INTRAMUSCULAR; INTRAVENOUS at 08:11

## 2018-01-01 RX ADMIN — Medication 10 MG: at 08:11

## 2018-01-01 RX ADMIN — KETAMINE HYDROCHLORIDE 100 MCG/KG/MIN: 100 INJECTION, SOLUTION INTRAMUSCULAR; INTRAVENOUS at 12:12

## 2018-01-01 RX ADMIN — PROPOFOL 50 MCG/KG/MIN: 10 INJECTION, EMULSION INTRAVENOUS at 12:11

## 2018-01-01 RX ADMIN — POTASSIUM & SODIUM PHOSPHATES POWDER PACK 280-160-250 MG 2 PACKET: 280-160-250 PACK at 10:11

## 2018-01-01 RX ADMIN — PROPOFOL 40 MG: 10 INJECTION, EMULSION INTRAVENOUS at 11:10

## 2018-01-01 RX ADMIN — ATORVASTATIN CALCIUM 80 MG: 20 TABLET, FILM COATED ORAL at 05:11

## 2018-01-01 RX ADMIN — LEVETIRACETAM INJECTION 1000 MG: 10 INJECTION INTRAVENOUS at 09:11

## 2018-01-01 RX ADMIN — DEXTROSE 1250 MG: 50 INJECTION, SOLUTION INTRAVENOUS at 05:12

## 2018-01-01 RX ADMIN — ACETAMINOPHEN 650 MG: 650 SOLUTION ORAL at 05:12

## 2018-01-01 RX ADMIN — POTASSIUM & SODIUM PHOSPHATES POWDER PACK 280-160-250 MG 2 PACKET: 280-160-250 PACK at 12:12

## 2018-01-01 RX ADMIN — ENOXAPARIN SODIUM 40 MG: 100 INJECTION SUBCUTANEOUS at 04:11

## 2018-01-01 RX ADMIN — GLYCOPYRROLATE 0.1 MG: 0.2 INJECTION INTRAMUSCULAR; INTRAVENOUS at 02:12

## 2018-01-01 RX ADMIN — ATORVASTATIN CALCIUM 80 MG: 20 TABLET, FILM COATED ORAL at 10:11

## 2018-01-01 RX ADMIN — ENOXAPARIN SODIUM 70 MG: 100 INJECTION SUBCUTANEOUS at 05:11

## 2018-01-01 RX ADMIN — GLYCOPYRROLATE 0.2 MG: 0.2 INJECTION, SOLUTION INTRAMUSCULAR; INTRAVENOUS at 08:11

## 2018-01-01 RX ADMIN — FAMOTIDINE 20 MG: 20 TABLET ORAL at 09:12

## 2018-01-01 RX ADMIN — PIPERACILLIN AND TAZOBACTAM 4.5 G: 4; .5 INJECTION, POWDER, LYOPHILIZED, FOR SOLUTION INTRAVENOUS; PARENTERAL at 12:11

## 2018-01-01 RX ADMIN — PANTOPRAZOLE SODIUM 40 MG: 40 INJECTION, POWDER, FOR SOLUTION INTRAVENOUS at 05:12

## 2018-01-01 RX ADMIN — CLONAZEPAM 2 MG: 0.5 TABLET ORAL at 10:12

## 2018-01-01 RX ADMIN — LEVETIRACETAM 3000 MG: 100 INJECTION, SOLUTION, CONCENTRATE INTRAVENOUS at 03:11

## 2018-01-01 RX ADMIN — AMIODARONE HYDROCHLORIDE 0.5 MG/MIN: 1.8 INJECTION, SOLUTION INTRAVENOUS at 06:11

## 2018-01-01 RX ADMIN — SODIUM CHLORIDE: 0.9 INJECTION, SOLUTION INTRAVENOUS at 11:12

## 2018-01-01 RX ADMIN — SODIUM CHLORIDE 50 MCG/KG/MIN: 0.9 INJECTION, SOLUTION INTRAVENOUS at 10:12

## 2018-01-01 RX ADMIN — CLONAZEPAM 0.5 MG: 0.5 TABLET ORAL at 06:12

## 2018-01-01 RX ADMIN — CLONAZEPAM 1 MG: 0.5 TABLET ORAL at 09:12

## 2018-01-01 RX ADMIN — PIPERACILLIN AND TAZOBACTAM 4.5 G: 4; .5 INJECTION, POWDER, LYOPHILIZED, FOR SOLUTION INTRAVENOUS; PARENTERAL at 11:11

## 2018-01-01 RX ADMIN — POTASSIUM & SODIUM PHOSPHATES POWDER PACK 280-160-250 MG 2 PACKET: 280-160-250 PACK at 06:12

## 2018-01-01 RX ADMIN — PROPOFOL 40 MCG/KG/MIN: 10 INJECTION, EMULSION INTRAVENOUS at 08:11

## 2018-01-01 RX ADMIN — PROPOFOL 50 MCG/KG/MIN: 10 INJECTION, EMULSION INTRAVENOUS at 01:11

## 2018-01-01 RX ADMIN — LORAZEPAM 2 MG: 2 INJECTION INTRAMUSCULAR; INTRAVENOUS at 09:12

## 2018-01-01 RX ADMIN — METOCLOPRAMIDE 10 MG: 5 INJECTION, SOLUTION INTRAMUSCULAR; INTRAVENOUS at 01:12

## 2018-01-01 RX ADMIN — CLONAZEPAM 0.5 MG: 0.5 TABLET ORAL at 01:11

## 2018-01-01 RX ADMIN — PROPOFOL 20 MCG/KG/MIN: 10 INJECTION, EMULSION INTRAVENOUS at 06:12

## 2018-01-01 RX ADMIN — LORAZEPAM 2 MG: 2 INJECTION INTRAMUSCULAR; INTRAVENOUS at 07:12

## 2018-01-01 RX ADMIN — KETAMINE HYDROCHLORIDE 50 MCG/KG/MIN: 100 INJECTION, SOLUTION INTRAMUSCULAR; INTRAVENOUS at 10:12

## 2018-01-01 RX ADMIN — FAMOTIDINE 20 MG: 20 TABLET ORAL at 09:11

## 2018-01-01 RX ADMIN — ATORVASTATIN CALCIUM 80 MG: 20 TABLET, FILM COATED ORAL at 09:11

## 2018-01-01 RX ADMIN — PROPOFOL 150 MCG/KG/MIN: 10 INJECTION, EMULSION INTRAVENOUS at 10:10

## 2018-01-01 RX ADMIN — LORAZEPAM 4 MG: 2 INJECTION INTRAMUSCULAR; INTRAVENOUS at 12:11

## 2018-01-01 RX ADMIN — ONDANSETRON HYDROCHLORIDE 4 MG: 2 INJECTION, SOLUTION INTRAMUSCULAR; INTRAVENOUS at 02:12

## 2018-01-01 RX ADMIN — LORAZEPAM 2 MG: 2 INJECTION INTRAMUSCULAR at 02:11

## 2018-01-01 RX ADMIN — CLONAZEPAM 0.5 MG: 0.5 TABLET ORAL at 09:11

## 2018-01-01 RX ADMIN — LORAZEPAM 2 MG: 2 INJECTION INTRAMUSCULAR; INTRAVENOUS at 01:12

## 2018-01-01 RX ADMIN — Medication 20 MG: at 08:11

## 2018-01-01 RX ADMIN — LORAZEPAM 2 MG: 2 INJECTION INTRAMUSCULAR; INTRAVENOUS at 03:12

## 2018-01-01 RX ADMIN — STANDARDIZED SENNA CONCENTRATE AND DOCUSATE SODIUM 1 TABLET: 8.6; 5 TABLET, FILM COATED ORAL at 09:11

## 2018-01-01 RX ADMIN — LIDOCAINE HYDROCHLORIDE 50 MG: 10 INJECTION, SOLUTION INTRAVENOUS at 10:10

## 2018-01-01 RX ADMIN — LEVETIRACETAM INJECTION 1000 MG: 10 INJECTION INTRAVENOUS at 01:11

## 2018-01-01 RX ADMIN — DEXTROSE 1000 MG: 50 INJECTION, SOLUTION INTRAVENOUS at 06:12

## 2018-01-01 RX ADMIN — VALPROATE SODIUM 1500 MG: 100 INJECTION, SOLUTION INTRAVENOUS at 10:12

## 2018-01-01 RX ADMIN — DEXTROSE 1250 MG: 50 INJECTION, SOLUTION INTRAVENOUS at 02:12

## 2018-01-01 RX ADMIN — PROPOFOL 20 MCG/KG/MIN: 10 INJECTION, EMULSION INTRAVENOUS at 11:12

## 2018-01-01 RX ADMIN — POTASSIUM CHLORIDE 40 MEQ: 20 SOLUTION ORAL at 04:11

## 2018-01-01 RX ADMIN — VANCOMYCIN HYDROCHLORIDE 1000 MG: 1 INJECTION, POWDER, LYOPHILIZED, FOR SOLUTION INTRAVENOUS at 03:11

## 2018-01-01 RX ADMIN — PROPOFOL 70 MG: 10 INJECTION, EMULSION INTRAVENOUS at 10:10

## 2018-01-01 RX ADMIN — CLOPIDOGREL 300 MG: 75 TABLET, FILM COATED ORAL at 05:11

## 2018-01-01 RX ADMIN — ASPIRIN 81 MG CHEWABLE TABLET 81 MG: 81 TABLET CHEWABLE at 11:11

## 2018-01-01 RX ADMIN — CLONAZEPAM 1 MG: 0.5 TABLET ORAL at 06:12

## 2018-01-01 RX ADMIN — LORAZEPAM 2 MG: 2 INJECTION INTRAMUSCULAR; INTRAVENOUS at 02:11

## 2018-01-01 RX ADMIN — LEVETIRACETAM INJECTION 1000 MG: 10 INJECTION INTRAVENOUS at 08:11

## 2018-01-01 RX ADMIN — DEXTROSE 1250 MG: 50 INJECTION, SOLUTION INTRAVENOUS at 01:12

## 2018-01-01 RX ADMIN — DEXTROSE 500 MG: 50 INJECTION, SOLUTION INTRAVENOUS at 11:12

## 2018-01-01 ASSESSMENT — ROUTINE ASSESSMENT OF PATIENT INDEX DATA (RAPID3)
FATIGUE SCORE: 6
MDHAQ FUNCTION SCORE: .9
PATIENT GLOBAL ASSESSMENT SCORE: 6
TOTAL RAPID3 SCORE: 5
WHEN YOU AWAKENED IN THE MORNING OVER THE LAST WEEK, PLEASE INDICATE THE AMOUNT OF TIME IT TAKES UNTIL YOU ARE AS LIMBER AS YOU WILL BE FOR THE DAY: 1 HR
PSYCHOLOGICAL DISTRESS SCORE: 1.1
PAIN SCORE: 6
AM STIFFNESS SCORE: 1, YES

## 2018-01-01 ASSESSMENT — ANKYLOSING SPONDYLITIS DISEASE ACTIVITY SCORE (ASDAS-CRP)
CRP_MG_PER_LITER: 90.1
TOTAL_SCORE: 4.54
NBH_PAIN: 6
MORNING_STIFFNESS: 5
PAIN_SWELLING: 5
GLOBAL_ACTIVITY: 5

## 2018-01-11 NOTE — TELEPHONE ENCOUNTER
Attempted contact to schedule colonoscopy.  No answer. Left message with the direct line phone number to return call.

## 2018-01-11 NOTE — TELEPHONE ENCOUNTER
----- Message from Ashley Lacy MA sent at 1/8/2018  3:47 PM CST -----  Contact: Pt  Can you help  with this? Thank you  ----- Message -----  From: Smita Sharpe  Sent: 1/8/2018  10:23 AM  To: Epifan[facility] Michaud Staff    Pt is calling to schedule a colonoscopy and can be reached at [phone].    Thank you

## 2018-01-18 NOTE — PROGRESS NOTES
Subjective:       Patient ID: Micheal Chirinos Jr. is a 57 y.o. male.    Chief Complaint: Back Pain and Neck Pain    Knee Pain      Neck Pain    Pertinent negatives include no chest pain, fever, headaches or trouble swallowing.   Shoulder Pain    Pertinent negatives include no fever or headaches.   Wrist Pain    Pertinent negatives include no fever.   Hip Pain      Back Pain   Pertinent negatives include no abdominal pain, chest pain, fever or headaches.   Hand Pain    Pertinent negatives include no chest pain.   Mr. Chirinos, returns to clinic for multiple MSK pains, chronic neck, back pain, and multiple joint pain.   He has a severe ankylosing spondylitis with axial neck and back  pain and upper thoracic and lower pelvic girdle pain.  LCV 10/18/17.    Today, he complains also about:  1. Rt knee pain.  2. Chronic neck pain, and shoulders,  3. Low back pain,    He states that he has his good, and bad days.      #1 Rt knee pain  Current pain is 4, the worst pain is 5/10.  He reports no injury.  Rt knee pain is dull ache, no swelling, + cracking, no giving out on him, does not lock.  Pain is mainly in joint, does not radiate up/down.     #2.neck pain,   current pain is 5/10, and the worst pain is 10/10.  Neck pain is present t the both side of the neck R > L, it feels as muscle spasms/ tightness,  It radiates to Right  shoulders, and shoulder blade  not to arm/hand.   No numbness, paraesthesias in arms/ hands.   Neck pain is on/ off, worse at night time, sometimes he has trouble placing head on pillow.   Neck pain is running to shoulder blades, not to arms, pain is more localized in joints, on right side,   Right shoulder, elbow, swell, when he does more exercise, and it is always on right side.     #3 Low  back pain,   current pain is 4/10, worst pain lately 7/10.   Pain is localized mainly in mid spine , in axial distribution, and just across low back bilateral.   Pain is off/om,   No radiation to buttocks,  legs, but back of legs are very tight.    He has a severe ankylosing spondylitis with axial neck and back  pain and upper thoracic and lower pelvic girdle pain.   He takes hydrocodone 10 mg and he usually takes three tablets a day; however, on bad days he takes  four tablets a day.    He failed almost all muscle relaxants Dantrium secondary to making him tired, Flexeril, makes him sleepy. (although still taking it).   He was given Celebrex by dr. Navas, but he is afraidl secondary to GI bleed, a year ago.  He is taking only Sulfasalazine and Celebrex 200 daily for his severe AS, that is always with high infllamatory markers,    Patient completed Physical therapy for neck pain, at [facility].   He does his own stretching,and goes 3x/week to fitness.  Exercises regularly, and stretches his neck/upper/lower spine, lying prone on table for 20-30 minutes daily.     He is on chronic pain management with me, takes Hydrocodone 10/325 mg , 3-4x/day, and Flexeril at bedtime for several years ,   with no need for increase dose, nor frequency. States that hydrocodone helps him to move, with reasonable pain control.   Here for follow up, and chronic pain management with opiates.  Past Medical History:   Diagnosis Date    Degenerative disc disease     Hemorrhage of rectum and anus     Hypertension     Idiopathic ankylosing spondylitis 2000    Joint pain        Past Surgical History:   Procedure Laterality Date    MOUTH SURGERY         Family History   Problem Relation Age of Onset    Arthritis Mother     Kidney disease Father     No Known Problems Sister     No Known Problems Brother     No Known Problems Maternal Aunt     No Known Problems Maternal Uncle     No Known Problems Paternal Aunt     No Known Problems Paternal Uncle     No Known Problems Maternal Grandmother     No Known Problems Maternal Grandfather     No Known Problems Paternal Grandmother     No Known Problems Paternal Grandfather      Celiac disease Neg Hx     Cirrhosis Neg Hx     Colon cancer Neg Hx     Colon polyps Neg Hx     Crohn's disease Neg Hx     Cystic fibrosis Neg Hx     Esophageal cancer Neg Hx     Hemochromatosis Neg Hx     Inflammatory bowel disease Neg Hx     Irritable bowel syndrome Neg Hx     Liver cancer Neg Hx     Liver disease Neg Hx     Rectal cancer Neg Hx     Stomach cancer Neg Hx     Ulcerative colitis Neg Hx     Jorge's disease Neg Hx     Melanoma Neg Hx     Psoriasis Neg Hx     Lupus Neg Hx     Eczema Neg Hx     Acne Neg Hx     Amblyopia Neg Hx     Blindness Neg Hx     Cataracts Neg Hx     Glaucoma Neg Hx     Macular degeneration Neg Hx     Retinal detachment Neg Hx     Strabismus Neg Hx     Cancer Neg Hx     Diabetes Neg Hx     Hypertension Neg Hx     Stroke Neg Hx     Thyroid disease Neg Hx        Social History     Social History    Marital status:      Spouse name: N/A    Number of children: N/A    Years of education: N/A     Occupational History    Disabled      Social History Main Topics    Smoking status: Never Smoker    Smokeless tobacco: Never Used    Alcohol use 0.0 oz/week      Comment: one's in the blue moon    Drug use: Yes     Frequency: 2.0 times per week     Types: Marijuana    Sexual activity: Not on file     Other Topics Concern    Not on file     Social History Narrative    No narrative on file       Current Outpatient Prescriptions   Medication Sig Dispense Refill    celecoxib (CELEBREX) 100 MG capsule Take 1 capsule (100 mg total) by mouth 2 (two) times daily as needed for Pain. 180 capsule 1    cetirizine (ZYRTEC) 10 mg TbDL 1 Tablet(s) Oral PRN Every day.        cyclobenzaprine (FLEXERIL) 10 MG tablet Take 1 tablet (10 mg total) by mouth 3 (three) times daily as needed. 90 tablet 11    hydrocodone-acetaminophen 10-325mg (NORCO)  mg Tab Take 1 tablet by mouth every 6 (six) hours as needed for Pain (msk pain). 120 tablet 0    [START ON  2/18/2018] hydrocodone-acetaminophen 10-325mg (NORCO)  mg Tab Take 1 tablet by mouth every 6 (six) hours as needed for Pain (msk pain). 120 tablet 0    [START ON 3/18/2018] hydrocodone-acetaminophen 10-325mg (NORCO)  mg Tab Take 1 tablet by mouth every 6 (six) hours as needed for Pain (msk pain). 120 tablet 0    omeprazole (PRILOSEC) 20 MG capsule Take 1 capsule (20 mg total) by mouth 2 (two) times daily. 180 capsule 1    sulfaSALAzine (AZULFIDINE) 500 MG TbEC Take 3 tablets (1,500 mg total) by mouth 2 (two) times daily. 540 tablet 0     No current facility-administered medications for this visit.        Review of patient's allergies indicates:   Allergen Reactions    No known drug allergies          Review of Systems   Constitutional: Negative for activity change, appetite change, chills, diaphoresis, fatigue, fever and unexpected weight change.   HENT: Negative for trouble swallowing and voice change.    Eyes: Negative for pain and visual disturbance.   Respiratory: Negative for chest tightness, shortness of breath and wheezing.    Cardiovascular: Negative for chest pain, palpitations and leg swelling.   Gastrointestinal: Negative for abdominal pain, constipation and diarrhea.   Genitourinary: Negative for difficulty urinating, frequency and urgency.   Musculoskeletal: Positive for back pain and neck pain. Negative for joint swelling, myalgias and neck stiffness. Arthralgias:  left knee hurts, no swelling, no warmth, no popping, clicking.    Skin: Negative for rash and wound.   Neurological: Negative for dizziness, facial asymmetry, speech difficulty, light-headedness and headaches.   Hematological: Negative for adenopathy.   Psychiatric/Behavioral: Negative for agitation, behavioral problems, confusion, decreased concentration, dysphoric mood and sleep disturbance.           Objective:      Physical Exam    GENERAL: The patient is alert, oriented, pleasant.   HEENT: PERRLA  NECK: supple, no masses    CV: S1S2, RRR,   LUNGS: CTA bilateral.   ABDOMEN: soft, non-tender, bowel sounds nl.  EXTREMITIES: no edema, +2 pulses DP, b/l  SKIN: no skin rash, no skin breakdowns.   MUSCULOSKELETAL EXAM:  Gait is wobbling on wide bases with rotating pelvis        secondary to bilateral SI joints fusion and severe ankylosing spondylitis with      bamboo spine and moderate-to-severe kyphosis on the thoracic spine.    Center of gravity is moved forward         Walks with widely spread upper extremity to maintain dynamic balance.   He had no loss of balance.  Steps are wider and nanette is normal.   The patient is not able to stand upright touching with his occiput the wall.   There is  significantly increased distance from the occiput to the wall, more than 20 cm.    Cervical spine has very limited range of motion, especially extension that lacks more than 30-35 degrees from neutral.    Cervical spine flexion is still down to 50, extension again lacks 30-35 degrees.    There is still very limited motion in flexion and extension as well as a decreased motion in     rotation and side bending, probably 10-15 degrees bilaterally.    The patient has moderate  tenderness in paravertebral cervical musculature and upper   trapezius.  No tenderness in palpation of the cervical or thoracic spine.    Decreased active range of motion of bilateral upper and lower extremities.  Bilateral shoulders that have decreased abduction to 70 degrees, and flex forward to 80 degrees,  Decreased internal and external rotation to 30 degrees.  Decreased right elbow extension to 160-170 degrees.   FROM in both knees, no edema, no laxity, moderate  tenderness at medical plato line.   + pain with IR in both hips, decreased AROM in both hips.  Minimally decreased AROM in Rt wrist.  Muscle  strength is 5/5 throughout x4 extremities.    Minimal tenderness at right lateral epicondyle, with resisted wrist extension.  No joint laxity throughout x4  extremities.     Straight leg raising negative bilaterally.    Lumbar spine  flexion to 90 with holding on to table, extension to 0, side bending and     rotation decreased to about 20-25 degrees bilaterally.    Fused SI joints  without tenderness on palpation.                                             NEUROLOGIC:  Cranial nerves II through XII intact.    Deep tendon reflexes increased +3, symmetrical throughout x4 extremities.    Sensory is intact to light touch and pinprick throughout x4 extremities.    Tight posterior  hamstrings and increased tone in muscles in posterior hamstrings group and   gastrocnemius soleus group on Sobia scale, 1+/4.          Assessment:       1. Spondylosis of cervical region without myelopathy or radiculopathy    2. Chronic pain syndrome    3. Muscle spasms of neck    4. Ankylosing spondylitis of multiple sites in spine    5. Hip pain, bilateral    6. Chronic low back pain with sciatica, sciatica laterality unspecified, unspecified back pain laterality    7. Ankylosing spondylitis, unspecified site of spine    8. Neck pain    9. Chronic pain of right knee    10. Chronic bilateral low back pain without sciatica    11. Chronic pain of both knees    12. Right knee pain, unspecified chronicity    13. Shoulder arthritis        Plan:       Spondylosis of cervical region without myelopathy or radiculopathy  -     hydrocodone-acetaminophen 10-325mg (NORCO)  mg Tab; Take 1 tablet by mouth every 6 (six) hours as needed for Pain (msk pain).  Dispense: 120 tablet; Refill: 0  -     hydrocodone-acetaminophen 10-325mg (NORCO)  mg Tab; Take 1 tablet by mouth every 6 (six) hours as needed for Pain (msk pain).  Dispense: 120 tablet; Refill: 0  -     hydrocodone-acetaminophen 10-325mg (NORCO)  mg Tab; Take 1 tablet by mouth every 6 (six) hours as needed for Pain (msk pain).  Dispense: 120 tablet; Refill: 0    Chronic pain syndrome  -     hydrocodone-acetaminophen 10-325mg (NORCO)  mg Tab;  Take 1 tablet by mouth every 6 (six) hours as needed for Pain (msk pain).  Dispense: 120 tablet; Refill: 0  -     hydrocodone-acetaminophen 10-325mg (NORCO)  mg Tab; Take 1 tablet by mouth every 6 (six) hours as needed for Pain (msk pain).  Dispense: 120 tablet; Refill: 0  -     hydrocodone-acetaminophen 10-325mg (NORCO)  mg Tab; Take 1 tablet by mouth every 6 (six) hours as needed for Pain (msk pain).  Dispense: 120 tablet; Refill: 0    Muscle spasms of neck  -     hydrocodone-acetaminophen 10-325mg (NORCO)  mg Tab; Take 1 tablet by mouth every 6 (six) hours as needed for Pain (msk pain).  Dispense: 120 tablet; Refill: 0  -     hydrocodone-acetaminophen 10-325mg (NORCO)  mg Tab; Take 1 tablet by mouth every 6 (six) hours as needed for Pain (msk pain).  Dispense: 120 tablet; Refill: 0  -     hydrocodone-acetaminophen 10-325mg (NORCO)  mg Tab; Take 1 tablet by mouth every 6 (six) hours as needed for Pain (msk pain).  Dispense: 120 tablet; Refill: 0    Ankylosing spondylitis of multiple sites in spine  -     hydrocodone-acetaminophen 10-325mg (NORCO)  mg Tab; Take 1 tablet by mouth every 6 (six) hours as needed for Pain (msk pain).  Dispense: 120 tablet; Refill: 0  -     hydrocodone-acetaminophen 10-325mg (NORCO)  mg Tab; Take 1 tablet by mouth every 6 (six) hours as needed for Pain (msk pain).  Dispense: 120 tablet; Refill: 0  -     hydrocodone-acetaminophen 10-325mg (NORCO)  mg Tab; Take 1 tablet by mouth every 6 (six) hours as needed for Pain (msk pain).  Dispense: 120 tablet; Refill: 0    Hip pain, bilateral  -     hydrocodone-acetaminophen 10-325mg (NORCO)  mg Tab; Take 1 tablet by mouth every 6 (six) hours as needed for Pain (msk pain).  Dispense: 120 tablet; Refill: 0  -     hydrocodone-acetaminophen 10-325mg (NORCO)  mg Tab; Take 1 tablet by mouth every 6 (six) hours as needed for Pain (msk pain).  Dispense: 120 tablet; Refill: 0  -      hydrocodone-acetaminophen 10-325mg (NORCO)  mg Tab; Take 1 tablet by mouth every 6 (six) hours as needed for Pain (msk pain).  Dispense: 120 tablet; Refill: 0    Chronic low back pain with sciatica, sciatica laterality unspecified, unspecified back pain laterality  -     hydrocodone-acetaminophen 10-325mg (NORCO)  mg Tab; Take 1 tablet by mouth every 6 (six) hours as needed for Pain (msk pain).  Dispense: 120 tablet; Refill: 0  -     hydrocodone-acetaminophen 10-325mg (NORCO)  mg Tab; Take 1 tablet by mouth every 6 (six) hours as needed for Pain (msk pain).  Dispense: 120 tablet; Refill: 0  -     hydrocodone-acetaminophen 10-325mg (NORCO)  mg Tab; Take 1 tablet by mouth every 6 (six) hours as needed for Pain (msk pain).  Dispense: 120 tablet; Refill: 0    Ankylosing spondylitis, unspecified site of spine  -     hydrocodone-acetaminophen 10-325mg (NORCO)  mg Tab; Take 1 tablet by mouth every 6 (six) hours as needed for Pain (msk pain).  Dispense: 120 tablet; Refill: 0  -     hydrocodone-acetaminophen 10-325mg (NORCO)  mg Tab; Take 1 tablet by mouth every 6 (six) hours as needed for Pain (msk pain).  Dispense: 120 tablet; Refill: 0  -     hydrocodone-acetaminophen 10-325mg (NORCO)  mg Tab; Take 1 tablet by mouth every 6 (six) hours as needed for Pain (msk pain).  Dispense: 120 tablet; Refill: 0    Neck pain  -     hydrocodone-acetaminophen 10-325mg (NORCO)  mg Tab; Take 1 tablet by mouth every 6 (six) hours as needed for Pain (msk pain).  Dispense: 120 tablet; Refill: 0  -     hydrocodone-acetaminophen 10-325mg (NORCO)  mg Tab; Take 1 tablet by mouth every 6 (six) hours as needed for Pain (msk pain).  Dispense: 120 tablet; Refill: 0  -     hydrocodone-acetaminophen 10-325mg (NORCO)  mg Tab; Take 1 tablet by mouth every 6 (six) hours as needed for Pain (msk pain).  Dispense: 120 tablet; Refill: 0    Chronic pain of right knee  -     hydrocodone-acetaminophen  10-325mg (NORCO)  mg Tab; Take 1 tablet by mouth every 6 (six) hours as needed for Pain (msk pain).  Dispense: 120 tablet; Refill: 0  -     hydrocodone-acetaminophen 10-325mg (NORCO)  mg Tab; Take 1 tablet by mouth every 6 (six) hours as needed for Pain (msk pain).  Dispense: 120 tablet; Refill: 0  -     hydrocodone-acetaminophen 10-325mg (NORCO)  mg Tab; Take 1 tablet by mouth every 6 (six) hours as needed for Pain (msk pain).  Dispense: 120 tablet; Refill: 0    Chronic bilateral low back pain without sciatica  -     hydrocodone-acetaminophen 10-325mg (NORCO)  mg Tab; Take 1 tablet by mouth every 6 (six) hours as needed for Pain (msk pain).  Dispense: 120 tablet; Refill: 0  -     hydrocodone-acetaminophen 10-325mg (NORCO)  mg Tab; Take 1 tablet by mouth every 6 (six) hours as needed for Pain (msk pain).  Dispense: 120 tablet; Refill: 0  -     hydrocodone-acetaminophen 10-325mg (NORCO)  mg Tab; Take 1 tablet by mouth every 6 (six) hours as needed for Pain (msk pain).  Dispense: 120 tablet; Refill: 0    Chronic pain of both knees  -     hydrocodone-acetaminophen 10-325mg (NORCO)  mg Tab; Take 1 tablet by mouth every 6 (six) hours as needed for Pain (msk pain).  Dispense: 120 tablet; Refill: 0  -     hydrocodone-acetaminophen 10-325mg (NORCO)  mg Tab; Take 1 tablet by mouth every 6 (six) hours as needed for Pain (msk pain).  Dispense: 120 tablet; Refill: 0  -     hydrocodone-acetaminophen 10-325mg (NORCO)  mg Tab; Take 1 tablet by mouth every 6 (six) hours as needed for Pain (msk pain).  Dispense: 120 tablet; Refill: 0    Right knee pain, unspecified chronicity  -     hydrocodone-acetaminophen 10-325mg (NORCO)  mg Tab; Take 1 tablet by mouth every 6 (six) hours as needed for Pain (msk pain).  Dispense: 120 tablet; Refill: 0  -     hydrocodone-acetaminophen 10-325mg (NORCO)  mg Tab; Take 1 tablet by mouth every 6 (six) hours as needed for Pain (msk  pain).  Dispense: 120 tablet; Refill: 0  -     hydrocodone-acetaminophen 10-325mg (NORCO)  mg Tab; Take 1 tablet by mouth every 6 (six) hours as needed for Pain (msk pain).  Dispense: 120 tablet; Refill: 0    Shoulder arthritis  -     hydrocodone-acetaminophen 10-325mg (NORCO)  mg Tab; Take 1 tablet by mouth every 6 (six) hours as needed for Pain (msk pain).  Dispense: 120 tablet; Refill: 0  -     hydrocodone-acetaminophen 10-325mg (NORCO)  mg Tab; Take 1 tablet by mouth every 6 (six) hours as needed for Pain (msk pain).  Dispense: 120 tablet; Refill: 0  -     hydrocodone-acetaminophen 10-325mg (NORCO)  mg Tab; Take 1 tablet by mouth every 6 (six) hours as needed for Pain (msk pain).  Dispense: 120 tablet; Refill: 0      Patient with severe  AS, cx. with chronic neck and back pain associated with cervical and lumbar spondylosis and muscle spasms -   tightness in paravertebral cervical  muscles, and hamstrings.   On Sulfasalazine,and Celebrex as needed ( takes rarely sec. To bleeding PUD) for AS.      1. Chronic pain management.    The patient will resume Hydrocodone 10/325 mg one p.o. 6 hours p.r.n.( #120 tabs, and agreeable to taper down to 100 tabs/mionth, given 2 refills).   Muscle relaxant Flexeril 10 mg , TID  (helps better with relaxation than other muscle relaxants that he tried).    Does not take Cymbalta .    reviewed and appropriate.  Opioid Risk Score       Value Time User    Opioid Risk Score  5 1/21/2018 10:53 AM Janel Valdez MD            2. Patient completed PT, multiple times.  Exercises regularly, and stretches his neck/upper/lower spine,   lying prone on table for 20-30 minutes daily.     3. Ankylosing Spondylitis,  with multiple peripheral joint involvement.   Follows with Rheumatology, Dr. Navas.     Reviewing labs, he has CRP, and ESR  chronically  elevated.   He is currently taking less than prescribed Sulfasalazine, 3 tabs ( 1500 mg )  BID,  and also Celebrex  rarely,    once /day.   He has h/o UGI bleed,a nd soon has an hollie. With GI.   He refused DMAR th.       RTC in 3 months.     Total time spent face to face with patient was 25 minutes.   More than 50% of that time was spent in counseling on diagnosis , prognosis and treatment options.   I also caunsel patient  on common and most usual side effect of prescribed medications.   Risk and benefits of opiates, possible risk of developing opiate dependence and tolerance, need of strict compliance with prescribed medications.  Pain contract, rules and obligations were discussed with patient in details.  He is aware that I would be the only doctor prescribing him pain medications and ED in a case of emergency.  I reviewed Primary care , and other specialty's notes to better coordinate patient's  care.   All questions were answered, and patient voiced understanding.

## 2018-01-30 NOTE — TELEPHONE ENCOUNTER
Spouse states pt started with symptoms this morning, he has not received flu vaccine this year, has been around people with the flu for short periods of time; ángel advise; uses cherise on [facility] [address]

## 2018-01-30 NOTE — TELEPHONE ENCOUNTER
----- Message from Profusa sent at 1/30/2018  2:39 PM CST -----  Contact: Wife/Jenni CARRINGTON    Possible flu    Pt stated chills, fever 100.0 body aches and chest congestion.  Contact Jenni at [phone].    Thanks-

## 2018-01-31 NOTE — TELEPHONE ENCOUNTER
tamiflu sent, plenty water, tylenol for fever  No appt necessary unless no improvement by next week, typically lasts 7 days, avoid contact with others, stay home and rest  Any worsening or severe symptoms go to ED

## 2018-01-31 NOTE — TELEPHONE ENCOUNTER
----- Message from Radha patricequeenie sent at 1/31/2018 10:16 AM CST -----  Contact: self  Pt's spouse states cough syrup is not covered by insurance and is too expensive. She is asking for an alternate. Pt at pharmacy now. [facility] is [facility].  [phone].

## 2018-01-31 NOTE — TELEPHONE ENCOUNTER
Spoke with patient and wife. Notified of message below. Requests something for cough. Spoke with Michelle. Cough medicine sent. Verbalized understanding.

## 2018-01-31 NOTE — TELEPHONE ENCOUNTER
Tessalon prescribed yesterday for cough; spouse states not covered by insurance; requesting different medication be sent in

## 2018-01-31 NOTE — TELEPHONE ENCOUNTER
.PINK DOT patient    Have you had any of the following symptoms: fever >100.4/chills, URI symptoms, body aches, nausea, vomiting, or diarrhea?   Which symptoms have you had? Body aches,nausea,chills  How long have you had the symptoms? Started yesterday morning  Did the symptoms start suddenly or did they come on gradually? Gradually  Have you been in contact with anyone who has the flu? No   If so, how close was the contact and when was the last contact?  Did you receive the flu shot during this current flu season? No  Spoke with spouse and patient. Advised for patient to get plenty of rest and push fluids. Verbalized understanding.Would like to know if should come in for appointment?Please advise  LOV 7/1/2017 with

## 2018-04-18 NOTE — PROGRESS NOTES
Subjective:       Patient ID: Micheal Chirinos Jr. is a 57 y.o. male.    Chief Complaint: Neck Pain; Back Pain; and Hip Pain    Back Pain   Pertinent negatives include no abdominal pain, chest pain, fever or headaches.   Neck Pain    Pertinent negatives include no chest pain, fever, headaches or trouble swallowing.   Knee Pain      Shoulder Pain    Pertinent negatives include no fever or headaches.   Wrist Pain    Pertinent negatives include no fever.   Hip Pain      Hand Pain    Pertinent negatives include no chest pain.   Mr. Chirinos, returns to clinic for multiple MSK pains, chronic neck, back pain, and multiple joint pain.   He has a severe ankylosing spondylitis with axial neck and back  pain and upper thoracic and lower pelvic girdle pain.  LCV 01/18/18.    Today, his chief complains are :    1. Rt knee pain.  2. Chronic neck pain, and shoulders,  3. Low back pain,    He states that he has his good, and bad days.      #1 Rt knee pain  Current pain is 4, the worst pain is 5/10.  He reports no injury.  Rt knee pain is dull ache, no swelling, + cracking, no giving out on him, does not lock.  Pain is mainly in joint, does not radiate up/down.     #2.neck pain,   current pain is 5/10, and the worst pain is 10/10.  Neck pain is present t the both side of the neck R > L, it feels as muscle spasms/ tightness,  It radiates to Right  shoulders, and shoulder blade  not to arm/hand.   No numbness, paraesthesias in arms/ hands.   Neck pain is on/ off, worse at night time, sometimes he has trouble placing head on pillow.   Neck pain is running to shoulder blades, not to arms, pain is more localized in joints, on right side,   Right shoulder, elbow, swell, when he does more exercise, and it is always on right side.     #3 Low  back pain,   current pain is 4/10, worst pain lately 7/10.   Pain is localized mainly in mid spine , in axial distribution, and just across low back bilateral.   Pain is off/om,   No radiation to  buttocks, legs, but back of legs are very tight.    He has a severe ankylosing spondylitis with axial neck and back  pain and upper thoracic and lower pelvic girdle pain.   He takes hydrocodone 10 mg and he usually takes three tablets a day; however, on bad days he takes  four tablets a day.    He failed almost all muscle relaxants Dantrium secondary to making him tired, Flexeril, makes him sleepy. (although still taking it).   He was given Celebrex by dr. Navas, but he is afraidl secondary to GI bleed, a year ago.  He is taking only Sulfasalazine and Celebrex 200 daily for his severe AS, that is always with high infllamatory markers,    Patient completed Physical therapy for neck pain, at [facility].   He does his own stretching,and goes 3x/week to fitness.  Exercises regularly, and stretches his neck/upper/lower spine, lying prone on table for 20-30 minutes daily.     He is on chronic pain management with me, takes Hydrocodone 10/325 mg , 3-4x/day, and Flexeril at bedtime for several years ,   with no need for increase dose, nor frequency. States that hydrocodone helps him to move, with reasonable pain control.   Here for follow up, and chronic pain management with opiates.  Past Medical History:   Diagnosis Date    Degenerative disc disease     Hemorrhage of rectum and anus     Hypertension     Idiopathic ankylosing spondylitis 2000    Joint pain        Past Surgical History:   Procedure Laterality Date    COLONOSCOPY N/A 2/27/2018    Procedure: COLONOSCOPY;  Surgeon: Jaswant Godfrey MD;  Location: [address]);  Service: Endoscopy;  Laterality: N/A;  original order from 5/1/17 per Dr Godfrey-unable to use that order, new order placed    MOUTH SURGERY         Family History   Problem Relation Age of Onset    Arthritis Mother     Kidney disease Father     No Known Problems Sister     No Known Problems Brother     No Known Problems Maternal Aunt     No Known Problems Maternal Uncle     No Known  Problems Paternal Aunt     No Known Problems Paternal Uncle     No Known Problems Maternal Grandmother     No Known Problems Maternal Grandfather     No Known Problems Paternal Grandmother     No Known Problems Paternal Grandfather     Celiac disease Neg Hx     Cirrhosis Neg Hx     Colon cancer Neg Hx     Colon polyps Neg Hx     Crohn's disease Neg Hx     Cystic fibrosis Neg Hx     Esophageal cancer Neg Hx     Hemochromatosis Neg Hx     Inflammatory bowel disease Neg Hx     Irritable bowel syndrome Neg Hx     Liver cancer Neg Hx     Liver disease Neg Hx     Rectal cancer Neg Hx     Stomach cancer Neg Hx     Ulcerative colitis Neg Hx     Jorge's disease Neg Hx     Melanoma Neg Hx     Psoriasis Neg Hx     Lupus Neg Hx     Eczema Neg Hx     Acne Neg Hx     Amblyopia Neg Hx     Blindness Neg Hx     Cataracts Neg Hx     Glaucoma Neg Hx     Macular degeneration Neg Hx     Retinal detachment Neg Hx     Strabismus Neg Hx     Cancer Neg Hx     Diabetes Neg Hx     Hypertension Neg Hx     Stroke Neg Hx     Thyroid disease Neg Hx        Social History     Social History    Marital status:      Spouse name: N/A    Number of children: N/A    Years of education: N/A     Occupational History    Disabled      Social History Main Topics    Smoking status: Never Smoker    Smokeless tobacco: Never Used    Alcohol use 0.0 oz/week      Comment: one's in the blue moon    Drug use: Yes     Frequency: 2.0 times per week     Types: Marijuana    Sexual activity: Not Asked     Other Topics Concern    None     Social History Narrative    None       Current Outpatient Prescriptions   Medication Sig Dispense Refill    celecoxib (CELEBREX) 100 MG capsule Take 1 capsule (100 mg total) by mouth 2 (two) times daily as needed for Pain. 180 capsule 1    cetirizine (ZYRTEC) 10 mg TbDL 1 Tablet(s) Oral PRN Every day.        cyclobenzaprine (FLEXERIL) 10 MG tablet Take 1 tablet (10 mg total) by  mouth 3 (three) times daily as needed. 90 tablet 11    hydrocodone-acetaminophen 10-325mg (NORCO)  mg Tab Take 1 tablet by mouth every 6 (six) hours as needed for Pain (msk pain). 120 tablet 0    [START ON 5/18/2018] hydrocodone-acetaminophen 10-325mg (NORCO)  mg Tab Take 1 tablet by mouth every 6 (six) hours as needed for Pain (msk pain). 120 tablet 0    [START ON 6/18/2018] hydrocodone-acetaminophen 10-325mg (NORCO)  mg Tab Take 1 tablet by mouth every 6 (six) hours as needed for Pain (msk pain). 120 tablet 0    omeprazole (PRILOSEC) 20 MG capsule TAKE 1 CAPSULE(20 MG) BY MOUTH TWICE DAILY 180 capsule 1    sulfaSALAzine (AZULFIDINE) 500 MG TbEC Take 3 tablets (1,500 mg total) by mouth 2 (two) times daily. 540 tablet 0     No current facility-administered medications for this visit.        Review of patient's allergies indicates:   Allergen Reactions    No known drug allergies          Review of Systems   Constitutional: Negative for activity change, appetite change, chills, diaphoresis, fatigue, fever and unexpected weight change.   HENT: Negative for trouble swallowing and voice change.    Eyes: Negative for pain and visual disturbance.   Respiratory: Negative for chest tightness, shortness of breath and wheezing.    Cardiovascular: Negative for chest pain, palpitations and leg swelling.   Gastrointestinal: Negative for abdominal pain, constipation and diarrhea.   Genitourinary: Negative for difficulty urinating, frequency and urgency.   Musculoskeletal: Positive for back pain and neck pain. Negative for joint swelling, myalgias and neck stiffness. Arthralgias:  left knee hurts, no swelling, no warmth, no popping, clicking.    Skin: Negative for rash and wound.   Neurological: Negative for dizziness, facial asymmetry, speech difficulty, light-headedness and headaches.   Hematological: Negative for adenopathy.   Psychiatric/Behavioral: Negative for agitation, behavioral problems, confusion,  decreased concentration, dysphoric mood and sleep disturbance.           Objective:      Physical Exam    GENERAL: The patient is alert, oriented, pleasant.   HEENT: PERRLA  NECK: supple, no masses   CV: S1S2, RRR,   LUNGS: CTA bilateral.   ABDOMEN: soft, non-tender, bowel sounds nl.  EXTREMITIES: no edema, +2 pulses DP, b/l  SKIN: no skin rash, no skin breakdowns.   MUSCULOSKELETAL EXAM:  Gait is wobbling on wide bases with rotating pelvis        secondary to bilateral SI joints fusion and severe ankylosing spondylitis with      bamboo spine and moderate-to-severe kyphosis on the thoracic spine.    Center of gravity is moved forward         Walks with widely spread upper extremity to maintain dynamic balance.   He had no loss of balance.  Steps are wider and nanette is normal.   The patient is not able to stand upright touching with his occiput the wall.   There is  significantly increased distance from the occiput to the wall, more than 20 cm.    Cervical spine has very limited range of motion, especially extension that lacks more than 30-35 degrees from neutral.    Cervical spine flexion is still down to 50, extension again lacks 30-35 degrees.    There is still very limited motion in flexion and extension as well as a decreased motion in     rotation and side bending, probably 10-15 degrees bilaterally.    The patient has moderate  tenderness in paravertebral cervical musculature and upper   trapezius.  No tenderness in palpation of the cervical or thoracic spine.    Decreased active range of motion of bilateral upper and lower extremities.  Bilateral shoulders that have decreased abduction to 70 degrees, and flex forward to 80 degrees,  Decreased internal and external rotation to 30 degrees.  Decreased right elbow extension to 160-170 degrees.   FROM in both knees, no edema, no laxity, moderate  tenderness at medical plato line.   + pain with IR in both hips, decreased AROM in both hips.  Minimally decreased  AROM in Rt wrist.  Muscle  strength is 5/5 throughout x4 extremities.    Minimal tenderness at right lateral epicondyle, with resisted wrist extension.  No joint laxity throughout x4  extremities.    Straight leg raising negative bilaterally.    Lumbar spine  flexion to 90 with holding on to table, extension to 0, side bending and     rotation decreased to about 20-25 degrees bilaterally.    Fused SI joints  without tenderness on palpation.                                             NEUROLOGIC:  Cranial nerves II through XII intact.    Deep tendon reflexes increased +3, symmetrical throughout x4 extremities.    Sensory is intact to light touch and pinprick throughout x4 extremities.    Tight posterior  hamstrings and increased tone in muscles in posterior hamstrings group and   gastrocnemius soleus group on Sobia scale, 1+/4.          Assessment:       1. Muscle spasms of neck    2. Neck pain    3. Chronic low back pain with sciatica, sciatica laterality unspecified, unspecified back pain laterality    4. Ankylosing spondylitis of multiple sites in spine    5. Left hip pain    6. Ankylosing spondylitis, unspecified site of spine    7. Chronic pain of right knee    8. Chronic bilateral low back pain without sciatica    9. Chronic pain of both knees    10. Hip pain, bilateral    11. Right knee pain, unspecified chronicity    12. Spondylosis of cervical region without myelopathy or radiculopathy    13. Shoulder arthritis    14. Chronic pain syndrome        Plan:       Muscle spasms of neck  -     hydrocodone-acetaminophen 10-325mg (NORCO)  mg Tab; Take 1 tablet by mouth every 6 (six) hours as needed for Pain (msk pain).  Dispense: 120 tablet; Refill: 0  -     hydrocodone-acetaminophen 10-325mg (NORCO)  mg Tab; Take 1 tablet by mouth every 6 (six) hours as needed for Pain (msk pain).  Dispense: 120 tablet; Refill: 0  -     hydrocodone-acetaminophen 10-325mg (NORCO)  mg Tab; Take 1 tablet by mouth  every 6 (six) hours as needed for Pain (msk pain).  Dispense: 120 tablet; Refill: 0    Neck pain  -     hydrocodone-acetaminophen 10-325mg (NORCO)  mg Tab; Take 1 tablet by mouth every 6 (six) hours as needed for Pain (msk pain).  Dispense: 120 tablet; Refill: 0  -     hydrocodone-acetaminophen 10-325mg (NORCO)  mg Tab; Take 1 tablet by mouth every 6 (six) hours as needed for Pain (msk pain).  Dispense: 120 tablet; Refill: 0  -     hydrocodone-acetaminophen 10-325mg (NORCO)  mg Tab; Take 1 tablet by mouth every 6 (six) hours as needed for Pain (msk pain).  Dispense: 120 tablet; Refill: 0    Chronic low back pain with sciatica, sciatica laterality unspecified, unspecified back pain laterality  -     hydrocodone-acetaminophen 10-325mg (NORCO)  mg Tab; Take 1 tablet by mouth every 6 (six) hours as needed for Pain (msk pain).  Dispense: 120 tablet; Refill: 0  -     hydrocodone-acetaminophen 10-325mg (NORCO)  mg Tab; Take 1 tablet by mouth every 6 (six) hours as needed for Pain (msk pain).  Dispense: 120 tablet; Refill: 0  -     hydrocodone-acetaminophen 10-325mg (NORCO)  mg Tab; Take 1 tablet by mouth every 6 (six) hours as needed for Pain (msk pain).  Dispense: 120 tablet; Refill: 0    Ankylosing spondylitis of multiple sites in spine  -     hydrocodone-acetaminophen 10-325mg (NORCO)  mg Tab; Take 1 tablet by mouth every 6 (six) hours as needed for Pain (msk pain).  Dispense: 120 tablet; Refill: 0  -     hydrocodone-acetaminophen 10-325mg (NORCO)  mg Tab; Take 1 tablet by mouth every 6 (six) hours as needed for Pain (msk pain).  Dispense: 120 tablet; Refill: 0  -     hydrocodone-acetaminophen 10-325mg (NORCO)  mg Tab; Take 1 tablet by mouth every 6 (six) hours as needed for Pain (msk pain).  Dispense: 120 tablet; Refill: 0    Left hip pain  -     hydrocodone-acetaminophen 10-325mg (NORCO)  mg Tab; Take 1 tablet by mouth every 6 (six) hours as needed for Pain  (msk pain).  Dispense: 120 tablet; Refill: 0  -     hydrocodone-acetaminophen 10-325mg (NORCO)  mg Tab; Take 1 tablet by mouth every 6 (six) hours as needed for Pain (msk pain).  Dispense: 120 tablet; Refill: 0  -     hydrocodone-acetaminophen 10-325mg (NORCO)  mg Tab; Take 1 tablet by mouth every 6 (six) hours as needed for Pain (msk pain).  Dispense: 120 tablet; Refill: 0    Ankylosing spondylitis, unspecified site of spine  -     hydrocodone-acetaminophen 10-325mg (NORCO)  mg Tab; Take 1 tablet by mouth every 6 (six) hours as needed for Pain (msk pain).  Dispense: 120 tablet; Refill: 0  -     hydrocodone-acetaminophen 10-325mg (NORCO)  mg Tab; Take 1 tablet by mouth every 6 (six) hours as needed for Pain (msk pain).  Dispense: 120 tablet; Refill: 0  -     hydrocodone-acetaminophen 10-325mg (NORCO)  mg Tab; Take 1 tablet by mouth every 6 (six) hours as needed for Pain (msk pain).  Dispense: 120 tablet; Refill: 0    Chronic pain of right knee  -     hydrocodone-acetaminophen 10-325mg (NORCO)  mg Tab; Take 1 tablet by mouth every 6 (six) hours as needed for Pain (msk pain).  Dispense: 120 tablet; Refill: 0  -     hydrocodone-acetaminophen 10-325mg (NORCO)  mg Tab; Take 1 tablet by mouth every 6 (six) hours as needed for Pain (msk pain).  Dispense: 120 tablet; Refill: 0  -     hydrocodone-acetaminophen 10-325mg (NORCO)  mg Tab; Take 1 tablet by mouth every 6 (six) hours as needed for Pain (msk pain).  Dispense: 120 tablet; Refill: 0    Chronic bilateral low back pain without sciatica  -     hydrocodone-acetaminophen 10-325mg (NORCO)  mg Tab; Take 1 tablet by mouth every 6 (six) hours as needed for Pain (msk pain).  Dispense: 120 tablet; Refill: 0  -     hydrocodone-acetaminophen 10-325mg (NORCO)  mg Tab; Take 1 tablet by mouth every 6 (six) hours as needed for Pain (msk pain).  Dispense: 120 tablet; Refill: 0  -     hydrocodone-acetaminophen 10-325mg (NORCO)   mg Tab; Take 1 tablet by mouth every 6 (six) hours as needed for Pain (msk pain).  Dispense: 120 tablet; Refill: 0    Chronic pain of both knees  -     hydrocodone-acetaminophen 10-325mg (NORCO)  mg Tab; Take 1 tablet by mouth every 6 (six) hours as needed for Pain (msk pain).  Dispense: 120 tablet; Refill: 0  -     hydrocodone-acetaminophen 10-325mg (NORCO)  mg Tab; Take 1 tablet by mouth every 6 (six) hours as needed for Pain (msk pain).  Dispense: 120 tablet; Refill: 0  -     hydrocodone-acetaminophen 10-325mg (NORCO)  mg Tab; Take 1 tablet by mouth every 6 (six) hours as needed for Pain (msk pain).  Dispense: 120 tablet; Refill: 0    Hip pain, bilateral  -     hydrocodone-acetaminophen 10-325mg (NORCO)  mg Tab; Take 1 tablet by mouth every 6 (six) hours as needed for Pain (msk pain).  Dispense: 120 tablet; Refill: 0  -     hydrocodone-acetaminophen 10-325mg (NORCO)  mg Tab; Take 1 tablet by mouth every 6 (six) hours as needed for Pain (msk pain).  Dispense: 120 tablet; Refill: 0  -     hydrocodone-acetaminophen 10-325mg (NORCO)  mg Tab; Take 1 tablet by mouth every 6 (six) hours as needed for Pain (msk pain).  Dispense: 120 tablet; Refill: 0    Right knee pain, unspecified chronicity  -     hydrocodone-acetaminophen 10-325mg (NORCO)  mg Tab; Take 1 tablet by mouth every 6 (six) hours as needed for Pain (msk pain).  Dispense: 120 tablet; Refill: 0  -     hydrocodone-acetaminophen 10-325mg (NORCO)  mg Tab; Take 1 tablet by mouth every 6 (six) hours as needed for Pain (msk pain).  Dispense: 120 tablet; Refill: 0  -     hydrocodone-acetaminophen 10-325mg (NORCO)  mg Tab; Take 1 tablet by mouth every 6 (six) hours as needed for Pain (msk pain).  Dispense: 120 tablet; Refill: 0    Spondylosis of cervical region without myelopathy or radiculopathy  -     hydrocodone-acetaminophen 10-325mg (NORCO)  mg Tab; Take 1 tablet by mouth every 6 (six) hours as  needed for Pain (msk pain).  Dispense: 120 tablet; Refill: 0  -     hydrocodone-acetaminophen 10-325mg (NORCO)  mg Tab; Take 1 tablet by mouth every 6 (six) hours as needed for Pain (msk pain).  Dispense: 120 tablet; Refill: 0  -     hydrocodone-acetaminophen 10-325mg (NORCO)  mg Tab; Take 1 tablet by mouth every 6 (six) hours as needed for Pain (msk pain).  Dispense: 120 tablet; Refill: 0    Shoulder arthritis  -     hydrocodone-acetaminophen 10-325mg (NORCO)  mg Tab; Take 1 tablet by mouth every 6 (six) hours as needed for Pain (msk pain).  Dispense: 120 tablet; Refill: 0  -     hydrocodone-acetaminophen 10-325mg (NORCO)  mg Tab; Take 1 tablet by mouth every 6 (six) hours as needed for Pain (msk pain).  Dispense: 120 tablet; Refill: 0  -     hydrocodone-acetaminophen 10-325mg (NORCO)  mg Tab; Take 1 tablet by mouth every 6 (six) hours as needed for Pain (msk pain).  Dispense: 120 tablet; Refill: 0    Chronic pain syndrome  -     hydrocodone-acetaminophen 10-325mg (NORCO)  mg Tab; Take 1 tablet by mouth every 6 (six) hours as needed for Pain (msk pain).  Dispense: 120 tablet; Refill: 0  -     hydrocodone-acetaminophen 10-325mg (NORCO)  mg Tab; Take 1 tablet by mouth every 6 (six) hours as needed for Pain (msk pain).  Dispense: 120 tablet; Refill: 0  -     hydrocodone-acetaminophen 10-325mg (NORCO)  mg Tab; Take 1 tablet by mouth every 6 (six) hours as needed for Pain (msk pain).  Dispense: 120 tablet; Refill: 0      Patient with severe  AS, cx. with chronic neck and back pain associated with cervical and lumbar spondylosis and muscle spasms -   tightness in paravertebral cervical  muscles, and hamstrings.   On Sulfasalazine,and Celebrex as needed ( takes rarely sec. To bleeding PUD) for AS.      1. Chronic pain management.    The patient will resume Hydrocodone 10/325 mg one p.o. 6 hours p.r.n.( #120 tabs, and agreeable to taper down to 100 tabs/mionth, given 2  refills).   Muscle relaxant Flexeril 10 mg , TID  (helps better with relaxation than other muscle relaxants that he tried).    Does not take Cymbalta .    reviewed and appropriate.  Opioid Risk Score       Value Time User    Opioid Risk Score  5 4/18/2018 10:53 AM Janel Valdez MD            2. Patient completed PT, multiple times.  Exercises regularly, and stretches his neck/upper/lower spine,   lying prone on table for 20-30 minutes daily.     3. Ankylosing Spondylitis,  with multiple peripheral joint involvement.   Follows with Rheumatology, Dr. Navas.     Reviewing labs, he has CRP, and ESR  chronically  elevated.   He is currently taking less than prescribed Sulfasalazine, 3 tabs ( 1500 mg )  BID,  and also Celebrex rarely,    once /day.   He has h/o UGI bleed,a nd soon has an hollie. With GI.   He refused DMAR th.       RTC in 3 months.     Total time spent face to face with patient was 25 minutes.   More than 50% of that time was spent in counseling on diagnosis , prognosis and treatment options.   I also caunsel patient  on common and most usual side effect of prescribed medications.   Risk and benefits of opiates, possible risk of developing opiate dependence and tolerance, need of strict compliance with prescribed medications.  Pain contract, rules and obligations were discussed with patient in details.  He is aware that I would be the only doctor prescribing him pain medications and ED in a case of emergency.  I reviewed Primary care , and other specialty's notes to better coordinate patient's  care.   All questions were answered, and patient voiced understanding.

## 2018-05-12 NOTE — ED TRIAGE NOTES
Pt present to the ER with his wife. The pt states he was gardening yesterday and was picking up on several bags of mulch. He is having left sided rib pain. Denies chest pain.

## 2018-05-12 NOTE — DISCHARGE INSTRUCTIONS
"Return to the Emergency Department of any acute worsening of your symptoms or for any other concern.     You should return to the ED for fever/chills, shortness of breath, chest pain, weakness or "passing out".     Pt should take all medications as prescribed.    Pt should follow up with PCP as soon as possible.    The risks associated with not taking your medications as prescribed and not following up with your Primary Care doctor or sub specialist includes worsening of your condition, pain, disability, loss of function or livelihood, and death      SNEHAL Sexton M.D. 1:23 PM 5/12/2018      Our goal in the emergency department is to always give you outstanding care and exceptional service. You may receive a survey by mail or e-mail in the next week regarding your experience in our ED. We would greatly appreciate your completing and returning the survey. Your feedback provides us with a way to recognize our staff who give very good care and it helps us learn how to improve when your experience was below our aspiration of excellence.     "

## 2018-05-12 NOTE — ED PROVIDER NOTES
Encounter Date: 5/12/2018    SCRIBE #1 NOTE: I, Love Schaefer, am scribing for, and in the presence of,  Manish Sexton MD. I have scribed the following portions of the note - Other sections scribed: HPI/ROS.       History     Chief Complaint   Patient presents with    Abdominal Pain     left upper quad pain started approx 2 am.  hurts worse with movement.  denies n/v/d or fever.  states he was lifting heavy bags yesterday.     CC: Rib Pain     HPI: This 57 y.o. male with a medical hx of DDD, hemorrhage of rectum/anus, HTN, idiopathic ankylosing spondylitis, joint pain presents to the ED for an evaluation of acute onset, moderate (6/10) L rib pain that began around 2:00 AM this morning. Patient reports picking up a bag of marsh yesterday. Breathing makes the pain worse. He attempted tx with [place] around 6:00 AM this morning resulting in some relief. No cardiac problems or any known allergies to medications. No alleviating factors. Otherwise, patient denies fever, chills, numbness, weakness, chest pain, SOB, cough, and N/V/D.      The history is provided by the patient. No  was used.     Review of patient's allergies indicates:   Allergen Reactions    No known drug allergies      Past Medical History:   Diagnosis Date    Degenerative disc disease     Hemorrhage of rectum and anus     Hypertension     Idiopathic ankylosing spondylitis 2000    Joint pain      Past Surgical History:   Procedure Laterality Date    COLONOSCOPY N/A 2/27/2018    Procedure: COLONOSCOPY;  Surgeon: Jaswant Godfrey MD;  Location: [address];  Service: Endoscopy;  Laterality: N/A;  original order from 5/1/17 per Dr Godfrey-unable to use that order, new order placed    MOUTH SURGERY       Family History   Problem Relation Age of Onset    Arthritis Mother     Kidney disease Father     No Known Problems Sister     No Known Problems Brother     No Known Problems Maternal Aunt     No Known Problems Maternal  Uncle     No Known Problems Paternal Aunt     No Known Problems Paternal Uncle     No Known Problems Maternal Grandmother     No Known Problems Maternal Grandfather     No Known Problems Paternal Grandmother     No Known Problems Paternal Grandfather     Celiac disease Neg Hx     Cirrhosis Neg Hx     Colon cancer Neg Hx     Colon polyps Neg Hx     Crohn's disease Neg Hx     Cystic fibrosis Neg Hx     Esophageal cancer Neg Hx     Hemochromatosis Neg Hx     Inflammatory bowel disease Neg Hx     Irritable bowel syndrome Neg Hx     Liver cancer Neg Hx     Liver disease Neg Hx     Rectal cancer Neg Hx     Stomach cancer Neg Hx     Ulcerative colitis Neg Hx     Jorge's disease Neg Hx     Melanoma Neg Hx     Psoriasis Neg Hx     Lupus Neg Hx     Eczema Neg Hx     Acne Neg Hx     Amblyopia Neg Hx     Blindness Neg Hx     Cataracts Neg Hx     Glaucoma Neg Hx     Macular degeneration Neg Hx     Retinal detachment Neg Hx     Strabismus Neg Hx     Cancer Neg Hx     Diabetes Neg Hx     Hypertension Neg Hx     Stroke Neg Hx     Thyroid disease Neg Hx      Social History   Substance Use Topics    Smoking status: Never Smoker    Smokeless tobacco: Never Used    Alcohol use 0.0 oz/week      Comment: one's in the blue moon     Review of Systems   Constitutional: Negative for chills and fever.   HENT: Negative for congestion, ear pain, rhinorrhea and sore throat.    Eyes: Negative for pain and visual disturbance.   Respiratory: Negative for cough and shortness of breath.    Cardiovascular: Negative for chest pain.   Gastrointestinal: Negative for abdominal pain, diarrhea, nausea and vomiting.   Genitourinary: Negative for difficulty urinating and dysuria.   Musculoskeletal: Negative for back pain and neck pain.        (+) L rib pain   Skin: Negative for rash.   Neurological: Negative for weakness, numbness and headaches.       Physical Exam     Initial Vitals [05/12/18 1003]   BP Pulse Resp  Temp SpO2   (!) 159/88 (!) 54 18 97.3 °F (36.3 °C) 100 %      MAP       111.67         Physical Exam    Vitals reviewed.  Constitutional: He appears well-developed and well-nourished.   HENT:   Head: Normocephalic and atraumatic.   Nose: Nose normal. No nasal septal hematoma. No epistaxis.   Eyes: EOM are normal. Pupils are equal, round, and reactive to light.   Neck: Normal range of motion. Neck supple.   Cardiovascular: Normal rate, regular rhythm, normal heart sounds and intact distal pulses.   Pulmonary/Chest: Breath sounds normal. No respiratory distress. He has no wheezes. He has no rhonchi. He has no rales.   Abdominal: Soft. Bowel sounds are normal.   Musculoskeletal: Normal range of motion.   Neurological: He is alert and oriented to person, place, and time.   Skin: Skin is warm and dry. No petechiae noted.   Psychiatric: He has a normal mood and affect.         ED Course   Procedures  Labs Reviewed   CBC W/ AUTO DIFFERENTIAL - Abnormal; Notable for the following:        Result Value    RBC 4.23 (*)     Hemoglobin 10.7 (*)     Hematocrit 32.6 (*)     MCV 77 (*)     MCH 25.3 (*)     RDW 21.7 (*)     Platelets 470 (*)     Mono # 1.4 (*)     All other components within normal limits   COMPREHENSIVE METABOLIC PANEL - Abnormal; Notable for the following:     Albumin 2.9 (*)     ALT 8 (*)     All other components within normal limits    Narrative:     Recoll. [id number] by TITA at 05/12/2018 11:15, reason: Specimen   hemolyzed. CALLED TO [facility] IN ED.  05/12/2018  11:15   B-TYPE NATRIURETIC PEPTIDE - Abnormal; Notable for the following:      (*)     All other components within normal limits    Narrative:     Recoll. [id number] by TITA at 05/12/2018 11:15, reason: Specimen   hemolyzed. CALLED TO [facility] IN ED.  05/12/2018  11:15   SEDIMENTATION RATE, MANUAL - Abnormal; Notable for the following:     Sed Rate 89 (*)     All other components within normal limits   TROPONIN I    Narrative:     Recoll. [id number]  by TITA at 05/12/2018 11:15, reason: Specimen   hemolyzed. CALLED TO [facility] IN ED.  05/12/2018  11:15   PROTIME-INR    Narrative:     Recoll. [id number] by TITA at 05/12/2018 11:15, reason: Specimen   hemolyzed. CALLED TO [facility]ANK[facility] IN ED.  05/12/2018  11:15   CK   CK-MB     EKG Readings: (Independently Interpreted)   Initial Reading: No STEMI. Rhythm: Normal Sinus Rhythm. Ectopy: PVCs. Conduction: Normal. ST Segments: Normal ST Segments. T Waves: Normal. Clinical Impression: Normal Sinus Rhythm          Medical Decision Making:   History:   Old Medical Records: I decided to obtain old medical records.  Initial Assessment:   Medical decision-making:    The patient received a medical screening exam. If performed, the EKG was independently evaluated by me and is pending final cardiology evaluation.  If performed, all radiographic studies were independently evaluated by me and are pending final radiology evaluation. If labs were ordered, they were reviewed. Vital signs are independently assessed by me.  If performed, the pulse oximetry was independently evaluated by me.  I decided to obtain the patient's past medical record.  If available, I reviewed the patient's past medical record, including most recent labs and radiology reports.    ED Management:  This is an emergent evaluation for a patient with chest pain.The patient's pain is atypical for cardiac etiology.  I decided to obtain and review the patient's past medical record.      Clinically, the pt has chest wall pain.   The vital signs are stable in the room.    EKG is normal.  There is no evidence of STEMI or ischemia.  PVC on EKG, but now resolved on monitor  CXR is negative for pneumonia, pneumothorax and edema.  Troponin is negative and was drawn at least 8 hours since the onset of pain.  I doubt ACS.  BNP is negative.  There is no evidence of congestive heart failure.  The electrolytes are relatively normal.  The pt is not anemic.  Consider, but doubt PE. No risk  factors.     Currently the patient has a a non-diagnostic EKG with negative troponin in the emergency department.  I doubt acute coronary syndrome.  I did inform the patient that even with negative testing, we can never eliminate all risk.  I believe the patient is low risk with negative initial testing; they are appropriate for close outpatient follow-up.  The patient is aware of the small but persistent risk for MI/ACS with subsequent cardiac complications or death.  I have low suspicion for cardiopulmonary, vascular, infectious, respiratory, or other emergent medical condition based on my evaluation in the ED.     The patient's pain is currently improved.      The results and physical exam findings were reviewed with the patient. Pt agrees with assessment, disposition and treatment plan and has no further questions or complaints at this time.      SNEHAL Sexton M.D. 1:53 PM 5/12/2018              Scribe Attestation:   Scribe #1: I performed the above scribed service and the documentation accurately describes the services I performed. I attest to the accuracy of the note.    Attending Attestation:           Physician Attestation for Scribe:  Physician Attestation Statement for Scribe #1: I, Mainsh Sexton MD, reviewed documentation, as scribed by Love Schaefer in my presence, and it is both accurate and complete.                    Clinical Impression:   The primary encounter diagnosis was Chest wall pain. A diagnosis of Chest pain was also pertinent to this visit.                           Manish Sexton MD  05/12/18 [id number]       Manish Sexton MD  05/12/18 [id number]

## 2018-05-25 NOTE — PATIENT INSTRUCTIONS
Labs today- anemia labs  Vaccines- Shingrix (Shingles), Tetanus, Pneumococcal   Schedule colonoscopy  Labs in 3 months- standing labs and lipid panel  Return to clinic in 3 months

## 2018-05-25 NOTE — PROGRESS NOTES
"Subjective:       Patient ID: Micheal Chirinos Jr. is a 57 y.o. male.    Chief Complaint: Disease management- ankylosing spondylitis    HPI     Mr. Micheal Chirinos Jr. is a 57 y.o. man, here for follow up on AS. Last seen on   12/13/17. At last visit, he was having pain and swelling in the right foot, otherwise was stable.    Today, he reports, foot pain is "doing ok" and "acts up every now and then" with swelling but   has improved enough with time and compound cream that he did not start PT. He continues   on sulfasalazine-EN 1500mg twice daily for peripheral spondyloarthritis and has declined   biologics (TNFi, secukinumab) especially with recent recurrent spontaneous left dental abscess   and facial cellulitis. He has approx. 1 hour of AM stiffness. For pain relief, he takes Norco   10-325mg TID (occasionally upto 4 times daily with flares) and Flexeril QHS (unsure of the dose).  He takes Celecoxib 100mg rarely after developing a stomach ulcer a year ago. For this, he  takes Prilosec 20mg every morning and has had no further stomach problems since starting  treatment. He continues with regular HSP and exercise- rides a stationary bike and does band  exercises every M/W/F. He has not had his colonoscopy; he needed to reschedule due to a   schedule conflict. Mr. Chirinos was recently seen in the ER for rib pain after over exerting himself   with yard work. He xray that showed mild cardiomegaly and had BNP elevation, but no elevation   of troponin and no findings on EKG and was discharged. Pain resolved soon after. He reports no   medical changes and no infections since last visit.       Review of Systems   Constitutional: Positive for fatigue (6/10). Negative for chills and fever.   HENT: Negative for mouth sores, nosebleeds and sore throat.         Dry mouth     Eyes: Negative for pain and redness.   Respiratory: Negative for chest tightness and shortness of breath.    Cardiovascular: Positive for " "leg swelling (occ right ankle swelling). Negative for chest pain and palpitations.   Gastrointestinal: Negative for abdominal pain and blood in stool.   Musculoskeletal: Positive for arthralgias, back pain, gait problem, joint swelling, neck pain and neck stiffness.   Skin: Negative for rash and wound.   Neurological: Negative for light-headedness and headaches.   Hematological: Does not bruise/bleed easily.         Objective:   /70   Pulse 64   Ht 5' 9.6" (1.768 m)   Wt 65.8 kg (145 lb 1.6 oz)   BMI 21.06 kg/m²      Physical Exam   Constitutional: He is oriented to person, place, and time and well-developed, well-nourished, and in no distress.   HENT:   Head: Normocephalic and atraumatic.   Mouth/Throat: Oropharynx is clear and moist.   Poor dentition   Eyes: EOM are normal. Pupils are equal, round, and reactive to light.   Neck: Neck supple. No thyromegaly present.   Cardiovascular: Normal rate, regular rhythm, normal heart sounds and intact distal pulses.    Pulmonary/Chest: Breath sounds normal. He has no wheezes.   Decreased inspiratory phase   Abdominal: Soft. Bowel sounds are normal.       Right Side Rheumatological Exam     The patient has an enlarged shoulder, elbow, wrist and 2nd DIP    Shoulder Exam     Range of Motion   Active Abduction: 90   Extension: 30   Forward Flexion: 90     Elbow/Wrist Exam     Range of Motion   Elbow   Extension: -10     Left Side Rheumatological Exam     The patient is tender to palpation of the elbow.    The patient has an enlarged shoulder, elbow, 4th DIP and 5th DIP.    Shoulder Exam     Range of Motion   Active Abduction: 90   Extension: 30   Forward Flexion: 90     Elbow/Wrist Exam     Range of Motion   Elbow   Extension: -10       Back/Neck Exam   General Inspection   Spinal Kyphosis: present    Neck Range of Motion   Flexion:  Severely limited 0  Extension:  Severely limited 0  Right Lateral Bend: 0 abnormal  Left Lateral Bend: 0 abnormal  Right Rotation: " abnormal  Left Rotation: abnormal    Comments:  FADIR and FABERE neg. B/l for SIJ pain  Severely limited hip ROM b/l <20degrees with anterior and lateral hip pain on internal and external rotation  Chest expansion: 2 cm  Schober test: 10-11cm  Right elbow contracture -15 degrees of extension, left elbow -10 degrees  Left knee lacking 5 degrees of full extension  Decreased rom wrists and ankles  No spinal TTP; no joint TTP or swelling    Lymphadenopathy:     He has no cervical adenopathy.   Neurological: He is alert and oriented to person, place, and time. He has normal strength. He exhibits abnormal muscle tone. Gait abnormal.   Skin: Skin is warm and dry. No rash noted.         Images  EXAMINATION:  XR CHEST PA AND LATERAL    CLINICAL HISTORY:  Chest Pain;    TECHNIQUE:  PA and lateral views of the chest were performed.    COMPARISON:  None    FINDINGS:  The lungs are clear and free of infiltrate.  No pleural effusion or pneumothorax. The heart is mildly enlarged.   Impression       1.  No acute cardiopulmonary process.      Electronically signed by: Yang Pearson DO  Date: 05/12/2018  Time: 10:59    Encounter     View Encounter         Narrative     Right foot 3 views.  There is narrowing at the first MTP with mild hallux valgus.  Otherwise no acute fracture seen.  Small calcaneal spurs noted.  Some osteophytes about the dorsal aspect of the foot.   Impression      See above      Electronically signed by: MERLENE BETANCOURT MD  Date: 12/14/17  Time: 14:17        Labs  Results for CHELSEA BOYD  (MRN [id number]) as of 5/25/2018 08:06   Ref. Range 5/12/2018 11:46 5/12/2018 11:47   Protime Latest Ref Range: 9.0 - 12.5 sec 11.4    Coumadin Monitoring INR Latest Ref Range: 0.8 - 1.2  1.1    Sodium Latest Ref Range: 136 - 145 mmol/L  141   Potassium Latest Ref Range: 3.5 - 5.1 mmol/L  3.6   Chloride Latest Ref Range: 95 - 110 mmol/L  105   CO2 Latest Ref Range: 23 - 29 mmol/L  28   Anion Gap Latest Ref Range: 8 -  16 mmol/L  8   BUN, Bld Latest Ref Range: 6 - 20 mg/dL  10   Creatinine Latest Ref Range: 0.5 - 1.4 mg/dL  0.8   eGFR if non African American Latest Ref Range: >60 mL/min/1.73 m^2  >60   eGFR if  Latest Ref Range: >60 mL/min/1.73 m^2  >60   Glucose Latest Ref Range: 70 - 110 mg/dL  83   Calcium Latest Ref Range: 8.7 - 10.5 mg/dL  9.3   Alkaline Phosphatase Latest Ref Range: 55 - 135 U/L  95   Total Protein Latest Ref Range: 6.0 - 8.4 g/dL  7.8   Albumin Latest Ref Range: 3.5 - 5.2 g/dL  2.9 (L)   Total Bilirubin Latest Ref Range: 0.1 - 1.0 mg/dL  0.3   AST Latest Ref Range: 10 - 40 U/L  10   ALT Latest Ref Range: 10 - 44 U/L  8 (L)   Troponin I Latest Ref Range: 0.000 - 0.026 ng/mL  0.008   BNP Latest Ref Range: 0 - 99 pg/mL  158 (H)      Ref. Range 5/12/2018 11:21   Sed Rate Latest Ref Range: 0 - 10 mm/Hr 89 (H)      Ref. Range 5/12/2018 10:24   WBC Latest Ref Range: 3.90 - 12.70 K/uL 9.70   RBC Latest Ref Range: 4.60 - 6.20 M/uL 4.23 (L)   Hemoglobin Latest Ref Range: 14.0 - 18.0 g/dL 10.7 (L)   Hematocrit Latest Ref Range: 40.0 - 54.0 % 32.6 (L)   MCV Latest Ref Range: 82 - 98 fL 77 (L)   MCH Latest Ref Range: 27.0 - 31.0 pg 25.3 (L)   MCHC Latest Ref Range: 32.0 - 36.0 g/dL 32.8   RDW Latest Ref Range: 11.5 - 14.5 % 21.7 (H)   Platelets Latest Ref Range: 150 - 350 K/uL 470 (H)   MPV Latest Ref Range: 9.2 - 12.9 fL 11.0   Gran% Latest Ref Range: 38.0 - 73.0 % 45.4   Gran # (ANC) Latest Ref Range: 1.8 - 7.7 K/uL 4.4   Lymph% Latest Ref Range: 18.0 - 48.0 % 36.3   Lymph # Latest Ref Range: 1.0 - 4.8 K/uL 3.5   Mono% Latest Ref Range: 4.0 - 15.0 % 14.1   Mono # Latest Ref Range: 0.3 - 1.0 K/uL 1.4 (H)   Eosinophil% Latest Ref Range: 0.0 - 8.0 % 3.4   Eos # Latest Ref Range: 0.0 - 0.5 K/uL 0.3   Basophil% Latest Ref Range: 0.0 - 1.9 % 0.6   Baso # Latest Ref Range: 0.00 - 0.20 K/uL 0.06     Assessment:       AS: ASDAS-CRP: 4.54(very high activity) BASDAI: 4.1(high activity)   BASFI 5.55(high  functional limitation)  H/o AAU not recent  Iron deficiency anemia , H/o H. Pylori h/o colon polyps (2013) due for colonoscopy Dr. Godfrey  Hyperlipidemia > statin threshold for mod-high dose statin repeat due(6.5%)      Plan:       Cont. sulfasalazine-EN  1500mg twice daily for peripheral spondyloarthritis- refill ordered--Declines   biologics(TNFi(etanercept twice weekly with shortest T1/2), secukinumab) especially with   recurrent spontaneous left dental abscess and facial cellulitis  Cont. Celecoxib 100mg once or twice daily PRN  Cont. Omeprazole 20mg twice daily  Labs today- anemia labs- Iron, TIBC, ferritin, B12, folate  *Prevnar 13, Shingrix, Tdap ordered  Schedule colonoscopy as ordered by Dr. Godfrey 5/1/17 and 1/12/18  Cont HEP/HSP    RTC 3 months with standing labs, lipid panel (fasting)

## 2018-05-25 NOTE — PROGRESS NOTES
I have personally taken the history and examined the patient and agree with the resident,s note as stated above    Schober's 10-11 cm    Neck rom:0  Chest expansion: 2 cm  Fabere + ant hip pain bilateral, no SIJ pain  Severe decrease int rot 20 and ext 20 both hips  Right elbow 15 contracture  Decreased rom wrists         Results for CHELSEA BOYD JR. (MRN [id number]) as of 5/25/2018 08:12   Ref. Range 5/12/2018 10:24 5/12/2018 11:21 5/12/2018 11:21 5/12/2018 11:46 5/12/2018 11:47   WBC Latest Ref Range: 3.90 - 12.70 K/uL 9.70       RBC Latest Ref Range: 4.60 - 6.20 M/uL 4.23 (L)       Hemoglobin Latest Ref Range: 14.0 - 18.0 g/dL 10.7 (L)       Hematocrit Latest Ref Range: 40.0 - 54.0 % 32.6 (L)       MCV Latest Ref Range: 82 - 98 fL 77 (L)       MCH Latest Ref Range: 27.0 - 31.0 pg 25.3 (L)       MCHC Latest Ref Range: 32.0 - 36.0 g/dL 32.8       RDW Latest Ref Range: 11.5 - 14.5 % 21.7 (H)       Platelets Latest Ref Range: 150 - 350 K/uL 470 (H)       MPV Latest Ref Range: 9.2 - 12.9 fL 11.0       Gran% Latest Ref Range: 38.0 - 73.0 % 45.4       Gran # (ANC) Latest Ref Range: 1.8 - 7.7 K/uL 4.4       Lymph% Latest Ref Range: 18.0 - 48.0 % 36.3       Lymph # Latest Ref Range: 1.0 - 4.8 K/uL 3.5       Mono% Latest Ref Range: 4.0 - 15.0 % 14.1       Mono # Latest Ref Range: 0.3 - 1.0 K/uL 1.4 (H)       Eosinophil% Latest Ref Range: 0.0 - 8.0 % 3.4       Eos # Latest Ref Range: 0.0 - 0.5 K/uL 0.3       Basophil% Latest Ref Range: 0.0 - 1.9 % 0.6       Baso # Latest Ref Range: 0.00 - 0.20 K/uL 0.06       Sed Rate Latest Ref Range: 0 - 10 mm/Hr  89 (H)      Protime Latest Ref Range: 9.0 - 12.5 sec    11.4    Coumadin Monitoring INR Latest Ref Range: 0.8 - 1.2     1.1    Sodium Latest Ref Range: 136 - 145 mmol/L     141   Potassium Latest Ref Range: 3.5 - 5.1 mmol/L     3.6   Chloride Latest Ref Range: 95 - 110 mmol/L     105   CO2 Latest Ref Range: 23 - 29 mmol/L     28   Anion Gap Latest Ref Range: 8 - 16  mmol/L     8   BUN, Bld Latest Ref Range: 6 - 20 mg/dL     10   Creatinine Latest Ref Range: 0.5 - 1.4 mg/dL     0.8   eGFR if non African American Latest Ref Range: >60 mL/min/1.73 m^2     >60   eGFR if  Latest Ref Range: >60 mL/min/1.73 m^2     >60   Glucose Latest Ref Range: 70 - 110 mg/dL     83   Calcium Latest Ref Range: 8.7 - 10.5 mg/dL     9.3   Alkaline Phosphatase Latest Ref Range: 55 - 135 U/L     95   Total Protein Latest Ref Range: 6.0 - 8.4 g/dL     7.8   Albumin Latest Ref Range: 3.5 - 5.2 g/dL     2.9 (L)   Total Bilirubin Latest Ref Range: 0.1 - 1.0 mg/dL     0.3   AST Latest Ref Range: 10 - 40 U/L     10   ALT Latest Ref Range: 10 - 44 U/L     8 (L)   CPK Latest Ref Range: 20 - 200 U/L  86 86     CPK MB Latest Ref Range: 0.1 - 6.5 ng/mL   0.6     MB% Latest Ref Range: 0.0 - 5.0 %   0.7     Troponin I Latest Ref Range: 0.000 - 0.026 ng/mL     0.008   BNP Latest Ref Range: 0 - 99 pg/mL     158 (H)     Please tell  Pt the right foot x-ray shows bone spurs under and behind the heel which is likely related to the  Ankylosing spondylitis. Also some degenerative changes top of  Midfoot and great toe. [facility]          PACS Images     Show images for X-Ray Foot Complete Right   Reviewed By List     Cr Navas MD on 12/19/2017 07:11   Cr Navas MD on 12/14/2017 18:26   External Result Report     External Result Report   Narrative     Right foot 3 views.  There is narrowing at the first MTP with mild hallux valgus.  Otherwise no acute fracture seen.  Small calcaneal spurs noted.  Some osteophytes about the dorsal aspect of the foot.   Impression      See above      Electronically signed by: MERLENE BETANCOURT MD  Date: 12/14/17  Time: 14:17     Encounter     View Encounter                     AS: ASDAS-CRP: 4.54(very high activity) BASDAI: 4.1(high activity) BASFI 5.55(high functional limitation )  H/o AAU not recent  Improved right elbow pain still flexion deformity  Resolved Bilateral  knee effusions  Iron deficiency anemia , H/o H. Pylori h/o colon polyps(2013) due for colonoscopy Dr. Godfrey  Hyperlipidemia > statin threshold for mod-high dose statin repeat due(6.5%)    *Prevnar 13, Shingrix, Tdap  Anemia labs today  Celecoxib 100mg once or twice daily WM according to symptoms  Omeprazole 20mg twice daily  Schedule colonoscopy as ordered by Dr. Godfrey 5/1/17 and 1/12/18  Cont HEP    sulfasalazine-EN  1500mg twice daily for peripheral spondyloarthritis   Declines biologics(TNFi(etanercept twice weekly with shortest T1/2), secukinumab) especially with recent recurrent spontaneous left dental abscess and facial cellulitis  RTC 3 months with standing labs, lipid panel

## 2018-05-30 NOTE — TELEPHONE ENCOUNTER
----- Message from Fanta Ozuna sent at 5/30/2018  8:54 AM CDT -----  Contact: wife  Pt wife calling to schedule injection and vaccines. Please refer to Dr Navas's notes if possible. They would like to get this done at this office. Please call [phone] or [phone].

## 2018-05-30 NOTE — TELEPHONE ENCOUNTER
Left voicemail for patient to return call to clinic to scheduled an annual exam. An office visit is needed for vaccines. LOV with PCP 03/17/2017.

## 2018-05-30 NOTE — TELEPHONE ENCOUNTER
Attempted contact to schedule colonoscopy.  No answer. Left message with the main line phone number to return call.  There have been multiple attempts to schedule patient.   He has had multiple schedules and reschedules.  Also sent a letter to contact the Endoscopy department to schedule procedure.        MD Lorena Jaimes, GENTRY             Please call tos chedule colonoscopy.   Order was placed in eliud

## 2018-05-31 NOTE — TELEPHONE ENCOUNTER
----- Message from Aundrea Lacy sent at 5/31/2018  8:26 AM CDT -----  Contact: Wife   pts wife is returning your call. Please call at [phone].

## 2018-06-04 NOTE — PROGRESS NOTES
Pt tolerated tdap and pneumonia vaccines to left deltoid without difficulty; no adverse reaction noted; VIS given

## 2018-07-18 PROBLEM — F11.90 OPIATE USE: Chronic | Status: ACTIVE | Noted: 2018-01-01

## 2018-07-18 PROBLEM — M25.531 WRIST PAIN, ACUTE, RIGHT: Status: ACTIVE | Noted: 2018-01-01

## 2018-07-18 NOTE — PROGRESS NOTES
Subjective:       Patient ID: Micheal Chirinos Jr. is a 57 y.o. male.    Chief Complaint: Neck Pain; Back Pain; and Wrist Pain    Neck Pain    Pertinent negatives include no chest pain, fever, headaches or trouble swallowing.   Back Pain   Pertinent negatives include no abdominal pain, chest pain, fever or headaches.   Hip Pain      Knee Pain      Shoulder Pain    Pertinent negatives include no fever or headaches.   Wrist Pain    Pertinent negatives include no fever.   Hand Pain    Pertinent negatives include no chest pain.   Mr. Chirinos, returns to clinic for multiple MSK pains, chronic neck, back pain, and multiple joint pain.   He has a severe ankylosing spondylitis with axial neck and back  pain and upper thoracic and lower pelvic girdle pain.  LCV 04/18/18.    Today, his chief complains are :    1.Chronic neck pain, and shoulders,  2. Low back pain,    He states that he has his good, and bad days.         #1.neck pain,   current pain is 6/10, and the worst pain is 10/10.  Neck pain is present t the both side of the neck R > L, it feels as muscle spasms/ tightness,  It radiates to Right  shoulders, and shoulder blade  not to arm/hand.   No numbness, paraesthesias in arms/ hands.   Neck pain is on/ off, worse at night time, sometimes he has trouble placing head on pillow.   Neck pain is running to shoulder blades, not to arms, pain is more localized in joints, on right side,   Right shoulder, elbow, swell, when he does more exercise, and it is always on right side.     #2 Low  back pain,   current pain is 4/10, worst pain lately 7/10.   Pain is localized mainly in mid spine , in axial distribution, and just across low back bilateral.   Pain is off/om,   No radiation to buttocks, legs, but back of legs are very tight.    He has a severe ankylosing spondylitis with axial neck and back  pain and upper thoracic and lower pelvic girdle pain.   He takes hydrocodone 10 mg and he usually takes three tablets a day;  however, on bad days he takes  four tablets a day.    He failed almost all muscle relaxants Dantrium secondary to making him tired, Flexeril, makes him sleepy. (although still taking it).   He was given Celebrex by dr. Navas, but he is afraidl secondary to GI bleed, a year ago.  He is taking only Sulfasalazine and Celebrex 200 daily for his severe AS, that is always with high infllamatory markers,    Patient completed Physical therapy for neck pain, at [facility].   He does his own stretching,and goes 3x/week to fitness.  Exercises regularly, and stretches his neck/upper/lower spine, lying prone on table for 20-30 minutes daily.     He is on chronic pain management with me, takes Hydrocodone 10/325 mg , 3-4x/day, and Flexeril at bedtime for several years ,   with no need for increase dose, nor frequency. States that hydrocodone helps him to move, with reasonable pain control.   Here for follow up, and chronic pain management with opiates.  Past Medical History:   Diagnosis Date    Degenerative disc disease     Hemorrhage of rectum and anus     Hypertension     Idiopathic ankylosing spondylitis 2000    Joint pain        Past Surgical History:   Procedure Laterality Date    COLONOSCOPY N/A 2/27/2018    Procedure: COLONOSCOPY;  Surgeon: Jaswant Godfrey MD;  Location: [facility] ([address] [place] [address]);  Service: Endoscopy;  Laterality: N/A;  original order from 5/1/17 per Dr Godfrey-unable to use that order, new order placed    MOUTH SURGERY         Family History   Problem Relation Age of Onset    Arthritis Mother     Kidney disease Father     No Known Problems Sister     No Known Problems Brother     No Known Problems Maternal Aunt     No Known Problems Maternal Uncle     No Known Problems Paternal Aunt     No Known Problems Paternal Uncle     No Known Problems Maternal Grandmother     No Known Problems Maternal Grandfather     No Known Problems Paternal Grandmother     No Known Problems Paternal Grandfather      Celiac disease Neg Hx     Cirrhosis Neg Hx     Colon cancer Neg Hx     Colon polyps Neg Hx     Crohn's disease Neg Hx     Cystic fibrosis Neg Hx     Esophageal cancer Neg Hx     Hemochromatosis Neg Hx     Inflammatory bowel disease Neg Hx     Irritable bowel syndrome Neg Hx     Liver cancer Neg Hx     Liver disease Neg Hx     Rectal cancer Neg Hx     Stomach cancer Neg Hx     Ulcerative colitis Neg Hx     Jorge's disease Neg Hx     Melanoma Neg Hx     Psoriasis Neg Hx     Lupus Neg Hx     Eczema Neg Hx     Acne Neg Hx     Amblyopia Neg Hx     Blindness Neg Hx     Cataracts Neg Hx     Glaucoma Neg Hx     Macular degeneration Neg Hx     Retinal detachment Neg Hx     Strabismus Neg Hx     Cancer Neg Hx     Diabetes Neg Hx     Hypertension Neg Hx     Stroke Neg Hx     Thyroid disease Neg Hx        Social History     Social History    Marital status:      Spouse name: N/A    Number of children: N/A    Years of education: N/A     Occupational History    Disabled      Social History Main Topics    Smoking status: Never Smoker    Smokeless tobacco: Never Used    Alcohol use 0.0 oz/week      Comment: one's in the blue moon    Drug use: Yes     Frequency: 2.0 times per week     Types: Marijuana    Sexual activity: Yes     Partners: Female     Other Topics Concern    None     Social History Narrative    None       Current Outpatient Prescriptions   Medication Sig Dispense Refill    cetirizine (ZYRTEC) 10 mg TbDL 1 Tablet(s) Oral PRN Every day.        cyclobenzaprine (FLEXERIL) 10 MG tablet Take 1 tablet (10 mg total) by mouth 3 (three) times daily as needed. 90 tablet 11    HYDROcodone-acetaminophen (NORCO)  mg per tablet Take 1 tablet by mouth every 6 (six) hours as needed for Pain (msk pain). 120 tablet 0    [START ON 8/18/2018] HYDROcodone-acetaminophen (NORCO)  mg per tablet Take 1 tablet by mouth every 6 (six) hours as needed for Pain (msk pain). 120  tablet 0    [START ON 9/18/2018] HYDROcodone-acetaminophen (NORCO)  mg per tablet Take 1 tablet by mouth every 6 (six) hours as needed for Pain (msk pain). 120 tablet 0    omeprazole (PRILOSEC) 20 MG capsule TAKE 1 CAPSULE(20 MG) BY MOUTH TWICE DAILY 180 capsule 1    sulfaSALAzine (AZULFIDINE) 500 MG TbEC Take 3 tablets (1,500 mg total) by mouth 2 (two) times daily. 540 tablet 0    sulfaSALAzine (AZULFIDINE) 500 MG TbEC TAKE 3 TABLETS BY MOUTH TWICE DAILY 540 tablet 0     No current facility-administered medications for this visit.        Review of patient's allergies indicates:   Allergen Reactions    No known drug allergies          Review of Systems   Constitutional: Negative for activity change, appetite change, chills, diaphoresis, fatigue, fever and unexpected weight change.   HENT: Negative for trouble swallowing and voice change.    Eyes: Negative for pain and visual disturbance.   Respiratory: Negative for chest tightness, shortness of breath and wheezing.    Cardiovascular: Negative for chest pain, palpitations and leg swelling.   Gastrointestinal: Negative for abdominal pain, constipation and diarrhea.   Genitourinary: Negative for difficulty urinating, frequency and urgency.   Musculoskeletal: Positive for back pain and neck pain. Negative for joint swelling, myalgias and neck stiffness. Arthralgias:  left knee hurts, no swelling, no warmth, no popping, clicking.    Skin: Negative for rash and wound.   Neurological: Negative for dizziness, facial asymmetry, speech difficulty, light-headedness and headaches.   Hematological: Negative for adenopathy.   Psychiatric/Behavioral: Negative for agitation, behavioral problems, confusion, decreased concentration, dysphoric mood and sleep disturbance.           Objective:      Physical Exam    GENERAL: The patient is alert, oriented, pleasant.   HEENT: PERRLA  NECK: supple, no masses   CV: S1S2, RRR,   LUNGS: CTA bilateral.   ABDOMEN: soft, non-tender,  bowel sounds nl.  EXTREMITIES: no edema, +2 pulses DP, b/l  SKIN: no skin rash, no skin breakdowns.   MUSCULOSKELETAL EXAM:  Gait is wobbling on wide bases with rotating pelvis        secondary to bilateral SI joints fusion and severe ankylosing spondylitis with      bamboo spine and moderate-to-severe kyphosis on the thoracic spine.    Center of gravity is moved forward         Walks with widely spread upper extremity to maintain dynamic balance.   He had no loss of balance.  Steps are wider and nanette is normal.   The patient is not able to stand upright touching with his occiput the wall.   There is  significantly increased distance from the occiput to the wall, more than 20 cm.    Cervical spine has very limited range of motion, especially extension that lacks more than 30-35 degrees from neutral.    Cervical spine flexion is still down to 50, extension again lacks 30-35 degrees.    There is still very limited motion in flexion and extension as well as a decreased motion in     rotation and side bending, probably 10-15 degrees bilaterally.    The patient has moderate  tenderness in paravertebral cervical musculature and upper   trapezius.  No tenderness in palpation of the cervical or thoracic spine.    Decreased active range of motion of bilateral upper and lower extremities.  Bilateral shoulders that have decreased abduction to 70 degrees, and flex forward to 80 degrees,  Decreased internal and external rotation to 30 degrees.  Decreased right elbow extension to 160-170 degrees.   FROM in both knees, no edema, no laxity, moderate  tenderness at medical plato line.   + pain with IR in both hips, decreased AROM in both hips.  Minimally decreased AROM in Rt wrist.  Muscle  strength is 5/5 throughout x4 extremities.    Minimal tenderness at right lateral epicondyle, with resisted wrist extension.  No joint laxity throughout x4  extremities.    Straight leg raising negative bilaterally.    Lumbar spine  flexion to  90 with holding on to table, extension to 0, side bending and     rotation decreased to about 20-25 degrees bilaterally.    Fused SI joints  without tenderness on palpation.                                             NEUROLOGIC:  Cranial nerves II through XII intact.    Deep tendon reflexes increased +3, symmetrical throughout x4 extremities.    Sensory is intact to light touch and pinprick throughout x4 extremities.    Tight posterior  hamstrings and increased tone in muscles in posterior hamstrings group and   gastrocnemius soleus group on Sobia scale, 1+/4.          Assessment:       1. Chronic pain syndrome    2. Ankylosing spondylitis of multiple sites in spine    3. Spondylosis of cervical region without myelopathy or radiculopathy    4. Neck pain    5. Muscle spasms of neck    6. Chronic low back pain with sciatica, sciatica laterality unspecified, unspecified back pain laterality    7. Wrist pain, acute, right    8. Opiate use    9. Ankylosing spondylitis, unspecified site of spine    10. Chronic pain of right knee    11. Chronic bilateral low back pain without sciatica    12. Chronic pain of both knees    13. Hip pain, bilateral    14. Right knee pain, unspecified chronicity    15. Shoulder arthritis    16. Left hip pain        Plan:       Chronic pain syndrome  -     HYDROcodone-acetaminophen (NORCO)  mg per tablet; Take 1 tablet by mouth every 6 (six) hours as needed for Pain (msk pain).  Dispense: 120 tablet; Refill: 0  -     HYDROcodone-acetaminophen (NORCO)  mg per tablet; Take 1 tablet by mouth every 6 (six) hours as needed for Pain (msk pain).  Dispense: 120 tablet; Refill: 0  -     HYDROcodone-acetaminophen (NORCO)  mg per tablet; Take 1 tablet by mouth every 6 (six) hours as needed for Pain (msk pain).  Dispense: 120 tablet; Refill: 0    Ankylosing spondylitis of multiple sites in spine  -     HYDROcodone-acetaminophen (NORCO)  mg per tablet; Take 1 tablet by mouth every  6 (six) hours as needed for Pain (msk pain).  Dispense: 120 tablet; Refill: 0  -     HYDROcodone-acetaminophen (NORCO)  mg per tablet; Take 1 tablet by mouth every 6 (six) hours as needed for Pain (msk pain).  Dispense: 120 tablet; Refill: 0  -     HYDROcodone-acetaminophen (NORCO)  mg per tablet; Take 1 tablet by mouth every 6 (six) hours as needed for Pain (msk pain).  Dispense: 120 tablet; Refill: 0    Spondylosis of cervical region without myelopathy or radiculopathy  -     HYDROcodone-acetaminophen (NORCO)  mg per tablet; Take 1 tablet by mouth every 6 (six) hours as needed for Pain (msk pain).  Dispense: 120 tablet; Refill: 0  -     HYDROcodone-acetaminophen (NORCO)  mg per tablet; Take 1 tablet by mouth every 6 (six) hours as needed for Pain (msk pain).  Dispense: 120 tablet; Refill: 0  -     HYDROcodone-acetaminophen (NORCO)  mg per tablet; Take 1 tablet by mouth every 6 (six) hours as needed for Pain (msk pain).  Dispense: 120 tablet; Refill: 0    Neck pain  -     HYDROcodone-acetaminophen (NORCO)  mg per tablet; Take 1 tablet by mouth every 6 (six) hours as needed for Pain (msk pain).  Dispense: 120 tablet; Refill: 0  -     HYDROcodone-acetaminophen (NORCO)  mg per tablet; Take 1 tablet by mouth every 6 (six) hours as needed for Pain (msk pain).  Dispense: 120 tablet; Refill: 0  -     HYDROcodone-acetaminophen (NORCO)  mg per tablet; Take 1 tablet by mouth every 6 (six) hours as needed for Pain (msk pain).  Dispense: 120 tablet; Refill: 0    Muscle spasms of neck  -     HYDROcodone-acetaminophen (NORCO)  mg per tablet; Take 1 tablet by mouth every 6 (six) hours as needed for Pain (msk pain).  Dispense: 120 tablet; Refill: 0  -     HYDROcodone-acetaminophen (NORCO)  mg per tablet; Take 1 tablet by mouth every 6 (six) hours as needed for Pain (msk pain).  Dispense: 120 tablet; Refill: 0  -     HYDROcodone-acetaminophen (NORCO)  mg per tablet;  Take 1 tablet by mouth every 6 (six) hours as needed for Pain (msk pain).  Dispense: 120 tablet; Refill: 0    Chronic low back pain with sciatica, sciatica laterality unspecified, unspecified back pain laterality  -     HYDROcodone-acetaminophen (NORCO)  mg per tablet; Take 1 tablet by mouth every 6 (six) hours as needed for Pain (msk pain).  Dispense: 120 tablet; Refill: 0  -     HYDROcodone-acetaminophen (NORCO)  mg per tablet; Take 1 tablet by mouth every 6 (six) hours as needed for Pain (msk pain).  Dispense: 120 tablet; Refill: 0  -     HYDROcodone-acetaminophen (NORCO)  mg per tablet; Take 1 tablet by mouth every 6 (six) hours as needed for Pain (msk pain).  Dispense: 120 tablet; Refill: 0    Wrist pain, acute, right    Opiate use    Ankylosing spondylitis, unspecified site of spine  -     HYDROcodone-acetaminophen (NORCO)  mg per tablet; Take 1 tablet by mouth every 6 (six) hours as needed for Pain (msk pain).  Dispense: 120 tablet; Refill: 0  -     HYDROcodone-acetaminophen (NORCO)  mg per tablet; Take 1 tablet by mouth every 6 (six) hours as needed for Pain (msk pain).  Dispense: 120 tablet; Refill: 0  -     HYDROcodone-acetaminophen (NORCO)  mg per tablet; Take 1 tablet by mouth every 6 (six) hours as needed for Pain (msk pain).  Dispense: 120 tablet; Refill: 0    Chronic pain of right knee  -     HYDROcodone-acetaminophen (NORCO)  mg per tablet; Take 1 tablet by mouth every 6 (six) hours as needed for Pain (msk pain).  Dispense: 120 tablet; Refill: 0  -     HYDROcodone-acetaminophen (NORCO)  mg per tablet; Take 1 tablet by mouth every 6 (six) hours as needed for Pain (msk pain).  Dispense: 120 tablet; Refill: 0  -     HYDROcodone-acetaminophen (NORCO)  mg per tablet; Take 1 tablet by mouth every 6 (six) hours as needed for Pain (msk pain).  Dispense: 120 tablet; Refill: 0    Chronic bilateral low back pain without sciatica  -      HYDROcodone-acetaminophen (NORCO)  mg per tablet; Take 1 tablet by mouth every 6 (six) hours as needed for Pain (msk pain).  Dispense: 120 tablet; Refill: 0  -     HYDROcodone-acetaminophen (NORCO)  mg per tablet; Take 1 tablet by mouth every 6 (six) hours as needed for Pain (msk pain).  Dispense: 120 tablet; Refill: 0  -     HYDROcodone-acetaminophen (NORCO)  mg per tablet; Take 1 tablet by mouth every 6 (six) hours as needed for Pain (msk pain).  Dispense: 120 tablet; Refill: 0    Chronic pain of both knees  -     HYDROcodone-acetaminophen (NORCO)  mg per tablet; Take 1 tablet by mouth every 6 (six) hours as needed for Pain (msk pain).  Dispense: 120 tablet; Refill: 0  -     HYDROcodone-acetaminophen (NORCO)  mg per tablet; Take 1 tablet by mouth every 6 (six) hours as needed for Pain (msk pain).  Dispense: 120 tablet; Refill: 0  -     HYDROcodone-acetaminophen (NORCO)  mg per tablet; Take 1 tablet by mouth every 6 (six) hours as needed for Pain (msk pain).  Dispense: 120 tablet; Refill: 0    Hip pain, bilateral  -     HYDROcodone-acetaminophen (NORCO)  mg per tablet; Take 1 tablet by mouth every 6 (six) hours as needed for Pain (msk pain).  Dispense: 120 tablet; Refill: 0  -     HYDROcodone-acetaminophen (NORCO)  mg per tablet; Take 1 tablet by mouth every 6 (six) hours as needed for Pain (msk pain).  Dispense: 120 tablet; Refill: 0  -     HYDROcodone-acetaminophen (NORCO)  mg per tablet; Take 1 tablet by mouth every 6 (six) hours as needed for Pain (msk pain).  Dispense: 120 tablet; Refill: 0    Right knee pain, unspecified chronicity  -     HYDROcodone-acetaminophen (NORCO)  mg per tablet; Take 1 tablet by mouth every 6 (six) hours as needed for Pain (msk pain).  Dispense: 120 tablet; Refill: 0  -     HYDROcodone-acetaminophen (NORCO)  mg per tablet; Take 1 tablet by mouth every 6 (six) hours as needed for Pain (msk pain).  Dispense: 120 tablet;  Refill: 0  -     HYDROcodone-acetaminophen (NORCO)  mg per tablet; Take 1 tablet by mouth every 6 (six) hours as needed for Pain (msk pain).  Dispense: 120 tablet; Refill: 0    Shoulder arthritis  -     HYDROcodone-acetaminophen (NORCO)  mg per tablet; Take 1 tablet by mouth every 6 (six) hours as needed for Pain (msk pain).  Dispense: 120 tablet; Refill: 0  -     HYDROcodone-acetaminophen (NORCO)  mg per tablet; Take 1 tablet by mouth every 6 (six) hours as needed for Pain (msk pain).  Dispense: 120 tablet; Refill: 0  -     HYDROcodone-acetaminophen (NORCO)  mg per tablet; Take 1 tablet by mouth every 6 (six) hours as needed for Pain (msk pain).  Dispense: 120 tablet; Refill: 0    Left hip pain  -     HYDROcodone-acetaminophen (NORCO)  mg per tablet; Take 1 tablet by mouth every 6 (six) hours as needed for Pain (msk pain).  Dispense: 120 tablet; Refill: 0  -     HYDROcodone-acetaminophen (NORCO)  mg per tablet; Take 1 tablet by mouth every 6 (six) hours as needed for Pain (msk pain).  Dispense: 120 tablet; Refill: 0  -     HYDROcodone-acetaminophen (NORCO)  mg per tablet; Take 1 tablet by mouth every 6 (six) hours as needed for Pain (msk pain).  Dispense: 120 tablet; Refill: 0      Patient with severe  AS, cx. with chronic neck and back pain associated with cervical and lumbar spondylosis and muscle spasms -   tightness in paravertebral cervical  muscles, and hamstrings.   On Sulfasalazine,and Celebrex as needed ( takes rarely sec. To bleeding PUD) for AS.      1. Chronic pain management.    The patient will resume Hydrocodone 10/325 mg one p.o. 6 hours p.r.n.( #120 tabs, and agreeable to taper down to 100 tabs/mionth, given 2 refills).   Muscle relaxant Flexeril 10 mg , TID  (helps better with relaxation than other muscle relaxants that he tried).    Does not take Cymbalta .    reviewed and appropriate.  Hydrocodone refills on 6/18, 5/18, 4/18.    Opioid Risk Score        Value Time User    Opioid Risk Score  5 4/18/2018 10:53 AM Janel Valdez MD            2. Patient completed PT, multiple times.  Exercises regularly, and stretches his neck/upper/lower spine,   lying prone on table for 20-30 minutes daily.     3. Ankylosing Spondylitis,  with multiple peripheral joint involvement.   Follows with Rheumatology, Dr. Navas.     Reviewing labs, he has CRP, and ESR  chronically  elevated.   He is currently taking less than prescribed Sulfasalazine, 3 tabs ( 1500 mg )  BID,  and also Celebrex rarely,  once /day.   He has h/o UGI bleed,a nd soon has an hollie. With GI.   He refused DMAR th.       RTC in 3 months.     Total time spent face to face with patient was 25 minutes.   More than 50% of that time was spent in counseling on diagnosis , prognosis and treatment options.   I also caunsel patient  on common and most usual side effect of prescribed medications.   Risk and benefits of opiates, possible risk of developing opiate dependence and tolerance, need of strict compliance with prescribed medications.  Pain contract, rules and obligations were discussed with patient in details.  He is aware that I would be the only doctor prescribing him pain medications and ED in a case of emergency.  I reviewed Primary care , and other specialty's notes to better coordinate patient's  care.   All questions were answered, and patient voiced understanding.

## 2018-08-27 NOTE — TELEPHONE ENCOUNTER
----- Message from Jaswant Godfrey MD sent at 8/26/2018  8:55 PM CDT -----  Please schedullle followup with JOSHUA for anemia. He has been scheduled for procedures multiple times and never followed through  ----- Message -----  From: Cr Navas MD  Sent: 8/24/2018   5:52 PM  To: Jaswant Godfrey MD, [facility] Staff    Please tell pt the cbc shows moderate and relatively stable iron deficiency anemia. Need to f/u with Dr. Godfrey in Gastro to determine site of bleeding. Likely need upper endoscopy. RJQ

## 2018-08-27 NOTE — TELEPHONE ENCOUNTER
----- Message from Estefania Rodriguez sent at 8/27/2018  1:08 PM CDT -----  Contact: self [phone]  Patient Returning Call from [facility]    Who Left Message for Patient: Benny  Communication Preference: self [phone]  Additional Information:

## 2018-09-04 PROBLEM — K92.2 UPPER GI BLEEDING: Status: RESOLVED | Noted: 2017-04-28 | Resolved: 2018-01-01

## 2018-09-04 NOTE — PROGRESS NOTES
"Chief Complaint   Patient presents with    Elevated Blood Pressure Readings    Neck Pain       Micheal Chirinos Jr. is a 57 y.o. male who presents per the Chief Complaint.  Pt is known to me and was last seen by me on 3/17/2017.  All known chronic medical issues have been documented.       HPI     ROS  Review of Systems   Constitutional: Negative.  Negative for activity change, appetite change, chills, diaphoresis, fatigue, fever and unexpected weight change.   HENT: Negative.  Negative for congestion, ear pain, hearing loss, nosebleeds, postnasal drip, rhinorrhea, sinus pressure, sneezing, sore throat and trouble swallowing.    Eyes: Negative for pain and visual disturbance.   Respiratory: Negative for cough, choking, chest tightness and shortness of breath.    Cardiovascular: Negative for chest pain, palpitations and leg swelling.   Gastrointestinal: Negative for abdominal pain, constipation, diarrhea, nausea and vomiting.   Genitourinary: Negative for difficulty urinating, dysuria, flank pain, frequency, penile pain, penile swelling, scrotal swelling, testicular pain and urgency.   Musculoskeletal: Positive for back pain (stiff), myalgias, neck pain and neck stiffness. Negative for arthralgias (right wrist), gait problem and joint swelling.   Skin: Positive for rash (recurring bumps on scalp and face).   Allergic/Immunologic: Negative for environmental allergies, food allergies and immunocompromised state.   Neurological: Negative.  Negative for dizziness, seizures, syncope, weakness, light-headedness, numbness and headaches.   Psychiatric/Behavioral: Negative.  Negative for confusion, decreased concentration, dysphoric mood and sleep disturbance. The patient is not nervous/anxious.        Physical Exam  Vitals:    09/04/18 1405   BP: 138/86   Pulse:    Resp:    Temp:     Body mass index is 21.78 kg/m².  Weight: 66.9 kg (147 lb 7.8 oz)   Height: 5' 9" (175.3 cm)     Physical Exam   Constitutional: He is " oriented to person, place, and time. He appears well-developed and well-nourished. He is active and cooperative.  Non-toxic appearance. He does not have a sickly appearance. He does not appear ill. No distress.   HENT:   Head: Normocephalic and atraumatic.   Right Ear: Hearing, external ear and ear canal normal. No decreased hearing is noted.   Left Ear: Hearing and external ear normal. No decreased hearing is noted.   Nose: Nose normal. No rhinorrhea or nasal deformity.   Mouth/Throat: Uvula is midline and oropharynx is clear and moist. He does not have dentures. Normal dentition.   Eyes: Conjunctivae, EOM and lids are normal. Pupils are equal, round, and reactive to light. Right eye exhibits no chemosis, no discharge and no exudate. No foreign body present in the right eye. Left eye exhibits no chemosis, no discharge and no exudate. No foreign body present in the left eye. No scleral icterus.   Neck: Normal range of motion and full passive range of motion without pain. Neck supple. No thyromegaly present.   Cardiovascular: Normal rate, regular rhythm, S1 normal, S2 normal and normal heart sounds. Exam reveals no gallop and no friction rub.   No murmur heard.  Pulmonary/Chest: Effort normal and breath sounds normal. No accessory muscle usage. No respiratory distress. He has no decreased breath sounds. He has no wheezes. He has no rhonchi. He has no rales.   Abdominal: Soft. Normal appearance. He exhibits no distension and no mass. There is no hepatosplenomegaly. There is no tenderness. There is no rigidity, no rebound and no guarding.   Musculoskeletal:        Right wrist: He exhibits decreased range of motion, tenderness, bony tenderness and swelling. He exhibits no effusion, no crepitus, no deformity and no laceration.        Cervical back: He exhibits decreased range of motion, pain and spasm. He exhibits normal pulse.        Thoracic back: He exhibits decreased range of motion, bony tenderness, pain and spasm.  He exhibits no tenderness.        Left foot: Normal. There is normal range of motion, no tenderness, no bony tenderness, no swelling, normal capillary refill, no crepitus, no deformity and no laceration.   Neurological: He is alert and oriented to person, place, and time. He has normal strength. No cranial nerve deficit or sensory deficit. He exhibits normal muscle tone. He displays no seizure activity. Coordination and gait normal.   Skin: Skin is warm, dry and intact. No rash noted. He is not diaphoretic. No pallor.   Psychiatric: He has a normal mood and affect. His speech is normal and behavior is normal. Judgment and thought content normal. Cognition and memory are normal. He is attentive.       Assessment & Plan    1. Ankylosing spondylitis of multiple sites in spine  Stable; no therapeutic changes at this time.  Managed by Rheumatology and PM&R.  Will add topical NSAID to use only as needed for breakthrough pain.  - diclofenac sodium 1 % Gel; Apply 2 g topically 4 (four) times daily. for 10 days  Dispense: 100 g; Refill: 5  - cyclobenzaprine (FLEXERIL) 10 MG tablet; Take 1 tablet (10 mg total) by mouth 3 (three) times daily as needed.  Dispense: 90 tablet; Refill: 5    2. Spondylosis of cervical region without myelopathy or radiculopathy  The current medical regimen is effective at this time and no changes to present plan or medications will be made at this visit.     - diclofenac sodium 1 % Gel; Apply 2 g topically 4 (four) times daily. for 10 days  Dispense: 100 g; Refill: 5  - cyclobenzaprine (FLEXERIL) 10 MG tablet; Take 1 tablet (10 mg total) by mouth 3 (three) times daily as needed.  Dispense: 90 tablet; Refill: 5    3. Essential hypertension  Patient was counseled and encouraged to maintain a low sodium diet, as well as increasing physical activity.  Recommend random BP checks at home on a regular basis.  Repeat BP at end of visit was not necessary. Will continue medication at this time, and follow up  in 3-6 months, or sooner if blood pressure begins to increase.       4. Special screening for malignant neoplasm of prostate  Screening test will be ordered and once results available patient will be notified of results and managed accordingly.    - PSA, Screening; Future    5. Muscle spasms of neck  The current medical regimen is effective at this time and no changes to present plan or medications will be made at this visit.     - cyclobenzaprine (FLEXERIL) 10 MG tablet; Take 1 tablet (10 mg total) by mouth 3 (three) times daily as needed.  Dispense: 90 tablet; Refill: 5      Follow up documented    ACTIVE MEDICAL ISSUES:  Documented in Problem List    PAST MEDICAL HISTORY  Documented    PAST SURGICAL HISTORY:  Documented    SOCIAL HISTORY:  Documented    FAMILY HISTORY:  Documented    ALLERGIES AND MEDICATIONS: updated and reviewed.  Documented    Health Maintenance       Date Due Completion Date    Sign Pain Contract 11/24/1979 ---    Urine Drug Screen 11/24/1979 ---    Influenza Vaccine 08/01/2018 3/22/2016 (Done)    Override on 3/22/2016: Done (not at this present time)    Colonoscopy 03/18/2023 3/18/2013    Lipid Panel 05/29/2023 5/29/2018    Override on 3/22/2016: Done (future)    TETANUS VACCINE 06/04/2028 6/4/2018    Override on 2/14/2008: Done

## 2018-09-05 NOTE — TELEPHONE ENCOUNTER
Spoke to patient. States that he seen provider yesterday and an antibiotic was supposed to be called in for him but it wasn't. Would like to know if it will be sent in. Please advise.

## 2018-09-05 NOTE — TELEPHONE ENCOUNTER
----- Message from Corey Moody sent at 9/5/2018  8:36 AM CDT -----  Contact: Self/[phone]  The patient is requesting to speak to someone in regards to an antibiotic.    Thank you

## 2018-09-05 NOTE — TELEPHONE ENCOUNTER
----- Message from Emma Dwyer sent at 9/5/2018  8:12 AM CDT -----  Contact: Neal/LAURA/[phone] ext [phone]  PA:  diclofenac sodium 1 % Gel    .  [facility] [employer] [address] [place] [address] [address] [place]  [facility] [address] [place] [address] [place]   [facility] [place] [address]  Phone: [phone] Fax: [phone]    Thank you.

## 2018-09-05 NOTE — TELEPHONE ENCOUNTER
Spoke with [email or web address]. Prior authorization review for diclofenac gel. Will send in and response will be faxed.

## 2018-09-24 NOTE — PROGRESS NOTES
[facility] Note    Reason for Visit:  The primary encounter diagnosis was Iron deficiency anemia, unspecified iron deficiency anemia type. A diagnosis of H. pylori infection was also pertinent to this visit.    PCP:   Azikiwe K Lombard       Referring MD:  No referring provider defined for this encounter.    HPI:  This is a 57 y.o. male here for follow up on his LAURI.  He has not had the endoscopies advised at [facility] visits  He is here today with his wife.    He reports that his last colonoscopy was cancelled because he was not cleaned out  He continues to have LAURI an denies taking iron supplementation.  He has a Hx of H. Pylori, which was treated  He did not turn in the stool Ag test after his last visit    He has no complaints. He denies abdominal pain, nausea, vomiting, GERD, dysphagia, constipation, diarrhea, BRBPR, or melena.     ROS:  Constitutional: No fevers, no chills, No unintentional weight loss, no fatigue   ENT: No allergies  CV: No chest pain, no palpitations, no perif. edema  Pulm: No cough, No shortness of breath, no wheezes, no sputum  Ophtho: No vision changes  GI: see HPI; also no nausea, no vomiting, no change in appetite  Derm: No rash  Heme: No lymphadenopathy, No bruising  MSK: No arthritis, no muscle pain, no muscle weakness  : No dysuria, No hematuria  Endo: No hot or cold intolerance  Neuro: No syncope, No seizure     Medical History:  has a past medical history of Anemia, Degenerative disc disease, Hemorrhage of rectum and anus, Hypertension, Idiopathic ankylosing spondylitis (2000), and Joint pain.    Surgical History:  has a past surgical history that includes Mouth surgery; COLONOSCOPY (N/A, 2/27/2018); and COLONOSCOPY (N/A, 3/18/2013).    Family History: family history includes Arthritis in his mother; Kidney disease in his father; No Known Problems in his brother, maternal aunt, maternal grandfather, maternal grandmother, maternal uncle, paternal aunt,  "paternal grandfather, paternal grandmother, paternal uncle, and sister..     Social History:  reports that  has never smoked. he has never used smokeless tobacco. He reports that he drinks alcohol. He reports that he uses drugs. Drug: Marijuana. Frequency: 2.00 times per week.    Review of patient's allergies indicates:   Allergen Reactions    No known drug allergies      Current Outpatient Medications   Medication Sig    cetirizine (ZYRTEC) 10 mg TbDL 1 Tablet(s) Oral PRN Every day.      cyclobenzaprine (FLEXERIL) 10 MG tablet Take 1 tablet (10 mg total) by mouth 3 (three) times daily as needed.    diclofenac sodium 1 % Gel Apply 2 g topically 4 (four) times daily. for 10 days    HYDROcodone-acetaminophen (NORCO)  mg per tablet Take 1 tablet by mouth every 6 (six) hours as needed for Pain (msk pain).    omeprazole (PRILOSEC) 20 MG capsule TAKE 1 CAPSULE(20 MG) BY MOUTH TWICE DAILY    sulfaSALAzine (AZULFIDINE) 500 MG TbEC Take 3 tablets (1,500 mg total) by mouth 2 (two) times daily.    sulfaSALAzine (AZULFIDINE) 500 MG TbEC TAKE 3 TABLETS BY MOUTH TWICE DAILY    ferrous sulfate 325 (65 FE) MG EC tablet Take 1 tablet (325 mg total) by mouth 2 (two) times daily.    polyethylene glycol (GOLYTELY,NULYTELY) 236-22.74-6.74 -5.86 gram suspension Take 4,000 mLs (4 L total) by mouth once.     No current facility-administered medications for this visit.      Objective Findings:    Vital Signs:  BP (!) 149/83   Pulse 71   Ht 5' 9" (1.753 m)   Wt 65.8 kg (145 lb 1 oz)   BMI 21.42 kg/m²   Body mass index is 21.42 kg/m².    Physical Exam:  General Appearance: Well appearing in no acute distress  Head: Normocephalic, without obvious abnormality  Eyes: No scleral icterus  ENT: Neck supple, Lips, mucosa, and tongue normal; teeth and gums normal  Lungs: CTA bilaterally in anterior and posterior fields, no wheezes, no crackles.  Heart: Regular rate and rhythm, no murmurs heard  Abdomen: Soft, non tender, non " distended with positive bowel sounds in all four quadrants.  Extremities: No clubbing, cyanosis or edema  Skin: No rash to exposed areas  Neurologic: AAOx4    Labs:  Lab Results   Component Value Date    WBC 9.56 08/24/2018    HGB 10.3 (L) 08/24/2018    HCT 33.4 (L) 08/24/2018     (H) 08/24/2018    CHOL 135 05/29/2018    TRIG 70 05/29/2018    HDL 34 (L) 05/29/2018    ALT 12 08/24/2018    AST 16 08/24/2018     08/24/2018    K 3.8 08/24/2018     08/24/2018    CREATININE 0.9 08/24/2018    BUN 11 08/24/2018    CO2 27 08/24/2018    TSH 0.927 03/02/2010    PSA 1.7 09/10/2018    INR 1.1 05/12/2018     Endoscopy:    EGD- none    Colonoscopy- 3/18/2013- Non-bleeding internal hemorrhoids. 6 mm polyp in ascending colon partial retrieval. Coagulation for tissue destruction was successful. Repeat in 3-5 years.     Assessment:  1. Iron deficiency anemia, unspecified iron deficiency anemia type    2. H. pylori infection      56yo M with long Hx of LAURI. No prior EGD. Colonoscopy 5 yrs ago    Recommendations:  1. Order H. Pylori stool to conform eradication.   2. EGD/colonoscopy to rule out sources of bleeding, bx for celiac and H. Pylori. Constipation prep.  3. Start ferrous sulfate 325mg BID x 12 weeks      Order summary:  Orders Placed This Encounter    H. pylori antigen, stool    ferrous sulfate 325 (65 FE) MG EC tablet    Case request GI: EGD (ESOPHAGOGASTRODUODENOSCOPY), COLONOSCOPY     Thank you so much for allowing me to participate in the care of Micheal Dubon PA-C

## 2018-10-17 NOTE — PROGRESS NOTES
Subjective:       Patient ID: Micheal Chirinos Jr. is a 57 y.o. male.    Chief Complaint: Neck Pain; Back Pain; Knee Pain; and multiple joint pain    Neck Pain    Pertinent negatives include no chest pain, fever, headaches or trouble swallowing.   Back Pain   Pertinent negatives include no abdominal pain, chest pain, fever or headaches.   Wrist Pain    Pertinent negatives include no fever.   Hip Pain      Knee Pain      Shoulder Pain    Pertinent negatives include no fever or headaches.   Hand Pain    Pertinent negatives include no chest pain.   Mr. Chirinos, returns to clinic for multiple MSK pains, chronic neck, back pain, and multiple joint pain.   He has a severe ankylosing spondylitis with axial neck and back  pain and upper thoracic and lower pelvic girdle pain.  LCV 07/18/18.    Today, his chief complains are :    1.Chronic neck pain, and shoulders,  2. Low back pain,    He states that he has his good, and bad days.      #1.Neck pain,   current pain is 6/10, and the worst pain is 10/10.  Neck pain is present t the both side of the neck R > L, it feels as muscle spasms/ tightness,  It radiates to Right  shoulders, and shoulder blade  not to arm/hand.   No numbness, paraesthesias in arms/ hands.   Neck pain is on/ off, worse at night time, sometimes he has trouble placing head on pillow.   Neck pain is running to shoulder blades, not to arms, pain is more localized in joints, on right side,   Right shoulder, elbow, swell, when he does more exercise, and it is always on right side.     #2 Low  back pain,   current pain is 4/10, worst pain lately 7/10.   Pain is localized mainly in mid spine , in axial distribution, and just across low back bilateral.   Pain is off/om,   No radiation to buttocks, legs, but back of legs are very tight.    He has a severe ankylosing spondylitis with axial neck and back  pain and upper thoracic and lower pelvic girdle pain.   He takes hydrocodone 10 mg and he usually takes  three tablets a day; however, on bad days he takes  four tablets a day.    He failed almost all muscle relaxants Dantrium secondary to making him tired, Flexeril, makes him sleepy. (although still taking it).   He was given Celebrex by dr. Navas, but he is afraidl secondary to GI bleed, a year ago.  He is taking only Sulfasalazine and Celebrex 200 daily for his severe AS, that is always with high infllamatory markers,    Patient completed Physical therapy for neck pain, at [facility].   He does his own stretching,and goes 3x/week to fitness.  Exercises regularly, and stretches his neck/upper/lower spine, lying prone on table for 20-30 minutes daily.     He is on chronic pain management with me, takes Hydrocodone 10/325 mg , 3-4x/day, and Flexeril at bedtime for several years ,   with no need for increase dose, nor frequency. States that hydrocodone helps him to move, with reasonable pain control.   Here for follow up, and chronic pain management with opiates.      Past Medical History:   Diagnosis Date    Anemia     Degenerative disc disease     Hemorrhage of rectum and anus     Hypertension     Idiopathic ankylosing spondylitis 2000    Joint pain        Past Surgical History:   Procedure Laterality Date    COLONOSCOPY N/A 2/27/2018    Procedure: COLONOSCOPY;  Surgeon: Jaswant Godfrey MD;  Location: [facility] ([address] [place] [address]);  Service: Endoscopy;  Laterality: N/A;  original order from 5/1/17 per Dr Godfrey-unable to use that order, new order placed    COLONOSCOPY N/A 2/27/2018    [facility]    COLONOSCOPY N/A 3/18/2013    Performed by Radames Huston MD at [facility] ([facility])    MOUTH SURGERY         Family History   Problem Relation Age of Onset    Arthritis Mother     Kidney disease Father     No Known Problems Sister     No Known Problems Brother     No Known Problems Maternal Aunt     No Known Problems Maternal Uncle     No Known Problems Paternal Aunt     No  Known Problems Paternal Uncle     No Known Problems Maternal Grandmother     No Known Problems Maternal Grandfather     No Known Problems Paternal Grandmother     No Known Problems Paternal Grandfather     Celiac disease Neg Hx     Cirrhosis Neg Hx     Colon cancer Neg Hx     Colon polyps Neg Hx     Crohn's disease Neg Hx     Cystic fibrosis Neg Hx     Esophageal cancer Neg Hx     Hemochromatosis Neg Hx     Inflammatory bowel disease Neg Hx     Irritable bowel syndrome Neg Hx     Liver cancer Neg Hx     Liver disease Neg Hx     Rectal cancer Neg Hx     Stomach cancer Neg Hx     Ulcerative colitis Neg Hx     Jorge's disease Neg Hx     Melanoma Neg Hx     Psoriasis Neg Hx     Lupus Neg Hx     Eczema Neg Hx     Acne Neg Hx     Amblyopia Neg Hx     Blindness Neg Hx     Cataracts Neg Hx     Glaucoma Neg Hx     Macular degeneration Neg Hx     Retinal detachment Neg Hx     Strabismus Neg Hx     Cancer Neg Hx     Diabetes Neg Hx     Hypertension Neg Hx     Stroke Neg Hx     Thyroid disease Neg Hx        Social History     Socioeconomic History    Marital status:      Spouse name: None    Number of children: None    Years of education: None    Highest education level: None   Social Needs    Financial resource strain: None    Food insecurity - worry: None    Food insecurity - inability: None    Transportation needs - medical: None    Transportation needs - non-medical: None   Occupational History    Occupation: Disabled   Tobacco Use    Smoking status: Never Smoker    Smokeless tobacco: Never Used   Substance and Sexual Activity    Alcohol use: Yes     Alcohol/week: 0.0 oz     Comment: one's in the blue moon    Drug use: Yes     Frequency: 2.0 times per week     Types: Marijuana    Sexual activity: Yes     Partners: Female   Other Topics Concern    None   Social History Narrative    None       Current Outpatient Medications   Medication Sig Dispense Refill     cetirizine (ZYRTEC) 10 mg TbDL 1 Tablet(s) Oral PRN Every day.        cyclobenzaprine (FLEXERIL) 10 MG tablet Take 1 tablet (10 mg total) by mouth 3 (three) times daily as needed. 90 tablet 5    diclofenac sodium 1 % Gel Apply 2 g topically 4 (four) times daily. for 10 days 100 g 5    ferrous sulfate 325 (65 FE) MG EC tablet Take 1 tablet (325 mg total) by mouth 2 (two) times daily. 60 tablet 2    HYDROcodone-acetaminophen (NORCO)  mg per tablet Take 1 tablet by mouth every 6 (six) hours as needed for Pain (msk pain). 110 tablet 0    [START ON 11/17/2018] HYDROcodone-acetaminophen (NORCO)  mg per tablet Take 1 tablet by mouth every 6 (six) hours as needed for Pain (msk pain). 100 tablet 0    [START ON 12/17/2018] HYDROcodone-acetaminophen (NORCO)  mg per tablet Take 1 tablet by mouth every 8 (eight) hours as needed for Pain (msk pain). 90 tablet 0    omeprazole (PRILOSEC) 20 MG capsule TAKE 1 CAPSULE(20 MG) BY MOUTH TWICE DAILY 180 capsule 1    sulfaSALAzine (AZULFIDINE) 500 MG TbEC Take 3 tablets (1,500 mg total) by mouth 2 (two) times daily. 540 tablet 0    sulfaSALAzine (AZULFIDINE) 500 MG TbEC TAKE 3 TABLETS BY MOUTH TWICE DAILY 540 tablet 0     No current facility-administered medications for this visit.        Review of patient's allergies indicates:   Allergen Reactions    No known drug allergies          Review of Systems   Constitutional: Negative for activity change, appetite change, chills, diaphoresis, fatigue, fever and unexpected weight change.   HENT: Negative for trouble swallowing and voice change.    Eyes: Negative for pain and visual disturbance.   Respiratory: Negative for chest tightness, shortness of breath and wheezing.    Cardiovascular: Negative for chest pain, palpitations and leg swelling.   Gastrointestinal: Negative for abdominal pain, constipation and diarrhea.   Genitourinary: Negative for difficulty urinating, frequency and urgency.   Musculoskeletal:  Positive for back pain and neck pain. Negative for joint swelling, myalgias and neck stiffness. Arthralgias:  left knee hurts, no swelling, no warmth, no popping, clicking.    Skin: Negative for rash and wound.   Neurological: Negative for dizziness, facial asymmetry, speech difficulty, light-headedness and headaches.   Hematological: Negative for adenopathy.   Psychiatric/Behavioral: Negative for agitation, behavioral problems, confusion, decreased concentration, dysphoric mood and sleep disturbance.           Objective:      Physical Exam    GENERAL: The patient is alert, oriented, pleasant.   HEENT: PERRLA  NECK: supple, no masses   CV: S1S2, RRR,   LUNGS: CTA bilateral.   ABDOMEN: soft, non-tender, bowel sounds nl.  EXTREMITIES: no edema, +2 pulses DP, b/l  SKIN: no skin rash, no skin breakdowns.   MUSCULOSKELETAL EXAM:  Gait is wobbling on wide bases with rotating pelvis        secondary to bilateral SI joints fusion and severe ankylosing spondylitis with      bamboo spine and moderate-to-severe kyphosis on the thoracic spine.    Center of gravity is moved forward         Walks with widely spread upper extremity to maintain dynamic balance.   He had no loss of balance.  Steps are wider and nanette is normal.   The patient is not able to stand upright touching with his occiput the wall.   There is  significantly increased distance from the occiput to the wall, more than 20 cm.    Cervical spine has very limited range of motion, especially extension that lacks more than 30-35 degrees from neutral.    Cervical spine flexion is still down to 50, extension again lacks 30-35 degrees.    There is still very limited motion in flexion and extension as well as a decreased motion in     rotation and side bending, probably 10-15 degrees bilaterally.    The patient has moderate  tenderness in paravertebral cervical musculature and upper   trapezius.  No tenderness in palpation of the cervical or thoracic spine.    Decreased  active range of motion of bilateral upper and lower extremities.  Bilateral shoulders that have decreased abduction to 70 degrees, and flex forward to 80 degrees,  Decreased internal and external rotation to 30 degrees.  Decreased right elbow extension to 160-170 degrees.   FROM in both knees, no edema, no laxity, moderate  tenderness at medical plato line.   + pain with IR in both hips, decreased AROM in both hips.  Minimally decreased AROM in Rt wrist.  Muscle  strength is 5/5 throughout x4 extremities.    Minimal tenderness at right lateral epicondyle, with resisted wrist extension.  No joint laxity throughout x4  extremities.    Straight leg raising negative bilaterally.    Lumbar spine  flexion to 90 with holding on to table, extension to 0, side bending and     rotation decreased to about 20-25 degrees bilaterally.    Fused SI joints  without tenderness on palpation.                                             NEUROLOGIC:  Cranial nerves II through XII intact.    Deep tendon reflexes increased +3, symmetrical throughout x4 extremities.    Sensory is intact to light touch and pinprick throughout x4 extremities.    Tight posterior  hamstrings and increased tone in muscles in posterior hamstrings group and   gastrocnemius soleus group on Sobia scale, 1+/4.          Assessment:       1. Chronic low back pain with sciatica, sciatica laterality unspecified, unspecified back pain laterality    2. Spondylosis of cervical region without myelopathy or radiculopathy    3. Ankylosing spondylitis of multiple sites in spine    4. Chronic pain syndrome    5. Wrist pain, acute, right    6. Chronic pain of right knee    7. Muscle spasms of neck    8. Opiate use    9. Ankylosing spondylitis, unspecified site of spine    10. Neck pain    11. Chronic bilateral low back pain without sciatica    12. Chronic pain of both knees    13. Hip pain, bilateral    14. Right knee pain, unspecified chronicity    15. Shoulder arthritis     16. Left hip pain        Plan:       Chronic low back pain with sciatica, sciatica laterality unspecified, unspecified back pain laterality  -     Pain Clinic Drug Screen; Future  -     HYDROcodone-acetaminophen (NORCO)  mg per tablet; Take 1 tablet by mouth every 6 (six) hours as needed for Pain (msk pain).  Dispense: 110 tablet; Refill: 0  -     HYDROcodone-acetaminophen (NORCO)  mg per tablet; Take 1 tablet by mouth every 6 (six) hours as needed for Pain (msk pain).  Dispense: 100 tablet; Refill: 0  -     HYDROcodone-acetaminophen (NORCO)  mg per tablet; Take 1 tablet by mouth every 8 (eight) hours as needed for Pain (msk pain).  Dispense: 90 tablet; Refill: 0    Spondylosis of cervical region without myelopathy or radiculopathy  -     Pain Clinic Drug Screen; Future  -     HYDROcodone-acetaminophen (NORCO)  mg per tablet; Take 1 tablet by mouth every 6 (six) hours as needed for Pain (msk pain).  Dispense: 110 tablet; Refill: 0  -     HYDROcodone-acetaminophen (NORCO)  mg per tablet; Take 1 tablet by mouth every 6 (six) hours as needed for Pain (msk pain).  Dispense: 100 tablet; Refill: 0  -     HYDROcodone-acetaminophen (NORCO)  mg per tablet; Take 1 tablet by mouth every 8 (eight) hours as needed for Pain (msk pain).  Dispense: 90 tablet; Refill: 0    Ankylosing spondylitis of multiple sites in spine  -     Pain Clinic Drug Screen; Future  -     HYDROcodone-acetaminophen (NORCO)  mg per tablet; Take 1 tablet by mouth every 6 (six) hours as needed for Pain (msk pain).  Dispense: 110 tablet; Refill: 0  -     HYDROcodone-acetaminophen (NORCO)  mg per tablet; Take 1 tablet by mouth every 6 (six) hours as needed for Pain (msk pain).  Dispense: 100 tablet; Refill: 0  -     HYDROcodone-acetaminophen (NORCO)  mg per tablet; Take 1 tablet by mouth every 8 (eight) hours as needed for Pain (msk pain).  Dispense: 90 tablet; Refill: 0    Chronic pain syndrome  -      Pain Clinic Drug Screen; Future  -     HYDROcodone-acetaminophen (NORCO)  mg per tablet; Take 1 tablet by mouth every 6 (six) hours as needed for Pain (msk pain).  Dispense: 110 tablet; Refill: 0  -     HYDROcodone-acetaminophen (NORCO)  mg per tablet; Take 1 tablet by mouth every 6 (six) hours as needed for Pain (msk pain).  Dispense: 100 tablet; Refill: 0  -     HYDROcodone-acetaminophen (NORCO)  mg per tablet; Take 1 tablet by mouth every 8 (eight) hours as needed for Pain (msk pain).  Dispense: 90 tablet; Refill: 0    Wrist pain, acute, right  -     Pain Clinic Drug Screen; Future    Chronic pain of right knee  -     Pain Clinic Drug Screen; Future  -     HYDROcodone-acetaminophen (NORCO)  mg per tablet; Take 1 tablet by mouth every 6 (six) hours as needed for Pain (msk pain).  Dispense: 110 tablet; Refill: 0  -     HYDROcodone-acetaminophen (NORCO)  mg per tablet; Take 1 tablet by mouth every 6 (six) hours as needed for Pain (msk pain).  Dispense: 100 tablet; Refill: 0  -     HYDROcodone-acetaminophen (NORCO)  mg per tablet; Take 1 tablet by mouth every 8 (eight) hours as needed for Pain (msk pain).  Dispense: 90 tablet; Refill: 0    Muscle spasms of neck  -     Pain Clinic Drug Screen; Future  -     HYDROcodone-acetaminophen (NORCO)  mg per tablet; Take 1 tablet by mouth every 6 (six) hours as needed for Pain (msk pain).  Dispense: 110 tablet; Refill: 0  -     HYDROcodone-acetaminophen (NORCO)  mg per tablet; Take 1 tablet by mouth every 6 (six) hours as needed for Pain (msk pain).  Dispense: 100 tablet; Refill: 0  -     HYDROcodone-acetaminophen (NORCO)  mg per tablet; Take 1 tablet by mouth every 8 (eight) hours as needed for Pain (msk pain).  Dispense: 90 tablet; Refill: 0    Opiate use  -     Pain Clinic Drug Screen; Future    Ankylosing spondylitis, unspecified site of spine  -     HYDROcodone-acetaminophen (NORCO)  mg per tablet; Take 1 tablet  by mouth every 6 (six) hours as needed for Pain (msk pain).  Dispense: 110 tablet; Refill: 0  -     HYDROcodone-acetaminophen (NORCO)  mg per tablet; Take 1 tablet by mouth every 6 (six) hours as needed for Pain (msk pain).  Dispense: 100 tablet; Refill: 0  -     HYDROcodone-acetaminophen (NORCO)  mg per tablet; Take 1 tablet by mouth every 8 (eight) hours as needed for Pain (msk pain).  Dispense: 90 tablet; Refill: 0    Neck pain  -     HYDROcodone-acetaminophen (NORCO)  mg per tablet; Take 1 tablet by mouth every 6 (six) hours as needed for Pain (msk pain).  Dispense: 110 tablet; Refill: 0  -     HYDROcodone-acetaminophen (NORCO)  mg per tablet; Take 1 tablet by mouth every 6 (six) hours as needed for Pain (msk pain).  Dispense: 100 tablet; Refill: 0  -     HYDROcodone-acetaminophen (NORCO)  mg per tablet; Take 1 tablet by mouth every 8 (eight) hours as needed for Pain (msk pain).  Dispense: 90 tablet; Refill: 0    Chronic bilateral low back pain without sciatica  -     HYDROcodone-acetaminophen (NORCO)  mg per tablet; Take 1 tablet by mouth every 6 (six) hours as needed for Pain (msk pain).  Dispense: 110 tablet; Refill: 0  -     HYDROcodone-acetaminophen (NORCO)  mg per tablet; Take 1 tablet by mouth every 6 (six) hours as needed for Pain (msk pain).  Dispense: 100 tablet; Refill: 0  -     HYDROcodone-acetaminophen (NORCO)  mg per tablet; Take 1 tablet by mouth every 8 (eight) hours as needed for Pain (msk pain).  Dispense: 90 tablet; Refill: 0    Chronic pain of both knees  -     HYDROcodone-acetaminophen (NORCO)  mg per tablet; Take 1 tablet by mouth every 6 (six) hours as needed for Pain (msk pain).  Dispense: 110 tablet; Refill: 0  -     HYDROcodone-acetaminophen (NORCO)  mg per tablet; Take 1 tablet by mouth every 6 (six) hours as needed for Pain (msk pain).  Dispense: 100 tablet; Refill: 0  -     HYDROcodone-acetaminophen (NORCO)  mg per  tablet; Take 1 tablet by mouth every 8 (eight) hours as needed for Pain (msk pain).  Dispense: 90 tablet; Refill: 0    Hip pain, bilateral  -     HYDROcodone-acetaminophen (NORCO)  mg per tablet; Take 1 tablet by mouth every 6 (six) hours as needed for Pain (msk pain).  Dispense: 110 tablet; Refill: 0  -     HYDROcodone-acetaminophen (NORCO)  mg per tablet; Take 1 tablet by mouth every 6 (six) hours as needed for Pain (msk pain).  Dispense: 100 tablet; Refill: 0  -     HYDROcodone-acetaminophen (NORCO)  mg per tablet; Take 1 tablet by mouth every 8 (eight) hours as needed for Pain (msk pain).  Dispense: 90 tablet; Refill: 0    Right knee pain, unspecified chronicity  -     HYDROcodone-acetaminophen (NORCO)  mg per tablet; Take 1 tablet by mouth every 6 (six) hours as needed for Pain (msk pain).  Dispense: 110 tablet; Refill: 0  -     HYDROcodone-acetaminophen (NORCO)  mg per tablet; Take 1 tablet by mouth every 6 (six) hours as needed for Pain (msk pain).  Dispense: 100 tablet; Refill: 0  -     HYDROcodone-acetaminophen (NORCO)  mg per tablet; Take 1 tablet by mouth every 8 (eight) hours as needed for Pain (msk pain).  Dispense: 90 tablet; Refill: 0    Shoulder arthritis  -     HYDROcodone-acetaminophen (NORCO)  mg per tablet; Take 1 tablet by mouth every 6 (six) hours as needed for Pain (msk pain).  Dispense: 110 tablet; Refill: 0  -     HYDROcodone-acetaminophen (NORCO)  mg per tablet; Take 1 tablet by mouth every 6 (six) hours as needed for Pain (msk pain).  Dispense: 100 tablet; Refill: 0  -     HYDROcodone-acetaminophen (NORCO)  mg per tablet; Take 1 tablet by mouth every 8 (eight) hours as needed for Pain (msk pain).  Dispense: 90 tablet; Refill: 0    Left hip pain  -     HYDROcodone-acetaminophen (NORCO)  mg per tablet; Take 1 tablet by mouth every 6 (six) hours as needed for Pain (msk pain).  Dispense: 110 tablet; Refill: 0  -      HYDROcodone-acetaminophen (NORCO)  mg per tablet; Take 1 tablet by mouth every 6 (six) hours as needed for Pain (msk pain).  Dispense: 100 tablet; Refill: 0  -     HYDROcodone-acetaminophen (NORCO)  mg per tablet; Take 1 tablet by mouth every 8 (eight) hours as needed for Pain (msk pain).  Dispense: 90 tablet; Refill: 0      Patient with severe  AS, cx. with chronic neck and back pain associated with cervical and lumbar spondylosis and muscle spasms -   tightness in paravertebral cervical  muscles, and hamstrings.   On Sulfasalazine,and Celebrex as needed ( takes rarely sec. To bleeding PUD) for AS.      1. Chronic pain management.    The patient will resume Hydrocodone 10/325 mg one p.o. 6 hours p.r.n.( #120 tabs, and agreeable to taper down to 100 tabs/mionth, given 2 refills).   Muscle relaxant Flexeril 10 mg , TID  (helps better with relaxation than other muscle relaxants that he tried).    Does not take Cymbalta .        Opioid Risk Score       Value Time User    Opioid Risk Score  5 4/18/2018 10:53 AM Janel Valdez MD       reviewed and appropriate.  Hydrocodone refills on 9/18, 8/18, 7/18/18.  UDS ordered.    2. Patient completed PT, multiple times.  Exercises regularly, and stretches his neck/upper/lower spine,   lying prone on table for 20-30 minutes daily.     3. Ankylosing Spondylitis,  with multiple peripheral joint involvement.   Follows with Rheumatology, Dr. Navas.     Reviewing labs, he has CRP, and ESR  chronically  elevated.   He is currently taking less than prescribed Sulfasalazine, 3 tabs ( 1500 mg )  BID,  and also Celebrex rarely,  once /day.   He has h/o UGI bleed,a nd soon has an hollie. With GI.   He refused DMAR th.       RTC in 4 months.     Total time spent face to face with patient was 25 minutes.   More than 50% of that time was spent in counseling on diagnosis , prognosis and treatment options.   I also caunsel patient  on common and most usual side effect of prescribed  medications.   Risk and benefits of opiates, possible risk of developing opiate dependence and tolerance, need of strict compliance with prescribed medications.  Pain contract, rules and obligations were discussed with patient in details.  He is aware that I would be the only doctor prescribing him pain medications and ED in a case of emergency.  I reviewed Primary care , and other specialty's notes to better coordinate patient's  care.   All questions were answered, and patient voiced understanding.

## 2018-10-31 PROBLEM — Z12.11 COLON CANCER SCREENING: Status: ACTIVE | Noted: 2018-01-01

## 2018-10-31 NOTE — PROVATION PATIENT INSTRUCTIONS
Discharge Summary/Instructions after an Endoscopic Procedure  Patient Name: Micheal Chirinos  Patient MRN: [id number]  Patient YOB: 1960 Wednesday, October 31, 2018  Jaswant Godfrey MD  RESTRICTIONS:  During your procedure today, you received medications for sedation.  These   medications may affect your judgment, balance and coordination.  Therefore,   for 24 hours, you have the following restrictions:   - DO NOT drive a car, operate machinery, make legal/financial decisions,   sign important papers or drink alcohol.    ACTIVITY:  Today: no heavy lifting, straining or running due to procedural   sedation/anesthesia.  The following day: return to full activity including work.  DIET:  Eat and drink normally unless instructed otherwise.     TREATMENT FOR COMMON SIDE EFFECTS:  - Mild abdominal pain, nausea, belching, bloating or excessive gas:  rest,   eat lightly and use a heating pad.  - Sore Throat: treat with throat lozenges and/or gargle with warm salt   water.  - Because air was used during the procedure, expelling large amounts of air   from your rectum or belching is normal.  - If a bowel prep was taken, you may not have a bowel movement for 1-3 days.    This is normal.  SYMPTOMS TO WATCH FOR AND REPORT TO YOUR PHYSICIAN:  1. Abdominal pain or bloating, other than gas cramps.  2. Chest pain.  3. Back pain.  4. Signs of infection such as: chills or fever occurring within 24 hours   after the procedure.  5. Rectal bleeding, which would show as bright red, maroon, or black stools.   (A tablespoon of blood from the rectum is not serious, especially if   hemorrhoids are present.)  6. Vomiting.  7. Weakness or dizziness.  GO DIRECTLY TO THE NEAREST EMERGENCY ROOM IF YOU HAVE ANY OF THE FOLLOWING:      Difficulty breathing              Chills and/or fever over 101 F   Persistent vomiting and/or vomiting blood   Severe abdominal pain   Severe chest pain   Black, tarry stools   Bleeding- more than one  tablespoon   Any other symptom or condition that you feel may need urgent attention  Your doctor recommends these additional instructions:  If any biopsies were taken, your doctors clinic will contact you in 1 to 2   weeks with any results.  - Discharge patient to home.   - Perform a colonoscopy today.   - The findings and recommendations were discussed with the designated   responsible adult.  For questions, problems or results please call your physician - Jaswant Godfrey MD at Work:  [phone].  [facility], EMERGENCY ROOM PHONE NUMBER: [phone]  IF A COMPLICATION OR EMERGENCY SITUATION ARISES AND YOU ARE UNABLE TO REACH   YOUR PHYSICIAN - GO DIRECTLY TO THE EMERGENCY ROOM.  Jaswant Godfrey MD  10/31/2018 11:12:31 AM  This report has been verified and signed electronically.  [employer]

## 2018-10-31 NOTE — TRANSFER OF CARE
"Anesthesia Transfer of Care Note    Patient: Micheal Chirinos Jr.    Procedure(s) Performed: Procedure(s) (LRB):  EGD (ESOPHAGOGASTRODUODENOSCOPY) (N/A)  COLONOSCOPY (N/A)    Patient location: PACU    Anesthesia Type: general    Transport from OR: Transported from OR on 2-3 L/min O2 by NC with adequate spontaneous ventilation    Post pain: adequate analgesia    Post assessment: no apparent anesthetic complications and tolerated procedure well    Post vital signs: stable    Level of consciousness: responds to stimulation and sedated    Nausea/Vomiting: no nausea/vomiting    Complications: none    Transfer of care protocol was followed      Last vitals:   Visit Vitals  BP (!) 161/88   Pulse 66   Temp 36.3 °C (97.3 °F)   Resp 14   Ht 5' 10" (1.778 m)   Wt 66.7 kg (147 lb)   SpO2 100%   BMI 21.09 kg/m²     "

## 2018-10-31 NOTE — ANESTHESIA PREPROCEDURE EVALUATION
10/31/2018  Micheal Chirinos Jr. is a 57 y.o., male.    Past Medical History:   Diagnosis Date    Anemia     Degenerative disc disease     Hemorrhage of rectum and anus     Hypertension     Idiopathic ankylosing spondylitis 2000    Joint pain      Past Surgical History:   Procedure Laterality Date    COLONOSCOPY N/A 2/27/2018    Procedure: COLONOSCOPY;  Surgeon: Jaswant Godfrey MD;  [facility]  Service: Endoscopy;  Laterality: N/A;  original order from 5/1/17 per Dr Godfrey-unable to use that order, new order placed    COLONOSCOPY N/A 2/27/2018    Performed by [facility]aswant Godfrey MD at [facility]    COLONOSCOPY N/A 3/18/2013    Performed by Radames Huston MD at [facility] ([facility])    MOUTH SURGERY         Anesthesia Evaluation    I have reviewed the Patient Summary Reports.    I have reviewed the Nursing Notes.   I have reviewed the Medications.     Review of Systems  Anesthesia Hx:  No problems with previous Anesthesia    Hematology/Oncology:  Hematology Normal   Oncology Normal     EENT/Dental:EENT/Dental Normal   Cardiovascular:   Hypertension    Pulmonary:  Pulmonary Normal    Renal/:  Renal/ Normal     Hepatic/GI:  Hepatic/GI Normal    Musculoskeletal:   Arthritis  Ankylosing Spondylitis, limited extension/flexion, inability to turn head side/side   Neurological:   Neuromuscular Disease,    Endocrine:  Endocrine Normal    Dermatological:  Skin Normal    Psych:  Psychiatric Normal           Physical Exam  General:  Well nourished    Airway/Jaw/Neck:  Airway Findings: Mouth Opening: Normal Tongue: Normal  General Airway Assessment: Adult  Mallampati: II  TM Distance: Normal, at least 6 cm  Jaw/Neck Findings:  Neck ROM: Extension Decreased, Severe  Neck Findings:     Eyes/Ears/Nose:  EYES/EARS/NOSE FINDINGS: Normal   Dental:  Dental Findings: In tact    Chest/Lungs:  Chest/Lungs Clear    Heart/Vascular:  Heart Findings: Normal Heart murmur: negative Vascular Findings: Normal    Abdomen:  Abdomen Findings: Normal    Musculoskeletal:  Musculoskeletal Findings: Normal   Skin:  Skin Findings: Normal    Mental Status:  Mental Status Findings: Normal        Anesthesia Plan  Type of Anesthesia, risks & benefits discussed:  Anesthesia Type:  general  Patient's Preference:   Intra-op Monitoring Plan: standard ASA monitors  Intra-op Monitoring Plan Comments:   Post Op Pain Control Plan: multimodal analgesia  Post Op Pain Control Plan Comments:   Induction:   IV  Beta Blocker:  Patient is not currently on a Beta-Blocker (No further documentation required).       Informed Consent: Patient understands risks and agrees with Anesthesia plan.  Questions answered. Anesthesia consent signed with patient.  ASA Score: 2     Day of Surgery Review of History & Physical: I have interviewed and examined the patient. I have reviewed the patient's H&P dated:  There are no significant changes.  H&P update referred to the provider.         Ready For Surgery From Anesthesia Perspective.

## 2018-10-31 NOTE — H&P
Short Stay Endoscopy History and Physical    PCP - Azikiwe K Lombard, MD    Procedure - EGD/Colonoscopy  ASA - per anesthesia  Mallampati - per anesthesia  History of Anesthesia problems - no  Family history Anesthesia problems -  no   Plan of anesthesia - MAC    HPI:  This is a 57 y.o. male here for evaluation of :     Recent lab work (9/10/18) with low iron sat, normal ferritin, with hgb 10.3 (has been anemic throughout our records), mcv 78    EGD - Iron def anemia  Colon - screening for CRC. Prior colonoscopy 2013 with 1 polyp (normal mucosa). Notes one episode ~2 weeks ago of hematochezia (resolved). Blood was in the stool. No melena. No abdominal pain.     ROS:  Constitutional: No fevers, chills, No weight loss  CV: No chest pain  Pulm: No cough, No shortness of breath  Ophtho: No vision changes  GI: see HPI  Derm: No rash    Medical History:  has a past medical history of Anemia, Degenerative disc disease, Hemorrhage of rectum and anus, Hypertension, Idiopathic ankylosing spondylitis (2000), and Joint pain.    Surgical History:  has a past surgical history that includes Mouth surgery; COLONOSCOPY (N/A, 2/27/2018); and COLONOSCOPY (N/A, 3/18/2013).    Family History: family history includes Arthritis in his mother; Kidney disease in his father; No Known Problems in his brother, maternal aunt, maternal grandfather, maternal grandmother, maternal uncle, paternal aunt, paternal grandfather, paternal grandmother, paternal uncle, and sister.. Otherwise no colon cancer, inflammatory bowel disease, or GI malignancies.    Social History:  reports that  has never smoked. he has never used smokeless tobacco. He reports that he drinks alcohol. He reports that he does not use drugs.    Review of patient's allergies indicates:   Allergen Reactions    No known drug allergies        Medications:   Medications Prior to Admission   Medication Sig Dispense Refill Last Dose    cyclobenzaprine (FLEXERIL) 10 MG tablet Take 1  tablet (10 mg total) by mouth 3 (three) times daily as needed. 90 tablet 5 Past Month at Unknown time    diclofenac sodium 1 % Gel Apply 2 g topically 4 (four) times daily. for 10 days 100 g 5 Past Month at Unknown time    HYDROcodone-acetaminophen (NORCO)  mg per tablet Take 1 tablet by mouth every 6 (six) hours as needed for Pain (msk pain). 110 tablet 0 10/30/2018 at Unknown time    omeprazole (PRILOSEC) 20 MG capsule TAKE 1 CAPSULE(20 MG) BY MOUTH TWICE DAILY 180 capsule 1 10/30/2018 at Unknown time    sulfaSALAzine (AZULFIDINE) 500 MG TbEC Take 3 tablets (1,500 mg total) by mouth 2 (two) times daily. 540 tablet 0 10/30/2018 at Unknown time    cetirizine (ZYRTEC) 10 mg TbDL 1 Tablet(s) Oral PRN Every day.     More than a month at Unknown time    ferrous sulfate 325 (65 FE) MG EC tablet Take 1 tablet (325 mg total) by mouth 2 (two) times daily. 60 tablet 2 More than a month at Unknown time    [START ON 11/17/2018] HYDROcodone-acetaminophen (NORCO)  mg per tablet Take 1 tablet by mouth every 6 (six) hours as needed for Pain (msk pain). 100 tablet 0     [START ON 12/17/2018] HYDROcodone-acetaminophen (NORCO)  mg per tablet Take 1 tablet by mouth every 8 (eight) hours as needed for Pain (msk pain). 90 tablet 0     sulfaSALAzine (AZULFIDINE) 500 MG TbEC TAKE 3 TABLETS BY MOUTH TWICE DAILY 540 tablet 0 Taking       Physical Exam:    Vital Signs:   Vitals:    10/31/18 1031   BP: (!) 161/88   Pulse: 66   Resp: 14   Temp: 97.3 °F (36.3 °C)       General Appearance: Well appearing in no acute distress  Eyes:    No scleral icterus  ENT: Neck supple, Lips, mucosa, and tongue normal; teeth and gums normal  Lungs: CTA anteriorly  Heart:  Regular rate, S1, S2 normal, no murmurs heard.  Abdomen: Soft, non tender, non distended with normal bowel sounds. No hepatosplenomegaly, ascites, or mass.  Extremities: No edema  Skin: No rash    Labs:  Lab Results   Component Value Date    WBC 9.56 08/24/2018     HGB 10.3 (L) 08/24/2018    HCT 33.4 (L) 08/24/2018     (H) 08/24/2018    CHOL 135 05/29/2018    TRIG 70 05/29/2018    HDL 34 (L) 05/29/2018    ALT 12 08/24/2018    AST 16 08/24/2018     08/24/2018    K 3.8 08/24/2018     08/24/2018    CREATININE 0.9 08/24/2018    BUN 11 08/24/2018    CO2 27 08/24/2018    TSH 0.927 03/02/2010    PSA 1.7 09/10/2018    INR 1.1 05/12/2018       I have explained the risks and benefits of endoscopy procedures to the patient including but not limited to bleeding, perforation, infection, and death.      Palmer Murphy MD

## 2018-10-31 NOTE — ANESTHESIA POSTPROCEDURE EVALUATION
"Anesthesia Post Evaluation    Patient: Micheal Chirinos Jr.    Procedure(s) Performed: Procedure(s) (LRB):  EGD (ESOPHAGOGASTRODUODENOSCOPY) (N/A)  COLONOSCOPY (N/A)    Final Anesthesia Type: general  Patient location during evaluation: PACU  Patient participation: Yes- Able to Participate  Level of consciousness: awake and alert  Post-procedure vital signs: reviewed and stable  Pain management: adequate  Airway patency: patent  PONV status at discharge: No PONV  Anesthetic complications: no      Cardiovascular status: blood pressure returned to baseline  Respiratory status: unassisted, room air and spontaneous ventilation  Hydration status: euvolemic  Follow-up not needed.        Visit Vitals  /78 (BP Location: Left arm, Patient Position: Lying)   Pulse 75   Temp 36.6 °C (97.9 °F) (Temporal)   Resp 16   Ht 5' 10" (1.778 m)   Wt 66.7 kg (147 lb)   SpO2 97%   BMI 21.09 kg/m²       Pain/Montez Score: Pain Assessment Performed: Yes (10/31/2018 11:40 AM)  Presence of Pain: denies (10/31/2018 11:54 AM)  Pain Rating Prior to Med Admin: 0 (10/31/2018 11:54 AM)  Montez Score: 10 (10/31/2018 11:54 AM)        "

## 2018-10-31 NOTE — PROVATION PATIENT INSTRUCTIONS
Discharge Summary/Instructions after an Endoscopic Procedure  Patient Name: Micheal Chirinos  Patient MRN: [id number]  Patient YOB: 1960 Wednesday, October 31, 2018  Jaswant Godfrey MD  RESTRICTIONS:  During your procedure today, you received medications for sedation.  These   medications may affect your judgment, balance and coordination.  Therefore,   for 24 hours, you have the following restrictions:   - DO NOT drive a car, operate machinery, make legal/financial decisions,   sign important papers or drink alcohol.    ACTIVITY:  Today: no heavy lifting, straining or running due to procedural   sedation/anesthesia.  The following day: return to full activity including work.  DIET:  Eat and drink normally unless instructed otherwise.     TREATMENT FOR COMMON SIDE EFFECTS:  - Mild abdominal pain, nausea, belching, bloating or excessive gas:  rest,   eat lightly and use a heating pad.  - Sore Throat: treat with throat lozenges and/or gargle with warm salt   water.  - Because air was used during the procedure, expelling large amounts of air   from your rectum or belching is normal.  - If a bowel prep was taken, you may not have a bowel movement for 1-3 days.    This is normal.  SYMPTOMS TO WATCH FOR AND REPORT TO YOUR PHYSICIAN:  1. Abdominal pain or bloating, other than gas cramps.  2. Chest pain.  3. Back pain.  4. Signs of infection such as: chills or fever occurring within 24 hours   after the procedure.  5. Rectal bleeding, which would show as bright red, maroon, or black stools.   (A tablespoon of blood from the rectum is not serious, especially if   hemorrhoids are present.)  6. Vomiting.  7. Weakness or dizziness.  GO DIRECTLY TO THE NEAREST EMERGENCY ROOM IF YOU HAVE ANY OF THE FOLLOWING:      Difficulty breathing              Chills and/or fever over 101 F   Persistent vomiting and/or vomiting blood   Severe abdominal pain   Severe chest pain   Black, tarry stools   Bleeding- more than one  tablespoon   Any other symptom or condition that you feel may need urgent attention  Your doctor recommends these additional instructions:  If any biopsies were taken, your doctors clinic will contact you in 1 to 2   weeks with any results.  - Patient has a contact number available for emergencies.  The signs and   symptoms of potential delayed complications were discussed with the   patient.  Return to normal activities tomorrow.  Written discharge   instructions were provided to the patient.   - Discharge patient to home.   - Await pathology results.   - Repeat colonoscopy in 3 years for surveillance based on pathology results.     - The findings and recommendations were discussed with the designated   responsible adult.  For questions, problems or results please call your physician - Jaswant Godfrey MD at Work:  [phone].  [facility], EMERGENCY ROOM PHONE NUMBER: [phone]  IF A COMPLICATION OR EMERGENCY SITUATION ARISES AND YOU ARE UNABLE TO REACH   YOUR PHYSICIAN - GO DIRECTLY TO THE EMERGENCY ROOM.  Jaswant Godfrey MD  10/31/2018 11:29:55 AM  This report has been verified and signed electronically.  [employer]ATION

## 2018-11-16 NOTE — PROGRESS NOTES
[facility] Note    Reason for Visit:  The encounter diagnosis was Iron deficiency anemia, unspecified iron deficiency anemia type.    PCP:   Azikiwe K Lombard       Referring MD:  No referring provider defined for this encounter.    HPI:  This is a 57 y.o. male here for follow up on his LAURI.  He is here today with his wife.    10/2018 EGD/colon did not reveal sources of bleeding  Now taking iron supplement  He has a Hx of H. Pylori, which was treated  He did not turn in the stool Ag test after his last visit    He has no complaints. He denies abdominal pain, nausea, vomiting, GERD, dysphagia, constipation, diarrhea, BRBPR, or melena.     ROS:  Constitutional: No fevers, no chills, No unintentional weight loss, no fatigue   ENT: No allergies  CV: No chest pain, no palpitations, no perif. edema  Pulm: No cough, No shortness of breath, no wheezes, no sputum  Ophtho: No vision changes  GI: see HPI; also no nausea, no vomiting, no change in appetite  Derm: No rash  Heme: No lymphadenopathy, No bruising  MSK: neck pain  : No dysuria, No hematuria  Endo: No hot or cold intolerance  Neuro: No syncope, No seizure     Medical History:  has a past medical history of Anemia, Degenerative disc disease, Hemorrhage of rectum and anus, Hypertension, Idiopathic ankylosing spondylitis (2000), and Joint pain.    Surgical History:  has a past surgical history that includes Mouth surgery; EGD (ESOPHAGOGASTRODUODENOSCOPY) (N/A, 10/31/2018); COLONOSCOPY (N/A, 10/31/2018); COLONOSCOPY (N/A, 2/27/2018); and COLONOSCOPY (N/A, 3/18/2013).    Family History: family history includes Arthritis in his mother; Kidney disease in his father; No Known Problems in his brother, maternal aunt, maternal grandfather, maternal grandmother, maternal uncle, paternal aunt, paternal grandfather, paternal grandmother, paternal uncle, and sister..     Social History:  reports that  has never smoked. he has never used smokeless  "tobacco. He reports that he drinks alcohol. He reports that he does not use drugs.    Review of patient's allergies indicates:   Allergen Reactions    No known drug allergies      Current Outpatient Medications   Medication Sig    cetirizine (ZYRTEC) 10 mg TbDL 1 Tablet(s) Oral PRN Every day.      ferrous sulfate 325 (65 FE) MG EC tablet Take 1 tablet (325 mg total) by mouth 2 (two) times daily.    HYDROcodone-acetaminophen (NORCO)  mg per tablet Take 1 tablet by mouth every 6 (six) hours as needed for Pain (msk pain).    omeprazole (PRILOSEC) 20 MG capsule TAKE 1 CAPSULE(20 MG) BY MOUTH TWICE DAILY    sulfaSALAzine (AZULFIDINE) 500 MG TbEC Take 3 tablets (1,500 mg total) by mouth 2 (two) times daily.    cyclobenzaprine (FLEXERIL) 10 MG tablet Take 1 tablet (10 mg total) by mouth 3 (three) times daily as needed.    diclofenac sodium 1 % Gel Apply 2 g topically 4 (four) times daily. for 10 days     No current facility-administered medications for this visit.      Objective Findings:    Vital Signs:  /72   Pulse 85   Ht 5' 9" (1.753 m)   Wt 65.8 kg (145 lb)   BMI 21.41 kg/m²   Body mass index is 21.41 kg/m².    Physical Exam:  General Appearance: Well appearing in no acute distress  Head: Normocephalic, without obvious abnormality  Eyes: No scleral icterus  ENT: Neck supple, Lips, mucosa, and tongue normal  Lungs: CTA bilaterally in anterior and posterior fields, no wheezes, no crackles.  Heart: Regular rate and rhythm, no murmurs heard  Abdomen: Soft, non tender, non distended with positive bowel sounds in all four quadrants.  Extremities: No clubbing, cyanosis or edema  Skin: No rash to exposed areas  Neurologic: AAOx4    Labs:  Lab Results   Component Value Date    WBC 9.56 08/24/2018    HGB 10.3 (L) 08/24/2018    HCT 33.4 (L) 08/24/2018     (H) 08/24/2018    CHOL 135 05/29/2018    TRIG 70 05/29/2018    HDL 34 (L) 05/29/2018    ALT 12 08/24/2018    AST 16 08/24/2018     " 08/24/2018    K 3.8 08/24/2018     08/24/2018    CREATININE 0.9 08/24/2018    BUN 11 08/24/2018    CO2 27 08/24/2018    TSH 0.927 03/02/2010    PSA 1.7 09/10/2018    INR 1.1 05/12/2018     Endoscopy:    10/2018 EGD - Normal esophagus.                        - Normal stomach.                        - Normal examined duodenum.    10/2018 colon - polyp x 1, hemorrhoids    Colonoscopy- 3/18/2013- Non-bleeding internal hemorrhoids. 6 mm polyp in ascending colon partial retrieval. Coagulation for tissue destruction was successful. Repeat in 3-5 years.     Assessment:  1. Iron deficiency anemia, unspecified iron deficiency anemia type      56yo M with long Hx of LAURI. EGD/ Colonoscopy was unrevealing    Recommendations:  1. If SBFT is normal will proceed with VCE with golytely prep. I discussed with the patient the risks and benefits of the VCE that include the retention of pill and possible surgical retrieval. I also gave him the option of not proceeding with the VCE given the risk of potentially missing a tumor, ordering a CTE, and monitoring the H/H. He agreed to proceed with the VCE. VCE consent was signed in office today by my self and the patient and witnessed by BINTA Garcia.        Order summary:  Orders Placed This Encounter    FL Small Bowel Follow Through     Thank you so much for allowing me to participate in the care of Micheal Dubon PA-C

## 2018-11-25 PROBLEM — D72.829 LEUKOCYTOSIS: Status: ACTIVE | Noted: 2018-01-01

## 2018-11-25 PROBLEM — I49.01 CARDIAC ARREST WITH VENTRICULAR FIBRILLATION: Status: ACTIVE | Noted: 2018-01-01

## 2018-11-25 PROBLEM — J90 PLEURAL EFFUSION: Status: ACTIVE | Noted: 2018-01-01

## 2018-11-25 PROBLEM — R56.9 SEIZURE: Status: ACTIVE | Noted: 2018-01-01

## 2018-11-25 PROBLEM — A41.9 SEPSIS: Status: ACTIVE | Noted: 2018-01-01

## 2018-11-25 PROBLEM — E87.20 LACTIC ACIDOSIS: Status: ACTIVE | Noted: 2018-01-01

## 2018-11-25 PROBLEM — J96.01 ACUTE HYPOXEMIC RESPIRATORY FAILURE: Status: ACTIVE | Noted: 2018-01-01

## 2018-11-25 PROBLEM — I46.9 CARDIAC ARREST WITH VENTRICULAR FIBRILLATION: Status: ACTIVE | Noted: 2018-01-01

## 2018-11-25 PROBLEM — R57.9 SHOCK, UNSPECIFIED: Status: ACTIVE | Noted: 2018-01-01

## 2018-11-25 PROBLEM — G93.1 ANOXIC BRAIN INJURY: Status: ACTIVE | Noted: 2018-01-01

## 2018-11-25 PROBLEM — K72.00 SHOCK LIVER: Status: ACTIVE | Noted: 2018-01-01

## 2018-11-25 NOTE — OP NOTE
This is Dr. Teja Davila  dictating an operative note on patient Micheal Chirinos Jr., MRN [id number]    DATE OF PROCEDURE  11/25/2018    PROCEDURE  1. Tracheostomy -- CANCELLED (successful endotracheal intubation)    PREOP DIAGNOSIS  1. Respiratory arrest status-post cardiac arrest with resuscitation  2. Ankylosing Spondylitis    POSTOP DX  Same    SURGEON:  Teja Davila MD    ASSISTANT:  Lois Johnson MD (resident)    ANESTHESIA:  General anesthetic    SUMMARY OF SIGNIFICANT EVENTS & FINDINGS:  Tracheostomy planned only in event of unsuccessful endotracheal intubation.    CLINICAL NOTE:  The patient is a 58 y.o.-year old male who was evaluated at [facility] in extremis. He was found down at home, in cardiac arrest per EMS, underwent defibrillation twice en route to hospital. Intubation attempted in ER for airway protection was unsuccessful; LMA was placed. Fiberoptic intubation planned in OR in case surgical airway was necessary.    OPERATIVE DETAIL:  After review of appropriate documents and consents, the patient was brought to the operating room. They were sedated and intubated under general anesthesia via fiberoptic technique. With the patient's airway successfully protected, surgical airway was unnecessary and the procedure was concluded.    EBL:  None    DRAINS:  None    SPECIMEN:  None    COMPLICATIONS:  None    DISPOSITION  To PACU/ICU, management per Anesthesia and ICU

## 2018-11-25 NOTE — ASSESSMENT & PLAN NOTE
Mr. Chirinos appears to have suffered an out of hospital V fib arrest. Troponin elevated to 4.7 on presentation. Continue to follow. EKG without ST or T wave changes concerning for acute ischemia. Given rectal aspirin. Cardiology consulted. TTM to 33C initiated.

## 2018-11-25 NOTE — PROGRESS NOTES
Results for CHELSEA BOYD JR. (MRN [id number]) as of 11/25/2018 15:53   Ref. Range 11/25/2018 15:40   POC PH Latest Ref Range: 7.35 - 7.45  7.368   POC PCO2 Latest Ref Range: 35 - 45 mmHg 39.5   POC PO2 Latest Ref Range: 80 - 100 mmHg 212 (H)   POC BE Latest Ref Range: -2 to 2 mmol/L -2   POC HCO3 Latest Ref Range: 24 - 28 mmol/L 22.7 (L)   POC SATURATED O2 Latest Ref Range: 95 - 100 % 100   POC TCO2 Latest Ref Range: 23 - 27 mmol/L 24   FiO2 Unknown 100   Vt Unknown 500   PiP Unknown 25   PEEP Unknown 5   Sample Unknown ARTERIAL   DelSys Unknown Adult Vent   Allens Test Unknown Pass   Site Unknown LR   Mode Unknown AC/PRVC   Rate Unknown 12

## 2018-11-25 NOTE — LETTER
November 26, 2018                      [place] [id number]  Phone: [phone] TO: [facility]  RE: Latosha Avel    Please assist Ms Vallecillo with adjusting her schedule and making up any missed assignment. Ms Vallecillo is at this hospital with her family as her grandfather Mr Micheal Chirinos is in the ICU. The family is needed to meet with the treatment team through out the day. Discharge date is not known at this time.        Dara Onofre [facility]   II  [phone]

## 2018-11-25 NOTE — ASSESSMENT & PLAN NOTE
Likely stress reaction from cardiac arrest. 18% bands on presentation. Empirically placed on antibiotics. Admit UA consistent with UTI. Blood and urine cultures pending. Empirically started on vancomycin and zosyn.

## 2018-11-25 NOTE — CONSULTS
General Surgery Consult    CC: compromised airway    HPI: The patient is a 58 y.o.-year old male who was evaluated at [facility] ER in extremis. He was found down at home, in cardiac arrest per EMS, underwent defibrillation twice en route to hospital. Intubation attempted in ER for airway protection was unsuccessful; LMA was placed successfully    ROS  Unable to obtain (patient sedated with LMA in place)    Past Medical History:   Diagnosis Date    Anemia     Degenerative disc disease     Hemorrhage of rectum and anus     Hypertension     Idiopathic ankylosing spondylitis 2000    Joint pain      Past Surgical History:   Procedure Laterality Date    COLONOSCOPY N/A 2/27/2018    Procedure: COLONOSCOPY;  Surgeon: Jaswant Godfrey MD;  Location: [facility] ([address] [place] [address]);  Service: Endoscopy;  Laterality: N/A;  original order from 5/1/17 per Dr Godfrey-unable to use that order, new order placed    COLONOSCOPY N/A 10/31/2018    Procedure: COLONOSCOPY;  Surgeon: Jaswant Godfrey MD;  Location: [facility] ([address] [place] [address]);  Service: Endoscopy;  Laterality: N/A;  constipation prep: miralax for a week prior and low residue diet 1 week prior. handout given to pt    COLONOSCOPY N/A 10/31/2018    Performed by Jaswant Godfrey MD at [facility] (4TH FLR)    COLONOSCOPY N/A 2/27/2018    Performed by Jaswant Godfrey MD at [facility] (4TH FLR)    COLONOSCOPY N/A 3/18/2013    Performed by Radames Huston MD at [facility] (4TH FLR)    EGD (ESOPHAGOGASTRODUODENOSCOPY) N/A 10/31/2018    [facility]    ESOPHAGOGASTRODUODENOSCOPY N/A 10/31/2018    Procedure: EGD (ESOPHAGOGASTRODUODENOSCOPY);  Surgeon: Jaswant Godfrey MD;  Location: [address]);  Service: Endoscopy;  Laterality: N/A;    MOUTH SURGERY       No current facility-administered medications on file prior to encounter.      Current Outpatient Medications on File Prior to Encounter   Medication Sig Dispense Refill    cetirizine (ZYRTEC) 10 mg TbDL 1 Tablet(s)  Oral PRN Every day.        cyclobenzaprine (FLEXERIL) 10 MG tablet Take 1 tablet (10 mg total) by mouth 3 (three) times daily as needed. 90 tablet 5    diclofenac sodium 1 % Gel Apply 2 g topically 4 (four) times daily. for 10 days 100 g 5    ferrous sulfate 325 (65 FE) MG EC tablet Take 1 tablet (325 mg total) by mouth 2 (two) times daily. 60 tablet 2    HYDROcodone-acetaminophen (NORCO)  mg per tablet Take 1 tablet by mouth every 6 (six) hours as needed for Pain (msk pain). 110 tablet 0    omeprazole (PRILOSEC) 20 MG capsule TAKE 1 CAPSULE(20 MG) BY MOUTH TWICE DAILY 180 capsule 1    sulfaSALAzine (AZULFIDINE) 500 MG TbEC Take 3 tablets (1,500 mg total) by mouth 2 (two) times daily. 540 tablet 0     Review of patient's allergies indicates:   Allergen Reactions    No known drug allergies      Exam  Vitals:    11/25/18 1010 11/25/18 1015 11/25/18 1020 11/25/18 1025   BP:  117/84     BP Location:       Patient Position:       Pulse: 66 66 66 64   Resp: 11 12 12 11   Temp:       TempSrc:       SpO2: 98% 98% 99% 99%   Weight:       Height:         NAD, sedated  Non-obese  LMA in place  Minimal cervical mobility, static in slightly flexed position  Anterior neck landmarks (thyroid & cricoid cartilege, sternal notch, vascular bundle) easily identified and palpated  Moderate secretions  + able to bag ventilate  RRR  Abdomen soft, NT/ND  No peripheral edema  Incontinent of stool    Labs  WBC 31  H/H 10/31.7  CO2 18  tbili 0.4, other LFT's mildly elevated  Glucose 202    Imaging  CXR today showing cardiomegaly with pulmonary congestion/edema    Assessment  58M with cardiac arrest of unknown etiology requiring airway protection. Fiberoptic intubation planned in OR in case surgical airway is necessary due to ankylosing spondylitis

## 2018-11-25 NOTE — NURSING
1550- Patient placed on cooling blanket per order. Targeted temp is 33 degrees celsius per order.     1555- Patient's SBP 75, Dr. Taveras notified, new orders placed.     1615- Patient having breakthrough seizure activity with propofol at 50 mcg'/kg/min. Has not been two hours since patient's last dose per order. Dr. Taveras notified and stated ok to given an additional dose of ativan now. Family updated at bedside.     1715- MARIOLA notified of [facility], will follow patient. Info placed in flowsheet.     1745- Spoke with Dr. Taveras, patient's lip is either quivering or patient is having seizure activity. Vent is alarming due to high pressures. Pinpoint pupils. Patient has goosebumps.     1800- Dr. Lux at bedside assessing patient. Stated he will speak to Dr. Forrester about adding a paralytic. Confirmed TT of 33 degrees with Dr. Lux  (he stated he wanted to double check).

## 2018-11-25 NOTE — PROGRESS NOTES
Pt presented to PACU already intubated from OR and placed  on Servo I vent with settings as followed: PRVC 12/500/+5 and 100%.  Size 7.5 ETT in place and secure at 24 cm at the lip.  Ambu bag and mask at bedside and all alarms on and functioning. NARN. RT will continue to monitor pt status.

## 2018-11-25 NOTE — ED NOTES
Pt transported to OR with anesthesia , resp therapy, and primary nurse present. Pt stable, no complications. Care transferred to OR team.

## 2018-11-25 NOTE — SUBJECTIVE & OBJECTIVE
Past Medical History:   Diagnosis Date    Anemia     Degenerative disc disease     Hemorrhage of rectum and anus     Hypertension     Idiopathic ankylosing spondylitis 2000    Joint pain        Past Surgical History:   Procedure Laterality Date    COLONOSCOPY N/A 2/27/2018    Procedure: COLONOSCOPY;  Surgeon: Jaswant Godfrey MD;  [facility]  Service: Endoscopy;  Laterality: N/A;  original order from 5/1/17 per Dr Godfrey-unable to use that order, new order placed    COLONOSCOPY N/A 10/31/2018    Procedure: COLONOSCOPY;  Surgeon: Jaswant Godfrey MD;  [facility]  Service: Endoscopy;  Laterality: N/A;  constipation prep: miralax for a week prior and low residue diet 1 week prior. handout given to pt    COLONOSCOPY N/A 10/31/2018    Performed by Jaswant Godfrey MD at [facility] (4TH FLR)    COLONOSCOPY N/A 2/27/2018    Performed by Jaswant Godfrey MD at [facility] (4TH FLR)    COLONOSCOPY N/A 3/18/2013    [facility]ust[facility]    EGD (ESOPHAGOGASTRODUODENOSCOPY) N/A 10/31/2018    [facility]    ESOPHAGOGASTRODUODENOSCOPY N/A 10/31/2018    Procedure: EGD (ESOPHAGOGASTRODUODENOSCOPY);  Surgeon: Jaswant Godfrey MD;  Location: [address]);  Service: Endoscopy;  Laterality: N/A;    MOUTH SURGERY         Review of patient's allergies indicates:   Allergen Reactions    No known drug allergies        No current facility-administered medications on file prior to encounter.      Current Outpatient Medications on File Prior to Encounter   Medication Sig    cetirizine (ZYRTEC) 10 mg TbDL 1 Tablet(s) Oral PRN Every day.      cyclobenzaprine (FLEXERIL) 10 MG tablet Take 1 tablet (10 mg total) by mouth 3 (three) times daily as needed.    diclofenac sodium 1 % Gel Apply 2 g topically 4 (four) times daily. for 10 days    ferrous sulfate 325 (65 FE) MG EC tablet Take 1 tablet (325 mg total) by mouth 2 (two) times daily.     HYDROcodone-acetaminophen (NORCO)  mg per tablet Take 1 tablet by mouth every 6 (six) hours as needed for Pain (msk pain).    omeprazole (PRILOSEC) 20 MG capsule TAKE 1 CAPSULE(20 MG) BY MOUTH TWICE DAILY    sulfaSALAzine (AZULFIDINE) 500 MG TbEC Take 3 tablets (1,500 mg total) by mouth 2 (two) times daily.     Family History     Problem Relation (Age of Onset)    Arthritis Mother    Kidney disease Father    No Known Problems Sister, Brother, Maternal Aunt, Maternal Uncle, Paternal Aunt, Paternal Uncle, Maternal Grandmother, Maternal Grandfather, Paternal Grandmother, Paternal Grandfather        Tobacco Use    Smoking status: Never Smoker    Smokeless tobacco: Never Used   Substance and Sexual Activity    Alcohol use: Yes     Alcohol/week: 0.0 oz     Comment: one's in the blue moon    Drug use: No    Sexual activity: Yes     Partners: Female     Review of Systems   Unable to perform ROS: intubated     Objective:     Vital Signs (Most Recent):  Temp: 98 °F (36.7 °C) (11/25/18 1444)  Pulse: 77 (11/25/18 1530)  Resp: 14 (11/25/18 1530)  BP: 93/71 (11/25/18 1530)  SpO2: (!) 91 % (11/25/18 1530) Vital Signs (24h Range):  Temp:  [95.5 °F (35.3 °C)-98 °F (36.7 °C)] 98 °F (36.7 °C)  Pulse:  [] 77  Resp:  [0-45] 14  SpO2:  [91 %-100 %] 91 %  BP: ()/() 93/71     Weight: 65.8 kg (145 lb)  Body mass index is 21.41 kg/m².    SpO2: (!) 91 %  O2 Device (Oxygen Therapy): ventilator      Intake/Output Summary (Last 24 hours) at 11/25/2018 1614  Last data filed at 11/25/2018 1344  Gross per 24 hour   Intake 250 ml   Output 600 ml   Net -350 ml       Lines/Drains/Airways     Drain                 Urethral Catheter 11/25/18 0635 Non-latex 16 Fr. less than 1 day          Airway                 Airway - Non-Surgical 11/25/18 0824 less than 1 day          Peripheral Intravenous Line                 Peripheral IV - Single Lumen 11/25/18 0613 Right Forearm less than 1 day         Peripheral IV - Single Lumen  11/25/18 0616 Left Antecubital less than 1 day         Peripheral IV - Single Lumen 11/25/18 0620 Left Wrist less than 1 day         Peripheral IV - Single Lumen 11/25/18 0625 Right Hand less than 1 day                Physical Exam   Constitutional: He appears well-developed and well-nourished. No distress.   HENT:   Head: Normocephalic and atraumatic.   Eyes: Conjunctivae and EOM are normal. Pupils are equal, round, and reactive to light.   Neck: Normal range of motion. Neck supple. No thyromegaly present.   Cardiovascular: Normal rate, regular rhythm and normal heart sounds.   No murmur heard.  Pulmonary/Chest: Effort normal and breath sounds normal. No respiratory distress. He has no wheezes. He has no rales. He exhibits no tenderness.   Abdominal: Soft. Bowel sounds are normal.   Musculoskeletal: He exhibits no edema.   Neurological:   Intubated, sedated   Skin: Skin is warm and dry.   Psychiatric:   Unable to assess       Significant Labs:   CMP   Recent Labs   Lab 11/25/18 0615      K 3.9      CO2 18*   *   BUN 17   CREATININE 1.2   CALCIUM 9.0   PROT 8.0   ALBUMIN 2.8*   BILITOT 0.4   ALKPHOS 272*   *   *   ANIONGAP 18*   ESTGFRAFRICA >60   EGFRNONAA >60   , CBC   Recent Labs   Lab 11/25/18 0615   WBC 31.06*   HGB 10.0*   HCT 31.7*      , INR   Recent Labs   Lab 11/25/18 0615   INR 1.1   , Lipid Panel No results for input(s): CHOL, HDL, LDLCALC, TRIG, CHOLHDL in the last 48 hours. and Troponin   Recent Labs   Lab 11/25/18 0615   TROPONINI 4.718*       Significant Imaging: EKG: Sinus bradycardia with criteria for LVH and secondary ST-T changes with new depressions diffusely in comparison to previous

## 2018-11-25 NOTE — OR NURSING
Pt ready to go to CT scan accompanied by house sup, ER nurse Jyoti, and RT  Pt on portable vent and on monitor   Seizure like activity getting more frequent  Update called to Madeline Simons in ICU about pt - going to room 278

## 2018-11-25 NOTE — OR NURSING
Dr aries Gamez at bedside to see pt - questionable anoxic brain activity?  Will put some orders in

## 2018-11-25 NOTE — ASSESSMENT & PLAN NOTE
Concern for anoxic brain injury given myoclonic jerks that developed ~6-7 hours after cardiac arrest. CT head with significant motion artifact but areas where gray/white matter differentiation appears to still be intact. Neurology consulted. TTM and monitor for neurologic improvement.

## 2018-11-25 NOTE — HPI
We are being consulted for VT in this 58-year-old male. He has a history of ankylosing spondylitis, severe cervical disc disease from AS, HTN, iron deficiency anemia who is currently intubated, being treated for status epilepticus, and has completed cooling protocol. As per spouse, he has never had any cardiopulmonary complaints recently are testing done in the past.    As per review of records, had had a sudden collapse heard by spouse on 11/25, she called EMS, arrival took approx 5 minutes, AED was connected and recommended cardioversion, paramedics found VFib. Given total of three rounds epi, 300mg amiodarone, ROSC achieved prior to arrival to ER, but he required intubation in the OR.     He had myoclonic jerks in PACU, with EEG suggesting status epilepticus. Cardiac evaluation at OSH suspected cardiac etiology of arrest with recommendation for angiographic eval once more stable. He was transferred to [facility] once hypothermia protocol completed.

## 2018-11-25 NOTE — PROGRESS NOTES
Patient went on transport from PACU to [facility] and returned to room 278 from transport.  Patient placed back on ventilator on previous settings.  Ventilator and alarms on and functioning.  AMBU bag and mask at bedside.  Nurse notified

## 2018-11-25 NOTE — SUBJECTIVE & OBJECTIVE
Past Medical History:   Diagnosis Date    Anemia     Degenerative disc disease     Hemorrhage of rectum and anus     Hypertension     Idiopathic ankylosing spondylitis 2000    Joint pain        Past Surgical History:   Procedure Laterality Date    COLONOSCOPY N/A 2/27/2018    Procedure: COLONOSCOPY;  Surgeon: Jaswant Godfrey MD;  [facility]  Service: Endoscopy;  Laterality: N/A;  original order from 5/1/17 per Dr Godfrey-unable to use that order, new order placed    COLONOSCOPY N/A 10/31/2018    Procedure: COLONOSCOPY;  Surgeon: Jaswant Godfrey MD;  [facility]  Service: Endoscopy;  Laterality: N/A;  constipation prep: miralax for a week prior and low residue diet 1 week prior. handout given to pt    COLONOSCOPY N/A 10/31/2018    Performed by Jaswant Godfrey MD at [facility] (4TH FLR)    COLONOSCOPY N/A 2/27/2018    Performed by Jaswant Godfrey MD at [facility] (4TH FLR)    COLONOSCOPY N/A 3/18/2013    [facility]ust[facility]    EGD (ESOPHAGOGASTRODUODENOSCOPY) N/A 10/31/2018    [facility]    ESOPHAGOGASTRODUODENOSCOPY N/A 10/31/2018    Procedure: EGD (ESOPHAGOGASTRODUODENOSCOPY);  Surgeon: Jaswant Godfrey MD;  Location: [address]);  Service: Endoscopy;  Laterality: N/A;    MOUTH SURGERY         Review of patient's allergies indicates:   Allergen Reactions    No known drug allergies        No current facility-administered medications on file prior to encounter.      Current Outpatient Medications on File Prior to Encounter   Medication Sig    cetirizine (ZYRTEC) 10 mg TbDL 1 Tablet(s) Oral PRN Every day.      cyclobenzaprine (FLEXERIL) 10 MG tablet Take 1 tablet (10 mg total) by mouth 3 (three) times daily as needed.    diclofenac sodium 1 % Gel Apply 2 g topically 4 (four) times daily. for 10 days    ferrous sulfate 325 (65 FE) MG EC tablet Take 1 tablet (325 mg total) by mouth 2 (two) times daily.     HYDROcodone-acetaminophen (NORCO)  mg per tablet Take 1 tablet by mouth every 6 (six) hours as needed for Pain (msk pain).    omeprazole (PRILOSEC) 20 MG capsule TAKE 1 CAPSULE(20 MG) BY MOUTH TWICE DAILY    sulfaSALAzine (AZULFIDINE) 500 MG TbEC Take 3 tablets (1,500 mg total) by mouth 2 (two) times daily.     Family History     Problem Relation (Age of Onset)    Arthritis Mother    Kidney disease Father    No Known Problems Sister, Brother, Maternal Aunt, Maternal Uncle, Paternal Aunt, Paternal Uncle, Maternal Grandmother, Maternal Grandfather, Paternal Grandmother, Paternal Grandfather        Tobacco Use    Smoking status: Never Smoker    Smokeless tobacco: Never Used   Substance and Sexual Activity    Alcohol use: Yes     Alcohol/week: 0.0 oz     Comment: one's in the blue moon    Drug use: No    Sexual activity: Yes     Partners: Female     Review of Systems   Unable to perform ROS: Intubated     Objective:     Vital Signs (Most Recent):  Temp: 98 °F (36.7 °C) (11/25/18 1444)  Pulse: 77 (11/25/18 1530)  Resp: 14 (11/25/18 1530)  BP: 93/71 (11/25/18 1530)  SpO2: (!) 91 % (11/25/18 1530) Vital Signs (24h Range):  Temp:  [95.5 °F (35.3 °C)-98 °F (36.7 °C)] 98 °F (36.7 °C)  Pulse:  [] 77  Resp:  [0-45] 14  SpO2:  [91 %-100 %] 91 %  BP: ()/() 93/71     Weight: 65.8 kg (145 lb)  Body mass index is 21.41 kg/m².    Physical Exam   Constitutional: He appears well-developed and well-nourished.   HENT:   Right Ear: External ear normal.   Left Ear: External ear normal.   ET/OG tube in place   Eyes: Conjunctivae are normal.   Pupils constricted, minimally reactive   Neck: No JVD present.   Neck rigid   Cardiovascular: Normal rate, normal heart sounds and intact distal pulses.   No murmur heard.  Pulmonary/Chest: He has no wheezes. He has no rales.   Mechanically ventilated   Abdominal: Soft. Bowel sounds are normal. He exhibits no distension and no mass.   Musculoskeletal: He exhibits no edema  or deformity.   Neurological:   Sedated   Skin: Skin is warm and dry. No rash noted. No erythema.           Significant Labs: All pertinent labs within the past 24 hours have been reviewed.    Significant Imaging: I have reviewed and interpreted all pertinent imaging results/findings within the past 24 hours.

## 2018-11-25 NOTE — TRANSFER OF CARE
"Anesthesia Transfer of Care Note    Patient: Micheal Chirinos Jr.    Procedure(s) Performed: Procedure(s) (LRB):  TRACHEOTOMY ON STAND-BY, NO TRACH PERFORMED (N/A)  INTUBATION, ENDOTRACHEAL    Patient location: PACU    Anesthesia Type: general    Transport from OR: Transported from OR intubated on 100% O2 by AMBU with adequate controlled ventilation. Upon arrival to PACU/ICU, patient attached to ventilator and auscultated to confirm bilateral breath sounds and adequate TV. Continuous ECG monitoring in transport    Post pain: adequate analgesia    Post assessment: no apparent anesthetic complications and tolerated procedure well    Post vital signs: stable    Level of consciousness: unresponsive    Nausea/Vomiting: no nausea/vomiting    Complications: none    Transfer of care protocol was followed      Last vitals:   Visit Vitals  BP (!) 155/109 (BP Location: Right arm)   Pulse 104   Temp 36.3 °C (97.3 °F) (Tympanic)   Resp 12   Ht 5' 9" (1.753 m)   Wt 65.8 kg (145 lb)   SpO2 97%   BMI 21.41 kg/m²     "

## 2018-11-25 NOTE — HPI
57 yo M, found down at home, cardiac arrest x2 with v fib s/p CPR/shock with ROSC. Given history of ankylosing spondylitis, surgery consulted for possible surgical airway in event anesthesia unable to intubate in OR.

## 2018-11-25 NOTE — ASSESSMENT & PLAN NOTE
Likely nidus was cardiac event, now with elevated cardiac markers  Continue supportive therapy including cooling protocol  Unable to tolerate any medicines for secondary prevention  Added full-dose anticoagulation and anti-platelet therapy  Will plan for cardiac catheterization if stabilizes

## 2018-11-25 NOTE — H&P
[facility] Medicine  History & Physical    Patient Name: Micheal Chirinos Jr.  MRN: [id number]  Admission Date: 11/25/2018  Attending Physician: Dleia Taveras MD  Primary Care Provider: Azikiwe K Lombard, MD         Patient information was obtained from spouse/SO, past medical records and ER records.     Subjective:     Principal Problem:Cardiac arrest with ventricular fibrillation    Chief Complaint:   Chief Complaint   Patient presents with    Cardiac Arrest     pt was found down by family, fire department was on scene and shocked pt 1 time had rosc then coded again with a PEA rhythm, upon ems arrival pt was in v-fib and shocked again, pt given amiadrone, 3 epi and placed on a norepi drip, LMA in place. , Pt  has ROSC currently        HPI: Mr. Chirinos is a 57 yo man with ankyklosing spondylitis, severe cervical disc disease from AS, iron deficiency anemia, essential HTN, and chronic prescription opiate use who presented via EMS after being found down by his wife. His wife reports hearing a thud in the bathroom this morning around 4:50am. She immediately went to check on him and found him face down, lying listlessly over the tub unresponsive. When first responders arrived about 20 min later, an AED was connected and recommended cardioversion. Chest compressions were started. When paramedics arrived he was in V fib arrest. He was given a total of three rounds of epi and 1 dose of amiodarone in the field. An LMA was placed. ROSC was achieved prior to arrival in the ER.   Upon arrival in the ER, he maintained circulation.  An ET could not be placed in the ER due to neck stiffness from his AS.  He was taken to the OR by surgery and an ET tube was passed.  Initial labs revealed a leukocytosis to 31, chemistries were generally unremarkable except for elevated LFTs, troponin was elevated to 4.718, and lactic acid was 6.5.  EKG showed sinus bradycardia with LVH but no findings  concerning for acute ischemia.  CXR showed cardiomegaly and pulmonary vascular congestion.  UA was concerning for UTI.  While in PACU awaiting an ICU bed the patient began to exhibit myoclonic jerks.  The jerks resolved with Ativan and he was loaded on Keppra. CT head with significant motion artifact but areas where gray/white matter differentiation appears to still be intact. He was started on TTM to 33C.     Past Medical History:   Diagnosis Date    Anemia     Degenerative disc disease     Hemorrhage of rectum and anus     Hypertension     Idiopathic ankylosing spondylitis 2000    Joint pain        Past Surgical History:   Procedure Laterality Date    COLONOSCOPY N/A 2/27/2018    Procedure: COLONOSCOPY;  Surgeon: Jaswant Godfrey MD;  [facility]  Service: Endoscopy;  Laterality: N/A;  original order from 5/1/17 per Dr Godfrey-unable to use that order, new order placed    COLONOSCOPY N/A 10/31/2018    Procedure: COLONOSCOPY;  Surgeon: Jaswant Godfrey MD;  Location: [facility] ([address] [place] [address]);  Service: Endoscopy;  Laterality: N/A;  constipation prep: miralax for a week prior and low residue diet 1 week prior. handout given to pt    COLONOSCOPY N/A 10/31/2018    Performed by Jaswant Godfrey MD at [facility] (4TH FLR)    COLONOSCOPY N/A 2/27/2018    Performed by Jaswant Godfrey MD at [facility] (4TH FLR)    COLONOSCOPY N/A 3/18/2013    Performed by Radmaes Huston MD at [facility] (4TH FLR)    EGD (ESOPHAGOGASTRODUODENOSCOPY) N/A 10/31/2018    Performed by Jaswant Godfrey MD at [facility] (4TH FLR)    ESOPHAGOGASTRODUODENOSCOPY N/A 10/31/2018    Procedure: EGD (ESOPHAGOGASTRODUODENOSCOPY);  Surgeon: Jaswant Godfrey MD;  Location: [facility] ([address] [place] [address]);  Service: Endoscopy;  Laterality: N/A;    MOUTH SURGERY         Review of patient's allergies indicates:   Allergen Reactions    No known drug allergies        No current facility-administered medications on file prior to encounter.      Current Outpatient Medications on File  Prior to Encounter   Medication Sig    cetirizine (ZYRTEC) 10 mg TbDL 1 Tablet(s) Oral PRN Every day.      cyclobenzaprine (FLEXERIL) 10 MG tablet Take 1 tablet (10 mg total) by mouth 3 (three) times daily as needed.    diclofenac sodium 1 % Gel Apply 2 g topically 4 (four) times daily. for 10 days    ferrous sulfate 325 (65 FE) MG EC tablet Take 1 tablet (325 mg total) by mouth 2 (two) times daily.    HYDROcodone-acetaminophen (NORCO)  mg per tablet Take 1 tablet by mouth every 6 (six) hours as needed for Pain (msk pain).    omeprazole (PRILOSEC) 20 MG capsule TAKE 1 CAPSULE(20 MG) BY MOUTH TWICE DAILY    sulfaSALAzine (AZULFIDINE) 500 MG TbEC Take 3 tablets (1,500 mg total) by mouth 2 (two) times daily.     Family History     Problem Relation (Age of Onset)    Arthritis Mother    Kidney disease Father    No Known Problems Sister, Brother, Maternal Aunt, Maternal Uncle, Paternal Aunt, Paternal Uncle, Maternal Grandmother, Maternal Grandfather, Paternal Grandmother, Paternal Grandfather        Tobacco Use    Smoking status: Never Smoker    Smokeless tobacco: Never Used   Substance and Sexual Activity    Alcohol use: Yes     Alcohol/week: 0.0 oz     Comment: one's in the blue moon    Drug use: No    Sexual activity: Yes     Partners: Female     Review of Systems   Unable to perform ROS: Intubated     Objective:     Vital Signs (Most Recent):  Temp: 98 °F (36.7 °C) (11/25/18 1444)  Pulse: 77 (11/25/18 1530)  Resp: 14 (11/25/18 1530)  BP: 93/71 (11/25/18 1530)  SpO2: (!) 91 % (11/25/18 1530) Vital Signs (24h Range):  Temp:  [95.5 °F (35.3 °C)-98 °F (36.7 °C)] 98 °F (36.7 °C)  Pulse:  [] 77  Resp:  [0-45] 14  SpO2:  [91 %-100 %] 91 %  BP: ()/() 93/71     Weight: 65.8 kg (145 lb)  Body mass index is 21.41 kg/m².    Physical Exam   Constitutional: He appears well-developed and well-nourished.   HENT:   Right Ear: External ear normal.   Left Ear: External ear normal.   ET/OG tube in  place   Eyes: Conjunctivae are normal.   Pupils constricted, minimally reactive   Neck: No JVD present.   Neck rigid   Cardiovascular: Normal rate, normal heart sounds and intact distal pulses.   No murmur heard.  Pulmonary/Chest: He has no wheezes. He has no rales.   Mechanically ventilated   Abdominal: Soft. Bowel sounds are normal. He exhibits no distension and no mass.   Musculoskeletal: He exhibits no edema or deformity.   Neurological:   Sedated   Skin: Skin is warm and dry. No rash noted. No erythema.           Significant Labs: All pertinent labs within the past 24 hours have been reviewed.    Significant Imaging: I have reviewed and interpreted all pertinent imaging results/findings within the past 24 hours.    Assessment/Plan:     * Cardiac arrest with ventricular fibrillation    Mr. Chirinos appears to have suffered an out of hospital V fib arrest. Troponin elevated to 4.7 on presentation. Continue to follow. EKG without ST or T wave changes concerning for acute ischemia. Given rectal aspirin. Cardiology consulted. TTM to 33C initiated.     Shock, unspecified    Cardiogenic +/- septic shock. Levophed is needed to maintain MAP 60.      Lactic acidosis    From cardiac arrest and cardiogenic shock. Follow up repeat lactate.     Pleural effusion    Increased hypoxia prompted repeat CXR which showed pleural effusion on the right. Pulm consulted.     Sepsis    Empirically placed on antibiotics. BCx pending.     Shock liver    LFTs elevated likely due to shock during cardiac arrest.     Acute hypoxemic respiratory failure    LMA placed in the field with adequate oxygenation upon arrival. ET tube placed in the OR due to neck rigidity from AS. Pulm consulted for vent management.     Seizure    No history of seizure. Likely related to anoxic injury. Repeat labs pending. Seizure broke with Keppra and Ativan. Cont Keppra and PRN Ativan. Neurology recs appreciated.     Anoxic brain injury    Concern for anoxic brain  injury given myoclonic jerks that developed ~6-7 hours after cardiac arrest. CT head with significant motion artifact but areas where gray/white matter differentiation appears to still be intact. Neurology consulted. TTM and monitor for neurologic improvement.     Leukocytosis    Likely stress reaction from cardiac arrest. 18% bands on presentation. Empirically placed on antibiotics. Admit UA consistent with UTI. Blood and urine cultures pending. Empirically started on vancomycin and zosyn.     AS (ankylosing spondylitis)    No acute issues. Treated with sulfasalazine.       VTE Risk Mitigation (From admission, onward)        Ordered     enoxaparin injection 70 mg  Every 12 hours (non-standard times)      11/25/18 1500        Critical care time spent on the evaluation and treatment of severe organ dysfunction, review of pertinent labs and imaging studies, discussions with consulting providers and discussions with patient/family: 90 minutes.     Delia Taveras MD  Department of Hospital Medicine   [facility]

## 2018-11-25 NOTE — ED TRIAGE NOTES
Pt was found down by family. Pt in cardiac arrest. Fire department was on scene and shocked pt 1 time had rosc then coded again with a PEA rhythm, upon ems arrival pt was in v-fib and shocked again, pt given amiadrone, 3 epi and placed on a norepi drip, LMA in place. , Pt has ROSC currently upon arrival to ED bed 17.

## 2018-11-25 NOTE — OR NURSING
Questionable seizure like activity present   Dr rodriguez at bedside   Dr Delfin Gamez called in ICU and encouraged to come see pt asap

## 2018-11-25 NOTE — HPI
Mr. Chirinos is a 59 yo man with ankyklosing spondylitis, severe cervical disc disease from AS, iron deficiency anemia, essential HTN, and chronic prescription opiate use who presented via EMS after being found down by his wife. His wife reports hearing a thud in the bathroom that morning around 4:50am. She immediately went to check on him and found him face down lying listlessly over the tub unresponsive. When first responders arrived about 20 min later, an AED was connected and recommended cardioversion. Chest compressions were started. When paramedics arrived he was confirmed to be in V fib arrest. He was given a total of three rounds of epi and 1 dose of amiodarone in the field. An LMA was placed. ROSC was achieved prior to arrival in the ER.

## 2018-11-25 NOTE — PROGRESS NOTES
Pt transported to OR with no complications. Pt left in the care of OR staff.   Rich Elin  : 1941  [place] / [place] / Product Type: Medicare /  [address]  at [address] [facility], [facility] [place], [address]  [phone]   [phone]       OUTPATIENT SPEECH LANGUAGE PATHOLOGY: MODIFIED BARIUM SWALLOW    ICD-10: Treatment Diagnosis: Pharyngeal dysphagia (R13.13)  DATE: 10/26/2017  REFERRING PHYSICIAN: Osmani Mancera DO MD Orders: Modified Barium Swallow study  PAST MEDICAL HISTORY:   Ms. Ibrahima Fisher is a 77 y.o. female who  has a past medical history of Anemia NEC (Aug '13); Anxiety; Arthritis; Cancer ([facility].); Chronic bronchitis [facility]) (last exac 2013); Depression; GERD (gastroesophageal reflux disease); H/O seasonal allergies; Hypertension; Nausea & vomiting; Personal history of colonic polyps (); Post-surgical hypothyroidism; Psychiatric disorder; and Thyroid disease. She also  has a past surgical history that includes thyroidectomy; tubal ligation; colonoscopy (13); and orthopaedic. RADIOLOGIST:  Dr. Helen Nassar  MEDICAL/REFERRING DIAGNOSIS: Dysphagia, unspecified type [R13.10]    PRECAUTIONS/ALLERGIES: Prednisolone (bulk); Lisinopril; and Sulfa (sulfonamide antibiotics)   ASSESSMENT/PLAN OF CARE:  Based on the objective data described below, the patient presents with oral and pharyngeal phase of swallow grossly within functional limits. Intermittent trace laryngeal penetration without aspiration of serial swallows of thin liquid by straw only. Minimal vallecular residue after swallow of solids that clears with liquid rinse. Recommend continue current diet, alternating solids with liquids. Upright for all PO  Patient will benefit from skilled intervention to address the above impairments; consider training in supraglottic swallow for intermittent choking.   RECOMMENDATIONS AND PLANNED INTERVENTIONS (Benefits and precautions of therapy have been discussed with the patient.):  · continue prescribed diet  MEDICATIONS:  · With liquid  COMPENSATORY STRATEGIES/MODIFICATIONS INCLUDING:  · Alternate liquids/solids  OTHER RECOMMENDATIONS (including follow up treatment recommendations): · Family training/education  · Laryngeal exercises  · Patient education    Thank you for this referral,  Raphael Rockwell MA, CCC-SLP  SUBJECTIVE:  Patient pleasant and cooperative. Present Symptoms: choking; vocal hoarseness      Current Dietary Status:  Regular textures, thin liquids      History of reflux:  [x]YES     []NO      Reflux medication:Prevacid     Social History/Home Situation: , resides with spouse      Work/Activity History: Retired    OBJECTIVE:  Objective Measure: Tool Used: National Outcomes Measurement System: Functional Communication Measures: SWALLOWING  Score:  Initial: 6 Most Recent: X (Date: -- )   Interpretation of Tool: This measure describes the change in functional communication status subsequent to speech-language pathology treatment of patients with dysphagia.  o Level 1:  Individual is not able to swallow anything safely by mouth. All nutrition and hydration is received through non-oral means (e.g., nasogastric tube, PEG). o Level 2: Individual is not able to swallow safely by mouth for nutrition and hydration, but may take some consistency with consistent maximal cues in therapy only. Alternative method of feeding required. o Level 3:  Alternative method of feeding required as individual takes less than 50% of nutrition and hydration by mouth, and/or swallowing is safe with consistent use of moderate cues to use compensatory strategies and/or requires maximum diet restriction. o Level 4:  Swallowing is safe, but usually requires moderate cues to use compensatory strategies, and/or the individual has moderate diet restrictions and/or still requires tube feeding and/or oral supplements.   o Level 5:  Swallowing is safe with minimal diet restriction and/or occasionally requires minimal cueing to use compensatory strategies. The individual may occasionally self-cue. All nutrition and hydration needs are met by mouth at mealtime. o Level 6:  Swallowing is safe, and the individual eats and drinks independently and may rarely require minimal cueing. The individual usually self-cues when difficulty occurs. May need to avoid specific food items (e.g., popcorn and nuts), or require additional time (due to dysphagia). o Level 7: The individuals ability to eat independently is not limited by swallow function. Swallowing would be safe and efficient for all consistencies. Compensatory strategies are effectively used when needed. Score Level 7 Level 6 Level 5 Level 4 Level 3 Level 2 Level 1   Modifier CH CI CJ CK CL CM CN   ? Swallowing:     - CURRENT STATUS: CI - 1%-19% impaired, limited or restricted    - GOAL STATUS:  CI - 1%-19% impaired, limited or restricted    - D/C STATUS:  CI - 1%-19% impaired, limited or restricted        Cognitive/Communication Status:  Mental Status  Neurologic State: Alert  Orientation Level: Appropriate for age  Cognition: Appropriate decision making  Perception: Appears intact  Perseveration: No perseveration noted  Safety/Judgement: Insight into deficits    Oral Assessment:  Oral Assessment  Labial: No impairment  Dentition: Natural  Lingual: No impairment  Velum: No impairment    Vocal Quality: dysphonic, breathy    Patient Viewed:    Film Views: Lateral, Fluoro    Oral Prepatory:  The patient was given the following: Consistency Presented:  Thin liquid, Solid, Pudding, Mixed consistency  How Presented: Self-fed/presented, SLP-fed/presented    Oral Phase:  Bolus Acceptance: No impairment  Bolus Formation/Control: No impairment  Propulsion: No impairment     Oral Residue: None  Initiation of Swallow: No impairment  Oral Phase Severity: No impairment    Pharyngeal Phase:  Timing: No impairment  Decreased Tongue Base Retraction?: No  Laryngeal Elevation: Incomplete laryngeal closure  Penetration: Trace, During swallow, To laryngeal vestibule, From initial swallow (with serial straw swallows only)  Aspiration/Timing: No evidence of aspiration  Aspiration/Penetration Score: 2 (Penetration/No residue-Contrast enters the airway penetrates, remains above the folds/cords, and is cleared)     Pharyngeal Dysfunction: None  Pharyngeal Phase Severity: Minimal  Pharyngeal-Esophageal Segment: Decreased relaxation of upper esophageal segment    Assessment/Reassessment only, no treatment provided today   _________________________________________________________    Total Treatment Duration:  Time In: 0935   Time Out: [address], MA, CCC-SLP

## 2018-11-25 NOTE — PROGRESS NOTES
11/25/18 0635   Patient Assessment/Suction   Level of Consciousness (AVPU) unresponsive   Respiratory Effort Unlabored   Expansion/Accessory Muscles/Retractions no use of accessory muscles   All Lung Fields Breath Sounds clear  (Echo from LMA)   Rhythm/Pattern, Respiratory artificial airway;mechanical device;tachypnea;ventilator assisted   PRE-TX-O2-ETCO2   Oxygen Concentration (%) 100   SpO2 100 %   ETCO2 (mmHg) 0 mmHg   Pulse 68   Resp (!) 33   Vent Select   Conventional Vent Y   $ Ventilator Initial 1   Charged w/in last 24h YES   Preset Conventional Ventilator Settings   Vent ID [id number]   Vent Type Servo i   Vent Mode PRVC   Humidity HME   Set Rate 15 bmp   Vt Set 600 mL   PEEP/CPAP 5 cmH20   Insp Time 0.8 Sec(s)   Insp Rise Time  0.15 %   Patient Ventilator Parameters   Resp Rate Total 32 br/min   Peak Airway Pressure 25.5 cmH2O   Mean Airway Pressure 12.3 cmH20   Exhaled Vt 481 mL   Total Ve 14.8 mL   Conventional Ventilator Alarms   Alarms On Y   Ve High Alarm 40 L/min   Ve Low Alarm 5 L/min   Resp Rate High Alarm 50 br/min   Resp Rate Low Alarm 5   Pt arrived to ER via EMS. Pt placed on vent with current settings. Pt has LMA in place from EMS.

## 2018-11-25 NOTE — ASSESSMENT & PLAN NOTE
No history of seizure. Likely related to anoxic injury. Repeat labs pending. Seizure broke with Keppra and Ativan. Cont Keppra and PRN Ativan. Neurology recs appreciated.

## 2018-11-25 NOTE — ED PROVIDER NOTES
Encounter Date: 11/25/2018       History     Chief Complaint   Patient presents with    Cardiac Arrest     pt was found down by family, fire department was on scene and shocked pt 1 time had rosc then coded again with a PEA rhythm, upon ems arrival pt was in v-fib and shocked again, pt given amiadrone, 3 epi and placed on a norepi drip, LMA in place. , Pt  has ROSC currently     Chief complaint:  Unresponsive    History of present illness:  58-year-old male presents with EMS after being found down pulseless and apneic.  According to EMS, patient received initial defibrillation from fire department in addition to mechanical CPR.  Presenting rhythm for paramedics was ventricular fibrillation.  Patient received additional shock.  Due to ankylosing spondylitis and severely stiff neck, unable to intubate in the field so LMA was placed.  Patient received 3 doses of epinephrine and 300 mg of IV amiodarone.  Patient had return of spontaneous circulation and then lost pulses again but then have another return of spontaneous circulation.            Review of patient's allergies indicates:   Allergen Reactions    No known drug allergies      Past Medical History:   Diagnosis Date    Anemia     Degenerative disc disease     Hemorrhage of rectum and anus     Hypertension     Idiopathic ankylosing spondylitis 2000    Joint pain      Past Surgical History:   Procedure Laterality Date    COLONOSCOPY N/A 2/27/2018    Procedure: COLONOSCOPY;  Surgeon: Jaswant Godfrey MD;  Location: [address]);  Service: Endoscopy;  Laterality: N/A;  original order from 5/1/17 per Dr Godfrey-unable to use that order, new order placed    COLONOSCOPY N/A 10/31/2018    Procedure: COLONOSCOPY;  Surgeon: Jaswant Godfrey MD;  Location: [facility] [address]);  Service: Endoscopy;  Laterality: N/A;  constipation prep: miralax for a week prior and low residue diet 1 week prior. handout given to pt    COLONOSCOPY N/A 10/31/2018    Performed by Jaswant  MD Epif[facility]io at [facility] (4TH FLR)    COLONOSCOPY N/A 2/27/2018    Performed by Jaswant Godfrey MD at [facility] (4TH FLR)    COLONOSCOPY N/A 3/18/2013    Performed by Radames Huston MD at [facility] END[facility] (4TH FLR)    EGD (ESOPHAGOGASTRODUODENOSCOPY) N/A 10/31/2018    Performed by Jaswant Godfrey MD at [facility] (4TH FLR)    ESOPHAGOGASTRODUODENOSCOPY N/A 10/31/2018    Procedure: EGD (ESOPHAGOGASTRODUODENOSCOPY);  Surgeon: Jaswant Godfrey MD;  Location: [facility] (4TH FLR);  Service: Endoscopy;  Laterality: N/A;    MOUTH SURGERY       Family History   Problem Relation Age of Onset    Arthritis Mother     Kidney disease Father     No Known Problems Sister     No Known Problems Brother     No Known Problems Maternal Aunt     No Known Problems Maternal Uncle     No Known Problems Paternal Aunt     No Known Problems Paternal Uncle     No Known Problems Maternal Grandmother     No Known Problems Maternal Grandfather     No Known Problems Paternal Grandmother     No Known Problems Paternal Grandfather     Celiac disease Neg Hx     Cirrhosis Neg Hx     Colon cancer Neg Hx     Colon polyps Neg Hx     Crohn's disease Neg Hx     Cystic fibrosis Neg Hx     Esophageal cancer Neg Hx     Hemochromatosis Neg Hx     Inflammatory bowel disease Neg Hx     Irritable bowel syndrome Neg Hx     Liver cancer Neg Hx     Liver disease Neg Hx     Rectal cancer Neg Hx     Stomach cancer Neg Hx     Ulcerative colitis Neg Hx     Jorge's disease Neg Hx     Melanoma Neg Hx     Psoriasis Neg Hx     Lupus Neg Hx     Eczema Neg Hx     Acne Neg Hx     Amblyopia Neg Hx     Blindness Neg Hx     Cataracts Neg Hx     Glaucoma Neg Hx     Macular degeneration Neg Hx     Retinal detachment Neg Hx     Strabismus Neg Hx     Cancer Neg Hx     Diabetes Neg Hx     Hypertension Neg Hx     Stroke Neg Hx     Thyroid disease Neg Hx      Social History     Tobacco Use    Smoking status: Never Smoker    Smokeless tobacco: Never Used    Substance Use Topics    Alcohol use: Yes     Alcohol/week: 0.0 oz     Comment: one's in the blue moon    Drug use: No     Review of Systems   Unable to perform ROS: Acuity of condition       Physical Exam     Initial Vitals   BP Pulse Resp Temp SpO2   11/25/18 0617 11/25/18 0613 11/25/18 0613 11/25/18 0646 11/25/18 0613   (!) 186/111 99 (!) 40 (!) 95.5 °F (35.3 °C) 100 %      MAP       --                Physical Exam    Nursing note and vitals reviewed.  HENT:   Head: Normocephalic and atraumatic.   Right Ear: External ear normal.   Left Ear: External ear normal.   Nose: Nose normal.   LMA in place; patient on ventilator.   Eyes: Conjunctivae and EOM are normal. Pupils are equal, round, and reactive to light. Right eye exhibits no discharge. Left eye exhibits no discharge. No scleral icterus.   Neck: No JVD present.   Neck in fixed position.  Unable to move in any direction.   Cardiovascular: Normal rate, regular rhythm, normal heart sounds and intact distal pulses. Exam reveals no gallop and no friction rub.    No murmur heard.  Pulmonary/Chest: Breath sounds normal. He has no wheezes. He has no rhonchi. He has no rales.   Ventilated breath sounds.  Symmetric bilaterally.   Abdominal: Soft. Bowel sounds are normal. He exhibits no distension and no mass.   Musculoskeletal: He exhibits no edema.   Neurological:   Unresponsive.   Skin: Skin is warm and dry. No rash noted. No erythema. No pallor.         ED Course   Critical Care  Date/Time: 11/25/2018 10:37 AM  Performed by: Les Mar Jr., MD  Authorized by: Les Mar Jr., MD   Direct patient critical care time: 10 minutes  Additional history critical care time: 10 minutes  Ordering / reviewing critical care time: 5 minutes  Documentation critical care time: 5 minutes  Consulting other physicians critical care time: 5 minutes  Consult with family critical care time: 10 minutes  Total critical care time (exclusive of procedural time) : 45  minutes  Critical care was necessary to treat or prevent imminent or life-threatening deterioration of the following conditions: cardiac failure, circulatory failure and respiratory failure.  Critical care was time spent personally by me on the following activities: development of treatment plan with patient or surrogate, discussions with primary provider, discussions with consultants, interpretation of cardiac output measurements, evaluation of patient's response to treatment, examination of patient, obtaining history from patient or surrogate, ordering and performing treatments and interventions, ordering and review of laboratory studies, ordering and review of radiographic studies, pulse oximetry, ventilator management, review of old charts and re-evaluation of patient's condition.        Labs Reviewed   CBC W/ AUTO DIFFERENTIAL - Abnormal; Notable for the following components:       Result Value    WBC 31.06 (*)     RBC 4.15 (*)     Hemoglobin 10.0 (*)     Hematocrit 31.7 (*)     MCV 76 (*)     MCH 24.1 (*)     MCHC 31.5 (*)     RDW 18.7 (*)     All other components within normal limits   COMPREHENSIVE METABOLIC PANEL - Abnormal; Notable for the following components:    CO2 18 (*)     Glucose 202 (*)     Albumin 2.8 (*)     Alkaline Phosphatase 272 (*)      (*)      (*)     Anion Gap 18 (*)     All other components within normal limits   TROPONIN I - Abnormal; Notable for the following components:    Troponin I 4.718 (*)     All other components within normal limits   B-TYPE NATRIURETIC PEPTIDE - Abnormal; Notable for the following components:     (*)     All other components within normal limits   LACTIC ACID, PLASMA - Abnormal; Notable for the following components:    Lactate (Lactic Acid) 6.5 (*)     All other components within normal limits    Narrative:      Lactic Acid  critical result(s) called and verbal readback obtained   from Kiersten Shaw., 11/25/2018 07:08   CULTURE, URINE    CULTURE, BLOOD   CULTURE, BLOOD   PROTIME-INR   APTT   URINALYSIS, REFLEX TO URINE CULTURE   URINALYSIS MICROSCOPIC     EKG Readings: (Independently Interpreted)   Initial Reading: No STEMI.   EKG reviewed and interpreted by me shows sinus rhythm at a rate of 96.  There is first-degree AV block.  There is QRS widening.  The QT interval is prolonged.  There is a right axis deviation.  There is normal R-wave progression.  There is LVH.  There are diffuse ST depressions in the anterior, lateral, and inferior leads.    EKG 2.  Reviewed by me reveals no significant changes with continued sinus rhythm. First-degree AV block with QRS widening.  There is a normal axis.  There continues to be diffuse ST depression in the inferior and lateral and anterior leads.    EKG 3.  Reviewed by me reveals sinus bradycardia rate of 56.  There is first-degree AV block.  There is a wide QRS and QT interval.  There is normal axis.  There are diffuse ST depressions mostly in the inferior, lateral, and anterior leads.    EKG 4.  Reviewed by me reveals no significant changes.  Sinus bradycardia rate of 49.  QT interval is prolonged.  There is normal axis.  There is normal R-wave progression.  There are ST depressions in the inferior, lateral, and anterior leads.         Imaging Results    None       X-Rays:   Independently Interpreted Readings:   Other Readings:  Chest x-ray reveals pulmonary edema and pulmonary vascular congestion.    Medical Decision Making:   ED Management:  This is the emergent evaluation of a 58-year-old male in the emergency department unresponsive after being resuscitated from cardiac arrest.  Differential diagnosis at the time of initial evaluation included, but was not limited to:  Acute myocardial infarction, sepsis, pneumonia, acute CHF, anemia, metabolic disturbance.  Patient did not require further chest compressions upon arrival to the emergency department.  LMA appears to be in place and the patient has good  oxygen saturation and has been placed on vanc.  The patient has a history of ankylosing spondylitis.  His neck is completely mobile.  Given this, I do not feel that attempting removal of LMA and definitive airway access to be done without backup.  Discussed the case with the anesthesiologist who saw the patient in the emergency department.  He would prefer to do this in the operating room.  House supervisor asthma call to set up the case.  Anesthesia discussing with General Surgery at this time.  Family has been updated.      Additionally, discussed the case again with Dr. Reyez from Cardiology.  He will see the patient when patient has secure airway.  I have discussed the case with the ICU hospitalist, Dr. Taveras, who agrees with the plan for admission and post cardiac arrest management.    Upon arrival, the patient had an EKG revealing ST depressions diffusely mostly in the inferior and lateral leads.  Given the presenting rhythm was ventricular fibrillation there was a question of possible ST elevation in the field which was not captured by EMS, I did discuss the case initially with Dr. Reyez, who was on-call for Cardiology.  He agrees with rectal aspirin.  Question of earlier GI bleed but I reviewed the notes and there is no anything to indicate active bleeding from upper or lower GI source based on endoscopy and colonoscopy that were done on October 31, 2018.  However, no further anticoagulation or antiplatelet medications ordered by Dr. Reyez at this time, due to possibility of bleeding diathesis.    Late entry:  Per charge RN, patient is still in the PACU.  He was successfully intubated in the operating room.   He is being held until ICU bed becomes available.  I have updated Dr. Taveras, the ICU hospitalist.                      Clinical Impression:   Cardiac arrest, leukocytosis, metabolic acidosis                             Les Mar Jr., MD  11/25/18 9439       Les Mar  MD Doug  12/05/18 0931

## 2018-11-25 NOTE — ED NOTES
Report received from GENTRY Paniagua; all care assumed. Pt lying in bed, no acute distress at this time. Airway in place. VSS. MD at bedside consulting with anesthesia. Will continue to monitor patient.

## 2018-11-25 NOTE — ANESTHESIA PREPROCEDURE EVALUATION
11/25/2018  Micheal Chirinos Jr. is a 58 y.o., male.    Anesthesia Evaluation    I have reviewed the Patient Summary Reports.     I have reviewed the Medications.     Review of Systems  Anesthesia Hx:  No problems with previous Anesthesia    Social:  Alcohol Use, Non-Smoker    Cardiovascular:   Hypertension    Musculoskeletal:   Arthritis     Neurological:   Neuromuscular Disease,        Physical Exam  General:  Well nourished    Airway/Jaw/Neck:  Airway Findings: General Airway Assessment: Possible difficult mask airway, Possible difficult intubation    LMA supreme in place    Dental:  DENTAL FINDINGS: Normal   Chest/Lungs:  Chest/Lungs Clear    Heart/Vascular:  Heart Findings: Rhythm: Irregularly Irregular        Mental Status:  Mental Status Findings:  Unconscious         Anesthesia Plan  Type of Anesthesia, risks & benefits discussed:  Anesthesia Type:  general  Patient's Preference:   Intra-op Monitoring Plan: standard ASA monitors  Intra-op Monitoring Plan Comments:   Post Op Pain Control Plan: multimodal analgesia, IV/PO Opioids PRN and per primary service following discharge from PACU  Post Op Pain Control Plan Comments:   Induction:   IV  Beta Blocker:  Patient is not currently on a Beta-Blocker (No further documentation required).       Informed Consent: Patient representative understands risks and agrees with Anesthesia plan.  Questions answered. Anesthesia consent signed with patient representative.  ASA Score: 3  emergent   Day of Surgery Review of History & Physical:    H&P update referred to the surgeon.     Anesthesia Plan Notes: Cardiac arrest in the field with ROSC after 3 rounds of epi, arrived with LMA.  Pt is stable with LMA but needs definitive airway.  Pt with anklyosing spondylitis and likely difficult airway.  Surgical team called for backup trach if necessary.  Plan for  glidescope intubation, fiberoptic available.  Wife at bedside in ER for consents.        Ready For Surgery From Anesthesia Perspective.

## 2018-11-25 NOTE — ASSESSMENT & PLAN NOTE
LMA placed in the field with adequate oxygenation upon arrival. ET tube placed in the OR due to neck rigidity from AS. Pulm consulted for vent management.

## 2018-11-25 NOTE — CONSULTS
[facility]  Cardiology  Consult Note    Patient Name: Micheal Chirinos Jr.  MRN: [id number]  Admission Date: 11/25/2018  Hospital Length of Stay: 0 days  Code Status: Full Code   Attending Provider: Delia Taveras MD   Consulting Provider: Rajesh Reyez MD  Primary Care Physician: Azikiwe K Lombard, MD  Principal Problem:Cardiac arrest with ventricular fibrillation    Patient information was obtained from spouse/SO, EMS personnel, past medical records and ER records.     Inpatient consult to Cardiology  Consult performed by: Rajesh Reyez MD  Consult ordered by: Les Mar Jr., MD  Reason for consult: Cardiac arrest        Subjective:     Chief Complaint:  Found down     HPI:    58-year-old male presents with EMS after being found down pulseless and apneic.  According to EMS, patient received initial defibrillation from fire department in addition to mechanical CPR.  Presenting rhythm for paramedics was ventricular fibrillation.  Patient received additional shock.  Due to ankylosing spondylitis and severely stiff neck, unable to intubate in the field so LMA was placed.  Patient received 3 doses of epinephrine and 300 mg of IV amiodarone.  Patient had return of spontaneous circulation and then lost pulses again but then have another return of spontaneous circulation.       The patient is currently intubated on cooling protocol.  Spoke with wife at bedside who heard her  go down and called 911.  She said that several minutes elapsed between when EMS arrived and successfully resuscitated the patient.  EKG in the ED showed diffuse ST depressions which are new in comparison to previous.  She denies any previous cardiac history.  She says he has never had any cardiopulmonary complaints recently are testing done in the past.  Troponin as well as elevated consistent with non ST elevation MI.          Past Medical History:   Diagnosis Date    Anemia     Degenerative disc  disease     Hemorrhage of rectum and anus     Hypertension     Idiopathic ankylosing spondylitis 2000    Joint pain        Past Surgical History:   Procedure Laterality Date    COLONOSCOPY N/A 2/27/2018    Procedure: COLONOSCOPY;  Surgeon: Jaswant Godfrey MD;  [facility]  Service: Endoscopy;  Laterality: N/A;  original order from 5/1/17 per Dr Godfrey-unable to use that order, new order placed    COLONOSCOPY N/A 10/31/2018    Procedure: COLONOSCOPY;  Surgeon: Jaswant Godfrey MD;  [facility]  Service: Endoscopy;  Laterality: N/A;  constipation prep: miralax for a week prior and low residue diet 1 week prior. handout given to pt    COLONOSCOPY N/A 10/31/2018    Performed by Jaswant Godfrey MD at [facility] (4TH FLR)    COLONOSCOPY N/A 2/27/2018    Performed by Jaswant Godfrey MD at [facility] (4TH FLR)    COLONOSCOPY N/A 3/18/2013    Performed by Radames Huston MD at [facility] (4TH FLR)    EGD (ESOPHAGOGASTRODUODENOSCOPY) N/A 10/31/2018    Performed by Jaswant Godfrey MD at [facility] (4TH FLR)    ESOPHAGOGASTRODUODENOSCOPY N/A 10/31/2018    Procedure: EGD (ESOPHAGOGASTRODUODENOSCOPY);  Surgeon: Jaswant Godfrey MD;  Location: [address]);  Service: Endoscopy;  Laterality: N/A;    MOUTH SURGERY         Review of patient's allergies indicates:   Allergen Reactions    No known drug allergies        No current facility-administered medications on file prior to encounter.      Current Outpatient Medications on File Prior to Encounter   Medication Sig    cetirizine (ZYRTEC) 10 mg TbDL 1 Tablet(s) Oral PRN Every day.      cyclobenzaprine (FLEXERIL) 10 MG tablet Take 1 tablet (10 mg total) by mouth 3 (three) times daily as needed.    diclofenac sodium 1 % Gel Apply 2 g topically 4 (four) times daily. for 10 days    ferrous sulfate 325 (65 FE) MG EC tablet Take 1 tablet (325 mg total) by mouth 2 (two) times daily.    HYDROcodone-acetaminophen (NORCO)  mg per tablet Take 1 tablet by mouth  every 6 (six) hours as needed for Pain (msk pain).    omeprazole (PRILOSEC) 20 MG capsule TAKE 1 CAPSULE(20 MG) BY MOUTH TWICE DAILY    sulfaSALAzine (AZULFIDINE) 500 MG TbEC Take 3 tablets (1,500 mg total) by mouth 2 (two) times daily.     Family History     Problem Relation (Age of Onset)    Arthritis Mother    Kidney disease Father    No Known Problems Sister, Brother, Maternal Aunt, Maternal Uncle, Paternal Aunt, Paternal Uncle, Maternal Grandmother, Maternal Grandfather, Paternal Grandmother, Paternal Grandfather        Tobacco Use    Smoking status: Never Smoker    Smokeless tobacco: Never Used   Substance and Sexual Activity    Alcohol use: Yes     Alcohol/week: 0.0 oz     Comment: one's in the blue moon    Drug use: No    Sexual activity: Yes     Partners: Female     Review of Systems   Unable to perform ROS: intubated     Objective:     Vital Signs (Most Recent):  Temp: 98 °F (36.7 °C) (11/25/18 1444)  Pulse: 77 (11/25/18 1530)  Resp: 14 (11/25/18 1530)  BP: 93/71 (11/25/18 1530)  SpO2: (!) 91 % (11/25/18 1530) Vital Signs (24h Range):  Temp:  [95.5 °F (35.3 °C)-98 °F (36.7 °C)] 98 °F (36.7 °C)  Pulse:  [] 77  Resp:  [0-45] 14  SpO2:  [91 %-100 %] 91 %  BP: ()/() 93/71     Weight: 65.8 kg (145 lb)  Body mass index is 21.41 kg/m².    SpO2: (!) 91 %  O2 Device (Oxygen Therapy): ventilator      Intake/Output Summary (Last 24 hours) at 11/25/2018 1614  Last data filed at 11/25/2018 1344  Gross per 24 hour   Intake 250 ml   Output 600 ml   Net -350 ml       Lines/Drains/Airways     Drain                 Urethral Catheter 11/25/18 0635 Non-latex 16 Fr. less than 1 day          Airway                 Airway - Non-Surgical 11/25/18 0824 less than 1 day          Peripheral Intravenous Line                 Peripheral IV - Single Lumen 11/25/18 0613 Right Forearm less than 1 day         Peripheral IV - Single Lumen 11/25/18 0616 Left Antecubital less than 1 day         Peripheral IV - Single  Lumen 11/25/18 0620 Left Wrist less than 1 day         Peripheral IV - Single Lumen 11/25/18 0625 Right Hand less than 1 day                Physical Exam   Constitutional: He appears well-developed and well-nourished. No distress.   HENT:   Head: Normocephalic and atraumatic.   Eyes: Conjunctivae and EOM are normal. Pupils are equal, round, and reactive to light.   Neck: Normal range of motion. Neck supple. No thyromegaly present.   Cardiovascular: Normal rate, regular rhythm and normal heart sounds.   No murmur heard.  Pulmonary/Chest: Effort normal and breath sounds normal. No respiratory distress. He has no wheezes. He has no rales. He exhibits no tenderness.   Abdominal: Soft. Bowel sounds are normal.   Musculoskeletal: He exhibits no edema.   Neurological:   Intubated, sedated   Skin: Skin is warm and dry.   Psychiatric:   Unable to assess       Significant Labs:   CMP   Recent Labs   Lab 11/25/18  0615      K 3.9      CO2 18*   *   BUN 17   CREATININE 1.2   CALCIUM 9.0   PROT 8.0   ALBUMIN 2.8*   BILITOT 0.4   ALKPHOS 272*   *   *   ANIONGAP 18*   ESTGFRAFRICA >60   EGFRNONAA >60   , CBC   Recent Labs   Lab 11/25/18 0615   WBC 31.06*   HGB 10.0*   HCT 31.7*      , INR   Recent Labs   Lab 11/25/18 0615   INR 1.1   , Lipid Panel No results for input(s): CHOL, HDL, LDLCALC, TRIG, CHOLHDL in the last 48 hours. and Troponin   Recent Labs   Lab 11/25/18 0615   TROPONINI 4.718*       Significant Imaging: EKG: Sinus bradycardia with criteria for LVH and secondary ST-T changes with new depressions diffusely in comparison to previous    Assessment and Plan:     * Cardiac arrest with ventricular fibrillation    Likely nidus was cardiac event, now with elevated cardiac markers  Continue supportive therapy including cooling protocol  Unable to tolerate any medicines for secondary prevention  Added full-dose anticoagulation and anti-platelet therapy  Will plan for cardiac  catheterization if stabilizes       Acute hypoxemic respiratory failure    Ventilator management per Pulmonary     Shock, unspecified           Lactic acidosis           Anoxic brain injury    Neuro to evaluate     AS (ankylosing spondylitis)               VTE Risk Mitigation (From admission, onward)        Ordered     enoxaparin injection 70 mg  Every 12 hours (non-standard times)      11/25/18 1500        * total ICU time spent on case 45 min     Thank you for your consult. I will follow-up with patient. Please contact us if you have any additional questions.    Rajesh Reyez MD  Cardiology   [facility]

## 2018-11-26 NOTE — PLAN OF CARE
Problem: Patient Care Overview  Goal: Plan of Care Review  Outcome: Ongoing (interventions implemented as appropriate)  Pt remains in ICU on ventilator via ET tube. Pt remains on propofol for comfort. No seizure activity noted. Pt remains unresponsive with no gag, cough or corneal reflex. Amio discontinued per Dr. Rodrigez due to bradycardia. Targeted temperature achieved at 0030. Urine output adequate via mcgee catheter. No bowel movement this shift. 2g of magnesium given per PRN order. Pt and family updated on plan of care. Pt remains free of falls and hospital acquired injuries and falls.

## 2018-11-26 NOTE — PROGRESS NOTES
Results for CHELSEA BOYD JR. (MRN [id number]) as of 11/25/2018 18:22   Ref. Range 11/25/2018 18:16   POC PH Latest Ref Range: 7.35 - 7.45  7.400   POC PCO2 Latest Ref Range: 35 - 45 mmHg 32.0 (L)   POC PO2 Latest Ref Range: 80 - 100 mmHg 117 (H)   POC BE Latest Ref Range: -2 to 2 mmol/L -4   POC HCO3 Latest Ref Range: 24 - 28 mmol/L 19.8 (L)   POC SATURATED O2 Latest Ref Range: 95 - 100 % 99   POC TCO2 Latest Ref Range: 23 - 27 mmol/L 21 (L)   FiO2 Unknown 70   Vt Unknown 400   PiP Unknown 22   PEEP Unknown 15   Sample Unknown ARTERIAL   DelSys Unknown Adult Vent   Allens Test Unknown Pass   Site Unknown LR   Mode Unknown AC/PRVC   Rate Unknown 15

## 2018-11-26 NOTE — PROGRESS NOTES
[facility]  Pulmonology  Progress Note    Patient Name: Micheal Chirinos Jr.  MRN: [id number]  Admission Date: 11/25/2018  Hospital Length of Stay: 1 days  Code Status: Full Code  Attending Provider: Delia Taveras MD  Primary Care Provider: Azikiwe K Lombard, MD   Principal Problem: Cardiac arrest with ventricular fibrillation    Subjective:     Interval History: Cooling begun overnight and patient is at goal (33C). Troponin flavio to 9.1 consistent with NSTEMI.     Objective:     Vital Signs (Most Recent):  Temp: (!) 91.4 °F (33 °C) (11/26/18 0630)  Pulse: (!) 45 (11/26/18 0916)  Resp: (!) 35 (11/26/18 0916)  BP: 105/71 (11/26/18 0916)  SpO2: 100 % (11/26/18 0916) Vital Signs (24h Range):  Temp:  [90.9 °F (32.7 °C)-99.7 °F (37.6 °C)] 91.4 °F (33 °C)  Pulse:  [42-85] 45  Resp:  [0-42] 35  SpO2:  [91 %-100 %] 100 %  BP: ()/(52-92) 105/71     Weight: 65.8 kg (145 lb)  Body mass index is 21.41 kg/m².      Intake/Output Summary (Last 24 hours) at 11/26/2018 0929  Last data filed at 11/26/2018 0700  Gross per 24 hour   Intake 1713.89 ml   Output 960 ml   Net 753.89 ml     ROS unable to obtain due to intubation and encephalopathy.   Physical Exam   Constitutional: He appears well-developed and well-nourished. He is intubated.   HENT:   Head: Normocephalic and atraumatic.   Mouth/Throat: Oropharynx is clear and moist.   Eyes: Conjunctivae are normal. Pupils are equal, round, and reactive to light. Right eye exhibits no discharge. Left eye exhibits no discharge. No scleral icterus.   Neck: Trachea normal, normal range of motion and full passive range of motion without pain. Neck supple. No JVD present. No tracheal deviation present. No thyromegaly present.   Cardiovascular: Normal rate, regular rhythm, S1 normal, S2 normal, normal heart sounds and intact distal pulses. Exam reveals no gallop and no friction rub.   No murmur heard.  Pulmonary/Chest: Effort normal and breath sounds normal. He  is intubated. No respiratory distress. He has no wheezes. He has no rales. He exhibits no tenderness.   Abdominal: Soft. Bowel sounds are normal. He exhibits no distension and no mass. There is no tenderness. There is no rebound and no guarding.   Musculoskeletal: Normal range of motion. He exhibits no tenderness or deformity.   Lymphadenopathy:     He has no cervical adenopathy.   Neurological: No cranial nerve deficit. Coordination normal.   Skin: Skin is warm and dry. No abrasion and no bruising noted.   Vitals reviewed.      Vents:  Vent Mode: PRVC (11/26/18 0916)  Ventilator Initiated: Yes (11/25/18 0853)  Set Rate: 20 bmp (11/26/18 0916)  Vt Set: 400 mL (11/26/18 0916)  PEEP/CPAP: 5 cmH20 (11/26/18 0916)  Oxygen Concentration (%): 40 (11/26/18 0916)  Peak Airway Pressure: 21.7 cmH2O (11/26/18 0916)  Total Ve: 8.4 mL (11/26/18 0916)  F/VT Ratio<105 (RSBI): (!) 83.93 (11/26/18 0916)    Lines/Drains/Airways     Drain                 Urethral Catheter 11/25/18 0635 Non-latex 16 Fr. 1 day          Airway                 Airway - Non-Surgical 11/25/18 0824 1 day          Peripheral Intravenous Line                 Peripheral IV - Single Lumen 11/25/18 0613 Right Forearm 1 day         Peripheral IV - Single Lumen 11/25/18 0616 Left Antecubital 1 day         Peripheral IV - Single Lumen 11/25/18 0620 Left Wrist 1 day         Peripheral IV - Single Lumen 11/25/18 0625 Right Hand 1 day                Significant Labs:    CBC/Anemia Profile:  Recent Labs   Lab 11/25/18  0615 11/25/18  1707 11/26/18  0447   WBC 31.06* 24.87* 24.83*   HGB 10.0* 9.3* 10.5*   HCT 31.7* 29.0* 32.6*    344 279   MCV 76* 77* 77*   RDW 18.7* 18.9* 19.1*        Chemistries:  Recent Labs   Lab 11/25/18  0615  11/25/18  1707 11/25/18  2046 11/26/18  0025 11/26/18  0447     --  140  138  138 138 138 137   K 3.9  --  3.8  3.8  3.7 4.1 3.9 3.9     --  107  106  106 106 106 106   CO2 18*  --  21*  20*  19* 17* 19* 18*   BUN  17  --  23*  23*  23* 25* 25* 24*   CREATININE 1.2  --  1.2  1.2  1.2 1.2 1.2 1.2   CALCIUM 9.0  --  8.7  8.9  8.9 9.1 9.1 9.3   ALBUMIN 2.8*  --  2.9*  2.8*  --   --   --    PROT 8.0  --  8.0  7.8  --   --   --    BILITOT 0.4  --  0.5  0.5  --   --   --    ALKPHOS 272*  --  236*  232*  --   --   --    *  --  124*  121*  --   --   --    *  --  207*  205*  --   --   --    MG  --    < > 1.8 1.8 2.2 2.3   PHOS  --    < > 3.0 3.2 3.2 3.4    < > = values in this interval not displayed.     Significant Imaging:  I have reviewed and interpreted all pertinent imaging results/findings within the past 24 hours.    Assessment/Plan:     * Cardiac arrest with ventricular fibrillation    --ROSC enroute to ED.    --Currently on hypothermic protocol with target temp of 33 degree.     --EEG. May also need MRI after cooling completed to evaluate for anoxic brain injury. NSE pending.   --Suspect cardiac etiology given NSTEMI. Cardiology following.      Shock liver    --Trend LFTs.     Acute hypoxemic respiratory failure    cxr with new rll infiltrate.  Suspect aspiration since patient with lma for several hours.  fio2 is trending down with increased peep.  Continue with lung protective strategy.    --Repeat CXR today.   --Wean ventilator as tolerated after cooling is completed.      Leukocytosis    Vanc/zosyn empirically.  Await culture.         Critical Care time: 50 minutes     Skyler Sahu PA-C  Pulmonology  [facility]

## 2018-11-26 NOTE — SUBJECTIVE & OBJECTIVE
Interval History:  No acute events, on cooling protocol    Telemetry:  Normal sinus rhythm    Review of Systems   Unable to perform ROS: intubated     Objective:     Vital Signs (Most Recent):  Temp: (!) 91.4 °F (33 °C) (11/26/18 1000)  Pulse: 61 (11/26/18 1152)  Resp: (!) 63 (11/26/18 1152)  BP: 130/81 (11/26/18 1152)  SpO2: 100 % (11/26/18 1152) Vital Signs (24h Range):  Temp:  [90.9 °F (32.7 °C)-99.7 °F (37.6 °C)] 91.4 °F (33 °C)  Pulse:  [42-85] 61  Resp:  [0-63] 63  SpO2:  [91 %-100 %] 100 %  BP: ()/(52-89) 130/81     Weight: 65.8 kg (145 lb 1 oz)  Body mass index is 21.42 kg/m².     SpO2: 100 %  O2 Device (Oxygen Therapy): ventilator      Intake/Output Summary (Last 24 hours) at 11/26/2018 1201  Last data filed at 11/26/2018 1000  Gross per 24 hour   Intake 1892.69 ml   Output 1120 ml   Net 772.69 ml       Lines/Drains/Airways     Drain                 Urethral Catheter 11/25/18 0635 Non-latex 16 Fr. 1 day          Airway                 Airway - Non-Surgical 11/25/18 0824 1 day          Peripheral Intravenous Line                 Peripheral IV - Single Lumen 11/25/18 0613 Right Forearm 1 day         Peripheral IV - Single Lumen 11/25/18 0616 Left Antecubital 1 day         Peripheral IV - Single Lumen 11/25/18 0620 Left Wrist 1 day         Peripheral IV - Single Lumen 11/25/18 0625 Right Hand 1 day                Physical Exam   Constitutional: No distress.   Cardiovascular: Normal rate and regular rhythm.   Pulmonary/Chest: Effort normal and breath sounds normal.   Musculoskeletal: He exhibits no edema.   Neurological:   Intubated, sedated       Significant Labs:   BMP:   Recent Labs   Lab 11/25/18 2046 11/26/18  0025 11/26/18  0447 11/26/18  0914 11/26/18  0915   GLU 98  98 98  98 115*  115*  --  99    138 137  --   --    K 4.1 3.9 3.9  --   --     106 106  --   --    CO2 17* 19* 18*  --   --    BUN 25* 25* 24*  --   --    CREATININE 1.2 1.2 1.2  --   --    CALCIUM 9.1 9.1 9.3  --   --     MG 1.8 2.2 2.3 2.4  --     and CBC   Recent Labs   Lab 11/25/18  0615 11/25/18  1707 11/26/18  0447   WBC 31.06* 24.87* 24.83*   HGB 10.0* 9.3* 10.5*   HCT 31.7* 29.0* 32.6*    344 279       Significant Imaging: Echocardiogram:   Transthoracic echo (TTE) complete (Cupid Only):   Results for orders placed or performed during the hospital encounter of 11/25/18   Transthoracic echo (TTE) complete   Result Value Ref Range    BSA 1.79 m2

## 2018-11-26 NOTE — PROGRESS NOTES
1125 propofol paused for EEG per MD Lux's recommendations. During EEG pt began to have rhythmic oral movements, eyes spontaneously opened and remained open, not tracking, no corneal reflex, MD Lux aware, speaking with Neurologist at [facility], 1212 ativan 4 mg provided, pt with out improvement, to movements and EEG waveform, MD Lux instructed RN to restart propofol. Propofol restarted- movements stopped, eyes closed. Vimpat ordered, will admin when available. Following this orders received for transfer to [facility], Family updated per MD's. Aware pt will not transfer until pt is rewarmed.   1635 Vanc trough 16.8, informed md Nunez. New orders received.

## 2018-11-26 NOTE — CONSULTS
[facility]  Pulmonology  Consult Note    Patient Name: Micheal Chirinos Jr.  MRN: [id number]  Admission Date: 11/25/2018  Hospital Length of Stay: 0 days  Code Status: Full Code  Attending Physician: Delia Taveras MD  Primary Care Provider: Azikiwe K Lombard, MD   Principal Problem: Cardiac arrest with ventricular fibrillation    Inpatient consult to Pulmonology  Consult performed by: Syed Forrester MD  Consult ordered by: Delia Taveras MD        Subjective:     HPI:  Patient is 58 y.o. male  has a past medical history of Anemia, Degenerative disc disease, Hemorrhage of rectum and anus, Hypertension, Idiopathic ankylosing spondylitis (2000), and Joint pain. presented to [facility] on 11/25/18 after found down by wife.  History is per chart review.  Per Dr. Lenz's note, wife heard a thud sound around 4:50 am.  She immediately went to check on him and found him face down, lying listlessly over the tub unresponsive. When first responders arrived about 20 min later, an AED was connected and recommended cardioversion. Chest compressions were started. When paramedics arrived he was in V fib arrest. He was given a total of three rounds of epi and 1 dose of amiodarone in the field. An LMA was placed. ROSC was achieved prior to arrival in the ER.   Upon arrival in the ER, he maintained circulation.  An ET could not be placed in the ER due to neck stiffness from his AS.  He was taken to the OR by surgery and an ET tube was passed.  Initial labs revealed a leukocytosis to 31, chemistries were generally unremarkable except for elevated LFTs, troponin was elevated to 4.718, and lactic acid was 6.5.  EKG showed sinus bradycardia with LVH but no findings concerning for acute ischemia.  CXR showed cardiomegaly and pulmonary vascular congestion.  UA was concerning for UTI.  While in PACU awaiting an ICU bed the patient began to exhibit myoclonic jerks.  The jerks resolved with Ativan and he  was loaded on Keppra. CT head with significant motion artifact but areas where gray/white matter differentiation appears to still be intact. He was started on TTM to 33C.     Pulmonary was consulted for vent management.  CXR post intubation revealed new rll infiltrate.            Past Medical History:   Diagnosis Date    Anemia     Degenerative disc disease     Hemorrhage of rectum and anus     Hypertension     Idiopathic ankylosing spondylitis 2000    Joint pain        Past Surgical History:   Procedure Laterality Date    COLONOSCOPY N/A 2/27/2018    Procedure: COLONOSCOPY;  Surgeon: Jaswant Godfrey MD;  [facility]  Service: Endoscopy;  Laterality: N/A;  original order from 5/1/17 per Dr Godfrey-unable to use that order, new order placed    COLONOSCOPY N/A 10/31/2018    Procedure: COLONOSCOPY;  Surgeon: Jaswant Godfrey MD;  [facility]  Service: Endoscopy;  Laterality: N/A;  constipation prep: miralax for a week prior and low residue diet 1 week prior. handout given to pt    COLONOSCOPY N/A 10/31/2018    Performed by Jaswant Godfrey MD at [facility] (4TH FLR)    COLONOSCOPY N/A 2/27/2018    Performed by Jaswant Godfrey MD at [facility] (4TH FLR)    COLONOSCOPY N/A 3/18/2013    Performed by Radames Huston MD at [facility] (4TH FLR)    EGD (ESOPHAGOGASTRODUODENOSCOPY) N/A 10/31/2018    Performed by Jaswant Godfrey MD at [facility] (4TH FLR)    ESOPHAGOGASTRODUODENOSCOPY N/A 10/31/2018    Procedure: EGD (ESOPHAGOGASTRODUODENOSCOPY);  Surgeon: Jaswant Godfrey MD;  Location: [facility] ([address] [place] [address]);  Service: Endoscopy;  Laterality: N/A;    MOUTH SURGERY         Review of patient's allergies indicates:   Allergen Reactions    No known drug allergies        Family History     Problem Relation (Age of Onset)    Arthritis Mother    Kidney disease Father    No Known Problems Sister, Brother, Maternal Aunt, Maternal Uncle, Paternal Aunt, Paternal Uncle, Maternal Grandmother, Maternal Grandfather, Paternal  Grandmother, Paternal Grandfather        Tobacco Use    Smoking status: Never Smoker    Smokeless tobacco: Never Used   Substance and Sexual Activity    Alcohol use: Yes     Alcohol/week: 0.0 oz     Comment: one's in the blue moon    Drug use: No    Sexual activity: Yes     Partners: Female         Review of Systems   Unable to perform ROS: Intubated     Objective:     Vital Signs (Most Recent):  Temp: (!) 93.4 °F (34.1 °C) (11/25/18 1900)  Pulse: 78 (11/25/18 1941)  Resp: (!) 42 (11/25/18 1941)  BP: 129/86 (11/25/18 1939)  SpO2: 99 % (11/25/18 1941) Vital Signs (24h Range):  Temp:  [93.4 °F (34.1 °C)-99.7 °F (37.6 °C)] 93.4 °F (34.1 °C)  Pulse:  [] 78  Resp:  [0-45] 42  SpO2:  [91 %-100 %] 99 %  BP: ()/() 129/86     Weight: 65.8 kg (145 lb)  Body mass index is 21.41 kg/m².      Intake/Output Summary (Last 24 hours) at 11/25/2018 1949  Last data filed at 11/25/2018 1900  Gross per 24 hour   Intake 1143.89 ml   Output 600 ml   Net 543.89 ml       Physical Exam   Constitutional: He appears well-developed.   HENT:   Head: Normocephalic and atraumatic.   Mouth/Throat: Oropharynx is clear and moist.   ETT in place   Eyes: Conjunctivae are normal.   Pin-point pupils   Neck: Normal range of motion. Neck supple.   Cardiovascular: Normal rate, regular rhythm and normal heart sounds.   Pulmonary/Chest: Effort normal and breath sounds normal.   Abdominal: Bowel sounds are normal. He exhibits distension. There is no tenderness. There is no guarding.   Genitourinary:   Genitourinary Comments: Myers in place.   Musculoskeletal: Normal range of motion.   Neurological: No cranial nerve deficit.   Unresponsive on vent.     Skin: Skin is warm.       Vents:  Vent Mode: PRVC (11/25/18 1733)  Ventilator Initiated: Yes (11/25/18 0853)  Set Rate: 20 bmp (11/25/18 1733)  Vt Set: 400 mL (11/25/18 1733)  PEEP/CPAP: 10 cmH20 (11/25/18 1733)  Oxygen Concentration (%): 50 (11/25/18 1945)  Peak Airway Pressure: 17.8 cmH2O  (11/25/18 1733)  Total Ve: 11.1 mL (11/25/18 1733)  F/VT Ratio<105 (RSBI): (!) 16.78 (11/25/18 1045)    Lines/Drains/Airways     Drain                 Urethral Catheter 11/25/18 0635 Non-latex 16 Fr. less than 1 day          Airway                 Airway - Non-Surgical 11/25/18 0824 less than 1 day          Peripheral Intravenous Line                 Peripheral IV - Single Lumen 11/25/18 0613 Right Forearm less than 1 day         Peripheral IV - Single Lumen 11/25/18 0616 Left Antecubital less than 1 day         Peripheral IV - Single Lumen 11/25/18 0620 Left Wrist less than 1 day         Peripheral IV - Single Lumen 11/25/18 0625 Right Hand less than 1 day                Significant Labs:    CBC/Anemia Profile:  Recent Labs   Lab 11/25/18  0615 11/25/18  1707   WBC 31.06* 24.87*   HGB 10.0* 9.3*   HCT 31.7* 29.0*    344   MCV 76* 77*   RDW 18.7* 18.9*        Chemistries:  Recent Labs   Lab 11/25/18  0615 11/25/18  1707    140  138  138   K 3.9 3.8  3.8  3.7    107  106  106   CO2 18* 21*  20*  19*   BUN 17 23*  23*  23*   CREATININE 1.2 1.2  1.2  1.2   CALCIUM 9.0 8.7  8.9  8.9   ALBUMIN 2.8* 2.9*  2.8*   PROT 8.0 8.0  7.8   BILITOT 0.4 0.5  0.5   ALKPHOS 272* 236*  232*   * 124*  121*   * 207*  205*   MG  --  1.8   PHOS  --  3.0       ABGs:   Recent Labs   Lab 11/25/18  1816   PH 7.400   PCO2 32.0*   HCO3 19.8*   POCSATURATED 99   BE -4     Ct head 11/25/18 no acute process.    Significant Imaging:   CXR: I have reviewed all pertinent results/findings within the past 24 hours and my personal findings are:  rll infiltrate; new when compare to cxr prior to intubation.      Assessment/Plan:     * Cardiac arrest with ventricular fibrillation    ROSC enroute to ED.  Currently on hypothermic protocol with target temp of 33 degree.   Since continous EEG is not available at [facility], would not start paralytic due to concern about masking status epilepticus.  D/w   Jose J.       Shock, unspecified    Off levophed.  Await echo result.       Shock liver    Monitor lft.       Acute hypoxemic respiratory failure    cxr with new rll infiltrate.  Suspect aspiration since patient with lma for several hours.  fio2 is trending down with increased peep.  Continue with lung protective strategy.       Seizure    keppra and ativan.       Anoxic brain injury    Myoclonic jerks.  keppra started per neurology.      Leukocytosis    Vanc/zosyn empirically.  Await culture.             Thank you for your consult. I will follow-up with patient. Please contact us if you have any additional questions.     Syed Forrester MD  Pulmonology  [facility]    Critical Care Time: 45  minutes  Critical secondary to respiratory failure and mof.       Critical care was time spent personally by me on the following activities: development of treatment plan with patient or surrogate and bedside caregivers, discussions with consultants, evaluation of patient's response to treatment, examination of patient, ordering and performing treatments and interventions, ordering and review of laboratory studies, ordering and review of radiographic studies, pulse oximetry, re-evaluation of patient's condition.  This critical care time did not overlap with that of any other provider or involve time for any procedures.

## 2018-11-26 NOTE — PROGRESS NOTES
Transfer to [facility] neuro critical care unit-MODE contacted MARIUSZ Holm per protocol.

## 2018-11-26 NOTE — PLAN OF CARE
"   11/26/18 1433   Discharge Assessment   Assessment Type Discharge Planning Assessment   Confirmed/corrected address and phone number on facesheet? Yes   Assessment information obtained from? Caregiver;Medical Record  (wife)   Communicated expected length of stay with patient/caregiver no   Prior to hospitilization cognitive status: Alert/Oriented   Prior to hospitalization functional status: Independent   Current cognitive status: Coma/Sedated/Intubated   Current Functional Status: Completely Dependent   Lives With spouse   Able to Return to Prior Arrangements unable to determine at this time (comments)   Is patient able to care for self after discharge? Unable to determine at this time (comments)   Readmission Within The Last 30 Days no previous admission in last 30 days   Patient currently being followed by outpatient case management? Unable to determine (comments)   Patient currently receives any other outside agency services? No   Equipment Currently Used at Home none   Do you have any problems affording any of your prescribed medications? No   Is the patient taking medications as prescribed? yes   Does the patient have transportation home? Yes   Transportation Available family or friend will provide   Does the patient receive services at the [facility]? No   Discharge Plan A Other  (patient on vent in ICU: to be determined as patient progresses)   Discharge Plan B Other  (initial plan to transfer to neuro ICU when stable for this)   Does the patient have transportation to healthcare appointments? Yes   MODE met with wife, daughter, grand daughter in ICU, explained role of MODE/CM with treatment team, provided contact information with the "discharge planning begins on admission" checklist handout.   Confirmed information in demographics.   SW reviewed the discharge planning folder, explained to keep it with wife at this time as patient plans to transfer to [facility] neuro ICU possible tomorrow. Explained " to place all written education informatioin into folder to help her help manage patient health care at home. Once has new room, can leave in room with patient so that team/patient can place all written discharge information throughout hospital stay. Explained that this SW is unsure if Philip Chacon uses same, or different type folder.    Preferred pharmacy is   [facility] [address] [facility] [address] [address]  [facility] [place] [address]  Phone: [phone] Fax: [phone]  .  SW will follow in ICU and assist as needed.

## 2018-11-26 NOTE — SUBJECTIVE & OBJECTIVE
Interval History: Cooling begun overnight and patient is at goal (33C). Troponin flavio to 9.1 consistent with NSTEMI.     Objective:     Vital Signs (Most Recent):  Temp: (!) 91.4 °F (33 °C) (11/26/18 0630)  Pulse: (!) 45 (11/26/18 0916)  Resp: (!) 35 (11/26/18 0916)  BP: 105/71 (11/26/18 0916)  SpO2: 100 % (11/26/18 0916) Vital Signs (24h Range):  Temp:  [90.9 °F (32.7 °C)-99.7 °F (37.6 °C)] 91.4 °F (33 °C)  Pulse:  [42-85] 45  Resp:  [0-42] 35  SpO2:  [91 %-100 %] 100 %  BP: ()/(52-92) 105/71     Weight: 65.8 kg (145 lb)  Body mass index is 21.41 kg/m².      Intake/Output Summary (Last 24 hours) at 11/26/2018 0929  Last data filed at 11/26/2018 0700  Gross per 24 hour   Intake 1713.89 ml   Output 960 ml   Net 753.89 ml     ROS unable to obtain due to intubation and encephalopathy.   Physical Exam   Constitutional: He appears well-developed and well-nourished. He is intubated.   HENT:   Head: Normocephalic and atraumatic.   Mouth/Throat: Oropharynx is clear and moist.   Eyes: Conjunctivae are normal. Pupils are equal, round, and reactive to light. Right eye exhibits no discharge. Left eye exhibits no discharge. No scleral icterus.   Neck: Trachea normal, normal range of motion and full passive range of motion without pain. Neck supple. No JVD present. No tracheal deviation present. No thyromegaly present.   Cardiovascular: Normal rate, regular rhythm, S1 normal, S2 normal, normal heart sounds and intact distal pulses. Exam reveals no gallop and no friction rub.   No murmur heard.  Pulmonary/Chest: Effort normal and breath sounds normal. He is intubated. No respiratory distress. He has no wheezes. He has no rales. He exhibits no tenderness.   Abdominal: Soft. Bowel sounds are normal. He exhibits no distension and no mass. There is no tenderness. There is no rebound and no guarding.   Musculoskeletal: Normal range of motion. He exhibits no tenderness or deformity.   Lymphadenopathy:     He has no cervical  adenopathy.   Neurological: No cranial nerve deficit. Coordination normal.   Skin: Skin is warm and dry. No abrasion and no bruising noted.   Vitals reviewed.      Vents:  Vent Mode: PRVC (11/26/18 0916)  Ventilator Initiated: Yes (11/25/18 0853)  Set Rate: 20 bmp (11/26/18 0916)  Vt Set: 400 mL (11/26/18 0916)  PEEP/CPAP: 5 cmH20 (11/26/18 0916)  Oxygen Concentration (%): 40 (11/26/18 0916)  Peak Airway Pressure: 21.7 cmH2O (11/26/18 0916)  Total Ve: 8.4 mL (11/26/18 0916)  F/VT Ratio<105 (RSBI): (!) 83.93 (11/26/18 0916)    Lines/Drains/Airways     Drain                 Urethral Catheter 11/25/18 0635 Non-latex 16 Fr. 1 day          Airway                 Airway - Non-Surgical 11/25/18 0824 1 day          Peripheral Intravenous Line                 Peripheral IV - Single Lumen 11/25/18 0613 Right Forearm 1 day         Peripheral IV - Single Lumen 11/25/18 0616 Left Antecubital 1 day         Peripheral IV - Single Lumen 11/25/18 0620 Left Wrist 1 day         Peripheral IV - Single Lumen 11/25/18 0625 Right Hand 1 day                Significant Labs:    CBC/Anemia Profile:  Recent Labs   Lab 11/25/18  0615 11/25/18  1707 11/26/18  0447   WBC 31.06* 24.87* 24.83*   HGB 10.0* 9.3* 10.5*   HCT 31.7* 29.0* 32.6*    344 279   MCV 76* 77* 77*   RDW 18.7* 18.9* 19.1*        Chemistries:  Recent Labs   Lab 11/25/18  0615  11/25/18  1707 11/25/18  2046 11/26/18  0025 11/26/18  0447     --  140  138  138 138 138 137   K 3.9  --  3.8  3.8  3.7 4.1 3.9 3.9     --  107  106  106 106 106 106   CO2 18*  --  21*  20*  19* 17* 19* 18*   BUN 17  --  23*  23*  23* 25* 25* 24*   CREATININE 1.2  --  1.2  1.2  1.2 1.2 1.2 1.2   CALCIUM 9.0  --  8.7  8.9  8.9 9.1 9.1 9.3   ALBUMIN 2.8*  --  2.9*  2.8*  --   --   --    PROT 8.0  --  8.0  7.8  --   --   --    BILITOT 0.4  --  0.5  0.5  --   --   --    ALKPHOS 272*  --  236*  232*  --   --   --    *  --  124*  121*  --   --   --    *  --   207*  205*  --   --   --    MG  --    < > 1.8 1.8 2.2 2.3   PHOS  --    < > 3.0 3.2 3.2 3.4    < > = values in this interval not displayed.     Significant Imaging:  I have reviewed and interpreted all pertinent imaging results/findings within the past 24 hours.

## 2018-11-26 NOTE — ASSESSMENT & PLAN NOTE
cxr with new rll infiltrate.  Suspect aspiration since patient with lma for several hours.  fio2 is trending down with increased peep.  Continue with lung protective strategy.    --Repeat CXR today.   --Wean ventilator as tolerated after cooling is completed.

## 2018-11-26 NOTE — PROGRESS NOTES
Results for CHELSEA BOYD JR. (MRN [id number]) as of 11/26/2018 07:40   Ref. Range 11/26/2018 05:10   POC PH Latest Ref Range: 7.35 - 7.45  7.411   POC PCO2 Latest Ref Range: 35 - 45 mmHg 34.9 (L)   POC PO2 Latest Ref Range: 80 - 100 mmHg 212 (H)   POC BE Latest Ref Range: -2 to 2 mmol/L -2   POC HCO3 Latest Ref Range: 24 - 28 mmol/L 22.2 (L)   POC SATURATED O2 Latest Ref Range: 95 - 100 % 100   POC TCO2 Latest Ref Range: 23 - 27 mmol/L 23   FiO2 Unknown 50   Vt Unknown 400   PiP Unknown 31   PEEP Unknown 15   Sample Unknown ARTERIAL   DelSys Unknown Adult Vent   Allens Test Unknown Pass   Site Unknown LR   Mode Unknown AC/PRVC   Rate Unknown 20

## 2018-11-26 NOTE — PROGRESS NOTES
"[facility]  Neurology  Progress Note    Patient Name: Micheal Chirinos Jr.  MRN: [id number]  Admission Date: 11/25/2018  Hospital Length of Stay: 1 days  Code Status: Full Code   Attending Provider: Delia Taveras MD  Primary Care Physician: Azikiwe K Lombard, MD   Principal Problem:Cardiac arrest with ventricular fibrillation    Subjective:     Interval History: 57 y/o male with medical Hx as listed below was found by his wife unconscious in bathroom. A team of michell responders arrive to scene; an AED reportedly showed a shockable rhythm and pt was "shocked. Paramedics arrived finding pt in V-fib. ACLS protocol followed. Pt required intubation by surgery in hospital due to severe cervical disease caused by Ankylosing Spondylitis. While in PACU pt began to exhibit involuntary motor activity which resolved after administration of lorazepam.     -11/26/18: Sedation interrupted for EEG. Pt exhibited rhythmic facial motor activity.    Current Neurological Medications:    Current Facility-Administered Medications   Medication Dose Route Frequency Provider Last Rate Last Dose    acetaminophen oral solution 650 mg  650 mg Per NG tube Q6H Delia Taveras MD   650 mg at 11/26/18 0509    enoxaparin injection 70 mg  1 mg/kg Subcutaneous Q12H Rajesh Reyez MD   70 mg at 11/26/18 0322    fentaNYL 2500 mcg in 0.9% sodium chloride 250 mL infusion premix (titrating)   Intravenous Continuous Syed Forrester MD        levETIRAcetam in NaCl (iso-os) IVPB 1,000 mg  1,000 mg Intravenous Q12H Delia Taveras  mL/hr at 11/26/18 0800 1,000 mg at 11/26/18 0800    lorazepam injection 2 mg  2 mg Intravenous Q2H PRN Delia Taveras MD   2 mg at 11/25/18 1617    norepinephrine 4 mg in dextrose 5% 250 mL infusion (premix) (titrating)  0.02 mcg/kg/min Intravenous Continuous Delia Taveras MD   Stopped at 11/25/18 1716    pantoprazole injection 40 mg  40 mg Intravenous Daily Delia" MD Nitin   40 mg at 11/26/18 0800    piperacillin-tazobactam 4.5 g in sodium chloride 0.9% 100 mL IVPB (ready to mix system)  4.5 g Intravenous Q8H Delia Taveras MD 25 mL/hr at 11/26/18 0322 4.5 g at 11/26/18 0322    propofol (DIPRIVAN) 10 mg/mL infusion  5 mcg/kg/min Intravenous Continuous PRN Syed Forrester MD 19.7 mL/hr at 11/26/18 1000 49.899 mcg/kg/min at 11/26/18 1000    vancomycin in dextrose 5 % 1 gram/250 mL IVPB 1,000 mg  15 mg/kg Intravenous Q12H Delia Taveras MD 0 mL/hr at 11/25/18 1710 1,000 mg at 11/26/18 0322       Review of Systems   Unable to perform ROS: Intubated         Objective:     Vital Signs (Most Recent):  Temp: (!) 91.4 °F (33 °C) (11/26/18 1000)  Pulse: (!) 47 (11/26/18 1000)  Resp: 20 (11/26/18 1000)  BP: 106/73 (11/26/18 1000)  SpO2: 100 % (11/26/18 1000) Vital Signs (24h Range):  Temp:  [90.9 °F (32.7 °C)-99.7 °F (37.6 °C)] 91.4 °F (33 °C)  Pulse:  [42-85] 47  Resp:  [0-42] 20  SpO2:  [91 %-100 %] 100 %  BP: ()/(52-89) 106/73     Weight: 65.8 kg (145 lb)  Body mass index is 21.41 kg/m².    Physical Exam  Constitutional: No distress.   HENT:   Head: Normocephalic.   Eyes: Right eye exhibits no discharge. Left eye exhibits no discharge.   Neck:   No PROM of neck   Cardiovascular: Normal rate.   Pulmonary/Chest:   Symmetrical chest expansion   Abdominal: Bowel sounds are normal.   Musculoskeletal: He exhibits no edema.   Skin: He is not diaphoretic.         NEUROLOGICAL EXAMINATION:      MENTAL STATUS        No response to verbal stimulation   Pupils are round at 2 mm and reactive to light  Slight corneal responses bilaterally  Rhythmic motor facial activity.     No response to noxious stimulation of extremities          Significant Labs:   CBC:   Recent Labs   Lab 11/25/18  0615 11/25/18  1707 11/26/18  0447   WBC 31.06* 24.87* 24.83*   HGB 10.0* 9.3* 10.5*   HCT 31.7* 29.0* 32.6*    344 279     CMP:   Recent Labs   Lab 11/25/18 0615 11/25/18  1707   11/26/18  0447 11/26/18  0914 11/26/18  0915 11/26/18  1515 11/26/18  1822   * 120*  120*  120*  122*  122*  125*   < > 115*  115*  --  99 78 114*    140  138  138   < > 137  --   --  138 138   K 3.9 3.8  3.8  3.7   < > 3.9  --   --  4.1 3.6    107  106  106   < > 106  --   --  106 107   CO2 18* 21*  20*  19*   < > 18*  --   --  20* 20*   BUN 17 23*  23*  23*   < > 24*  --   --  24* 22*   CREATININE 1.2 1.2  1.2  1.2   < > 1.2  --   --  1.2 1.1   CALCIUM 9.0 8.7  8.9  8.9   < > 9.3  --   --  8.7 8.6*   MG  --  1.8   < > 2.3 2.4  --  2.4  --    PROT 8.0 8.0  7.8  --   --   --   --   --   --    ALBUMIN 2.8* 2.9*  2.8*  --   --   --   --   --   --    BILITOT 0.4 0.5  0.5  --   --   --   --   --   --    ALKPHOS 272* 236*  232*  --   --   --   --   --   --    * 207*  205*  --   --   --   --   --   --    * 124*  121*  --   --   --   --   --   --    ANIONGAP 18* 12  12  13   < > 13  --   --  12 11   EGFRNONAA >60 >60  >60  >60   < > >60  --   --  >60 >60    < > = values in this interval not displayed.       EEG  This is an abnormal extended (1 hour and 19 min) EEG due to:  1) continuous generalized epileptiform discharges seen, without   associated clinical correlate, when off Propofol - suggestive of   non-convulsive status epilepticus  2) severe generalized slowing seen while on Propofol, suggestive   of severe diffuse or multifocal cerebral dysfunction.    CLINICAL CORRELATION IS RECOMMENDED  Findings were conveyed to .  Delilah Almazan MD, HERSON(), FACNS, FAES.  Neurology-Epilepsy.  [facility].    Assessment and Plan:     59 y/o male S/P cardiac arrest     1. Seizures: as result of anoxia. Pt it ins status epilepticus.Head CT rerpots as above but there actually may be poor white/gray matter differentiation indicating brain edema.; repeat head CT after re-warming.           -Levetiracetam 4000 mg IV given. Levetiracetam 1500 mg twice  daily.   -Lacoasmide 400 mg VI x 1 then 200 mg BID           -Propofol at 50 mcg/kg/min. During EEG epileptic activity seemed to have ceased upon resuming propofol. Lorazepam 4 mg IV x 1 challenge with no effects.       2. S/P cardiac arrest.           V-fib arrest.            -Hypothermia protocol started. Goal of temp of 33C for 24 hours. Rewarming will begin at midnight; reassess when normothermic.        Active Diagnoses:    Diagnosis Date Noted POA    PRINCIPAL PROBLEM:  Cardiac arrest with ventricular fibrillation [I46.9, I49.01] 11/25/2018 Yes    Leukocytosis [D72.829] 11/25/2018 Yes    Anoxic brain injury [G93.1] 11/25/2018 Yes    Seizure [R56.9] 11/25/2018 No    Acute hypoxemic respiratory failure [J96.01] 11/25/2018 Yes    Shock liver [K72.00] 11/25/2018 Yes    Sepsis [A41.9] 11/25/2018 Yes    Pleural effusion [J90] 11/25/2018 No    Lactic acidosis [E87.2] 11/25/2018 Yes    Shock, unspecified [R57.9] 11/25/2018 Yes    AS (ankylosing spondylitis) [M45.9] 09/07/2012 Yes     Chronic      Problems Resolved During this Admission:       VTE Risk Mitigation (From admission, onward)        Ordered     enoxaparin injection 70 mg  Every 12 hours (non-standard times)      11/25/18 1500          Tae Lux MD  Neurology  [facility]

## 2018-11-26 NOTE — ANESTHESIA POSTPROCEDURE EVALUATION
"Anesthesia Post Evaluation    Patient: Micheal Chirinos Jr.    Procedure(s) Performed: Procedure(s) (LRB):  TRACHEOTOMY ON STAND-BY, NO TRACH PERFORMED (N/A)  INTUBATION, ENDOTRACHEAL    Final Anesthesia Type: general  Patient location during evaluation: PACU  Patient participation: No - Unable to Participate, Intubation  Level of consciousness: sedated and responds to stimulation  Post-procedure vital signs: reviewed and stable  Pain management: adequate  Airway patency: patent  PONV status at discharge: No PONV  Anesthetic complications: no      Cardiovascular status: blood pressure returned to baseline and hemodynamically stable  Respiratory status: ETT, intubated and ventilator  Hydration status: euvolemic  Follow-up not needed.        Visit Vitals  /64   Pulse (!) 47   Temp (!) 33 °C (91.4 °F) (Rectal)   Resp (!) 39   Ht 5' 9" (1.753 m)   Wt 65.8 kg (145 lb)   SpO2 100%   BMI 21.41 kg/m²       Pain/Montez Score: Pain Assessment Performed: Yes (11/26/2018  6:01 AM)  Presence of Pain: non-verbal indicators absent (11/26/2018  6:01 AM)  Pain Rating Prior to Med Admin: 0 (11/26/2018  5:09 AM)  Montez Score: 1 (11/25/2018  1:00 PM)        "

## 2018-11-26 NOTE — CONSULTS
"[facility]  Neurology  Consult Note    Patient Name: Micheal Chirinos Jr.  MRN: [id number]  Admission Date: 11/25/2018  Hospital Length of Stay: 0 days  Code Status: Full Code   Attending Provider: Delia Taveras MD   Consulting Provider: Tae Lux MD  Primary Care Physician: Azikiwe K Lombard, MD  Principal Problem:Cardiac arrest with ventricular fibrillation    Inpatient consult to Neurology  Consult performed by: Tae Lux MD  Consult ordered by: Delia Taveras MD        Subjective:     Chief Complaint: Seizures; S/P cardiac arrest     HPI: 57 y/o male with medical Hx as listed below was found by his wife unconscious in bathroom. A team of michell responders arrive to scene; an AED reportedly showed a shockable rhythm and pt was "shocked. Paramedics arrived finding pt in V-fib. ACLS protocol followed. Pt required intubation by surgery in hospital due to severe cervical disease caused by Ankylosing Spondylitis. While in PACU pt began to exhibit involuntary motor activity which resolved after administration of lorazepam.     Past Medical History:   Diagnosis Date    Anemia     Degenerative disc disease     Hemorrhage of rectum and anus     Hypertension     Idiopathic ankylosing spondylitis 2000    Joint pain        Past Surgical History:   Procedure Laterality Date    COLONOSCOPY N/A 2/27/2018    Procedure: COLONOSCOPY;  Surgeon: Jaswant Godfrey MD;  Location: [address]);  Service: Endoscopy;  Laterality: N/A;  original order from 5/1/17 per Dr Godfrey-unable to use that order, new order placed    COLONOSCOPY N/A 10/31/2018    Procedure: COLONOSCOPY;  Surgeon: Jaswant Godfrey MD;  Location: [address]);  Service: Endoscopy;  Laterality: N/A;  constipation prep: miralax for a week prior and low residue diet 1 week prior. handout given to pt    COLONOSCOPY N/A 10/31/2018    Performed by Jaswant Godfrey MD at [facility] ([address] [place] [address])    COLONOSCOPY N/A 2/27/2018    Performed " by Jaswant Godfrey MD at [facility] (4TH FLR)    COLONOSCOPY N/A 3/18/2013    Performed by Radames Huston MD at [facility] (4TH FLR)    EGD (ESOPHAGOGASTRODUODENOSCOPY) N/A 10/31/2018    Performed by Jaswant Godfrey MD at [facility] (4TH FLR)    ESOPHAGOGASTRODUODENOSCOPY N/A 10/31/2018    Procedure: EGD (ESOPHAGOGASTRODUODENOSCOPY);  Surgeon: Jaswant Godfrey MD;  [facility] (4[facility]);  Service: Endoscopy;  Laterality: N/A;    MOUTH SURGERY         Review of patient's allergies indicates:   Allergen Reactions    No known drug allergies        Current Neurological Medications:     No current facility-administered medications on file prior to encounter.      Current Outpatient Medications on File Prior to Encounter   Medication Sig    cetirizine (ZYRTEC) 10 mg TbDL 1 Tablet(s) Oral PRN Every day.      cyclobenzaprine (FLEXERIL) 10 MG tablet Take 1 tablet (10 mg total) by mouth 3 (three) times daily as needed.    diclofenac sodium 1 % Gel Apply 2 g topically 4 (four) times daily. for 10 days    ferrous sulfate 325 (65 FE) MG EC tablet Take 1 tablet (325 mg total) by mouth 2 (two) times daily.    HYDROcodone-acetaminophen (NORCO)  mg per tablet Take 1 tablet by mouth every 6 (six) hours as needed for Pain (msk pain).    omeprazole (PRILOSEC) 20 MG capsule TAKE 1 CAPSULE(20 MG) BY MOUTH TWICE DAILY    sulfaSALAzine (AZULFIDINE) 500 MG TbEC Take 3 tablets (1,500 mg total) by mouth 2 (two) times daily.      Family History     Problem Relation (Age of Onset)    Arthritis Mother    Kidney disease Father    No Known Problems Sister, Brother, Maternal Aunt, Maternal Uncle, Paternal Aunt, Paternal Uncle, Maternal Grandmother, Maternal Grandfather, Paternal Grandmother, Paternal Grandfather        Tobacco Use    Smoking status: Never Smoker    Smokeless tobacco: Never Used   Substance and Sexual Activity    Alcohol use: Yes     Alcohol/week: 0.0 oz     Comment: one's in the blue moon    Drug use: No    Sexual  activity: Yes     Partners: Female     Review of Systems   Unable to perform ROS: Intubated     Objective:     Vital Signs (Most Recent):  Temp: (!) 95.7 °F (35.4 °C) (11/25/18 1800)  Pulse: 81 (11/25/18 1815)  Resp: 19 (11/25/18 1815)  BP: 123/84 (11/25/18 1815)  SpO2: 96 % (11/25/18 1815) Vital Signs (24h Range):  Temp:  [95.5 °F (35.3 °C)-99.7 °F (37.6 °C)] 95.7 °F (35.4 °C)  Pulse:  [] 81  Resp:  [0-45] 19  SpO2:  [91 %-100 %] 96 %  BP: ()/() 123/84     Weight: 65.8 kg (145 lb)  Body mass index is 21.41 kg/m².    Physical Exam   Constitutional: No distress.   HENT:   Head: Normocephalic.   Eyes: Right eye exhibits no discharge. Left eye exhibits no discharge.   Neck:   No PROM of neck   Cardiovascular: Normal rate.   Pulmonary/Chest:   Symmetrical chest expansion   Abdominal: Bowel sounds are normal.   Musculoskeletal: He exhibits no edema.   Skin: He is not diaphoretic.       NEUROLOGICAL EXAMINATION:     MENTAL STATUS        Sedated   Pupils are round at 2 mm and reactive to light  Slight corneal responses bilaterally  Mandibular movements upon cough reflex testing    No response to noxious stimulation of extremities    Significant Labs:   CBC:   Recent Labs   Lab 11/25/18  0615 11/25/18  1707   WBC 31.06* 24.87*   HGB 10.0* 9.3*   HCT 31.7* 29.0*    344     CMP:   Recent Labs   Lab 11/25/18  0615 11/25/18  1707   * 120*  120*  120*  122*  122*  125*    140  138  138   K 3.9 3.8  3.8  3.7    107  106  106   CO2 18* 21*  20*  19*   BUN 17 23*  23*  23*   CREATININE 1.2 1.2  1.2  1.2   CALCIUM 9.0 8.7  8.9  8.9   MG  --  1.8   PROT 8.0 8.0  7.8   ALBUMIN 2.8* 2.9*  2.8*   BILITOT 0.4 0.5  0.5   ALKPHOS 272* 236*  232*   * 207*  205*   * 124*  121*   ANIONGAP 18* 12  12  13   EGFRNONAA >60 >60  >60  >60       Significant Imaging:   Head CT  FINDINGS:  The cortical sulcal pattern is normal. No hydrocephalus, midline shift or  abnormal mass effect present. No intra-or extra-axial mass or hemorrhage. No CT evidence of large territory acute stroke.    Orbits are unremarkable. Paranasal sinuses are clear. Mastoid air cells are clear. Calvarium is intact.      Impression       No acute intracranial abnormality.      Electronically signed by: Randall Martinez MD  Date: 11/25/2018  Time: 14:30          Assessment and Plan:     59 y/o male S/P cardiac arrest    1. Seizures: as result of anoxia. This could be post-anoxic myoclonus which usually portend a poor prognosis. Head CT rerpots as above but there actually may be poor white/gray matter differentiation indicating brain edema.; repeat head CT after re-warming.   -Levetiracetam 4000 mg IV given.    -Propofol at 50 mcg/kg/min   -EEG.    2. S/P cardiac arrest.   V-fib arrest.    -Hypothermia protocol started. Goal of temp of 33C for 24 hours then re-warm.    Active Diagnoses:    Diagnosis Date Noted POA    PRINCIPAL PROBLEM:  Cardiac arrest with ventricular fibrillation [I46.9, I49.01] 11/25/2018 Yes    Leukocytosis [D72.829] 11/25/2018 Yes    Anoxic brain injury [G93.1] 11/25/2018 Yes    Seizure [R56.9] 11/25/2018 No    Acute hypoxemic respiratory failure [J96.01] 11/25/2018 Yes    Shock liver [K72.00] 11/25/2018 Yes    Sepsis [A41.9] 11/25/2018 Yes    Pleural effusion [J90] 11/25/2018 No    Lactic acidosis [E87.2] 11/25/2018 Yes    Shock, unspecified [R57.9] 11/25/2018 Yes    AS (ankylosing spondylitis) [M45.9] 09/07/2012 Yes     Chronic      Problems Resolved During this Admission:       VTE Risk Mitigation (From admission, onward)        Ordered     enoxaparin injection 70 mg  Every 12 hours (non-standard times)      11/25/18 1500          Thank you for your consult. I will follow-up with patient. Please contact us if you have any additional questions.    Tae Lux MD  Neurology  [facility]

## 2018-11-26 NOTE — PROCEDURES
EXTENDED  ELECTROENCEPHALOGRAM  REPORT    Micheal Chirinos Jr.  [id number]  1960    DATE OF SERVICE: 11/25/18    DATE OF ADMISSION: 11/25/2018  6:11 AM    ADMITTING/REQUESTING PROVIDER:  Les Mar Jr., MD    REASON FOR CONSULT: 59yo M s/p cardiac arrest    METHODOLOGY   Electroencephalographic (EEG) recording is with electrodes placed according to the International 10-20 placement system.  Thirty two (32) channels of digital signal (sampling rate of 512/sec) including T1 and T2 was simultaneously recorded from the scalp and may include  EKG, EMG, and/or eye monitors.  Recording band pass was 0.1 to 512 hz.  Digital video recording of the patient is simultaneously recorded with the EEG.  The patient is instructed report clinical symptoms which may occur during the recording session.  EEG and video recording is stored and archived in digital format.  Activation procedures which include photic stimulation, hyperventilation and instructing patients to perform simple task are done in selected patients.   The EEG is displayed on a monitor screen and can be reviewed using different montages.  Computer assisted analysis is employed to detect spike and electrographic seizure activity.   The entire record is submitted for computer analysis.  The entire recording is visually reviewed and the times identified by computer analysis as being spikes or seizures are reviewed again.  Compresses spectral analysis (CSA) is also performed on the activity recorded from each individual channel.  This is displayed as a power display of frequencies from 0 to 30 Hz over time.   The CSA is reviewed looking for asymmetries in power between homologous areas of the scalp and then compared with the original EEG recording.     [employer] software was also utilized in the review of this study.  This software suite analyzes the EEG recording in multiple domains.  Coherence and rhythmicity is computed to identify EEG sections which  may contain organized seizures.  Each channel undergoes analysis to detect presence of spike and sharp waves which have special and morphological characteristic of epileptic activity.  The routine EEG recording is converted from spacial into frequency domain.  This is then displayed comparing homologous areas to identify areas of significant asymmetry.  Algorithm to identify non-cortically generated artifact is used to separate eye movement, EMG and other artifact from the EEG.      RECORDING TIMES  Start on 11/25/18, 11:22  Stop on 11/25/18, 12:41    A total of 1 hour and 19 minutes of EEG recording was obtained.    EEG FINDINGS  Background activity:   The background rhythm was characterized by sharply contoured delta-theta (4-5 Hz) activity was seen briefly at the onset prior Propofol being turned off  No posterior dominant rhythm seen.   Symmetry and continuity: the background was continuous and symmetric    Sleep:    Not seen.    Activation procedures:   Hyperventilation and photic stimulation were not performed  No response to noxious stimuli    Abnormal activity:   Continuous generalized epileptiform discharges (spike waves) were seen after Propofol was discontinued          After Lorazepam was given and Propofol re-satrted, the discharges become less continuous and are seen intermittently         IMPRESSION:   This is an abnormal extended (1 hour and 19 min) EEG due to:  1) continuous generalized epileptiform discharges seen, without associated clinical correlate, when off Propofol - suggestive of non-convulsive status epilepticus  2) severe generalized slowing seen while on Propofol, suggestive of severe diffuse or multifocal cerebral dysfunction.    CLINICAL CORRELATION IS RECOMMENDED  Findings were conveyed to .  Delilah Almazan MD, HERSON(), FACNS, FAES.  Neurology-Epilepsy.  [facility].

## 2018-11-26 NOTE — PROGRESS NOTES
[facility]  Cardiology  Progress Note    Patient Name: Micheal Chirinos Jr.  MRN: [id number]  Admission Date: 11/25/2018  Hospital Length of Stay: 1 days  Code Status: Full Code   Attending Physician: Delia Taveras MD   Primary Care Physician: Azikiwe K Lombard, MD  Expected Discharge Date:   Principal Problem:Cardiac arrest with ventricular fibrillation    Subjective:     Hospital Course:   11-25:  Admitted with out-of-hospital arrest    Interval History:  No acute events, on cooling protocol    Telemetry:  Normal sinus rhythm    Review of Systems   Unable to perform ROS: intubated     Objective:     Vital Signs (Most Recent):  Temp: (!) 91.4 °F (33 °C) (11/26/18 1000)  Pulse: 61 (11/26/18 1152)  Resp: (!) 63 (11/26/18 1152)  BP: 130/81 (11/26/18 1152)  SpO2: 100 % (11/26/18 1152) Vital Signs (24h Range):  Temp:  [90.9 °F (32.7 °C)-99.7 °F (37.6 °C)] 91.4 °F (33 °C)  Pulse:  [42-85] 61  Resp:  [0-63] 63  SpO2:  [91 %-100 %] 100 %  BP: ()/(52-89) 130/81     Weight: 65.8 kg (145 lb 1 oz)  Body mass index is 21.42 kg/m².     SpO2: 100 %  O2 Device (Oxygen Therapy): ventilator      Intake/Output Summary (Last 24 hours) at 11/26/2018 1201  Last data filed at 11/26/2018 1000  Gross per 24 hour   Intake 1892.69 ml   Output 1120 ml   Net 772.69 ml       Lines/Drains/Airways     Drain                 Urethral Catheter 11/25/18 0635 Non-latex 16 Fr. 1 day          Airway                 Airway - Non-Surgical 11/25/18 0824 1 day          Peripheral Intravenous Line                 Peripheral IV - Single Lumen 11/25/18 0613 Right Forearm 1 day         Peripheral IV - Single Lumen 11/25/18 0616 Left Antecubital 1 day         Peripheral IV - Single Lumen 11/25/18 0620 Left Wrist 1 day         Peripheral IV - Single Lumen 11/25/18 0625 Right Hand 1 day                Physical Exam   Constitutional: No distress.   Cardiovascular: Normal rate and regular rhythm.   Pulmonary/Chest: Effort normal  and breath sounds normal.   Musculoskeletal: He exhibits no edema.   Neurological:   Intubated, sedated       Significant Labs:   BMP:   Recent Labs   Lab 11/25/18  2046 11/26/18  0025 11/26/18  0447 11/26/18  0914 11/26/18  0915   GLU 98  98 98  98 115*  115*  --  99    138 137  --   --    K 4.1 3.9 3.9  --   --     106 106  --   --    CO2 17* 19* 18*  --   --    BUN 25* 25* 24*  --   --    CREATININE 1.2 1.2 1.2  --   --    CALCIUM 9.1 9.1 9.3  --   --    MG 1.8 2.2 2.3 2.4  --     and CBC   Recent Labs   Lab 11/25/18  0615 11/25/18  1707 11/26/18  0447   WBC 31.06* 24.87* 24.83*   HGB 10.0* 9.3* 10.5*   HCT 31.7* 29.0* 32.6*    344 279       Significant Imaging: Echocardiogram:   Transthoracic echo (TTE) complete (Cupid Only):   Results for orders placed or performed during the hospital encounter of 11/25/18   Transthoracic echo (TTE) complete   Result Value Ref Range    BSA 1.79 m2     Assessment and Plan:     Brief HPI:     * Cardiac arrest with ventricular fibrillation    Likely nidus was cardiac event, now with elevated cardiac markers  Continue supportive therapy including cooling protocol  Unable to tolerate any medicines for secondary prevention  Added full-dose anticoagulation and anti-platelet therapy  Will plan for cardiac catheterization if stabilizes       Acute hypoxemic respiratory failure    Ventilator management per Pulmonary     Shock, unspecified           Lactic acidosis           Anoxic brain injury    Neuro to evaluate     AS (ankylosing spondylitis)               VTE Risk Mitigation (From admission, onward)        Ordered     enoxaparin injection 70 mg  Every 12 hours (non-standard times)      11/25/18 1500          Rajesh Reyez MD  Cardiology  [facility]

## 2018-11-26 NOTE — HPI
Patient is 58 y.o. male  has a past medical history of Anemia, Degenerative disc disease, Hemorrhage of rectum and anus, Hypertension, Idiopathic ankylosing spondylitis (2000), and Joint pain. presented to [facility] on 11/25/18 after found down by wife.  History is per chart review.  Per Dr. Lenz's note, wife heard a thud sound around 4:50 am.  She immediately went to check on him and found him face down, lying listlessly over the tub unresponsive. When first responders arrived about 20 min later, an AED was connected and recommended cardioversion. Chest compressions were started. When paramedics arrived he was in V fib arrest. He was given a total of three rounds of epi and 1 dose of amiodarone in the field. An LMA was placed. ROSC was achieved prior to arrival in the ER.   Upon arrival in the ER, he maintained circulation.  An ET could not be placed in the ER due to neck stiffness from his AS.  He was taken to the OR by surgery and an ET tube was passed.  Initial labs revealed a leukocytosis to 31, chemistries were generally unremarkable except for elevated LFTs, troponin was elevated to 4.718, and lactic acid was 6.5.  EKG showed sinus bradycardia with LVH but no findings concerning for acute ischemia.  CXR showed cardiomegaly and pulmonary vascular congestion.  UA was concerning for UTI.  While in PACU awaiting an ICU bed the patient began to exhibit myoclonic jerks.  The jerks resolved with Ativan and he was loaded on Keppra. CT head with significant motion artifact but areas where gray/white matter differentiation appears to still be intact. He was started on TTM to 33C.     Pulmonary was consulted for vent management.  CXR post intubation revealed new rll infiltrate.

## 2018-11-26 NOTE — PLAN OF CARE
Recommendations     1. If unable to extubate w/in 48 hrs and consistent with goals of care rec:  - w/ propofol: Peptamen VHP to goal of 45 ml/hr with fluid flushes for tube patency or per MD. TF to provide: 1088 kcal, 99g pro, 907 ml fluid (1600 kcal w/ propofol)  -w/o propofol: Isosource 1.5 to goal of 50 ml/hr with fluid flushes for tube patency or per MD. TF to provide: 1800 kcal, 82g pro, 917 ml fluid.   - Hold TF for n/v/abd discomfort or residuals >500 ml; HOB >30  2. S/p extubation advance diet per SLP to Regular.  3. RD to monitor progress     Goals: Initiate nutrition within 48 hrs  Nutrition Goal Status: new  Communication of RD Recs: reviewed with RN(POC)     D/C planning: Too soon to determine

## 2018-11-26 NOTE — CONSULTS
"  [facility]  [place]  Consult Note    SUMMARY     Recommendations    1. If unable to extubate w/in 48 hrs and consistent with goals of care rec:  - w/ propofol: Peptamen VHP to goal of 45 ml/hr with fluid flushes for tube patency or per MD. TF to provide: 1088 kcal, 99g pro, 907 ml fluid (1600 kcal w/ propofol)  -w/o propofol: Isosource 1.5 to goal of 50 ml/hr with fluid flushes for tube patency or per MD. TF to provide: 1800 kcal, 82g pro, 917 ml fluid.   - Hold TF for n/v/abd discomfort or residuals >500 ml; HOB >30  2. S/p extubation advance diet per SLP to Regular.  3. RD to monitor progress    Goals: Initiate nutrition within 48 hrs  Nutrition Goal Status: new  Communication of RD Recs: reviewed with RN(POC)    D/C planning: Too soon to determine    Reason for Assessment    Reason for Assessment: consult  Diagnosis: (Cardiac Arrest)  Relevant Medical History: HTN    General Information Comments: Pt intubated; EEG pending. Propofol running @ this time. NPO Day1. NFPE: pt with mild loss of lean body mass evident in temporal, clavicles, but adequate in extremities; adequate fat mass. + myoclonic jerks. Concern for possible aspiration associated with cardiac event.    Nutrition/Diet History    Food Preferences: MARNIE  Food Allergies: NKFA  Factors Affecting Nutritional Intake: NPO, on mechanical ventilation    Anthropometrics    Temp: (!) 91.4 °F (33 °C)  Height Method: Estimated  Height: 5' 9" (175.3 cm)  Height (inches): 69 in  Weight Method: Estimated  Weight: 65.8 kg (145 lb 1 oz)  Weight (lb): 145.06 lb  Ideal Body Weight (IBW), Male: 160 lb  % Ideal Body Weight, Male (lb): 90.66 lb  BMI (Calculated): 21.5  BMI Grade: 18.5-24.9 - normal       Lab/Procedures/Meds    Pertinent Labs Reviewed: reviewed  Pertinent Labs Comments: B-202; WBC elevated  Pertinent Medications Reviewed: reviewed  Pertinent Medications Comments: abx, norepi, propofol, amio    Physical " Findings/Assessment    Overall Physical Appearance: nourished, on ventilator support  Tubes: orogastric tube  Skin: intact    Estimated/Assessed Needs    Weight Used For Calorie Calculations: 65.8 kg (145 lb 1 oz)  Energy Calorie Requirements (kcal): 1750   Energy Need Method: [facility]  Protein Requirements: 80-100g  Weight Used For Protein Calculations: 65.8 kg (145 lb 1 oz)     Fluid Need Method: RDA Method  RDA Method (mL): 1750    Nutrition Prescription Ordered    Current Diet Order: NPO    Evaluation of Received Nutrient/Fluid Intake    I/O: 1963/960  Other Calories (kcal): 520(propofol)  Energy Calories Required: not meeting needs  Protein Required: not meeting needs  Fluid Required: (per MD)  Comments: LBM: 11/25  % Intake of Estimated Energy Needs: 25 - 50 %  % Meal Intake: NPO    Nutrition Risk    Level of Risk/Frequency of Follow-up: (2 x week)     Assessment and Plan    Nutrition Problem  Inadequate energy intake    Related to (etiology):   Mechanical Ventilation    Signs and Symptoms (as evidenced by):   NPO without nutrition support    Interventions:  Collaboration with providers    Nutrition Diagnosis Status:   New     Monitor and Evaluation    Food and Nutrient Intake: energy intake, enteral nutrition intake  Food and Nutrient Adminstration: diet order, enteral and parenteral nutrition administration  Anthropometric Measurements: weight, weight change  Biochemical Data, Medical Tests and Procedures: electrolyte and renal panel  Nutrition-Focused Physical Findings: overall appearance     Nutrition Follow-Up    RD Follow-up?: Yes

## 2018-11-26 NOTE — SUBJECTIVE & OBJECTIVE
Past Medical History:   Diagnosis Date    Anemia     Degenerative disc disease     Hemorrhage of rectum and anus     Hypertension     Idiopathic ankylosing spondylitis 2000    Joint pain        Past Surgical History:   Procedure Laterality Date    COLONOSCOPY N/A 2/27/2018    Procedure: COLONOSCOPY;  Surgeon: Jaswant Godfrey MD;  [facility]  Service: Endoscopy;  Laterality: N/A;  original order from 5/1/17 per Dr Godfrey-unable to use that order, new order placed    COLONOSCOPY N/A 10/31/2018    Procedure: COLONOSCOPY;  Surgeon: Jaswant Godfrey MD;  Location: [facility]  Service: Endoscopy;  Laterality: N/A;  constipation prep: miralax for a week prior and low residue diet 1 week prior. handout given to pt    COLONOSCOPY N/A 10/31/2018    Performed by Jaswant Godfrey MD at [facility] (4TH FLR)    COLONOSCOPY N/A 2/27/2018    Performed by Jaswant Godfrey MD at [facility] (4TH FLR)    COLONOSCOPY N/A 3/18/2013    Performed by Radames Huston MD at [facility]    EGD (ESOPHAGOGASTRODUODENOSCOPY) N/A 10/31/2018    Performed by Jaswant Godfrey MD at [facility]    ESOPHAGOGASTRODUODENOSCOPY N/A 10/31/2018    Procedure: EGD (ESOPHAGOGASTRODUODENOSCOPY);  Surgeon: Jaswant Godfrey MD;  Location: [address]);  Service: Endoscopy;  Laterality: N/A;    MOUTH SURGERY         Review of patient's allergies indicates:   Allergen Reactions    No known drug allergies        Family History     Problem Relation (Age of Onset)    Arthritis Mother    Kidney disease Father    No Known Problems Sister, Brother, Maternal Aunt, Maternal Uncle, Paternal Aunt, Paternal Uncle, Maternal Grandmother, Maternal Grandfather, Paternal Grandmother, Paternal Grandfather        Tobacco Use    Smoking status: Never Smoker    Smokeless tobacco: Never Used   Substance and Sexual Activity    Alcohol use: Yes     Alcohol/week: 0.0 oz     Comment: one's in the [place]    Drug use: No    Sexual activity: Yes      Partners: Female         Review of Systems   Unable to perform ROS: Intubated     Objective:     Vital Signs (Most Recent):  Temp: (!) 93.4 °F (34.1 °C) (11/25/18 1900)  Pulse: 78 (11/25/18 1941)  Resp: (!) 42 (11/25/18 1941)  BP: 129/86 (11/25/18 1939)  SpO2: 99 % (11/25/18 1941) Vital Signs (24h Range):  Temp:  [93.4 °F (34.1 °C)-99.7 °F (37.6 °C)] 93.4 °F (34.1 °C)  Pulse:  [] 78  Resp:  [0-45] 42  SpO2:  [91 %-100 %] 99 %  BP: ()/() 129/86     Weight: 65.8 kg (145 lb)  Body mass index is 21.41 kg/m².      Intake/Output Summary (Last 24 hours) at 11/25/2018 1949  Last data filed at 11/25/2018 1900  Gross per 24 hour   Intake 1143.89 ml   Output 600 ml   Net 543.89 ml       Physical Exam   Constitutional: He appears well-developed.   HENT:   Head: Normocephalic and atraumatic.   Mouth/Throat: Oropharynx is clear and moist.   ETT in place   Eyes: Conjunctivae are normal.   Pin-point pupils   Neck: Normal range of motion. Neck supple.   Cardiovascular: Normal rate, regular rhythm and normal heart sounds.   Pulmonary/Chest: Effort normal and breath sounds normal.   Abdominal: Bowel sounds are normal. He exhibits distension. There is no tenderness. There is no guarding.   Genitourinary:   Genitourinary Comments: Myers in place.   Musculoskeletal: Normal range of motion.   Neurological: No cranial nerve deficit.   Unresponsive on vent.     Skin: Skin is warm.       Vents:  Vent Mode: PRVC (11/25/18 1733)  Ventilator Initiated: Yes (11/25/18 0853)  Set Rate: 20 bmp (11/25/18 1733)  Vt Set: 400 mL (11/25/18 1733)  PEEP/CPAP: 10 cmH20 (11/25/18 1733)  Oxygen Concentration (%): 50 (11/25/18 1945)  Peak Airway Pressure: 17.8 cmH2O (11/25/18 1733)  Total Ve: 11.1 mL (11/25/18 1733)  F/VT Ratio<105 (RSBI): (!) 16.78 (11/25/18 1045)    Lines/Drains/Airways     Drain                 Urethral Catheter 11/25/18 0635 Non-latex 16 Fr. less than 1 day          Airway                 Airway - Non-Surgical 11/25/18  0824 less than 1 day          Peripheral Intravenous Line                 Peripheral IV - Single Lumen 11/25/18 0613 Right Forearm less than 1 day         Peripheral IV - Single Lumen 11/25/18 0616 Left Antecubital less than 1 day         Peripheral IV - Single Lumen 11/25/18 0620 Left Wrist less than 1 day         Peripheral IV - Single Lumen 11/25/18 0625 Right Hand less than 1 day                Significant Labs:    CBC/Anemia Profile:  Recent Labs   Lab 11/25/18 0615 11/25/18  1707   WBC 31.06* 24.87*   HGB 10.0* 9.3*   HCT 31.7* 29.0*    344   MCV 76* 77*   RDW 18.7* 18.9*        Chemistries:  Recent Labs   Lab 11/25/18  0615 11/25/18  1707    140  138  138   K 3.9 3.8  3.8  3.7    107  106  106   CO2 18* 21*  20*  19*   BUN 17 23*  23*  23*   CREATININE 1.2 1.2  1.2  1.2   CALCIUM 9.0 8.7  8.9  8.9   ALBUMIN 2.8* 2.9*  2.8*   PROT 8.0 8.0  7.8   BILITOT 0.4 0.5  0.5   ALKPHOS 272* 236*  232*   * 124*  121*   * 207*  205*   MG  --  1.8   PHOS  --  3.0       ABGs:   Recent Labs   Lab 11/25/18  1816   PH 7.400   PCO2 32.0*   HCO3 19.8*   POCSATURATED 99   BE -4     Ct head 11/25/18 no acute process.    Significant Imaging:   CXR: I have reviewed all pertinent results/findings within the past 24 hours and my personal findings are:  rll infiltrate; new when compare to cxr prior to intubation.

## 2018-11-26 NOTE — ASSESSMENT & PLAN NOTE
--ROSC enroute to ED.    --Currently on hypothermic protocol with target temp of 33 degree.     --EEG. May also need MRI after cooling completed to evaluate for anoxic brain injury. NSE pending.   --Suspect cardiac etiology given NSTEMI. Cardiology following.

## 2018-11-26 NOTE — ASSESSMENT & PLAN NOTE
cxr with new rll infiltrate.  Suspect aspiration since patient with lma for several hours.  fio2 is trending down with increased peep.  Continue with lung protective strategy.

## 2018-11-26 NOTE — PLAN OF CARE
Problem: Patient Care Overview  Goal: Plan of Care Review  Outcome: Ongoing (interventions implemented as appropriate)  Plan of care reviewed. No falls/injuries this shift. Cooling protocol in place pt maintained at 33 degrees celsius. Labs monitored. Maintained on ventilator. Propofol for sedation. EEG completed. Transfer to [facility] once pt re-warmed.

## 2018-11-26 NOTE — LOPA/MORA/SWTA/AOC/AEB
[facility] ([facility])  On-Site Evaluation  [facility] Contact # [phone]        Thank you for the referral of this patient to determine suitability for organ, tissue, and eye donation.  A chart review has been conducted (date): 11/26/2018  at (time)  13:00 .  [facility] findings are as follows:    ? Potential candidate for organ donation - [facility] following patient. Any changes in patients condition, discussion of withdrawing the vent or brain death exams, Immediately call [phone].      [facility] Representative:  Digna Cooper RN, BSN        [facility] Referral Number:   [id number]

## 2018-11-26 NOTE — ASSESSMENT & PLAN NOTE
ROSC enroute to ED.  Currently on hypothermic protocol with target temp of 33 degree.   Since continous EEG is not available at [facility], would not start paralytic due to concern about masking status epilepticus.  D/w Dr. Lux.

## 2018-11-26 NOTE — PROGRESS NOTES
Pt recieved intubated on Servo i ventilator with the following settings;. PRVC 400 TV, 20 RR, +5 Peep, 40% FI02.   Alarms are set and functioning, Ambu bag at bedside, Pt without distress RT will continue to monitor and wean as tolerated.

## 2018-11-27 PROBLEM — G40.901 STATUS EPILEPTICUS: Status: ACTIVE | Noted: 2018-01-01

## 2018-11-27 NOTE — ASSESSMENT & PLAN NOTE
Concern for anoxic brain injury given myoclonic jerks that developed ~6-7 hours after cardiac arrest.   CT head with significant motion artifact but areas where gray/white matter differentiation appears to still be intact.   Neurology consulted. TTM and monitor for neurologic improvement.  I explained to family that the presence of status epilepticus worrisome finding for significant brain injury.

## 2018-11-27 NOTE — ASSESSMENT & PLAN NOTE
Likely stress reaction from cardiac arrest. 18% bands on presentation. Empirically placed on antibiotics. Admit UA consistent with UTI. Blood and urine cultures NGTD. Empirically started on vancomycin and zosyn. Leukocytosis decreasing.

## 2018-11-27 NOTE — ASSESSMENT & PLAN NOTE
LMA placed in the field with adequate oxygenation upon arrival.   ET tube placed in the OR due to neck rigidity from AS.   Pulm following for vent management.

## 2018-11-27 NOTE — ASSESSMENT & PLAN NOTE
No history of seizure.   Likely related to anoxic injury. No electrolyte abnormalities.   Seizure activity appeared to resolve with Ativan, Keppra, and Propofol.   EEG on 11/26 with status epilepticus.   Vimpat added and continued on Keppra, Propofol, and PRN Ativan.   Neurology recommendation appreciated.  After hypothermia protocol completed later today, patient to transfer to [facility] for continued EEG monitoring needed for status epilepticus.

## 2018-11-27 NOTE — PROGRESS NOTES
[facility]  Pulmonology  Progress Note    Patient Name: Micheal Chirinos Jr.  MRN: [id number]  Admission Date: 11/25/2018  Hospital Length of Stay: 2 days  Code Status: Full Code  Attending Provider: Rebekah Haile MD  Primary Care Provider: Azikiwe K Lombard, MD   Principal Problem: Cardiac arrest with ventricular fibrillation    Subjective:     Interval History:  EEG with status off Propofol.     Review of Systems   Unable to perform ROS: Intubated     Objective:     Vital Signs (Most Recent):  Temp: 96.3 °F (35.7 °C) (11/27/18 0830)  Pulse: (!) 58 (11/27/18 0630)  Resp: 20 (11/27/18 0630)  BP: 106/72 (11/27/18 0630)  SpO2: 100 % (11/27/18 0840) Vital Signs (24h Range):  Temp:  [91.2 °F (32.9 °C)-96.3 °F (35.7 °C)] 96.3 °F (35.7 °C)  Pulse:  [47-71] 58  Resp:  [18-31] 20  SpO2:  [99 %-100 %] 100 %  BP: (106-137)/(68-88) 106/72     Weight: 65.8 kg (145 lb 1 oz)  Body mass index is 21.42 kg/m².    Physical Exam   Constitutional: He appears well-developed and well-nourished.   HENT:   Right Ear: External ear normal.   Left Ear: External ear normal.   ET/OG tube in place   Eyes: Conjunctivae are normal.   Pupils constricted, minimally reactive   Neck: No JVD present.   Neck rigid   Cardiovascular: Normal rate, normal heart sounds and intact distal pulses.   No murmur heard.  Pulmonary/Chest: He has no wheezes. He has no rales.   Mechanically ventilated   Abdominal: Soft. Bowel sounds are normal. He exhibits no distension and no mass.   Musculoskeletal: He exhibits no edema or deformity.   Neurological:   Sedated on propofol.  No gag or corneal reflex on exam this AM.    Skin: Skin is warm and dry. No rash noted. No erythema.   Nursing note and vitals reviewed.          Significant Labs: All pertinent labs within the past 24 hours have been reviewed.    Significant Imaging: I have reviewed and interpreted all pertinent imaging results/findings within the past 24 hours.    Assessment/Plan:     *  Cardiac arrest with ventricular fibrillation    --ROSC enroute to ED.    --TTM now passive rewarming.    --EEG  --Suspect cardiac etiology given NSTEMI. Cardiology following.      Acute hypoxemic respiratory failure    CXR with RLL infiltrate vs atelectasis. Suspect aspiration.  Patient on minimal ventilator settings. Continue with lung protective strategy.    --Wean ventilator as tolerated.  --Neuro status will likely dictate liberation from mechanical ventilation.      Anoxic brain injury    S/p cardiac arrest. Myoclonic jerks initially. Status per EEG. On Propofol.   -Plan per primary is to transfer to Neuro critical care at [facility].        Critical Care time: 35 minutes.     Critical Care time for the evaluation and treatment of severe organ dysfunction, review of pertinent labs and imaging studies, discussions with primary team/consulting providers and discussions with family.     Wife updated at bedside.     Continue ICU care.        Bro Padgett MD  Pulmonology  [facility]

## 2018-11-27 NOTE — PLAN OF CARE
Problem: Patient Care Overview  Goal: Plan of Care Review  Outcome: Ongoing (interventions implemented as appropriate)  Pt remains in ICU on ventilator via ET tube. Pt remains on propofol for comfort. No seizure activity noted. Pt remains unresponsive with no gag, cough or corneal reflex.  Targeted temperature maintained till 0030, rewarming began per order. Urine output adequate via mcgee catheter. No bowel movement this shift. 40 mEq of potassium given per OG tube. Pt and family updated on plan of care. Pt remains free of falls and hospital acquired injuries and falls.

## 2018-11-27 NOTE — SUBJECTIVE & OBJECTIVE
Interval History:  Tonic clonic jerks with sedation weaned. EEG showed status epilepticus.     Review of Systems   Unable to perform ROS: Intubated     Objective:     Vital Signs (Most Recent):  Temp: (!) 91.4 °F (33 °C) (11/26/18 1807)  Pulse: (!) 50 (11/26/18 1900)  Resp: 20 (11/26/18 1900)  BP: 115/77 (11/26/18 1900)  SpO2: 100 % (11/26/18 1900) Vital Signs (24h Range):  Temp:  [90.9 °F (32.7 °C)-93.2 °F (34 °C)] 91.4 °F (33 °C)  Pulse:  [42-81] 50  Resp:  [9-42] 20  SpO2:  [97 %-100 %] 100 %  BP: ()/(64-88) 115/77     Weight: 65.8 kg (145 lb 1 oz)  Body mass index is 21.42 kg/m².    Physical Exam   Constitutional: He appears well-developed and well-nourished.   HENT:   Right Ear: External ear normal.   Left Ear: External ear normal.   ET/OG tube in place   Eyes: Conjunctivae are normal.   Pupils constricted, minimally reactive   Neck: No JVD present.   Neck rigid   Cardiovascular: Normal rate, normal heart sounds and intact distal pulses.   No murmur heard.  Pulmonary/Chest: He has no wheezes. He has no rales.   Mechanically ventilated   Abdominal: Soft. Bowel sounds are normal. He exhibits no distension and no mass.   Musculoskeletal: He exhibits no edema or deformity.   Neurological:   Sedated   Skin: Skin is warm and dry. No rash noted. No erythema.           Significant Labs: All pertinent labs within the past 24 hours have been reviewed.    Significant Imaging: I have reviewed and interpreted all pertinent imaging results/findings within the past 24 hours.

## 2018-11-27 NOTE — ASSESSMENT & PLAN NOTE
--ROSC enroute to ED.    --TTM now passive rewarming.    --EEG  --Suspect cardiac etiology given NSTEMI. Cardiology following.

## 2018-11-27 NOTE — ASSESSMENT & PLAN NOTE
Increased hypoxia prompted repeat CXR which showed pleural effusion on the right. Pulm consulted.   Still with minimal effusion but findings from 11/25 more likely atelectasis.   Pulmonary following.

## 2018-11-27 NOTE — PROGRESS NOTES
Received patient orally intubated and on a Servo I Ventilator with settings as follows:  PRVC 20/ 400/ +5 PEEP/ 40%.  Patient has a size 7.5 ET tube in place which is secured at the 24 cm carissa at the lips in the Center of the mouth.  The ET tube was rotated to the right side of the mouth.  The Ventilator alarms are set and functional and the AMBU bag is at the HOB.

## 2018-11-27 NOTE — ASSESSMENT & PLAN NOTE
No history of seizure. Likely related to anoxic injury. No electrolyte abnormalities. Seizure activity appeared to resolve with Ativan, Keppra, and Propofol. EEG 11/26 with status epilepticus. Vimpat added. Cont Keppra, Propofol, and PRN Ativan. Neurology recs appreciated.

## 2018-11-27 NOTE — ASSESSMENT & PLAN NOTE
Likely stress reaction from cardiac arrest with 18% bands on presentation. Empirically placed on antibiotics.   Admit UA consistent with UTI.   Blood and urine cultures NGTD.   Empirically started on vancomycin and zosyn.   Leukocytosis decreasing.  Will continue to current care.

## 2018-11-27 NOTE — SUBJECTIVE & OBJECTIVE
Interval History:  Pt completed hypothermia protocol and is in the process to completing re-warming.    Review of Systems   Unable to perform ROS: Intubated     Objective:     Vital Signs (Most Recent):  Temp: 97.2 °F (36.2 °C) (11/27/18 1130)  Pulse: 66 (11/27/18 0930)  Resp: 20 (11/27/18 0930)  BP: 111/75 (11/27/18 0902)  SpO2: 100 % (11/27/18 0930) Vital Signs (24h Range):  Temp:  [91.2 °F (32.9 °C)-97.2 °F (36.2 °C)] 97.2 °F (36.2 °C)  Pulse:  [47-76] 66  Resp:  [] 20  SpO2:  [99 %-100 %] 100 %  BP: (106-129)/(68-85) 111/75     Weight: 65.8 kg (145 lb 1 oz)  Body mass index is 21.42 kg/m².    Physical Exam   Constitutional: He appears well-developed and well-nourished.   HENT:   Right Ear: External ear normal.   Left Ear: External ear normal.   ET/OG tube in place   Eyes: Conjunctivae are normal.   Pupils constricted, minimally reactive   Neck: No JVD present.   Neck rigid   Cardiovascular: Normal rate, normal heart sounds and intact distal pulses.   No murmur heard.  Pulmonary/Chest: He has no wheezes. He has no rales.   Mechanically ventilated   Abdominal: Soft. Bowel sounds are normal. He exhibits no distension and no mass.   Musculoskeletal: He exhibits no edema or deformity.   Neurological:   Sedated   Skin: Skin is warm and dry. No rash noted. No erythema.           Significant Labs: All pertinent labs within the past 24 hours have been reviewed.    Significant Imaging: I have reviewed and interpreted all pertinent imaging results/findings within the past 24 hours.

## 2018-11-27 NOTE — PROGRESS NOTES
[facility] Hospital Medicine  Progress Note    Patient Name: Micheal Chirinos Jr.  MRN: [id number]  Patient Class: IP- Inpatient   Admission Date: 11/25/2018  Length of Stay: 1 days  Attending Physician: Delia Taveras MD  Primary Care Provider: Azikiwe K Lombard, MD        Subjective:     Principal Problem:Cardiac arrest with ventricular fibrillation    HPI:  Mr. Chirinos is a 59 yo man with ankyklosing spondylitis, severe cervical disc disease from AS, iron deficiency anemia, essential HTN, and chronic prescription opiate use who presented via EMS after being found down by his wife. His wife reports hearing a thud in the bathroom that morning around 4:50am. She immediately went to check on him and found him face down lying listlessly over the tub unresponsive. When first responders arrived about 20 min later, an AED was connected and recommended cardioversion. Chest compressions were started. When paramedics arrived he was confirmed to be in V fib arrest. He was given a total of three rounds of epi and 1 dose of amiodarone in the field. An LMA was placed. ROSC was achieved prior to arrival in the ER.     Hospital Course:  Upon arrival in the ER, he maintained circulation.  An ET could not be placed in the ER due to cervical spine fusion from his AS.  He was taken to the OR by surgery and an ET tube was passed.  Initial labs revealed a leukocytosis to 31, chemistries were generally unremarkable except for elevated LFTs, initial troponin was elevated to 4.718, and lactic acid was 6.5.  EKG showed sinus bradycardia with LVH but no findings concerning for acute ischemia.  CXR showed cardiomegaly and pulmonary vascular congestion.  UA was concerning for UTI.  While in PACU awaiting an ICU bed the patient began to exhibit myoclonic jerks.  The jerks resolved with Ativan and he was loaded on Keppra.  CT head with significant motion artifact but areas where gray/white matter differentiation  appears to still be intact. He was started on TTM to 33C.  He was empirically started on vanc and zosyn.  CXR concerning for patchy consolidation.  Cultures unremarkable so far.  EEG on 11/26 reveal status epilepticus.  Seizure-activity resolved with restarting Propofol; he was also started on Vimpat (in addition to the Keppra he was already on).  The neuro ICU at [facility] was contacted to request transfer continuous EEG monitoring.  Once TTM protocol completed and he is rewarmed and stable the patient is to be transferred to neuro ICU.      Interval History:  Tonic clonic jerks with sedation weaned. EEG showed status epilepticus.     Review of Systems   Unable to perform ROS: Intubated     Objective:     Vital Signs (Most Recent):  Temp: (!) 91.4 °F (33 °C) (11/26/18 1807)  Pulse: (!) 50 (11/26/18 1900)  Resp: 20 (11/26/18 1900)  BP: 115/77 (11/26/18 1900)  SpO2: 100 % (11/26/18 1900) Vital Signs (24h Range):  Temp:  [90.9 °F (32.7 °C)-93.2 °F (34 °C)] 91.4 °F (33 °C)  Pulse:  [42-81] 50  Resp:  [9-42] 20  SpO2:  [97 %-100 %] 100 %  BP: ()/(64-88) 115/77     Weight: 65.8 kg (145 lb 1 oz)  Body mass index is 21.42 kg/m².    Physical Exam   Constitutional: He appears well-developed and well-nourished.   HENT:   Right Ear: External ear normal.   Left Ear: External ear normal.   ET/OG tube in place   Eyes: Conjunctivae are normal.   Pupils constricted, minimally reactive   Neck: No JVD present.   Neck rigid   Cardiovascular: Normal rate, normal heart sounds and intact distal pulses.   No murmur heard.  Pulmonary/Chest: He has no wheezes. He has no rales.   Mechanically ventilated   Abdominal: Soft. Bowel sounds are normal. He exhibits no distension and no mass.   Musculoskeletal: He exhibits no edema or deformity.   Neurological:   Sedated   Skin: Skin is warm and dry. No rash noted. No erythema.           Significant Labs: All pertinent labs within the past 24 hours have been reviewed.    Significant Imaging: I  have reviewed and interpreted all pertinent imaging results/findings within the past 24 hours.    Assessment/Plan:      * Cardiac arrest with ventricular fibrillation    Mr. Chirinos appears to have suffered an out of hospital V fib arrest. Troponin elevated to 4.7 on presentation, increased to 9. Continue to follow. EKG without ST or T wave changes concerning for acute ischemia. Given rectal aspirin. Cardiology consulted. He was not stable enough to go to cath lab on presentation. TTM to 33C initiated (reached target temp @ 0030 on 11/26/2018).     Shock, unspecified    Cardiogenic +/- septic shock. Levophed ordered if needed to maintain MAP 60. (Not requiring.)     Lactic acidosis    From cardiac arrest and cardiogenic shock. Repeat lactate 3.6.     Pleural effusion    Increased hypoxia prompted repeat CXR which showed pleural effusion on the right. Pulm consulted. Still with minimal effusion but findings from 11/25 more likely atelectasis. Appreciate pulm recs.     Sepsis    Empirically placed on antibiotics. BCx NGTD.     Shock liver    LFTs elevated likely due to shock during cardiac arrest.     Acute hypoxemic respiratory failure    LMA placed in the field with adequate oxygenation upon arrival. ET tube placed in the OR due to neck rigidity from AS. Pulm consulted for vent management.     Seizure    No history of seizure. Likely related to anoxic injury. No electrolyte abnormalities. Seizure activity appeared to resolve with Ativan, Keppra, and Propofol. EEG 11/26 with status epilepticus. Vimpat added. Cont Keppra, Propofol, and PRN Ativan. Neurology recs appreciated.     Anoxic brain injury    Concern for anoxic brain injury given myoclonic jerks that developed ~6-7 hours after cardiac arrest. CT head with significant motion artifact but areas where gray/white matter differentiation appears to still be intact. Neurology consulted. TTM and monitor for neurologic improvement.     Leukocytosis    Likely stress  reaction from cardiac arrest. 18% bands on presentation. Empirically placed on antibiotics. Admit UA consistent with UTI. Blood and urine cultures NGTD. Empirically started on vancomycin and zosyn. Leukocytosis decreasing.      AS (ankylosing spondylitis)    No acute issues. Treated with sulfasalazine.       VTE Risk Mitigation (From admission, onward)        Ordered     enoxaparin injection 70 mg  Every 12 hours (non-standard times)      11/25/18 1500          Critical care time spent on the evaluation and treatment of severe organ dysfunction, review of pertinent labs and imaging studies, discussions with consulting providers and discussions with patient/family: 45 minutes.    Delia Taveras MD  Department of Hospital Medicine   [facility]

## 2018-11-27 NOTE — PROGRESS NOTES
"[facility]  Neurology  Progress Note    Patient Name: Micheal Chirinos Jr.  MRN: [id number]  Admission Date: 11/25/2018  Hospital Length of Stay: 2 days  Code Status: Full Code   Attending Provider: Rebekah Haile MD  Primary Care Physician: Azikiwe K Lombard, MD   Principal Problem:Cardiac arrest with ventricular fibrillation    Subjective:     Interval History: 57 y/o male with medical Hx as listed below was found by his wife unconscious in bathroom. A team of [facility] responders arrive to scene; an AED reportedly showed a shockable rhythm and pt was "shocked. Paramedics arrived finding pt in V-fib. ACLS protocol followed. Pt required intubation by surgery in hospital due to severe cervical disease caused by Ankylosing Spondylitis. While in PACU pt began to exhibit involuntary motor activity which resolved after administration of lorazepam.      -11/26/18: Sedation interrupted for EEG. Pt exhibited rhythmic facial motor activity.    -11/27/18: Upon sedation rhythmic facial motor activity resumes.    Current Neurological Medications:     Current Facility-Administered Medications   Medication Dose Route Frequency Provider Last Rate Last Dose    acetaminophen oral solution 650 mg  650 mg Per NG tube Q6H Delia Taveras MD   650 mg at 11/27/18 1130    dextrose 50% injection 12.5 g  12.5 g Intravenous PRN Delia Taveras MD        enoxaparin injection 70 mg  1 mg/kg Subcutaneous Q12H Rajesh Reyez MD   70 mg at 11/27/18 0324    fentaNYL 2500 mcg in 0.9% sodium chloride 250 mL infusion premix (titrating)   Intravenous Continuous Syed Forrester MD        glucagon (human recombinant) injection 1 mg  1 mg Intramuscular PRN Delia Taveras MD        lacosamide (VIMPAT) 200 mg in sodium chloride 0.9% 100 mL IVPB  200 mg Intravenous Q12H Tae Lux  mL/hr at 11/27/18 0856 200 mg at 11/27/18 0856    levETIRAcetam in NaCl (iso-os) IVPB 1,500 mg  1,500 mg Intravenous Q12H Tae " MD Jose J   1,500 mg at 11/27/18 0801    lorazepam injection 2 mg  2 mg Intravenous Q2H PRN Delia Taveras MD   2 mg at 11/25/18 1617    norepinephrine 4 mg in dextrose 5% 250 mL infusion (premix) (titrating)  0.02 mcg/kg/min Intravenous Continuous Delia Taveras MD   Stopped at 11/25/18 1716    pantoprazole injection 40 mg  40 mg Intravenous Daily Delia Taveras MD   40 mg at 11/27/18 0801    piperacillin-tazobactam 4.5 g in sodium chloride 0.9% 100 mL IVPB (ready to mix system)  4.5 g Intravenous Q8H Delia Taveras MD 25 mL/hr at 11/27/18 1130 4.5 g at 11/27/18 1130    propofol (DIPRIVAN) 10 mg/mL infusion  5 mcg/kg/min Intravenous Continuous PRN Syed Forrester MD 19.7 mL/hr at 11/27/18 1156 50 mcg/kg/min at 11/27/18 1156    [START ON 11/28/2018] vancomycin in dextrose 5 % 1 gram/250 mL IVPB 1,000 mg  15 mg/kg Intravenous Q24H Rebekah Haile MD           Review of Systems   Unable to perform ROS: Intubated       Objective:     Vital Signs (Most Recent):  Temp: 97.9 °F (36.6 °C) (11/27/18 1330)  Pulse: 78 (11/27/18 1450)  Resp: 20 (11/27/18 1450)  BP: 111/73 (11/27/18 1434)  SpO2: 100 % (11/27/18 1450) Vital Signs (24h Range):  Temp:  [91.2 °F (32.9 °C)-97.9 °F (36.6 °C)] 97.9 °F (36.6 °C)  Pulse:  [47-78] 78  Resp:  [] 20  SpO2:  [99 %-100 %] 100 %  BP: ()/(64-85) 111/73     Weight: 65.8 kg (145 lb 1 oz)  Body mass index is 21.42 kg/m².    Physical Exam  Constitutional: No distress.   HENT:   Head: Normocephalic.   Eyes: Right eye exhibits no discharge. Left eye exhibits no discharge.   Neck:   No PROM of neck   Cardiovascular: Normal rate.   Pulmonary/Chest:   Symmetrical chest expansion   Abdominal: Bowel sounds are normal.   Musculoskeletal: He exhibits no edema.   Skin: He is not diaphoretic.         NEUROLOGICAL EXAMINATION:      MENTAL STATUS   No response to verbal stimulation   Pupils are round at 2 mm and reactive to light  Slight corneal responses bilaterally  No  cough response  No gag response  Rhythmic motor facial activity.     No response to noxious stimulation of extremities           Significant Labs:   CBC:   Recent Labs   Lab 11/25/18  1707 11/26/18  0447 11/27/18  0231   WBC 24.87* 24.83* 16.11*   HGB 9.3* 10.5* 9.3*   HCT 29.0* 32.6* 28.8*    279 322     CMP:   Recent Labs   Lab 11/25/18  1707  11/27/18  0231 11/27/18  0653 11/27/18  0820 11/27/18  1118   *  120*  120*  122*  122*  125*   < > 85  82 80 85 82  83     138  138   < > 139 139  --  140   K 3.8  3.8  3.7   < > 3.6 3.8  --  3.6     106  106   < > 106 107  --  108   CO2 21*  20*  19*   < > 20* 21*  --  23   BUN 23*  23*  23*   < > 22* 23*  --  22*   CREATININE 1.2  1.2  1.2   < > 1.3 1.3  --  1.3   CALCIUM 8.7  8.9  8.9   < > 9.1 8.8  --  8.7   MG 1.8   < > 2.2  --  2.3 2.2   PROT 8.0  7.8  --  7.5  --   --   --    ALBUMIN 2.9*  2.8*  --  2.5*  --   --   --    BILITOT 0.5  0.5  --  0.6  --   --   --    ALKPHOS 236*  232*  --  164*  --   --   --    *  205*  --  213*  --   --   --    *  121*  --  101*  --   --   --    ANIONGAP 12  12  13   < > 13 11  --  9   EGFRNONAA >60  >60  >60   < > >60 >60  --  >60    < > = values in this interval not displayed.       Assessment and Plan:     59 y/o male S/P cardiac arrest     1. Seizures: as result of anoxia. Pt it ins status epilepticus. Head CT report no acute abnormalities but there actually may be poor white/gray matter differentiation indicating brain edema; repeat head CT after re-warming.           -Levetiracetam 4000 mg IV given. Levetiracetam 1500 mg twice daily.           -Lacosamide 400 mg VI x 1 then 200 mg BID           -Propofol at 50 mcg/kg/min; no seizures at this dose.        2. S/P cardiac arrest.           V-fib arrest. Is possible that pt will not recover from this insult but will need to attempt to stop status epilepticus to have a better picture of his prognosis. NSE sent to  lab on 11/25/18. Transfer to [facility] requested.           -Hypothermia protocol finished.   -              Active Diagnoses:    Diagnosis Date Noted POA    PRINCIPAL PROBLEM:  Cardiac arrest with ventricular fibrillation [I46.9, I49.01] 11/25/2018 Yes    Leukocytosis [D72.829] 11/25/2018 Yes    Anoxic brain injury [G93.1] 11/25/2018 Yes    Status epilepticus [G40.901] 11/25/2018 No    Acute hypoxemic respiratory failure [J96.01] 11/25/2018 Yes    Shock liver [K72.00] 11/25/2018 Yes    Sepsis [A41.9] 11/25/2018 Yes    Pleural effusion [J90] 11/25/2018 No    Lactic acidosis [E87.2] 11/25/2018 Yes    Shock, unspecified [R57.9] 11/25/2018 Yes    AS (ankylosing spondylitis) [M45.9] 09/07/2012 Yes     Chronic      Problems Resolved During this Admission:       VTE Risk Mitigation (From admission, onward)        Ordered     enoxaparin injection 70 mg  Every 12 hours (non-standard times)      11/25/18 Luiz Lux MD  Neurology  [facility]

## 2018-11-27 NOTE — SUBJECTIVE & OBJECTIVE
Interval History:  EEG with status off Propofol.     Review of Systems   Unable to perform ROS: Intubated     Objective:     Vital Signs (Most Recent):  Temp: 96.3 °F (35.7 °C) (11/27/18 0830)  Pulse: (!) 58 (11/27/18 0630)  Resp: 20 (11/27/18 0630)  BP: 106/72 (11/27/18 0630)  SpO2: 100 % (11/27/18 0840) Vital Signs (24h Range):  Temp:  [91.2 °F (32.9 °C)-96.3 °F (35.7 °C)] 96.3 °F (35.7 °C)  Pulse:  [47-71] 58  Resp:  [18-31] 20  SpO2:  [99 %-100 %] 100 %  BP: (106-137)/(68-88) 106/72     Weight: 65.8 kg (145 lb 1 oz)  Body mass index is 21.42 kg/m².    Physical Exam   Constitutional: He appears well-developed and well-nourished.   HENT:   Right Ear: External ear normal.   Left Ear: External ear normal.   ET/OG tube in place   Eyes: Conjunctivae are normal.   Pupils constricted, minimally reactive   Neck: No JVD present.   Neck rigid   Cardiovascular: Normal rate, normal heart sounds and intact distal pulses.   No murmur heard.  Pulmonary/Chest: He has no wheezes. He has no rales.   Mechanically ventilated   Abdominal: Soft. Bowel sounds are normal. He exhibits no distension and no mass.   Musculoskeletal: He exhibits no edema or deformity.   Neurological:   Sedated on propofol.  No gag or corneal reflex on exam this AM.    Skin: Skin is warm and dry. No rash noted. No erythema.   Nursing note and vitals reviewed.          Significant Labs: All pertinent labs within the past 24 hours have been reviewed.    Significant Imaging: I have reviewed and interpreted all pertinent imaging results/findings within the past 24 hours.

## 2018-11-27 NOTE — ASSESSMENT & PLAN NOTE
Increased hypoxia prompted repeat CXR which showed pleural effusion on the right. Pulm consulted. Still with minimal effusion but findings from 11/25 more likely atelectasis. Appreciate pulm recs.

## 2018-11-27 NOTE — ASSESSMENT & PLAN NOTE
Empirically placed on antibiotics. BCx NGTD.  Will continue antibiotics for now and consider de-escalation soon.

## 2018-11-27 NOTE — ASSESSMENT & PLAN NOTE
Pt suffered an out of hospital V fib arrest.   Troponin elevated to 4.7 on presentation, increased to 9.   EKG without ST or T wave changes concerning for acute ischemia.   Given rectal aspirin and Cardiology consulted.   He was not stable enough to go to cath lab on presentation.   TTM to 33C initiated (reached target temp @ 0030 on 11/26/2018).  Rewarming almost complete and vitals stable.

## 2018-11-27 NOTE — ASSESSMENT & PLAN NOTE
Mr. Chirinos appears to have suffered an out of hospital V fib arrest. Troponin elevated to 4.7 on presentation, increased to 9. Continue to follow. EKG without ST or T wave changes concerning for acute ischemia. Given rectal aspirin. Cardiology consulted. He was not stable enough to go to cath lab on presentation. TTM to 33C initiated (reached target temp @ 0030 on 11/26/2018).

## 2018-11-27 NOTE — HOSPITAL COURSE
Mr. Chirinos was admitted for V-fib arrest s/p ACLS protocol with ROSC in the field. Upon arrival in the ER, he maintained circulation.  An ET could not be placed in the ER due to cervical spine fusion from his AS.  He was taken to the OR by surgery and an ET tube was passed.  Initial labs revealed a leukocytosis to 31, chemistries were generally unremarkable except for elevated LFTs, initial troponin was elevated to 4.718, and lactic acid was 6.5.  EKG showed sinus bradycardia with LVH but no findings concerning for acute ischemia.  CXR showed cardiomegaly and pulmonary vascular congestion.  UA was concerning for UTI.  While in PACU awaiting an ICU bed, the patient began to exhibit myoclonic jerks.  The jerks resolved with Ativan and he was loaded on Keppra.  CT head with significant motion artifact but areas where gray/white matter differentiation appears to still be intact. He was started on TTM (hypothermia protocol) to 33 degrees C.  He was empirically started on Zosyn and Vancomycin due to CXR concerning for patchy consolidation.  Cultures unremarkable with NGTD.  EEG on 11/26 revealed status epilepticus.  Seizure-activity resolved with restarting Propofol; he was also started on Vimpat (in addition to the Keppra he was already on).  The Neuro-critical care ICU at [facility] was contacted to request transfer given need for continuous EEG monitoring.  Pt was accepted for transfer once TTM protocol completed and he is rewarmed and stable.

## 2018-11-27 NOTE — ASSESSMENT & PLAN NOTE
S/p cardiac arrest. Myoclonic jerks initially. Status per EEG. On Propofol.   -Plan per primary is to transfer to Neuro critical care at [facility].

## 2018-11-27 NOTE — PROGRESS NOTES
[facility] Hospital Medicine  Progress Note    Patient Name: Micheal Chirinos Jr.  MRN: [id number]  Patient Class: IP- Inpatient   Admission Date: 11/25/2018  Length of Stay: 2 days  Attending Physician: Rebekah Haile MD  Primary Care Provider: Azikiwe K Lombard, MD        Subjective:     Principal Problem:Cardiac arrest with ventricular fibrillation    HPI:  Mr. Chirinos is a 59 yo man with ankyklosing spondylitis, severe cervical disc disease from AS, iron deficiency anemia, essential HTN, and chronic prescription opiate use who presented via EMS after being found down by his wife. His wife reports hearing a thud in the bathroom that morning around 4:50am. She immediately went to check on him and found him face down lying listlessly over the tub unresponsive. When first responders arrived about 20 min later, an AED was connected and recommended cardioversion. Chest compressions were started. When paramedics arrived he was confirmed to be in V fib arrest. He was given a total of three rounds of epi and 1 dose of amiodarone in the field. An LMA was placed. ROSC was achieved prior to arrival in the ER.     Hospital Course:  Mr. Chirinos was admitted for V-fib arrest s/p ACLS protocol with ROSC in the field. Upon arrival in the ER, he maintained circulation.  An ET could not be placed in the ER due to cervical spine fusion from his AS.  He was taken to the OR by surgery and an ET tube was passed.  Initial labs revealed a leukocytosis to 31, chemistries were generally unremarkable except for elevated LFTs, initial troponin was elevated to 4.718, and lactic acid was 6.5.  EKG showed sinus bradycardia with LVH but no findings concerning for acute ischemia.  CXR showed cardiomegaly and pulmonary vascular congestion.  UA was concerning for UTI.  While in PACU awaiting an ICU bed, the patient began to exhibit myoclonic jerks.  The jerks resolved with Ativan and he was loaded on Keppra.  CT head with  significant motion artifact but areas where gray/white matter differentiation appears to still be intact. He was started on TTM (hypothermia protocol) to 33 degrees C.  He was empirically started on Zosyn and Vancomycin due to CXR concerning for patchy consolidation.  Cultures unremarkable with NGTD.  EEG on 11/26 revealed status epilepticus.  Seizure-activity resolved with restarting Propofol; he was also started on Vimpat (in addition to the Keppra he was already on).  The Neuro-critical care ICU at [facility] was contacted to request transfer given need for continuous EEG monitoring.  Pt was accepted for transfer once TTM protocol completed and he is rewarmed and stable.      Interval History:  Pt completed hypothermia protocol and is in the process to completing re-warming.    Review of Systems   Unable to perform ROS: Intubated     Objective:     Vital Signs (Most Recent):  Temp: 97.2 °F (36.2 °C) (11/27/18 1130)  Pulse: 66 (11/27/18 0930)  Resp: 20 (11/27/18 0930)  BP: 111/75 (11/27/18 0902)  SpO2: 100 % (11/27/18 0930) Vital Signs (24h Range):  Temp:  [91.2 °F (32.9 °C)-97.2 °F (36.2 °C)] 97.2 °F (36.2 °C)  Pulse:  [47-76] 66  Resp:  [] 20  SpO2:  [99 %-100 %] 100 %  BP: (106-129)/(68-85) 111/75     Weight: 65.8 kg (145 lb 1 oz)  Body mass index is 21.42 kg/m².    Physical Exam   Constitutional: He appears well-developed and well-nourished.   HENT:   Right Ear: External ear normal.   Left Ear: External ear normal.   ET/OG tube in place   Eyes: Conjunctivae are normal.   Pupils constricted, minimally reactive   Neck: No JVD present.   Neck rigid   Cardiovascular: Normal rate, normal heart sounds and intact distal pulses.   No murmur heard.  Pulmonary/Chest: He has no wheezes. He has no rales.   Mechanically ventilated   Abdominal: Soft. Bowel sounds are normal. He exhibits no distension and no mass.   Musculoskeletal: He exhibits no edema or deformity.   Neurological:   Sedated   Skin: Skin is warm and  dry. No rash noted. No erythema.           Significant Labs: All pertinent labs within the past 24 hours have been reviewed.    Significant Imaging: I have reviewed and interpreted all pertinent imaging results/findings within the past 24 hours.    Assessment/Plan:      * Cardiac arrest with ventricular fibrillation    Pt suffered an out of hospital V fib arrest.   Troponin elevated to 4.7 on presentation, increased to 9.   EKG without ST or T wave changes concerning for acute ischemia.   Given rectal aspirin and Cardiology consulted.   He was not stable enough to go to cath lab on presentation.   TTM to 33C initiated (reached target temp @ 0030 on 11/26/2018).  Rewarming almost complete and vitals stable.     Anoxic brain injury    Concern for anoxic brain injury given myoclonic jerks that developed ~6-7 hours after cardiac arrest.   CT head with significant motion artifact but areas where gray/white matter differentiation appears to still be intact.   Neurology consulted. TTM and monitor for neurologic improvement.  I explained to family that the presence of status epilepticus worrisome finding for significant brain injury.     Status epilepticus    No history of seizure.   Likely related to anoxic injury. No electrolyte abnormalities.   Seizure activity appeared to resolve with Ativan, Keppra, and Propofol.   EEG on 11/26 with status epilepticus.   Vimpat added and continued on Keppra, Propofol, and PRN Ativan.   Neurology recommendation appreciated.  After hypothermia protocol completed later today, patient to transfer to [facility] for continued EEG monitoring needed for status epilepticus.     Acute hypoxemic respiratory failure    LMA placed in the field with adequate oxygenation upon arrival.   ET tube placed in the OR due to neck rigidity from AS.   Pulm following for vent management.     Shock, unspecified    Cardiogenic +/- septic shock.   Levophed ordered if needed to maintain MAP 60. (Not requiring.)     Shock  liver    LFTs elevated likely due to shock during cardiac arrest.  Will continue to monitor.     Sepsis    Empirically placed on antibiotics. BCx NGTD.  Will continue antibiotics for now and consider de-escalation soon.     Leukocytosis    Likely stress reaction from cardiac arrest with 18% bands on presentation. Empirically placed on antibiotics.   Admit UA consistent with UTI.   Blood and urine cultures NGTD.   Empirically started on vancomycin and zosyn.   Leukocytosis decreasing.  Will continue to current care.     Lactic acidosis    From cardiac arrest and cardiogenic shock.   Repeat lactate 3.6.     Pleural effusion    Increased hypoxia prompted repeat CXR which showed pleural effusion on the right. Pulm consulted.   Still with minimal effusion but findings from 11/25 more likely atelectasis.   Pulmonary following.     AS (ankylosing spondylitis)    No acute issues.   Treated with sulfasalazine.       VTE Risk Mitigation (From admission, onward)        Ordered     enoxaparin injection 70 mg  Every 12 hours (non-standard times)      11/25/18 1500          Critical care time spent on the evaluation and treatment of severe organ dysfunction, review of pertinent labs and imaging studies, discussions with consulting providers and discussions with patient/family: 60 minutes.    Rebekah Haile MD  Department of Hospital Medicine   [facility]

## 2018-11-27 NOTE — ASSESSMENT & PLAN NOTE
CXR with RLL infiltrate vs atelectasis. Suspect aspiration.  Patient on minimal ventilator settings. Continue with lung protective strategy.    --Wean ventilator as tolerated.  --Neuro status will likely dictate liberation from mechanical ventilation.

## 2018-11-28 PROBLEM — I21.4 NSTEMI (NON-ST ELEVATED MYOCARDIAL INFARCTION): Status: ACTIVE | Noted: 2018-01-01

## 2018-11-28 NOTE — ASSESSMENT & PLAN NOTE
Admit to NCC for critical care monitoring  Maintain seizure precautions for patient safety  Use of AED as medical management for non convulsive status  Q1 hour neuro checks to assess changes in LOC  EEG placement with Epilepsy consult for NCS management

## 2018-11-28 NOTE — CONSULTS
"  [facility]  Adult Nutrition  Consult Note    SUMMARY     Recommendations    Recommendation/Intervention:   1. As medically able, recommend increasing TF goal rate.  Isosource 1.5 @ 50mL/hr.   - Provides 1800kcals, 82g protein and 917mL free water.   - Hold for residuals >500mL.     2. If able to extubate and advance diet, recommend Regular with texture per SLP recommendations.     RD to monitor.    Goals: Initiate nutrition within 48 hrs  Nutrition Goal Status: goal met  Communication of RD Recs: reviewed with RN(POC)    Reason for Assessment    Reason for Assessment: consult  Diagnosis: (Cardiac Arrest)  Relevant Medical History: HTN  Interdisciplinary Rounds: attended  General Information Comments: Pt remains intubated s/p cardiac arrest. Pt started on TF trickle, tolerating appropriately. NFPE completed on 11/26- exam remains unchanged. pt with mild loss of lean body mass evident in temporal, clavicles, but adequate in extremities; adequate fat mass.   Nutrition Discharge Planning: unable to determine at this time    Nutrition Risk Screen    Nutrition Risk Screen: tube feeding or parenteral nutrition    Nutrition/Diet History    Food Preferences: MARNIE  Do you have any cultural, spiritual, Shinto conflicts, given your current situation?: none  Food Allergies: NKFA  Factors Affecting Nutritional Intake: NPO, on mechanical ventilation    Anthropometrics    Temp: 98.8 °F (37.1 °C)  Height Method: Measured  Height: 5' 9" (175.3 cm)  Height (inches): 69 in  Weight Method: Bed Scale  Weight: 67.4 kg (148 lb 9.4 oz)  Weight (lb): 148.59 lb  Ideal Body Weight (IBW), Male: 160 lb  % Ideal Body Weight, Male (lb): 92.87 lb  BMI (Calculated): 22  BMI Grade: 18.5-24.9 - normal       Lab/Procedures/Meds    Pertinent Labs Reviewed: reviewed  Pertinent Labs Comments: Na 146, K 3.2  Pertinent Medications Reviewed: reviewed  Pertinent Medications Comments: propofol    Physical Findings/Assessment    Overall " Physical Appearance: nourished, on ventilator support  Tubes: orogastric tube  Skin: intact    Estimated/Assessed Needs    Weight Used For Calorie Calculations: 67.4 kg (148 lb 9.4 oz)  Energy Calorie Requirements (kcal): 1701  Energy Need Method: [facility]  Protein Requirements: 81-101g(1.2-1.5g/kg)  Weight Used For Protein Calculations: 67.4 kg (148 lb 9.4 oz)  Fluid Requirements (mL): 1mL/kcal or per MD  Fluid Need Method: RDA Method  RDA Method (mL): 1701         Nutrition Prescription Ordered    Current Diet Order: NPO  Nutrition Order Comments: TF at goal  Current Nutrition Support Formula Ordered: Isosource 1.5  Current Nutrition Support Rate Ordered: 10 (ml)  Current Nutrition Support Frequency Ordered: mL/hr    Evaluation of Received Nutrient/Fluid Intake    Enteral Calories (kcal): 360  Enteral Protein (gm): 17  Enteral (Free Water) Fluid (mL): 184  Other Calories (kcal): 533(propofol @ 20.2mL/hr)  Total Calories (kcal): 893    % Kcal Needs: 53  % Protein Needs: 21    I/O: +I/O, good UOP, LBM 11/25    Energy Calories Required: not meeting needs  Protein Required: not meeting needs  Fluid Required: other (see comments)(per MD)    Comments: 0mL residuals  Tolerance: tolerating    % Intake of Estimated Energy Needs: 0 - 25 %  % Meal Intake: NPO    Nutrition Risk    Level of Risk/Frequency of Follow-up: (f/u 2x/week)     Assessment and Plan    Nutrition Problem  Inadequate energy intake     Related to (etiology):   Mechanical Ventilation     Signs and Symptoms (as evidenced by):   NPO without nutrition support     Interventions:  Collaboration with providers     Nutrition Diagnosis Status:   Improving       Monitor and Evaluation    Food and Nutrient Intake: energy intake, enteral nutrition intake  Food and Nutrient Adminstration: diet order, enteral and parenteral nutrition administration  Anthropometric Measurements: weight, weight change  Biochemical Data, Medical Tests and Procedures: electrolyte and  renal panel  Nutrition-Focused Physical Findings: overall appearance     Nutrition Follow-Up    RD Follow-up?: Yes

## 2018-11-28 NOTE — H&P
[facility]  Neurocritical Care  History & Physical    Admit Date: 11/25/2018  Service Date: 11/28/2018  Length of Stay: 3    Subjective:     Chief Complaint: Status epilepticus    History of Present Illness: Patient is a 57yo male presented as a transfer from [facility]. Original admission to [facility] was due to s/p cardiac arrest. The patient was found down by his wife who then called EMS. On arrival to the home, assessment showed the patient to be in Vfib for which a shock was delivered along with 3 rounds of Epi and 300mg Amiodarone x1. ROSC achieved. Cooling procedures were initiated with target tempeture obtained 11/26 0030. Due to ankylosing spondylitis and severely stiff neck, EMS was unable to intubate in the field therefore an LMA was placed. The patient was subsequently intubated in the OR with the use of fiberoptic. While in PACU (11/25) awaiting an ICU bed the patient began to exhibit myoclonic jerks.  The jerks resolved with Ativan and he was loaded on Keppra. CT head showed significant motion artifact. An EEG later showed generalized epileptiform discharges suggestive of non convulsive status. A decision was made to transfer to [facility] for critical care epilepsy management.     Patient was received in the NCC unit vented on 50mcg of propofol. EEG monitoring in progress. Unable to perform a complete neuro assessment due to the critical nature posed towards the patient when propofol is halted. No family at bedside to contribute to patient's history.        Past Medical History:   Diagnosis Date    Anemia     Degenerative disc disease     Hemorrhage of rectum and anus     Hypertension     Idiopathic ankylosing spondylitis 2000    Joint pain      Past Surgical History:   Procedure Laterality Date    COLONOSCOPY N/A 2/27/2018    Procedure: COLONOSCOPY;  Surgeon: Jaswant Godfrey MD;  Location: [facility] ([address] [place] [address]);  Service: Endoscopy;  Laterality: N/A;  original order from 5/1/17 per  Dr Godfrey-unable to use that order, new order placed    COLONOSCOPY N/A 10/31/2018    Procedure: COLONOSCOPY;  Surgeon: Jaswant Godfrey MD;  Location: [facility] (4TH FLR);  Service: Endoscopy;  Laterality: N/A;  constipation prep: miralax for a week prior and low residue diet 1 week prior. handout given to pt    COLONOSCOPY N/A 10/31/2018    Performed by Jaswant Godfrey MD at [facility] (4TH FLR)    COLONOSCOPY N/A 2/27/2018    Performed by Jaswant Godfrey MD at [facility] (4TH FLR)    COLONOSCOPY N/A 3/18/2013    Performed by Radames Huston MD at [facility] (4TH FLR)    EGD (ESOPHAGOGASTRODUODENOSCOPY) N/A 10/31/2018    Performed by Jaswant Godfrey MD at [facility] (4TH FLR)    ESOPHAGOGASTRODUODENOSCOPY N/A 10/31/2018    Procedure: EGD (ESOPHAGOGASTRODUODENOSCOPY);  Surgeon: Jaswant Godfrey MD;  Location: [facility] ([facility] FLR);  Service: Endoscopy;  Laterality: N/A;    INSERTION OF ENDOTRACHEAL TUBE WITH FIBEROPTIC GUIDANCE  11/25/2018    Procedure: INTUBATION, ENDOTRACHEAL;  Surgeon: Teja Davila MD;  Location: [facility] OR;  Service: General;;    INTUBATION, ENDOTRACHEAL  11/25/2018    Performed by Teja Davila MD at [facility] OR    MOUTH SURGERY        No current facility-administered medications on file prior to encounter.      Current Outpatient Medications on File Prior to Encounter   Medication Sig Dispense Refill    cetirizine (ZYRTEC) 10 mg TbDL 1 Tablet(s) Oral PRN Every day.        cyclobenzaprine (FLEXERIL) 10 MG tablet Take 1 tablet (10 mg total) by mouth 3 (three) times daily as needed. 90 tablet 5    diclofenac sodium 1 % Gel Apply 2 g topically 4 (four) times daily. for 10 days 100 g 5    ferrous sulfate 325 (65 FE) MG EC tablet Take 1 tablet (325 mg total) by mouth 2 (two) times daily. 60 tablet 2    HYDROcodone-acetaminophen (NORCO)  mg per tablet Take 1 tablet by mouth every 6 (six) hours as needed for Pain (msk pain). 110 tablet 0    omeprazole (PRILOSEC) 20 MG capsule TAKE 1 CAPSULE(20 MG) BY MOUTH  TWICE DAILY 180 capsule 1    sulfaSALAzine (AZULFIDINE) 500 MG TbEC Take 3 tablets (1,500 mg total) by mouth 2 (two) times daily. 540 tablet 0      Allergies: No known drug allergies    Family History   Problem Relation Age of Onset    Arthritis Mother     Kidney disease Father     No Known Problems Sister     No Known Problems Brother     No Known Problems Maternal Aunt     No Known Problems Maternal Uncle     No Known Problems Paternal Aunt     No Known Problems Paternal Uncle     No Known Problems Maternal Grandmother     No Known Problems Maternal Grandfather     No Known Problems Paternal Grandmother     No Known Problems Paternal Grandfather     Celiac disease Neg Hx     Cirrhosis Neg Hx     Colon cancer Neg Hx     Colon polyps Neg Hx     Crohn's disease Neg Hx     Cystic fibrosis Neg Hx     Esophageal cancer Neg Hx     Hemochromatosis Neg Hx     Inflammatory bowel disease Neg Hx     Irritable bowel syndrome Neg Hx     Liver cancer Neg Hx     Liver disease Neg Hx     Rectal cancer Neg Hx     Stomach cancer Neg Hx     Ulcerative colitis Neg Hx     Jorge's disease Neg Hx     Melanoma Neg Hx     Psoriasis Neg Hx     Lupus Neg Hx     Eczema Neg Hx     Acne Neg Hx     Amblyopia Neg Hx     Blindness Neg Hx     Cataracts Neg Hx     Glaucoma Neg Hx     Macular degeneration Neg Hx     Retinal detachment Neg Hx     Strabismus Neg Hx     Cancer Neg Hx     Diabetes Neg Hx     Hypertension Neg Hx     Stroke Neg Hx     Thyroid disease Neg Hx      Social History     Tobacco Use    Smoking status: Never Smoker    Smokeless tobacco: Never Used   Substance Use Topics    Alcohol use: Yes     Alcohol/week: 0.0 oz     Comment: one's in the blue moon    Drug use: No     Review of Systems   Unable to perform ROS: Intubated     Objective:     Vitals:    Temp: 98.2 °F (36.8 °C)  Pulse: 83  Rhythm: normal sinus rhythm  BP: (!) 140/87  MAP (mmHg): 109  Resp: (!) 31  SpO2: 100 %  Oxygen  Concentration (%): 40  O2 Device (Oxygen Therapy): ventilator  Vent Mode: A/C  Set Rate: 20 bmp  Vt Set: 400 mL  Pressure Support: 0 cmH20  PEEP/CPAP: 5 cmH20  Peak Airway Pressure: 26 cmH2O  Mean Airway Pressure: 11 cmH20  Plateau Pressure: 0 cmH20    Temp  Min: 94.8 °F (34.9 °C)  Max: 99.1 °F (37.3 °C)  Pulse  Min: 60  Max: 96  BP  Min: 91/58  Max: 152/94  MAP (mmHg)  Min: 69  Max: 118  Resp  Min: 20  Max: 132  SpO2  Min: 99 %  Max: 100 %  Oxygen Concentration (%)  Min: 30  Max: 50    11/27 0701 - 11/28 0700  In: 1227.4 [I.V.:277.4]  Out: 1060 [Urine:1060]   Unmeasured Output  Stool Occurrence: 1       Physical Exam   Constitutional: He appears well-developed and well-nourished.   HENT:   Head: Normocephalic.   Eyes: Pupils are equal, round, and reactive to light.   Neck: Normal range of motion. No JVD present. No tracheal deviation present.   Cardiovascular: Normal rate and regular rhythm.   Pulmonary/Chest: Breath sounds normal. He has no wheezes. He has no rales.   Abdominal: He exhibits distension. He exhibits no mass. There is no tenderness. There is no rebound and no guarding.   Neurological:   The patient is sedated therefore only a limited exam could be performed. He does not open his eyes to voice/pain. ETT is in place without verbal communication present. Able to illicit twitching motion in all 4 extremities with painful stimulation but extremities do not cross midline. EEG in place with propofol infusing.   Skin: Skin is warm. Capillary refill takes 2 to 3 seconds.     Unable to test language, memory, judgment, insight, fund of knowledge, hearing, shoulder shrug, tongue protrusion, coordination, gait due to level of consciousness.    Today I personally reviewed pertinent medications, lines/drains/airways, imaging, laboratory results, notably:        Assessment/Plan:     Neuro   * Status epilepticus    Admit to [facility] for critical care monitoring  Maintain seizure precautions for patient safety  Use of AED  as medical management for non convulsive status  Q1 hour neuro checks to assess changes in LOC  EEG placement with Epilepsy consult for NCS management     Pulmonary   Acute hypoxemic respiratory failure    Admit to [facility] for ventilator management  Cardiac monitoring to maintain hemodynamic stability  Continuous Oxygen saturation monitoring to promote adequate perfusion  Ventilator monitoring and management to assist with proper gas exchange  ABG to assess oxygenation             The patient is being Prophylaxed for:  Venous Thromboembolism with: Chemical  Stress Ulcer with: None  Ventilator Pneumonia with: chlorhexidine oral care    Activity Orders          None        Full Code    Lamberto Howard NP  Neurocritical Care  [facility]

## 2018-11-28 NOTE — ASSESSMENT & PLAN NOTE
Admit to [facility] for ventilator management  Cardiac monitoring to maintain hemodynamic stability  Continuous Oxygen saturation monitoring to promote adequate perfusion  Ventilator monitoring and management to assist with proper gas exchange  ABG to assess oxygenation

## 2018-11-28 NOTE — SUBJECTIVE & OBJECTIVE
Past Medical History:   Diagnosis Date    Anemia     Degenerative disc disease     Hemorrhage of rectum and anus     Hypertension     Idiopathic ankylosing spondylitis 2000    Joint pain      Past Surgical History:   Procedure Laterality Date    COLONOSCOPY N/A 2/27/2018    Procedure: COLONOSCOPY;  Surgeon: Jaswant Godfrey MD;  [address]  Service: Endoscopy;  Laterality: N/A;  original order from 5/1/17 per Dr Godfrey-unable to use that order, new order placed    COLONOSCOPY N/A 10/31/2018    Procedure: COLONOSCOPY;  Surgeon: Jaswant Godfrey MD;  [facility]  Service: Endoscopy;  Laterality: N/A;  constipation prep: miralax for a week prior and low residue diet 1 week prior. handout given to pt    COLONOSCOPY N/A 10/31/2018    Performed by Jaswant Godfrey MD at [facility] (4TH FLR)    COLONOSCOPY N/A 2/27/2018    Performed by Jaswant Godfrey MD at [facility] (4TH FLR)    COLONOSCOPY N/A 3/18/2013    Performed by Radames Huston MD at [facility] ([facility])    EGD (ESOPHAGOGASTRODUODENOSCOPY) N/A 10/31/2018    Performed by Jaswant Godfrey MD at [facility] ([facility])    ESOPHAGOGASTRODUODENOSCOPY N/A 10/31/2018    Procedure: EGD (ESOPHAGOGASTRODUODENOSCOPY);  Surgeon: Jaswant Godfrey MD;  Location: [address]);  Service: Endoscopy;  Laterality: N/A;    INSERTION OF ENDOTRACHEAL TUBE WITH FIBEROPTIC GUIDANCE  11/25/2018    Procedure: INTUBATION, ENDOTRACHEAL;  Surgeon: Teja Davila MD;  Location: [facility] OR;  Service: General;;    INTUBATION, ENDOTRACHEAL  11/25/2018    Performed by Teja Davila MD at [facility] OR    MOUTH SURGERY        No current facility-administered medications on file prior to encounter.      Current Outpatient Medications on File Prior to Encounter   Medication Sig Dispense Refill    cetirizine (ZYRTEC) 10 mg TbDL 1 Tablet(s) Oral PRN Every day.        cyclobenzaprine (FLEXERIL) 10 MG tablet Take 1 tablet (10 mg total) by mouth 3 (three) times daily as needed. 90  tablet 5    diclofenac sodium 1 % Gel Apply 2 g topically 4 (four) times daily. for 10 days 100 g 5    ferrous sulfate 325 (65 FE) MG EC tablet Take 1 tablet (325 mg total) by mouth 2 (two) times daily. 60 tablet 2    HYDROcodone-acetaminophen (NORCO)  mg per tablet Take 1 tablet by mouth every 6 (six) hours as needed for Pain (msk pain). 110 tablet 0    omeprazole (PRILOSEC) 20 MG capsule TAKE 1 CAPSULE(20 MG) BY MOUTH TWICE DAILY 180 capsule 1    sulfaSALAzine (AZULFIDINE) 500 MG TbEC Take 3 tablets (1,500 mg total) by mouth 2 (two) times daily. 540 tablet 0      Allergies: No known drug allergies    Family History   Problem Relation Age of Onset    Arthritis Mother     Kidney disease Father     No Known Problems Sister     No Known Problems Brother     No Known Problems Maternal Aunt     No Known Problems Maternal Uncle     No Known Problems Paternal Aunt     No Known Problems Paternal Uncle     No Known Problems Maternal Grandmother     No Known Problems Maternal Grandfather     No Known Problems Paternal Grandmother     No Known Problems Paternal Grandfather     Celiac disease Neg Hx     Cirrhosis Neg Hx     Colon cancer Neg Hx     Colon polyps Neg Hx     Crohn's disease Neg Hx     Cystic fibrosis Neg Hx     Esophageal cancer Neg Hx     Hemochromatosis Neg Hx     Inflammatory bowel disease Neg Hx     Irritable bowel syndrome Neg Hx     Liver cancer Neg Hx     Liver disease Neg Hx     Rectal cancer Neg Hx     Stomach cancer Neg Hx     Ulcerative colitis Neg Hx     Jorge's disease Neg Hx     Melanoma Neg Hx     Psoriasis Neg Hx     Lupus Neg Hx     Eczema Neg Hx     Acne Neg Hx     Amblyopia Neg Hx     Blindness Neg Hx     Cataracts Neg Hx     Glaucoma Neg Hx     Macular degeneration Neg Hx     Retinal detachment Neg Hx     Strabismus Neg Hx     Cancer Neg Hx     Diabetes Neg Hx     Hypertension Neg Hx     Stroke Neg Hx     Thyroid disease Neg Hx       Social History     Tobacco Use    Smoking status: Never Smoker    Smokeless tobacco: Never Used   Substance Use Topics    Alcohol use: Yes     Alcohol/week: 0.0 oz     Comment: one's in the blue moon    Drug use: No     Review of Systems   Unable to perform ROS: Intubated     Objective:     Vitals:    Temp: 98.2 °F (36.8 °C)  Pulse: 83  Rhythm: normal sinus rhythm  BP: (!) 140/87  MAP (mmHg): 109  Resp: (!) 31  SpO2: 100 %  Oxygen Concentration (%): 40  O2 Device (Oxygen Therapy): ventilator  Vent Mode: A/C  Set Rate: 20 bmp  Vt Set: 400 mL  Pressure Support: 0 cmH20  PEEP/CPAP: 5 cmH20  Peak Airway Pressure: 26 cmH2O  Mean Airway Pressure: 11 cmH20  Plateau Pressure: 0 cmH20    Temp  Min: 94.8 °F (34.9 °C)  Max: 99.1 °F (37.3 °C)  Pulse  Min: 60  Max: 96  BP  Min: 91/58  Max: 152/94  MAP (mmHg)  Min: 69  Max: 118  Resp  Min: 20  Max: 132  SpO2  Min: 99 %  Max: 100 %  Oxygen Concentration (%)  Min: 30  Max: 50    11/27 0701 - 11/28 0700  In: 1227.4 [I.V.:277.4]  Out: 1060 [Urine:1060]   Unmeasured Output  Stool Occurrence: 1       Physical Exam   Constitutional: He appears well-developed and well-nourished.   HENT:   Head: Normocephalic.   Eyes: Pupils are equal, round, and reactive to light.   Neck: Normal range of motion. No JVD present. No tracheal deviation present.   Cardiovascular: Normal rate and regular rhythm.   Pulmonary/Chest: Breath sounds normal. He has no wheezes. He has no rales.   Abdominal: He exhibits distension. He exhibits no mass. There is no tenderness. There is no rebound and no guarding.   Neurological:   The patient is sedated therefore only a limited exam could be performed. He does not open his eyes to voice/pain. ETT is in place without verbal communication present. Able to illicit twitching motion in all 4 extremities with painful stimulation but extremities do not cross midline. EEG in place with propofol infusing.   Skin: Skin is warm. Capillary refill takes 2 to 3 seconds.      Unable to test language, memory, judgment, insight, fund of knowledge, hearing, shoulder shrug, tongue protrusion, coordination, gait due to level of consciousness.    Today I personally reviewed pertinent medications, lines/drains/airways, imaging, laboratory results, notably:

## 2018-11-28 NOTE — ASSESSMENT & PLAN NOTE
- 11/25 at home, troponin peaked at 9. Per cardiology, likely cardiac event   - Cardiology consulted at [facility], planned for cardiac catheterization once patient stable  - TTM completed at OSH, target temp reached at 0300 on 11/26, now rewarmed  - Management per primary

## 2018-11-28 NOTE — HOSPITAL COURSE
11/28 Admit to NCC; Epilepsy consulted; EEG monitoring in place  11/29: increased keppra. Load valproate and valproate TID started. Advanced tube feeds.  11/30: A-gilma placed, electrolytes replaced overnight. On rounds patient started having twitches in LE and face. Muscle paralysis with rocuronium 75, paused propofol and monitored EEG. EEG with occassional spike discharges. Restarted propofol at 40. AEDs as is. Ordered CK and TG levels  12/2: Had vomiting overnight, also bowel movement, myoclonus right after, propofol was re-started overnight. CXR without aspiration. Will D/C propofol today, will increase clonazepam, increase Ketamine to 50. Decrease RR to 60, will repeat ABG today.  12/3: went into SE when ketamine stopped, responded to 1 mg/kg ketamine bolus and resumption of gtt at 60mcg/kg, valproate increased for target level of , reglan started  12/4: burst suppression pattern consistent at 4 HZ separation between 1 Hz bursts, intervening myoclonic activity, reglan helping gastroparesis, probable start of sabril today  12/5: sabril load delayed until today, ketamine wean,peristant elevated wbc, pro joann up, abx started  12/6: still with peristant leukocytosis, elevated pro joann, elevated sed rate and crp, abd/pelvis ct with/without ordered, wean ketamine to off by afternoon  12/7:ct abd/pelvis with and without contrast negative, bladder changes c/w cystitis, no abscess, cipro added, family discussion about prognosis and directions of care  12/8: family decision to withdraw care, comfort measures initiated

## 2018-11-28 NOTE — SUBJECTIVE & OBJECTIVE
Past Medical History:   Diagnosis Date    Anemia     Degenerative disc disease     Hemorrhage of rectum and anus     Hypertension     Idiopathic ankylosing spondylitis 2000    Joint pain        Past Surgical History:   Procedure Laterality Date    COLONOSCOPY N/A 2/27/2018    Procedure: COLONOSCOPY;  Surgeon: Jaswant Godfrey MD;  [address]  Service: Endoscopy;  Laterality: N/A;  original order from 5/1/17 per Dr Godfrey-unable to use that order, new order placed    COLONOSCOPY N/A 10/31/2018    Procedure: COLONOSCOPY;  Surgeon: Jaswant Godfrey MD;  [address]  Service: Endoscopy;  Laterality: N/A;  constipation prep: miralax for a week prior and low residue diet 1 week prior. handout given to pt    COLONOSCOPY N/A 10/31/2018    Performed by [facility]frey MD at [facility] (4TH FLR)    COLONOSCOPY N/A 2/27/2018    Performed by [facility] Godfrey MD at [facility] (4TH FLR)    COLONOSCOPY N/A 3/18/2013    [facility]    EGD (ESOPHAGOGASTRODUODENOSCOPY) N/A 10/31/2018    [facility]    ESOPHAGOGASTRODUODENOSCOPY N/A 10/31/2018    Procedure: EGD (ESOPHAGOGASTRODUODENOSCOPY);  Surgeon: Jaswant Godfrey MD;  Location: [address]);  Service: Endoscopy;  Laterality: N/A;    INSERTION OF ENDOTRACHEAL TUBE WITH FIBEROPTIC GUIDANCE  11/25/2018    Procedure: INTUBATION, ENDOTRACHEAL;  Surgeon: Teja Davila MD;  Location: [facility] OR;  Service: General;;    INTUBATION, ENDOTRACHEAL  11/25/2018    Performed by Teja Davila MD at [facility] OR    MOUTH SURGERY         Review of patient's allergies indicates:   Allergen Reactions    No known drug allergies        No current facility-administered medications on file prior to encounter.      Current Outpatient Medications on File Prior to Encounter   Medication Sig    cetirizine (ZYRTEC) 10 mg TbDL 1 Tablet(s) Oral PRN Every day.      cyclobenzaprine (FLEXERIL) 10 MG  tablet Take 1 tablet (10 mg total) by mouth 3 (three) times daily as needed.    diclofenac sodium 1 % Gel Apply 2 g topically 4 (four) times daily. for 10 days    ferrous sulfate 325 (65 FE) MG EC tablet Take 1 tablet (325 mg total) by mouth 2 (two) times daily.    HYDROcodone-acetaminophen (NORCO)  mg per tablet Take 1 tablet by mouth every 6 (six) hours as needed for Pain (msk pain).    omeprazole (PRILOSEC) 20 MG capsule TAKE 1 CAPSULE(20 MG) BY MOUTH TWICE DAILY    sulfaSALAzine (AZULFIDINE) 500 MG TbEC Take 3 tablets (1,500 mg total) by mouth 2 (two) times daily.     Family History     Problem Relation (Age of Onset)    Arthritis Mother    Kidney disease Father    No Known Problems Sister, Brother, Maternal Aunt, Maternal Uncle, Paternal Aunt, Paternal Uncle, Maternal Grandmother, Maternal Grandfather, Paternal Grandmother, Paternal Grandfather        Tobacco Use    Smoking status: Never Smoker    Smokeless tobacco: Never Used   Substance and Sexual Activity    Alcohol use: Yes     Alcohol/week: 0.0 oz     Comment: one's in the blue moon    Drug use: No    Sexual activity: Yes     Partners: Female     Review of Systems   Unable to perform ROS: intubated   Constitution: Positive for diaphoresis.     Objective:     Vital Signs (Most Recent):  Temp: 99.1 °F (37.3 °C) (11/28/18 1515)  Pulse: 76 (11/28/18 1515)  Resp: (!) 21 (11/28/18 1515)  BP: 117/73 (11/28/18 1502)  SpO2: 100 % (11/28/18 1515) Vital Signs (24h Range):  Temp:  [97.7 °F (36.5 °C)-99.1 °F (37.3 °C)] 99.1 °F (37.3 °C)  Pulse:  [72-84] 76  Resp:  [0-33] 21  SpO2:  [99 %-100 %] 100 %  BP: ()/(58-94) 117/73     Weight: 67.4 kg (148 lb 9.4 oz)  Body mass index is 21.94 kg/m².    SpO2: 100 %  O2 Device (Oxygen Therapy): ventilator      Intake/Output Summary (Last 24 hours) at 11/28/2018 1713  Last data filed at 11/28/2018 1500  Gross per 24 hour   Intake 1637.55 ml   Output 905 ml   Net 732.55 ml       Lines/Drains/Airways     Drain                  NG/OG Tube -- days    Male External Urinary Catheter 11/28/18 0343 less than 1 day          Airway                 Airway - Non-Surgical 11/25/18 0824 3 days          Peripheral Intravenous Line                 Peripheral IV - Single Lumen 11/25/18 0613 Right Forearm 3 days         Peripheral IV - Single Lumen 11/25/18 0616 Left Antecubital 3 days         Peripheral IV - Single Lumen 11/25/18 0620 Left Wrist 3 days         Peripheral IV - Single Lumen 11/25/18 0625 Right Hand 3 days                Physical Exam   Constitutional: He appears well-developed. He is sedated and intubated.   Thin   HENT:   Head: Normocephalic and atraumatic.   Mouth/Throat: Oropharynx is clear and moist.   Facial twitching and drool  Not responding to commands   Neck: Neck supple. No JVD present.   Cardiovascular: Normal rate, regular rhythm, normal heart sounds and intact distal pulses.   No murmur heard.  Pulses:       Radial pulses are 2+ on the right side, and 2+ on the left side.   Pulmonary/Chest: Breath sounds normal. He is intubated.   Symmetrical expansion   Abdominal: Soft. Bowel sounds are normal. There is no hepatosplenomegaly. There is no tenderness.   Musculoskeletal: He exhibits no edema.   Good peripheral pulses   Skin: Skin is warm and dry. No rash noted.       Significant Labs:   ABG:   Recent Labs   Lab 11/27/18  0410 11/28/18  0457   PH 7.453* 7.378   PCO2 31.4* 39.7   HCO3 22.0* 23.4*   POCSATURATED 99 99   BE -2 -2   , CMP   Recent Labs   Lab 11/27/18  0231  11/27/18  1930 11/28/18  0003 11/28/18  0004 11/28/18  0552      < > 141  --  141 146*   K 3.6   < > 3.9  --  3.4* 3.2*      < > 110  --  109 112*   CO2 20*   < > 21*  --  20* 19*   GLU 85  82   < > 86  84 84 82 89   BUN 22*   < > 21*  --  20 19   CREATININE 1.3   < > 1.5*  --  1.4 1.3   CALCIUM 9.1   < > 8.5*  --  8.4* 7.6*   PROT 7.5  --   --   --   --  6.5   ALBUMIN 2.5*  --   --   --   --  1.9*   BILITOT 0.6  --   --   --   --   0.4   ALKPHOS 164*  --   --   --   --  137*   *  --   --   --   --  105*   *  --   --   --   --  63*   ANIONGAP 13   < > 10  --  12 15   ESTGFRAFRICA >60   < > 59*  --  >60 >60.0   EGFRNONAA >60   < > 51*  --  55* >60.0    < > = values in this interval not displayed.   , CBC   Recent Labs   Lab 11/27/18  0231 11/28/18  0552   WBC 16.11* 15.64*   HGB 9.3* 8.3*   HCT 28.8* 26.3*    280     Significant Imaging:   ECHO 11/26  · Moderately decreased left ventricular systolic function. The estimated ejection fraction is 30%  · Severe left ventricular enlargement.  · Eccentric left ventricular hypertrophy.  · Grade I (mild) left ventricular diastolic dysfunction consistent with impaired relaxation.  · Severe left atrial enlargement.  · Moderately reduced right ventricular systolic function.  · Moderate global hypokinetic wall motion.    EKG:  NSR, normal axis, inferolaterla T-wave inversions with ST-depression in V3 and V4. No Q waves. These are new findings since 2013 EKG (normal). Degree of ST depressions less when compared with EKG from 11/25.

## 2018-11-28 NOTE — PROGRESS NOTES
EMS arrived. Switched to portable Vent tolerating well. Trasported to [facility] accompanied by EMS. MARIOLA called and left message for Dixie. Neuro ICU was notified that patient just left the unit.

## 2018-11-28 NOTE — PROGRESS NOTES
Patient arrived to [facility] from [facility] via [facility].    Type of stroke/diagnosis:  S/p cardiac arrest --now with seizures    Current symptoms: intubated, not responsive     Skin assessment done: Y    Wounds noted: none    NCC notified: Fanta COTO

## 2018-11-28 NOTE — PLAN OF CARE
Problem: Patient Care Overview  Goal: Plan of Care Review  Outcome: Ongoing (interventions implemented as appropriate)  POC reviewed with pt at 0500. Pt cannot verbalize an understanding. Questions and concerns not addressed. No acute events overnight.  Pt placed on EEG cap. Pt progressing toward goals. Will continue to monitor. See flowsheets for full assessment and VS info

## 2018-11-28 NOTE — PHYSICIAN QUERY
PT Name: Micheal Chirinos Jr.  MR #: [id number]     Physician Query Form - Diagnosis Clarification      CDS/: Renea Huizar RN               Contact information:[phone]    This form is a permanent document in the medical record.     Query Date: November 28, 2018    By submitting this query, we are merely seeking further clarification of documentation.  Please utilize your independent clinical judgment when addressing the question(s) below.     The medical record contains the following:         Indicators   Supporting Clinical Findings   Location in Medical Record   x HR RR BP Temp Vital Signs (24h Range):   Temp:  [95.5 °F -98 °F ] 98 °F (36.7 °C)   Pulse:  [] 77   Resp:  [0-45] 14   SpO2:  [91 %-100 %] 91 %   BP: ()/() 93/71   H&P 11/25   x Lactic Acid             Procalcitonin Lactic acidosis From cardiac arrest and cardiogenic shock. Follow up repeat lactate        Initial labs revealed a leukocytosis to 31, chemistries were generally  unremarkable except for elevated LFTs, troponin was elevated to 4.718, and lactic acid was 6.5.     H&P 11/25        H&P 11/25   x WBC                Bands                     CRP WBC=  31.06-> 24.83-> 16.11-> 15.64    Bands  = 18 Labs 11/25- 11/28    Lab 11.9     x Culture(s) Blood Cultures  - NGTD     Respiratory Culture-   Normal respiratory ronal    Lab11/25    Lab 11/25    AMS, Confusion, LOC, etc.      Organ Dysfunction / Failure     x Bacteremia or Sepsis / Septic Sepsis   Empirically placed on antibiotics. BCx pending.       Sepsis   Empirically placed on antibiotics. BCx NGTD.   Will continue antibiotics for now and consider de-escalation soon.      H&P 11/25        Progress Note         Please clarify if the _______Sepsis ____________________ diagnosis has been:     Ruled In   x Ruled Out    Other/Clarification of findings (please specify):        Clinically undetermined     Please document in your progress notes daily for the duration  of treatment, until resolved, and include in your discharge summary.

## 2018-11-28 NOTE — ASSESSMENT & PLAN NOTE
- Likely given myoclonic jerks at OSH, NCSE on EEG off propofol  - CT head 11/28 showing interval diffuse loss of gray-white matter differentiation with diffuse cerebral edema resulting in sulcal effacement of the cerebral hemispheres and smaller caliber ventricular system, concerning for global anoxic hypoxic injury.

## 2018-11-28 NOTE — HPI
Patient is a 57yo male presented as a transfer from [facility]. Original admission to [facility] was due to s/p cardiac arrest. The patient was found down by his wife who then called EMS. On arrival to the home, assessment showed the patient to be in Vfib for which a shock was delivered along with 3 rounds of Epi and 300mg Amiodarone x1. ROSC achieved. Cooling procedures were initiated with target tempeture obtained 11/26 0030. Due to ankylosing spondylitis and severely stiff neck, EMS was unable to intubate in the field therefore an LMA was placed. The patient was subsequently intubated in the OR with the use of fiberoptic. While in PACU (11/25) awaiting an ICU bed the patient began to exhibit myoclonic jerks.  The jerks resolved with Ativan and he was loaded on Keppra. CT head showed significant motion artifact. An EEG later showed generalized epileptiform discharges suggestive of non convulsive status. A decision was made to transfer to [facility] for critical care epilepsy management.     Patient was received in the NCC unit vented on 50mcg of propofol. EEG monitoring in progress. Unable to perform a complete neuro assessment due to the critical nature posed towards the patient when propofol is halted. No family at bedside to contribute to patient's history.

## 2018-11-28 NOTE — PLAN OF CARE
Problem: Patient Care Overview  Goal: Plan of Care Review  Outcome: Ongoing (interventions implemented as appropriate)  Remains on vent.  Unresponsive to noxious stimuli.  Cardiac monitor shows SR - SB.  Target re-warming temp achieved; patient awaiting transfer to [facility] for continuous EEG monitoring.  One seizure this shift, localized to facial twitching and drooling, lasted approx 2 minutes; resolved with Ativan.  Myers with adequate output.  Remains free from hospital acquired injury this shift.

## 2018-11-28 NOTE — PLAN OF CARE
11/28/18 1215   Discharge Reassessment   Assessment Type Discharge Planning Reassessment   Provided patient/caregiver education on the expected discharge date and the discharge plan No   Do you have any problems affording any of your prescribed medications? No   Discharge Plan A Long-term acute care facility (LTAC)   Discharge Plan B Skilled Nursing Facility;Rehab   Patient choice form signed by patient/caregiver N/A   Anticipated Discharge Disposition LTAC   Can the patient answer the patient profile reliably? No, cognitively impaired   How does the patient rate their overall health at the present time? (rene)   Describe the patient's ability to walk at the present time. Does not walk or unable to take any steps at all   How often would a person be available to care for the patient? Whenever needed   Number of comorbid conditions (as recorded on the chart) Three   During the past month, has the patient often been bothered by feeling down, depressed or hopeless? (rene)   During the past month, has the patient often been bothered by little interest or pleasure in doing things? (rene)   Post-Acute Status   Post-Acute Authorization (Patient on vent )       Patient transferred from [facility].  Discharge Planning Assessment completed at [facility].    Not medically stable for discharge.        Gabbi Yuan RN, CCRN-K, [facility]  Neuro-Critical Care   [phone]

## 2018-11-28 NOTE — PLAN OF CARE
Problem: Patient Care Overview  Goal: Plan of Care Review  Outcome: Ongoing (interventions implemented as appropriate)  Nutrition assessment completed. Please see RD note for details.    Recommendation/Intervention:   1. As medically able, recommend increasing TF goal rate.  Isosource 1.5 @ 50mL/hr.   - Provides 1800kcals, 82g protein and 917mL free water.   - Hold for residuals >500mL.     2. If able to extubate and advance diet, recommend Regular with texture per SLP recommendations.     RD to monitor.    Goals: Initiate nutrition within 48 hrs  Nutrition Goal Status: goal met  Communication of RD Recs: reviewed with RN(POC)

## 2018-11-28 NOTE — CONSULTS
[facility]  Neurology-Epilepsy  Consult Note    Patient Name: Micheal Chirinos Jr.  MRN: [id number]  Admission Date: 11/25/2018  Hospital Length of Stay: 3 days  Code Status: Full Code   Attending Provider: Galen Lopes MD   Consulting Provider: Cathryn Ramirez PA-C  Primary Care Physician: Azikiwe K Lombard, MD  Principal Problem:Status epilepticus    Inpatient consult to Epilepsy  Consult performed by: Cathryn Ramirez PA-C  Consult ordered by: Lamberto Howard NP         Subjective:     HPI:   Mr. Chirinos is a 59 yo male with significant past medical history of ankylosing spondylitis, severe cervical disc disease from AS, HTN, iron deficiency anemia who presents to [facility] as transfer from [facility] for higher level of care of post cardiac status epilepticus. Patient was in his usual state of health per wife, when she heard a loud noise in bathroom on morning of 11/25. She found patient down on floor, unable to reposition him at first and called EMS. Unclear time until EMS arrival, wife reports approximately 5 minutes. Per chart, on  arrival, AED connected and recommended cardioversion. When paramedics arrived he was in VFib arrest. Given total of three rounds epi, 1 dose amiodarone, ROSC achieved prior to arrival to ER. At [facility] ER, ET could not be placed due to neck stiffness from AS. Taken to OR by surgery and ET tube passed. While in PACU, reported to exhibit myoclonic jerks. Given Ativan and Keppra. CT head completed which showed significant motion artifact. EEG completed 11/26 showed generalized epileptiform discharges suggestive of NCSE. Patient started on Vimpat, decision made to transfer patient to [facility] NICU once hypothermia protocol complete for higher level of care. Patient arrived overnight 11/27>11/28, sedated on Propofol, EEG cap placed. Neurology Epilepsy consulted for assistance in management of NCSE.    Hospital Course:   No notes on file     Past Medical History:    Diagnosis Date    Anemia     Degenerative disc disease     Hemorrhage of rectum and anus     Hypertension     Idiopathic ankylosing spondylitis 2000    Joint pain        Past Surgical History:   Procedure Laterality Date    COLONOSCOPY N/A 2/27/2018    Procedure: COLONOSCOPY;  Surgeon: Jaswant Godfrey MD;  [facility]  Service: Endoscopy;  Laterality: N/A;  original order from 5/1/17 per Dr Godfrey-unable to use that order, new order placed    COLONOSCOPY N/A 10/31/2018    Procedure: COLONOSCOPY;  Surgeon: Jaswant Godfrey MD;  [facility]  Service: Endoscopy;  Laterality: N/A;  constipation prep: miralax for a week prior and low residue diet 1 week prior. handout given to pt    COLONOSCOPY N/A 10/31/2018    Performed by Jaswant Godfrey MD at [facility] (4TH FLR)    COLONOSCOPY N/A 2/27/2018    Performed by Jaswant Godfrey MD at [facility] (4TH FLR)    COLONOSCOPY N/A 3/18/2013    Performed by Radames Huston MD at [facility] (4TH FLR)    EGD (ESOPHAGOGASTRODUODENOSCOPY) N/A 10/31/2018    Performed by Jaswant Godfrey MD at [facility] (4TH FLR)    ESOPHAGOGASTRODUODENOSCOPY N/A 10/31/2018    Procedure: EGD (ESOPHAGOGASTRODUODENOSCOPY);  Surgeon: Jaswant Godfrey MD;  Location: [address]);  Service: Endoscopy;  Laterality: N/A;    INSERTION OF ENDOTRACHEAL TUBE WITH FIBEROPTIC GUIDANCE  11/25/2018    Procedure: INTUBATION, ENDOTRACHEAL;  Surgeon: Teja Davila MD;  Location: [facility] OR;  Service: General;;    INTUBATION, ENDOTRACHEAL  11/25/2018    Performed by Teja Davila MD at [facility] OR    MOUTH SURGERY         Review of patient's allergies indicates:   Allergen Reactions    No known drug allergies        No current facility-administered medications on file prior to encounter.      Current Outpatient Medications on File Prior to Encounter   Medication Sig    cetirizine (ZYRTEC) 10 mg TbDL 1 Tablet(s) Oral PRN Every day.      cyclobenzaprine (FLEXERIL) 10 MG tablet Take 1 tablet  (10 mg total) by mouth 3 (three) times daily as needed.    diclofenac sodium 1 % Gel Apply 2 g topically 4 (four) times daily. for 10 days    ferrous sulfate 325 (65 FE) MG EC tablet Take 1 tablet (325 mg total) by mouth 2 (two) times daily.    HYDROcodone-acetaminophen (NORCO)  mg per tablet Take 1 tablet by mouth every 6 (six) hours as needed for Pain (msk pain).    omeprazole (PRILOSEC) 20 MG capsule TAKE 1 CAPSULE(20 MG) BY MOUTH TWICE DAILY    sulfaSALAzine (AZULFIDINE) 500 MG TbEC Take 3 tablets (1,500 mg total) by mouth 2 (two) times daily.     Continuous Infusions:   propofol 50 mcg/kg/min (11/28/18 1400)       Family History     Problem Relation (Age of Onset)    Arthritis Mother    Kidney disease Father    No Known Problems Sister, Brother, Maternal Aunt, Maternal Uncle, Paternal Aunt, Paternal Uncle, Maternal Grandmother, Maternal Grandfather, Paternal Grandmother, Paternal Grandfather        Tobacco Use    Smoking status: Never Smoker    Smokeless tobacco: Never Used   Substance and Sexual Activity    Alcohol use: Yes     Alcohol/week: 0.0 oz     Comment: one's in the blue moon    Drug use: No    Sexual activity: Yes     Partners: Female     Review of Systems   Unable to perform ROS: Intubated     Objective:     Vital Signs (Most Recent):  Temp: 99.1 °F (37.3 °C) (11/28/18 1400)  Pulse: 75 (11/28/18 1400)  Resp: 20 (11/28/18 1400)  BP: 121/78 (11/28/18 1400)  SpO2: 100 % (11/28/18 1400) Vital Signs (24h Range):  Temp:  [97.7 °F (36.5 °C)-99.1 °F (37.3 °C)] 99.1 °F (37.3 °C)  Pulse:  [72-96] 75  Resp:  [0-33] 20  SpO2:  [99 %-100 %] 100 %  BP: ()/(58-94) 121/78     Weight: 67.4 kg (148 lb 9.4 oz)  Body mass index is 21.94 kg/m².    Physical Exam   Constitutional: He appears well-developed and well-nourished.   HENT:   Head: Normocephalic and atraumatic.   Eyes: Conjunctivae are normal. Pupils are equal, round, and reactive to light.   Neck: No thyromegaly present.   Cardiovascular:  Intact distal pulses.   Musculoskeletal: He exhibits no deformity.   Skin: Skin is warm and dry. He is not diaphoretic.   Psychiatric:   Unable to assess       NEUROLOGICAL EXAMINATION:     MENTAL STATUS   Level of consciousness: unresponsive to painful stimuli    CRANIAL NERVES     CN III, IV, VI   Pupils are equal, round, and reactive to light.       Comatose   CN:   No blink to threat OU   SÁNCHEZ OU    Corneal absent OU   Face symmetric   Tongue midline   No withdrawal to noxious stimuli in any extremity   No Clonus in ankles   Toes equivocal on plantar stimulation     Exam findings to suggest seizures:  Myoclonus - no   eye twitching - no   Nystagmus - no   gaze deviation - no   waxy rigidity - no        Significant Labs: All pertinent lab results from the past 24 hours have been reviewed.    Significant Studies: I have reviewed all pertinent imaging results/findings within the past 24 hours.    Assessment and Plan:     * Status epilepticus    Transfer from [facility] for evaluation of NCSE noted on EEG . Likely secondary to anoxia in setting of cardiac arrest.    EE/26: continuous generalized epileptiform discharges when off propofol, suggestive of NCSE  : Cap EEG - intermittent rhythmic GPEDs, suggestive of post anoxic NCSE    Recommendations:  - Hook up to formal vEEG  - Continue Propofol 50 mKm - post anoxic status has been responsive to anesthetics at this point  - Continue Keppra 1500 mg BID, Vimpat 200 mg BID  - Further recommendations pending continued EEG read    Plan of care discussed with NCC team, family at bedside. Will continue to follow.      Cardiac arrest with ventricular fibrillation    -  at home, troponin peaked at 9. Per cardiology, likely cardiac event   - Cardiology consulted at [facility], planned for cardiac catheterization once patient stable  - TTM completed at OSH, target temp reached at 0300 on , now rewarmed  - Management per primary     Anoxic brain injury     - Likely given myoclonic jerks at OSH, NCSE on EEG off propofol  - CT head 11/28 showing interval diffuse loss of gray-white matter differentiation with diffuse cerebral edema resulting in sulcal effacement of the cerebral hemispheres and smaller caliber ventricular system, concerning for global anoxic hypoxic injury.     Acute hypoxemic respiratory failure    - ET tube placed in OR at [facility] due to neck rigidity from AS  - Vent management per [facility]     Leukocytosis    - Trending down since initial presentation, WBC 15.64 today           VTE Risk Mitigation (From admission, onward)        Ordered     enoxaparin injection 40 mg  Daily      11/28/18 6161          Thank you for your consult. I will follow-up with patient. Please contact us if you have any additional questions.    Cathryn Ramirez PA-C  Neurology-Epilepsy  [facility]  Staff: Dr. Whitfield

## 2018-11-28 NOTE — PROGRESS NOTES
Received call from Transfer center. Bed assignment obtained. Updated with the patient status. Report called to Obi at Neuro ICU. Awaiting transport for transfer

## 2018-11-28 NOTE — PROGRESS NOTES
Pt arrived, placed on vent with ordered settings. ETT [email or web address] center secure.

## 2018-11-28 NOTE — HPI
Mr. Chirinos is a 59 yo male with significant past medical history of ankylosing spondylitis, severe cervical disc disease from AS, HTN, iron deficiency anemia who presents to [facility] as transfer from [facility] for higher level of care of post cardiac status epilepticus. Patient was in his usual state of health per wife, when she heard a loud noise in bathroom on morning of 11/25. She found patient down on floor, unable to reposition him at first and called EMS. Unclear time until EMS arrival, wife reports approximately 5 minutes. Per chart, on  arrival, AED connected and recommended cardioversion. When paramedics arrived he was in VFib arrest. Given total of three rounds epi, 1 dose amiodarone, ROSC achieved prior to arrival to ER. At OSH ER, ET could not be placed due to neck stiffness from AS. Taken to OR by surgery and ET tube passed. While in PACU, reported to exhibit myoclonic jerks. Given Ativan and Keppra. CT head completed which showed significant motion artifact. EEG completed 11/26 showed generalized epileptiform discharges suggestive of NCSE. Patient started on Vimpat, decision made to transfer patient to [facility] NICU once hypothermia protocol complete for higher level of care. Patient arrived overnight 11/27>11/28, sedated on Propofol, EEG cap placed. Neurology Epilepsy consulted for assistance in management of NCSE.

## 2018-11-28 NOTE — SUBJECTIVE & OBJECTIVE
Past Medical History:   Diagnosis Date    Anemia     Degenerative disc disease     Hemorrhage of rectum and anus     Hypertension     Idiopathic ankylosing spondylitis 2000    Joint pain        Past Surgical History:   Procedure Laterality Date    COLONOSCOPY N/A 2/27/2018    Procedure: COLONOSCOPY;  Surgeon: Jaswant Godfrey MD;  [address]  Service: Endoscopy;  Laterality: N/A;  original order from 5/1/17 per Dr Godfrey-unable to use that order, new order placed    COLONOSCOPY N/A 10/31/2018    Procedure: COLONOSCOPY;  Surgeon: Jaswant Godfrey MD;  [address]  Service: Endoscopy;  Laterality: N/A;  constipation prep: miralax for a week prior and low residue diet 1 week prior. handout given to pt    COLONOSCOPY N/A 10/31/2018    [facility]    COLONOSCOPY N/A 2/27/2018    [facility])    COLONOSCOPY N/A 3/18/2013    [facility]    EGD (ESOPHAGOGASTRODUODENOSCOPY) N/A 10/31/2018    [facility]    ESOPHAGOGASTRODUODENOSCOPY N/A 10/31/2018    Procedure: EGD (ESOPHAGOGASTRODUODENOSCOPY);  Surgeon: Jawsant Godfrey MD;  Location: [address]);  Service: Endoscopy;  Laterality: N/A;    INSERTION OF ENDOTRACHEAL TUBE WITH FIBEROPTIC GUIDANCE  11/25/2018    Procedure: INTUBATION, ENDOTRACHEAL;  Surgeon: Teja Davila MD;  Location: [facility] OR;  Service: General;;    INTUBATION, ENDOTRACHEAL  11/25/2018    Performed by Teja Davila MD at [facility] OR    MOUTH SURGERY         Review of patient's allergies indicates:   Allergen Reactions    No known drug allergies        No current facility-administered medications on file prior to encounter.      Current Outpatient Medications on File Prior to Encounter   Medication Sig    cetirizine (ZYRTEC) 10 mg TbDL 1 Tablet(s) Oral PRN Every day.      cyclobenzaprine (FLEXERIL) 10 MG  tablet Take 1 tablet (10 mg total) by mouth 3 (three) times daily as needed.    diclofenac sodium 1 % Gel Apply 2 g topically 4 (four) times daily. for 10 days    ferrous sulfate 325 (65 FE) MG EC tablet Take 1 tablet (325 mg total) by mouth 2 (two) times daily.    HYDROcodone-acetaminophen (NORCO)  mg per tablet Take 1 tablet by mouth every 6 (six) hours as needed for Pain (msk pain).    omeprazole (PRILOSEC) 20 MG capsule TAKE 1 CAPSULE(20 MG) BY MOUTH TWICE DAILY    sulfaSALAzine (AZULFIDINE) 500 MG TbEC Take 3 tablets (1,500 mg total) by mouth 2 (two) times daily.     Continuous Infusions:   propofol 50 mcg/kg/min (11/28/18 1400)       Family History     Problem Relation (Age of Onset)    Arthritis Mother    Kidney disease Father    No Known Problems Sister, Brother, Maternal Aunt, Maternal Uncle, Paternal Aunt, Paternal Uncle, Maternal Grandmother, Maternal Grandfather, Paternal Grandmother, Paternal Grandfather        Tobacco Use    Smoking status: Never Smoker    Smokeless tobacco: Never Used   Substance and Sexual Activity    Alcohol use: Yes     Alcohol/week: 0.0 oz     Comment: one's in the blue moon    Drug use: No    Sexual activity: Yes     Partners: Female     Review of Systems   Unable to perform ROS: Intubated     Objective:     Vital Signs (Most Recent):  Temp: 99.1 °F (37.3 °C) (11/28/18 1400)  Pulse: 75 (11/28/18 1400)  Resp: 20 (11/28/18 1400)  BP: 121/78 (11/28/18 1400)  SpO2: 100 % (11/28/18 1400) Vital Signs (24h Range):  Temp:  [97.7 °F (36.5 °C)-99.1 °F (37.3 °C)] 99.1 °F (37.3 °C)  Pulse:  [72-96] 75  Resp:  [0-33] 20  SpO2:  [99 %-100 %] 100 %  BP: ()/(58-94) 121/78     Weight: 67.4 kg (148 lb 9.4 oz)  Body mass index is 21.94 kg/m².    Physical Exam   Constitutional: He appears well-developed and well-nourished.   HENT:   Head: Normocephalic and atraumatic.   Eyes: Conjunctivae are normal. Pupils are equal, round, and reactive to light.   Neck: No thyromegaly  present.   Cardiovascular: Intact distal pulses.   Musculoskeletal: He exhibits no deformity.   Skin: Skin is warm and dry. He is not diaphoretic.   Psychiatric:   Unable to assess       NEUROLOGICAL EXAMINATION:     MENTAL STATUS   Level of consciousness: unresponsive to painful stimuli    CRANIAL NERVES     CN III, IV, VI   Pupils are equal, round, and reactive to light.       Comatose   CN:   No blink to threat OU   SÁNCHEZ OU    Corneal absent OU   Face symmetric   Tongue midline   No withdrawal to noxious stimuli in any extremity   No Clonus in ankles   Toes equivocal on plantar stimulation     Exam findings to suggest seizures:  Myoclonus - no   eye twitching - no   Nystagmus - no   gaze deviation - no   waxy rigidity - no        Significant Labs: All pertinent lab results from the past 24 hours have been reviewed.    Significant Studies: I have reviewed all pertinent imaging results/findings within the past 24 hours.

## 2018-11-29 NOTE — PROCEDURES
"Micheal Chirinos Jr. is a 58 y.o. male patient.    Temp: 98.2 °F (36.8 °C) (18)  Pulse: 71 (18)  Resp: (!) 32 (18)  BP: 101/63 (18)  SpO2: 100 % (18)  Weight: 67.4 kg (148 lb 9.4 oz) (18)  Height: 5' 9" (175.3 cm) (18)       Arterial Line  Date/Time: 2018 8:38 AM  Location procedure was performed: [facility] NEURO CRITICAL CARE  Performed by: Lamberto Howard NP  Authorized by: Lamberto Howard NP   Assisting provider: Enio Dumont MD  Pre-op Diagnosis: Seizure  Post-operative diagnosis: Seizure  Consent Done: Yes  Consent: Verbal consent obtained.  Risks and benefits: risks, benefits and alternatives were discussed  Consent given by: spouse  Patient understanding: patient states understanding of the procedure being performed  Patient consent: the patient's understanding of the procedure matches consent given  Procedure consent: procedure consent matches procedure scheduled  Relevant documents: relevant documents present and verified  Test results: test results available and properly labeled  Site marked: the operative site was marked  Imaging studies: imaging studies available  Required items: required blood products, implants, devices, and special equipment available  Patient identity confirmed: , MRN and name  Time out: Immediately prior to procedure a "time out" was called to verify the correct patient, procedure, equipment, support staff and site/side marked as required.  Preparation: Patient was prepped and draped in the usual sterile fashion.  Indications: multiple ABGs and hemodynamic monitoring  Location: left radial  Patient sedated: no  Panda's test normal: yes  Needle gauge: 20  Number of attempts: 1  Complications: No  Estimated blood loss (mL): 0  Specimens: No  Implants: No  Post-procedure: dressing applied  Post-procedure CMS: normal  Patient tolerance: Patient tolerated the procedure well with no immediate " complications          Lamberto Howard  11/29/2018

## 2018-11-29 NOTE — SUBJECTIVE & OBJECTIVE
Interval History: Patient intubated, connected to EEG. Unresponsive, lacks corneal reflex. Currently on propofol gtt.     Review of Systems   Unable to perform ROS: intubated     Objective:     Vital Signs (Most Recent):  Temp: 97.7 °F (36.5 °C) (11/29/18 1201)  Pulse: 63 (11/29/18 1201)  Resp: (!) 24 (11/29/18 1201)  BP: 114/68 (11/29/18 1201)  SpO2: 100 % (11/29/18 1201) Vital Signs (24h Range):  Temp:  [97.5 °F (36.4 °C)-101.1 °F (38.4 °C)] 97.7 °F (36.5 °C)  Pulse:  [63-95] 63  Resp:  [7-50] 24  SpO2:  [99 %-100 %] 100 %  BP: ()/(57-95) 114/68  Arterial Line BP: (114-130)/(55-60) 117/59     Weight: 67.4 kg (148 lb 9.4 oz)  Body mass index is 21.94 kg/m².     SpO2: 100 %  O2 Device (Oxygen Therapy): ventilator      Intake/Output Summary (Last 24 hours) at 11/29/2018 1214  Last data filed at 11/29/2018 1201  Gross per 24 hour   Intake 2738.81 ml   Output 1450 ml   Net 1288.81 ml       Lines/Drains/Airways     Drain                 NG/OG Tube -- days    Male External Urinary Catheter 11/28/18 0343 1 day          Airway                 Airway - Non-Surgical 11/25/18 0824 4 days          Arterial Line                 Arterial Line 11/29/18 0752 Right Radial less than 1 day          Peripheral Intravenous Line                 Peripheral IV - Single Lumen 11/25/18 0613 Right Forearm 4 days         Peripheral IV - Single Lumen 11/25/18 0616 Left Antecubital 4 days         Peripheral IV - Single Lumen 11/25/18 0620 Left Wrist 4 days         Midline Catheter Insertion/Assessment  - Single Lumen 11/29/18 1000 Right brachial vein 18g x 8cm less than 1 day         Midline Catheter Insertion/Assessment  - Single Lumen 11/29/18 1102 Right basilic vein (medial side of arm) less than 1 day         Peripheral IV - Single Lumen 11/29/18 0838 Anterior;Left Forearm less than 1 day                Physical Exam   Constitutional: No distress. He is intubated.   Eyes: Conjunctivae are normal. No scleral icterus.   Pupils about  2mm, minimally reactive bilaterally   Neck: Neck supple. No JVD present. No thyromegaly present.   Cardiovascular: Normal rate and intact distal pulses.   Murmur heard.  Pulmonary/Chest: He is intubated. He has no wheezes.   Abdominal: Soft. Bowel sounds are normal. He exhibits no distension. There is no guarding.   Musculoskeletal: He exhibits no edema or tenderness.   Lymphadenopathy:     He has no cervical adenopathy.   Skin: Skin is warm and dry. He is not diaphoretic.   Nursing note and vitals reviewed.      Significant Labs:   Blood Culture: No results for input(s): LABBLOO in the last 48 hours., CMP   Recent Labs   Lab 18  0004 18  0552 18  012    146* 140   K 3.4* 3.2* 3.7    112* 112*   CO2 20* 19* 20*   GLU 82 89 97   BUN 20 19 19   CREATININE 1.4 1.3 1.5*   CALCIUM 8.4* 7.6* 8.4*   PROT  --  6.5 6.8   ALBUMIN  --  1.9* 2.1*   BILITOT  --  0.4 0.8   ALKPHOS  --  137* 200*   AST  --  105* 80*   ALT  --  63* 54*   ANIONGAP 12 15 8   ESTGFRAFRICA >60 >60.0 58.9*   EGFRNONAA 55* >60.0 50.9*   , CBC   Recent Labs   Lab 18  0552 18  0127   WBC 15.64* 13.83*   HGB 8.3* 8.3*   HCT 26.3* 25.0*    277     Significant Imagin2018 TTE:  · Moderately decreased left ventricular systolic function. The estimated ejection fraction is 30%  · Severe left ventricular enlargement.  · Eccentric left ventricular hypertrophy.  · Grade I (mild) left ventricular diastolic dysfunction consistent with impaired relaxation.  · Severe left atrial enlargement.  · Moderately reduced right ventricular systolic function.  · Moderate global hypokinetic wall motion.

## 2018-11-29 NOTE — SUBJECTIVE & OBJECTIVE
Interval History: Reported to have clinical seizure activity overnight, Propofol increased to 75 mKm. EEG in light burst suppression pattern with increased Propofol.    Current Facility-Administered Medications   Medication Dose Route Frequency Provider Last Rate Last Dose    acetaminophen oral solution 650 mg  650 mg Per NG tube Q6H PRN Xenia Gaffney MD        aspirin chewable tablet 81 mg  81 mg Per OG tube Daily Xenia Gaffney MD   81 mg at 11/29/18 1000    atorvastatin tablet 80 mg  80 mg Per OG tube Daily Xenia Gaffney MD   80 mg at 11/29/18 1000    chlorhexidine 0.12 % solution 15 mL  15 mL Mouth/Throat BID Lamberto Howard NP   15 mL at 11/29/18 0900    clopidogrel tablet 75 mg  75 mg Per OG tube Daily Xenia Gaffney MD   75 mg at 11/29/18 1046    dextrose 50% injection 12.5 g  12.5 g Intravenous PRN Delia Taveras MD        enoxaparin injection 40 mg  40 mg Subcutaneous Daily Xenia Gaffney MD   Stopped at 11/28/18 1728    famotidine tablet 20 mg  20 mg Per OG tube BID Xenia Gaffney MD   20 mg at 11/29/18 1000    glucagon (human recombinant) injection 1 mg  1 mg Intramuscular PRN Delia Taveras MD        lacosamide (VIMPAT) 200 mg in sodium chloride 0.9% 100 mL IVPB  200 mg Intravenous Q12H Tae Lux  mL/hr at 11/29/18 1000 200 mg at 11/29/18 1000    levETIRAcetam (KEPPRA) 2,000 mg in dextrose 5 % 250 mL IVPB  2,000 mg Intravenous Q12H Xenia Gaffney MD        lorazepam injection 2 mg  2 mg Intravenous Q2H PRN Delia Taveras MD   2 mg at 11/29/18 0514    magnesium oxide tablet 800 mg  800 mg Per NG tube PRN Lamberto Howard NP        magnesium oxide tablet 800 mg  800 mg Per NG tube PRN Lamberto Howard NP        potassium chloride 10% oral solution 40 mEq  40 mEq Per NG tube PRN Lamberto Howard NP   40 mEq at 11/29/18 0518    potassium chloride 10% oral solution 40 mEq  40 mEq Per NG tube PRN Lamberto Howard NP   40 mEq at 11/28/18 1440     potassium chloride 10% oral solution 60 mEq  60 mEq Per NG tube PRN Lamberto Howard NP        potassium, sodium phosphates 280-160-250 mg packet 2 packet  2 packet Per NG tube PRN Lamberto Howard NP        potassium, sodium phosphates 280-160-250 mg packet 2 packet  2 packet Per NG tube PRN Lamberto Howard NP   2 packet at 11/29/18 1322    potassium, sodium phosphates 280-160-250 mg packet 2 packet  2 packet Per NG tube PRN Lamberto Howard NP        propofol (DIPRIVAN) 10 mg/mL infusion (NON-TITRATING)  75 mcg/kg/min (Dosing Weight) Intravenous Continuous Enio Dumont MD 30.3 mL/hr at 11/29/18 1214 75 mcg/kg/min at 11/29/18 1214    senna-docusate 8.6-50 mg per tablet 1 tablet  1 tablet Per OG tube BID Galen Lopes MD   Stopped at 11/29/18 0900    sodium chloride 0.9% flush 3 mL  3 mL Intravenous PRN Lamberto Howard NP        valproate (DEPACON) 750 mg in dextrose 5 % 100 mL IVPB  750 mg Intravenous Q8H Xenia Gaffney MD         Continuous Infusions:   propofol 75 mcg/kg/min (11/29/18 1214)       Review of Systems   Unable to perform ROS: Intubated     Objective:     Vital Signs (Most Recent):  Temp: 97.5 °F (36.4 °C) (11/29/18 1259)  Pulse: 64 (11/29/18 1259)  Resp: (!) 28 (11/29/18 1259)  BP: 114/68 (11/29/18 1201)  SpO2: 100 % (11/29/18 1259) Vital Signs (24h Range):  Temp:  [97.5 °F (36.4 °C)-101.1 °F (38.4 °C)] 97.5 °F (36.4 °C)  Pulse:  [63-95] 64  Resp:  [11-50] 28  SpO2:  [99 %-100 %] 100 %  BP: ()/(57-95) 114/68  Arterial Line BP: (114-130)/(55-60) 117/59     Weight: 67.4 kg (148 lb 9.4 oz)  Body mass index is 21.94 kg/m².    Physical Exam   Constitutional: He appears well-developed and well-nourished.   HENT:   Head: Normocephalic and atraumatic.   Eyes: Conjunctivae are normal. Pupils are equal, round, and reactive to light.   Neck: No thyromegaly present.   Cardiovascular: Intact distal pulses.   Musculoskeletal: He exhibits no deformity.   Skin: Skin is warm and dry.  He is not diaphoretic.   Psychiatric:   Unable to assess       NEUROLOGICAL EXAMINATION:     MENTAL STATUS   Level of consciousness: unresponsive to painful stimuli    CRANIAL NERVES     CN III, IV, VI   Pupils are equal, round, and reactive to light.       Comatose   CN:   No blink to threat OU   SÁNCHEZ OU    Corneal absent OU   Face symmetric   Tongue midline   No withdrawal to noxious stimuli in any extremity   No Clonus in ankles   Toes equivocal on plantar stimulation     Exam findings to suggest seizures:  Myoclonus - no   eye twitching - no   Nystagmus - no   gaze deviation - no   waxy rigidity - no        Significant Labs: All pertinent lab results from the past 24 hours have been reviewed.    Significant Studies: I have reviewed all pertinent imaging results/findings within the past 24 hours.

## 2018-11-29 NOTE — SUBJECTIVE & OBJECTIVE
Interval History:   Clinical seizures overnight, propofol increased  Fever overnight  Exam unchanged  Seen by cardiology    Objective:     Vitals:  Temp: 96.6 °F (35.9 °C)  Pulse: (!) 59  Rhythm: normal sinus rhythm  BP: 121/75  MAP (mmHg): 92  Resp: (!) 26  SpO2: 100 %  Oxygen Concentration (%): 40  O2 Device (Oxygen Therapy): ventilator  Vent Mode: SIMV  Set Rate: 20 bmp  Vt Set: 380 mL  Pressure Support: 10 cmH20  PEEP/CPAP: 5 cmH20  Peak Airway Pressure: 21 cmH2O  Mean Airway Pressure: 10 cmH20  Plateau Pressure: 0 cmH20    Temp  Min: 96.6 °F (35.9 °C)  Max: 101.1 °F (38.4 °C)  Pulse  Min: 59  Max: 95  BP  Min: 97/59  Max: 171/95  MAP (mmHg)  Min: 73  Max: 125  Resp  Min: 11  Max: 50  SpO2  Min: 99 %  Max: 100 %  Oxygen Concentration (%)  Min: 40  Max: 40    11/28 0701 - 11/29 0700  In: 2739.8 [I.V.:704.8]  Out: 1450 [Urine:1450]   Unmeasured Output  Stool Occurrence: 1       Physical Exam   Constitutional: He appears well-developed and well-nourished.   HENT:   Head: Normocephalic.   Cardiovascular: Normal rate and regular rhythm.   Pulmonary/Chest:   intubated   Neuro exam: limited as patient on sedation  Sluggish pupils  No spontaneous movement of extremities noted  Unable to test orientation, language, memory, judgment, insight, fund of knowledge, hearing, shoulder shrug, tongue protrusion, coordination, gait due to level of consciousness.    Medications:  Continuous  propofol Last Rate: 50 mcg/kg/min (11/29/18 1701)   Scheduled  aspirin 81 mg Daily   atorvastatin 80 mg Daily   chlorhexidine 15 mL BID   clopidogrel 75 mg Daily   enoxaparin 40 mg Daily   famotidine 20 mg BID   lacosamide (VIMPAT) IVPB 200 mg Q12H   levetiracetam IVPB 2,000 mg Q12H   senna-docusate 8.6-50 mg 1 tablet BID   valproate sodium (DEPACON) IVPB 750 mg Q8H   PRN  acetaminophen 650 mg Q6H PRN   dextrose 50% 12.5 g PRN   glucagon (human recombinant) 1 mg PRN   lorazepam 2 mg Q2H PRN   magnesium oxide 800 mg PRN   magnesium oxide 800 mg  PRN   potassium chloride 10% 40 mEq PRN   potassium chloride 10% 40 mEq PRN   potassium chloride 10% 60 mEq PRN   potassium, sodium phosphates 2 packet PRN   potassium, sodium phosphates 2 packet PRN   potassium, sodium phosphates 2 packet PRN   sodium chloride 0.9% 3 mL PRN     Diet  Diet NPO  Diet NPO

## 2018-11-29 NOTE — PHYSICIAN QUERY
PT Name: Micheal Chirinos Jr.  MR #: [id number]     Physician Query Form - Documentation Clarification      CDS/: Renea Huizar RN              Contact information:[phone]    This form is a permanent document in the medical record.     Query Date: November 29, 2018    By submitting this query, we are merely seeking further clarification of documentation. Please utilize your independent clinical judgment when addressing the question(s) below.    The Medical record reflects the following:    Supporting Clinical Findings Location in Medical Record     Potassium = 3.9-> 3.4-> 3.2-> 3.7     Lab 11/27, 11/28,11/28, 11/29      Potassium chloride 40 mEq oral       MAR 11/28                                                                            Doctor, Please specify diagnosis or diagnoses associated with above clinical findings.    Provider Use Only      [ x  ] Hypokalemia    [   ] Other___________                                                                                                                 [  ] Clinically Undetermined

## 2018-11-29 NOTE — ASSESSMENT & PLAN NOTE
NSTEMI in the setting of newly diagnosed cardiomyopathy with reduced EF. Given the delay between collapse and arrival of EMS, unclear if the initiating event was primarily cardiac, but the presence of VF, his NSTEMI, and EKG changes to suggest an element of ischemia and ischemic heart disease needs to be ruled if the patient has neurologic recovery. In the meantime, guideline directed therapy should be initiated.    - continue ASA 81 mg  - recommend starting plavix 75mg  - continue atorvastatin 80mg daily  - recommend starting ARB (losartan 25mg to start) and up-titrate as tolerated by blood pressure  - recommend starting beta-blocker (Toprol 25mg daily or coreg 3.125 daily) and up-titrate as tolerated  - patient will need Life-vest prior to discharge  - will need interventional cardiology consult if neurological recovery takes place to determine if there there is ischemic heart disease and whether there is a culprit lesion

## 2018-11-29 NOTE — PLAN OF CARE
Problem: Patient Care Overview  Goal: Plan of Care Review  POC reviewed with pt at 0510. Pt unable to verbalize understanding. Straight cath x2.  No acute events overnight. Pt progressing toward goals. Will continue to monitor. See flowsheets for full assessment and VS info

## 2018-11-29 NOTE — HOSPITAL COURSE
EE/26: continuous generalized epileptiform discharges when off propofol, suggestive of NCSE  : Cap EEG - intermittent rhythmic GPEDs, suggestive of post anoxic NCSE  >: Fluctuating, periods of sharp GPEDs which improved ~0300 , when Propofol increased to 75 mKm. Subsequently, light burst suppression pattern.  : light burst suppression pattern, sharp GPEDs intermittently. Subcortical myoclonus + EEG artifact as well.   >: Periodic generalized epileptiform discharges. More suppressed on Ketamine/Propofol, plan is to taper off Propofol and optimize Ketamine  >2: Burst suppression, epileptiform discharges in bursts  2>3: Burst suppression pattern  3>4: burst suppression pattern with epileptiform bursts and associated clinical correlate  >5: burst suppression pattern with epileptiform bursts, fluctuating with periods of more prolonged suppression  >6: mostly suppressed overnight  >7: burst suppression pattern with epileptiform bursts, at onset prolonged periods of genrealized suppression with low amplitude generalized epileptiform bursts seen rarely, after ketamine discontinued bursts start to re-appear

## 2018-11-29 NOTE — CONSULTS
Midline placed  in right brachial, [id number] Lot# [id number] Removal date on or before 12/28/18. Needle advanced into vessel with real time ultrasound guidance. Image recorded and saved.

## 2018-11-29 NOTE — ASSESSMENT & PLAN NOTE
NSTEMI in the setting of newly diagnosed cardiomyopathy with reduced EF. Given the delay between collapse and arrival of EMS, unclear if the initiating event was primarily cardiac, but the presence of VF, his NSTEMI, and EKG changes suggest an element of ischemia and ischemic heart disease that can be further evaluated if the patient has neurologic recovery. In the meantime, guideline directed therapy should be initiated.    - continue ASA 81 mg  - recommend starting plavix 75mg  - continue atorvastatin 80mg daily  - recommend starting ARB (losartan 25mg to start) and up-titrate as tolerated by blood pressure  - recommend starting beta-blocker (Toprol 25mg daily or coreg 3.125 daily) and up-titrate as tolerated  - patient will need ICD placement prior to discharge  - will need interventional cardiology consult if neurological recovery takes place to for [facility] to determine if there is ischemic heart disease and whether there is a culprit lesion  - Recommendations remain as above for treating new-onset cardiomyopathy. If patient neurologically improves and stabilizes, we can pursue further investigations to evaluate heart function.  - We will sign off for now. Please call with any further questions. Reconsult once patient stabilizes to re-visit options.

## 2018-11-29 NOTE — PROGRESS NOTES
[facility]  Cardiology  Progress Note    Patient Name: Micheal Chirinos Jr.  MRN: [id number]  Admission Date: 11/25/2018  Hospital Length of Stay: 4 days  Code Status: Full Code   Attending Physician: Galen Lopes MD   Primary Care Physician: Azikiwe K Lombard, MD  Expected Discharge Date:   Principal Problem:Status epilepticus    Subjective:     We are being consulted for VT in this 58-year-old male. He has a history of ankylosing spondylitis, severe cervical disc disease from AS, HTN, iron deficiency anemia who is currently intubated, being treated for status epilepticus, and has completed cooling protocol. As per spouse, he has never had any cardiopulmonary complaints recently are testing done in the past.    As per review of records, had had a sudden collapse heard by spouse on 11/25, she called EMS, arrival took approx 5 minutes, AED was connected and recommended cardioversion, paramedics found VFib. Given total of three rounds epi, 300mg amiodarone, ROSC achieved prior to arrival to ER, but he required intubation in the OR.     He had myoclonic jerks in PACU, with EEG suggesting status epilepticus. Cardiac evaluation at OSH suspected cardiac etiology of arrest with recommendation for angiographic eval once more stable. He was transferred to [facility] once hypothermia protocol completed.    Hospital Course:   11-25:  Admitted with out-of-hospital arrest    Interval History: Patient intubated, connected to EEG. Unresponsive, lacks corneal reflex. Currently on propofol gtt.     Review of Systems   Unable to perform ROS: intubated     Objective:     Vital Signs (Most Recent):  Temp: 97.7 °F (36.5 °C) (11/29/18 1201)  Pulse: 63 (11/29/18 1201)  Resp: (!) 24 (11/29/18 1201)  BP: 114/68 (11/29/18 1201)  SpO2: 100 % (11/29/18 1201) Vital Signs (24h Range):  Temp:  [97.5 °F (36.4 °C)-101.1 °F (38.4 °C)] 97.7 °F (36.5 °C)  Pulse:  [63-95] 63  Resp:  [7-50] 24  SpO2:  [99 %-100 %] 100 %  BP:  ()/(57-95) 114/68  Arterial Line BP: (114-130)/(55-60) 117/59     Weight: 67.4 kg (148 lb 9.4 oz)  Body mass index is 21.94 kg/m².     SpO2: 100 %  O2 Device (Oxygen Therapy): ventilator      Intake/Output Summary (Last 24 hours) at 11/29/2018 1214  Last data filed at 11/29/2018 1201  Gross per 24 hour   Intake 2738.81 ml   Output 1450 ml   Net 1288.81 ml       Lines/Drains/Airways     Drain                 NG/OG Tube -- days    Male External Urinary Catheter 11/28/18 0343 1 day          Airway                 Airway - Non-Surgical 11/25/18 0824 4 days          Arterial Line                 Arterial Line 11/29/18 0752 Right Radial less than 1 day          Peripheral Intravenous Line                 Peripheral IV - Single Lumen 11/25/18 0613 Right Forearm 4 days         Peripheral IV - Single Lumen 11/25/18 0616 Left Antecubital 4 days         Peripheral IV - Single Lumen 11/25/18 0620 Left Wrist 4 days         Midline Catheter Insertion/Assessment  - Single Lumen 11/29/18 1000 Right brachial vein 18g x 8cm less than 1 day         Midline Catheter Insertion/Assessment  - Single Lumen 11/29/18 1102 Right basilic vein (medial side of arm) less than 1 day         Peripheral IV - Single Lumen 11/29/18 0838 Anterior;Left Forearm less than 1 day                Physical Exam   Constitutional: No distress. He is intubated.   Eyes: Conjunctivae are normal. No scleral icterus.   Pupils about 2mm, minimally reactive bilaterally   Neck: Neck supple. No JVD present. No thyromegaly present.   Cardiovascular: Normal rate and intact distal pulses.   Murmur heard.  Pulmonary/Chest: He is intubated. He has no wheezes.   Abdominal: Soft. Bowel sounds are normal. He exhibits no distension. There is no guarding.   Musculoskeletal: He exhibits no edema or tenderness.   Lymphadenopathy:     He has no cervical adenopathy.   Skin: Skin is warm and dry. He is not diaphoretic.   Nursing note and vitals reviewed.      Significant Labs:    Blood Culture: No results for input(s): LABBLOO in the last 48 hours., CMP   Recent Labs   Lab 18  0004 18  0552 18  0127    146* 140   K 3.4* 3.2* 3.7    112* 112*   CO2 20* 19* 20*   GLU 82 89 97   BUN 20 19 19   CREATININE 1.4 1.3 1.5*   CALCIUM 8.4* 7.6* 8.4*   PROT  --  6.5 6.8   ALBUMIN  --  1.9* 2.1*   BILITOT  --  0.4 0.8   ALKPHOS  --  137* 200*   AST  --  105* 80*   ALT  --  63* 54*   ANIONGAP 12 15 8   ESTGFRAFRICA >60 >60.0 58.9*   EGFRNONAA 55* >60.0 50.9*   , CBC   Recent Labs   Lab 18  0552 18  0127   WBC 15.64* 13.83*   HGB 8.3* 8.3*   HCT 26.3* 25.0*    277     Significant Imagin2018 TTE:  · Moderately decreased left ventricular systolic function. The estimated ejection fraction is 30%  · Severe left ventricular enlargement.  · Eccentric left ventricular hypertrophy.  · Grade I (mild) left ventricular diastolic dysfunction consistent with impaired relaxation.  · Severe left atrial enlargement.  · Moderately reduced right ventricular systolic function.  · Moderate global hypokinetic wall motion.    Assessment and Plan:     NSTEMI (non-ST elevated myocardial infarction)    NSTEMI in the setting of newly diagnosed cardiomyopathy with reduced EF. Given the delay between collapse and arrival of EMS, unclear if the initiating event was primarily cardiac, but the presence of VF, his NSTEMI, and EKG changes suggest an element of ischemia and ischemic heart disease that can be further evaluated if the patient has neurologic recovery. In the meantime, guideline directed therapy should be initiated.    - continue ASA 81 mg  - recommend starting plavix 75mg  - continue atorvastatin 80mg daily  - recommend starting ARB (losartan 25mg to start) and up-titrate as tolerated by blood pressure  - recommend starting beta-blocker (Toprol 25mg daily or coreg 3.125 daily) and up-titrate as tolerated  - patient will need ICD placement prior to discharge  -  will need interventional cardiology consult if neurological recovery takes place to for [facility] to determine if there is ischemic heart disease and whether there is a culprit lesion  - Recommendations remain as above for treating new-onset cardiomyopathy. If patient neurologically improves and stabilizes, we can pursue further investigations to evaluate heart function.  - We will sign off for now. Please call with any further questions. Reconsult once patient stabilizes to re-visit options.       VTE Risk Mitigation (From admission, onward)        Ordered     enoxaparin injection 40 mg  Daily      11/28/18 7266          Farideh Forrester MD  Cardiology  [facility]

## 2018-11-29 NOTE — PROGRESS NOTES
[facility]  Neurology-Epilepsy  Progress Note    Patient Name: Micheal Chirinos Jr.  MRN: [id number]  Admission Date: 2018  Hospital Length of Stay: 4 days  Code Status: Full Code   Attending Provider: Galen Lopes MD  Primary Care Physician: Azikiwe K Lombard, MD   Principal Problem:Status epilepticus    Subjective:     Hospital Course:   EE/26: continuous generalized epileptiform discharges when off propofol, suggestive of NCSE  : Cap EEG - intermittent rhythmic GPEDs, suggestive of post anoxic NCSE  >: Fluctuating, periods of sharp GPEDs which improved ~0300 , when Propofol increased to 75 mKm. Subsequently, light burst suppression pattern.    Interval History: Reported to have clinical seizure activity overnight, Propofol increased to 75 mKm. EEG in light burst suppression pattern with increased Propofol.    Current Facility-Administered Medications   Medication Dose Route Frequency Provider Last Rate Last Dose    acetaminophen oral solution 650 mg  650 mg Per NG tube Q6H PRN Xenia Gaffney MD        aspirin chewable tablet 81 mg  81 mg Per OG tube Daily Xenia Gaffney MD   81 mg at 18 1000    atorvastatin tablet 80 mg  80 mg Per OG tube Daily Xenia Gaffney MD   80 mg at 18 1000    chlorhexidine 0.12 % solution 15 mL  15 mL Mouth/Throat BID Lamberto Howard NP   15 mL at 18 0900    clopidogrel tablet 75 mg  75 mg Per OG tube Daily Xenia Gaffney MD   75 mg at 18 1046    dextrose 50% injection 12.5 g  12.5 g Intravenous PRN Delia Taveras MD        enoxaparin injection 40 mg  40 mg Subcutaneous Daily Xenia Gaffney MD   Stopped at 18 1728    famotidine tablet 20 mg  20 mg Per OG tube BID Xenia Gaffney MD   20 mg at 18 1000    glucagon (human recombinant) injection 1 mg  1 mg Intramuscular PRN Delia Taveras MD        lacosamide (VIMPAT) 200 mg in sodium chloride 0.9% 100 mL IVPB  200 mg  Intravenous Q12H Tae Lux  mL/hr at 11/29/18 1000 200 mg at 11/29/18 1000    levETIRAcetam (KEPPRA) 2,000 mg in dextrose 5 % 250 mL IVPB  2,000 mg Intravenous Q12H Xenia Gaffney MD        lorazepam injection 2 mg  2 mg Intravenous Q2H PRN Delia Taveras MD   2 mg at 11/29/18 0514    magnesium oxide tablet 800 mg  800 mg Per NG tube PRN Lamberto Howard NP        magnesium oxide tablet 800 mg  800 mg Per NG tube PRN Lamberto Howard NP        potassium chloride 10% oral solution 40 mEq  40 mEq Per NG tube PRN Lamberto Howard NP   40 mEq at 11/29/18 0518    potassium chloride 10% oral solution 40 mEq  40 mEq Per NG tube PRN Lamberto Howard NP   40 mEq at 11/28/18 1440    potassium chloride 10% oral solution 60 mEq  60 mEq Per NG tube PRN Lamberto Howard NP        potassium, sodium phosphates 280-160-250 mg packet 2 packet  2 packet Per NG tube PRN Lamberto Howard NP        potassium, sodium phosphates 280-160-250 mg packet 2 packet  2 packet Per NG tube PRN Lamberto Howard NP   2 packet at 11/29/18 1322    potassium, sodium phosphates 280-160-250 mg packet 2 packet  2 packet Per NG tube PRN Lamberto Howard NP        propofol (DIPRIVAN) 10 mg/mL infusion (NON-TITRATING)  75 mcg/kg/min (Dosing Weight) Intravenous Continuous Enio Dumont MD 30.3 mL/hr at 11/29/18 1214 75 mcg/kg/min at 11/29/18 1214    senna-docusate 8.6-50 mg per tablet 1 tablet  1 tablet Per OG tube BID Galen Lopes MD   Stopped at 11/29/18 0900    sodium chloride 0.9% flush 3 mL  3 mL Intravenous PRN Lamberto Howard NP        valproate (DEPACON) 750 mg in dextrose 5 % 100 mL IVPB  750 mg Intravenous Q8H Xenia Gaffney MD         Continuous Infusions:   propofol 75 mcg/kg/min (11/29/18 1214)       Review of Systems   Unable to perform ROS: Intubated     Objective:     Vital Signs (Most Recent):  Temp: 97.5 °F (36.4 °C) (11/29/18 1259)  Pulse: 64 (11/29/18 1259)  Resp: (!) 28 (11/29/18  1259)  BP: 114/68 (18 1201)  SpO2: 100 % (18 1259) Vital Signs (24h Range):  Temp:  [97.5 °F (36.4 °C)-101.1 °F (38.4 °C)] 97.5 °F (36.4 °C)  Pulse:  [63-95] 64  Resp:  [11-50] 28  SpO2:  [99 %-100 %] 100 %  BP: ()/(57-95) 114/68  Arterial Line BP: (114-130)/(55-60) 117/59     Weight: 67.4 kg (148 lb 9.4 oz)  Body mass index is 21.94 kg/m².    Physical Exam   Constitutional: He appears well-developed and well-nourished.   HENT:   Head: Normocephalic and atraumatic.   Eyes: Conjunctivae are normal. Pupils are equal, round, and reactive to light.   Neck: No thyromegaly present.   Cardiovascular: Intact distal pulses.   Musculoskeletal: He exhibits no deformity.   Skin: Skin is warm and dry. He is not diaphoretic.   Psychiatric:   Unable to assess       NEUROLOGICAL EXAMINATION:     MENTAL STATUS   Level of consciousness: unresponsive to painful stimuli    CRANIAL NERVES     CN III, IV, VI   Pupils are equal, round, and reactive to light.       Comatose   CN:   No blink to threat OU   SÁNCHEZ OU - sluggish  Corneal absent OU  No cough, gag   Face symmetric   Tongue midline     Exam findings to suggest seizures:  Myoclonus - no   eye twitching - no   Nystagmus - no   gaze deviation - no   waxy rigidity - no        Significant Labs: All pertinent lab results from the past 24 hours have been reviewed.    Significant Studies: I have reviewed all pertinent imaging results/findings within the past 24 hours.    Assessment and Plan:     * Status epilepticus    Transfer from [facility] for evaluation of NCSE noted on EEG . Likely secondary to anoxia in setting of cardiac arrest.    EE/26: continuous generalized epileptiform discharges when off propofol, suggestive of NCSE  : Cap EEG - intermittent rhythmic GPEDs, suggestive of post anoxic NCSE  >29: Fluctuating, periods of sharp GPEDs which improved ~0300 , when Propofol increased to 75 mKm. Subsequently, light burst suppression  pattern.    Recommendations:  - Continue vEEG  - Propofol increased to 75 mKm overnight - recommend continuing at this dose overnight, can consider starting to taper tomorrow   - Continue Vimpat 200 mg BID  - Keppra increased to 2000 mg BID, VPA added 11/29  - Further recommendations pending continued EEG read    Plan of care discussed with [facility] team, family at bedside. Will continue to follow.      Cardiac arrest with ventricular fibrillation    - 11/25 at home, troponin peaked at 9  - Cardiology consulted and following. Recommend guideline directed therapy for now, will need interventional cardiology/cardiac catheterization for further investigation to determine if there is ischemic disease if neurological recovery takes palce  - TTM completed at OSH, target temp reached at 0300 on 11/26, now rewarmed  - Management per primary     Anoxic brain injury    - Likely given myoclonic jerks at OSH, NCSE on EEG off propofol  - CT head 11/28 showing interval diffuse loss of gray-white matter differentiation with diffuse cerebral edema resulting in sulcal effacement of the cerebral hemispheres and smaller caliber ventricular system, concerning for global anoxic hypoxic injury.     Acute hypoxemic respiratory failure    - ET tube placed in OR at [facility] due to neck rigidity from AS  - Vent management per [facility]     Leukocytosis    - Trending down since initial presentation, WBC 13.83 today           VTE Risk Mitigation (From admission, onward)        Ordered     enoxaparin injection 40 mg  Daily      11/28/18 9215          Cathyrn Ramirez PA-C  Neurology-Epilepsy  [facility]  Staff: Dr. Whitfield

## 2018-11-29 NOTE — PROCEDURES
DATE OF SERVICE:  11/28/2018    ICU EEG/VIDEO MONITORING REPORT    METHODOLOGY:  Electroencephalographic (EEG) is recorded with electrodes placed   according to the International 10-20 placement system.  Thirty two (32) channels   of digital signal (sampling rate of 512/sec), including T1 and T2, were   simultaneously recorded from the scalp and may include EKG, EMG, and/or eye   monitors.  Recording band pass was 0.1 to 512 Hz.  Digital video recording of   the patient is simultaneously recorded with the EEG.  The patient is instructed   to report clinical symptoms which may occur during the recording session.  EEG   and video recording are stored and archived in digital format.  Activation   procedures, which include photic stimulation, hyperventilation and instructing   patients to perform simple tasks, are done in selected patients.    The EEG is displayed on a monitor screen and can be reviewed using different   montages.  Computer-assisted analysis is employed to detect spike and   electrographic seizure activity.  The entire record is submitted for computer   analysis.  The entire recording is visually reviewed, and the times identified   by computer analysis as being spikes or seizures are reviewed again.    Compressed spectral analysis (CSA) is also performed on the activity recorded   from each individual channel.  This is displayed as a power display of   frequencies from 0 to 30 Hz over time.  The CSA is reviewed looking for   asymmetries in power between homologous areas of the scalp, then compared with   the original EEG recording.    [employer] software was also utilized in the review of this study.  This software   suite analyzes the EEG recording in multiple domains.  Coherence and rhythmicity   are computed to identify EEG sections which may contain organized seizures.    Each channel undergoes analysis to detect the presence of spike and sharp waves   which have special and morphological  characteristics of epileptic activity.  The   routine EEG recording is converted from special into frequency domain.  This is   then displayed comparing homologous areas to identify areas of significant   asymmetry.  Algorithm to identify non-cortically generated artifact is used to   separate artifact from the EEG.    RECORDING TIMES:  The duration of study is 22 hours and 43 minutes.  The study   begins on 11/28/2018 at 04:00 and pauses on 11/28/2018 at 08:35 and the first   portion is done with electrode cap.  The study resumes on 11/28/2018 with full   hookup at 12:48 and concludes on 11/29/2018 at 07:00.    EEG FINDINGS:  The initial part of this study done with electrode cap is of good   technical quality and reveals at times a rhythmic GPED pattern, which is   bisynchronous, suggesting nonconvulsive status epilepticus.  This is not   sustained, however, and is seen in the first 30 minutes of the study, which   gives way possibly after sedation is applied to a light burst-suppression   pattern consisting of predominantly delta and theta activity with very brief   suppressions lasting up to 1 to 2 seconds in the absence of sharp activity.    Most of the remainder of the electrode cap portion changes little with a general   pattern predominating.  A few generalized periodic epileptiform discharges are   occasionally seen.    During the full hookup portion of the study, continuation of a light   burst-suppression pattern with delta and theta is seen and this dominates most   of the record.  There are a few points in the record particularly between 1900   and 2023 on 11/28/2018, during which time the GPED pattern returns with a brief   run of rhythmic GPEDs suggesting a brief return to nonconvulsive status   epilepticus.  This lasts only for a few minutes at times, possibly reflecting   relative lack of sedation at that time with a quick resumption of the light   suppression theta-delta pattern, which continues  until the conclusion of this   study.  Level of burst suppression is slightly deeper at the end of the record   than at the beginning.    INTERPRETATION:  Abnormal EEG due to the occasional appearance of rhythmic GPEDs   suggesting bouts of nonconvulsive status epilepticus, but the majority of the   record consisting of a likely anesthetic induced burst-suppression pattern,   which effectively treats the seizures.  No other significant findings were seen.    The end of the record consists of an adequate level of burst suppression to   control seizure activity and the record continues after that.      NBB/IN  dd: 11/29/2018 13:28:42 (CST)  td: 11/29/2018 13:46:47 (CST)  Doc ID   #[id number]  Job ID #[id number]    CC:

## 2018-11-29 NOTE — ASSESSMENT & PLAN NOTE
Transfer from [facility] for evaluation of NCSE noted on EEG . Likely secondary to anoxia in setting of cardiac arrest.    EE/26: continuous generalized epileptiform discharges when off propofol, suggestive of NCSE  : Cap EEG - intermittent rhythmic GPEDs, suggestive of post anoxic NCSE  >: Fluctuating, periods of sharp GPEDs which improved ~0300 , when Propofol increased to 75 mKm. Subsequently, light burst suppression pattern.    Recommendations:  - Continue vEEG  - Propofol increased to 75 mKm overnight - recommend continuing at this dose overnight, can consider starting to taper tomorrow   - Continue Vimpat 200 mg BID  - Keppra increased to 2000 mg BID, VPA added   - Further recommendations pending continued EEG read    Plan of care discussed with [facility] team, family at bedside. Will continue to follow.

## 2018-11-29 NOTE — ASSESSMENT & PLAN NOTE
- 11/25 at home, troponin peaked at 9  - Cardiology consulted and following. Recommend guideline directed therapy for now, will need interventional cardiology/cardiac catheterization for further investigation to determine if there is ischemic disease if neurological recovery takes palce  - TTM completed at OSH, target temp reached at 0300 on 11/26, now rewarmed  - Management per primary

## 2018-11-29 NOTE — CONSULTS
[facility]  Cardiology  Consult Note    Patient Name: Micheal Chirinos Jr.  MRN: [id number]  Admission Date: 11/25/2018  Hospital Length of Stay: 3 days  Code Status: Full Code   Attending Provider: Galen Lopes MD   Consulting Provider: Matthew Gauthier MD  Primary Care Physician: Azikiwe K Lombard, MD  Principal Problem:Status epilepticus    Patient information was obtained from relative(s), past medical records and ER records.     Inpatient consult to Cardiology  Consult performed by: Matthew Gauthier MD  Consult ordered by: Xenia Gaffney MD        Subjective:     Reason for consult: Cardiac arrest    HPI:   We are being consulted for VT in this 58-year-old male. He has a history of ankylosing spondylitis, severe cervical disc disease from AS, HTN, iron deficiency anemia who is currently intubated, being treated for status epilepticus, and has completed cooling protocol. As per spouse, he has never had any cardiopulmonary complaints recently are testing done in the past.    As per review of records, had had a sudden collapse heard by spouse on 11/25, she called EMS, arrival took approx 5 minutes, AED was connected and recommended cardioversion, paramedics found VFib. Given total of three rounds epi, 300mg amiodarone, ROSC achieved prior to arrival to ER, but he required intubation in the OR.     He had myoclonic jerks in PACU, with EEG suggesting status epilepticus. Cardiac evaluation at OSH suspected cardiac etiology of arrest with recommendation for angiographic eval once more stable. He was transferred to [facility] once hypothermia protocol completed.      Past Medical History:   Diagnosis Date    Anemia     Degenerative disc disease     Hemorrhage of rectum and anus     Hypertension     Idiopathic ankylosing spondylitis 2000    Joint pain        Past Surgical History:   Procedure Laterality Date    COLONOSCOPY N/A 2/27/2018    Procedure: COLONOSCOPY;  Surgeon: Jaswant  MD Epifanio;  Location: [facility] (4TH FLR);  Service: Endoscopy;  Laterality: N/A;  original order from 5/1/17 per Dr Godfrey-unable to use that order, new order placed    COLONOSCOPY N/A 10/31/2018    Procedure: COLONOSCOPY;  Surgeon: Jaswant Godfrey MD;  Location: [facility] (4TH FLR);  Service: Endoscopy;  Laterality: N/A;  constipation prep: miralax for a week prior and low residue diet 1 week prior. handout given to pt    COLONOSCOPY N/A 10/31/2018    Performed by [facility]as[facility]frey MD at [facility]O (4TH FLR)    COLONOSCOPY N/A 2/27/2018    Performed by Jaswant Godfrey MD at [facility] (4TH FLR)    COLONOSCOPY N/A 3/18/2013    Performed by Radames Huston MD at [facility] (4TH FLR)    EGD (ESOPHAGOGASTRODUODENOSCOPY) N/A 10/31/2018    Performed by Jaswant Godfrey MD at [facility] (4TH FLR)    ESOPHAGOGASTRODUODENOSCOPY N/A 10/31/2018    Procedure: EGD (ESOPHAGOGASTRODUODENOSCOPY);  Surgeon: Jaswant Godfrey MD;  [facility]  Service: Endoscopy;  Laterality: N/A;    INSERTION OF ENDOTRACHEAL TUBE WITH FIBEROPTIC GUIDANCE  11/25/2018    Procedure: INTUBATION, ENDOTRACHEAL;  Surgeon: Teja Davila MD;  Location: [facility] OR;  Service: General;;    INTUBATION, ENDOTRACHEAL  11/25/2018    Performed by Teja Davila MD at [facility] OR    MOUTH SURGERY         Review of patient's allergies indicates:   Allergen Reactions    No known drug allergies        No current facility-administered medications on file prior to encounter.      Current Outpatient Medications on File Prior to Encounter   Medication Sig    cetirizine (ZYRTEC) 10 mg TbDL 1 Tablet(s) Oral PRN Every day.      cyclobenzaprine (FLEXERIL) 10 MG tablet Take 1 tablet (10 mg total) by mouth 3 (three) times daily as needed.    diclofenac sodium 1 % Gel Apply 2 g topically 4 (four) times daily. for 10 days    ferrous sulfate 325 (65 FE) MG EC tablet Take 1 tablet (325 mg total) by mouth 2 (two) times daily.    HYDROcodone-acetaminophen (NORCO)  mg per tablet  Take 1 tablet by mouth every 6 (six) hours as needed for Pain (msk pain).    omeprazole (PRILOSEC) 20 MG capsule TAKE 1 CAPSULE(20 MG) BY MOUTH TWICE DAILY    sulfaSALAzine (AZULFIDINE) 500 MG TbEC Take 3 tablets (1,500 mg total) by mouth 2 (two) times daily.     Family History     Problem Relation (Age of Onset)    Arthritis Mother    Kidney disease Father    No Known Problems Sister, Brother, Maternal Aunt, Maternal Uncle, Paternal Aunt, Paternal Uncle, Maternal Grandmother, Maternal Grandfather, Paternal Grandmother, Paternal Grandfather        Tobacco Use    Smoking status: Never Smoker    Smokeless tobacco: Never Used   Substance and Sexual Activity    Alcohol use: Yes     Alcohol/week: 0.0 oz     Comment: one's in the blue moon    Drug use: No    Sexual activity: Yes     Partners: Female     Review of Systems   Unable to perform ROS: intubated   Constitution: Positive for diaphoresis.     Objective:     Vital Signs (Most Recent):  Temp: 99.1 °F (37.3 °C) (11/28/18 1515)  Pulse: 76 (11/28/18 1515)  Resp: (!) 21 (11/28/18 1515)  BP: 117/73 (11/28/18 1502)  SpO2: 100 % (11/28/18 1515) Vital Signs (24h Range):  Temp:  [97.7 °F (36.5 °C)-99.1 °F (37.3 °C)] 99.1 °F (37.3 °C)  Pulse:  [72-84] 76  Resp:  [0-33] 21  SpO2:  [99 %-100 %] 100 %  BP: ()/(58-94) 117/73     Weight: 67.4 kg (148 lb 9.4 oz)  Body mass index is 21.94 kg/m².    SpO2: 100 %  O2 Device (Oxygen Therapy): ventilator      Intake/Output Summary (Last 24 hours) at 11/28/2018 1713  Last data filed at 11/28/2018 1500  Gross per 24 hour   Intake 1637.55 ml   Output 905 ml   Net 732.55 ml       Lines/Drains/Airways     Drain                 NG/OG Tube -- days    Male External Urinary Catheter 11/28/18 0343 less than 1 day          Airway                 Airway - Non-Surgical 11/25/18 0824 3 days          Peripheral Intravenous Line                 Peripheral IV - Single Lumen 11/25/18 0613 Right Forearm 3 days         Peripheral IV - Single  Lumen 11/25/18 0616 Left Antecubital 3 days         Peripheral IV - Single Lumen 11/25/18 0620 Left Wrist 3 days         Peripheral IV - Single Lumen 11/25/18 0625 Right Hand 3 days                Physical Exam   Constitutional: He appears well-developed. He is sedated and intubated.   Thin   HENT:   Head: Normocephalic and atraumatic.   Mouth/Throat: Oropharynx is clear and moist.   Facial twitching and drool  Not responding to commands   Neck: Neck supple. No JVD present.   Cardiovascular: Normal rate, regular rhythm, normal heart sounds and intact distal pulses.   No murmur heard.  Pulses:       Radial pulses are 2+ on the right side, and 2+ on the left side.   Pulmonary/Chest: Breath sounds normal. He is intubated.   Symmetrical expansion   Abdominal: Soft. Bowel sounds are normal. There is no hepatosplenomegaly. There is no tenderness.   Musculoskeletal: He exhibits no edema.   Good peripheral pulses   Skin: Skin is warm and dry. No rash noted.       Significant Labs:   ABG:   Recent Labs   Lab 11/27/18  0410 11/28/18  0457   PH 7.453* 7.378   PCO2 31.4* 39.7   HCO3 22.0* 23.4*   POCSATURATED 99 99   BE -2 -2   , CMP   Recent Labs   Lab 11/27/18  0231  11/27/18  1930 11/28/18  0003 11/28/18  0004 11/28/18  0552      < > 141  --  141 146*   K 3.6   < > 3.9  --  3.4* 3.2*      < > 110  --  109 112*   CO2 20*   < > 21*  --  20* 19*   GLU 85  82   < > 86  84 84 82 89   BUN 22*   < > 21*  --  20 19   CREATININE 1.3   < > 1.5*  --  1.4 1.3   CALCIUM 9.1   < > 8.5*  --  8.4* 7.6*   PROT 7.5  --   --   --   --  6.5   ALBUMIN 2.5*  --   --   --   --  1.9*   BILITOT 0.6  --   --   --   --  0.4   ALKPHOS 164*  --   --   --   --  137*   *  --   --   --   --  105*   *  --   --   --   --  63*   ANIONGAP 13   < > 10  --  12 15   ESTGFRAFRICA >60   < > 59*  --  >60 >60.0   EGFRNONAA >60   < > 51*  --  55* >60.0    < > = values in this interval not displayed.   , CBC   Recent Labs   Lab  11/27/18  0231 11/28/18  0552   WBC 16.11* 15.64*   HGB 9.3* 8.3*   HCT 28.8* 26.3*    280     Significant Imaging:   ECHO 11/26  · Moderately decreased left ventricular systolic function. The estimated ejection fraction is 30%  · Severe left ventricular enlargement.  · Eccentric left ventricular hypertrophy.  · Grade I (mild) left ventricular diastolic dysfunction consistent with impaired relaxation.  · Severe left atrial enlargement.  · Moderately reduced right ventricular systolic function.  · Moderate global hypokinetic wall motion.    EKG:  NSR, normal axis, inferolaterla T-wave inversions with ST-depression in V3 and V4. No Q waves. These are new findings since 2013 EKG (normal). Degree of ST depressions less when compared with EKG from 11/25.    Assessment and Plan:     NSTEMI (non-ST elevated myocardial infarction)    NSTEMI in the setting of newly diagnosed cardiomyopathy with reduced EF. Given the delay between collapse and arrival of EMS, unclear if the initiating event was primarily cardiac, but the presence of VF, his NSTEMI, and EKG changes to suggest an element of ischemia and ischemic heart disease needs to be ruled if the patient has neurologic recovery. In the meantime, guideline directed therapy should be initiated.    - continue ASA 81 mg  - recommend starting plavix 75mg  - continue atorvastatin 80mg daily  - recommend starting ARB (losartan 25mg to start) and up-titrate as tolerated by blood pressure  - recommend starting beta-blocker (Toprol 25mg daily or coreg 3.125 daily) and up-titrate as tolerated  - patient will need Life-vest prior to discharge  - will need interventional cardiology consult if neurological recovery takes place to determine if there there is ischemic heart disease and whether there is a culprit lesion         VTE Risk Mitigation (From admission, onward)        Ordered     enoxaparin injection 40 mg  Daily      11/28/18 8562          Thank you for your consult. I  will follow-up with patient. Please contact us if you have any additional questions.    Matthew Gauthier MD  Cardiology   [facility]

## 2018-11-29 NOTE — PLAN OF CARE
Problem: Patient Care Overview  Goal: Plan of Care Review  POC reviewed with pt and spouse. Spouse verbalized understanding. Questions and concerns addressed. No acute events today. Seizure activity notice on eeg while propofol was turned off by Dr. Patel. Propofol turned back on and is at 50mcq/kg. TF started. Pt on isosource trickle feeds. Antibiotic D/C. Pt did not void on his own during shift. BP goal listed at less than 160 per Xenia MD order. Will continue to monitor. See flowsheets for full assessment and VS info.

## 2018-11-29 NOTE — NURSING
[facility] contacted Fanta CASTRO). Reported that pt. R eye now sluggish. No other neuro changes. No new orders. Will continue to monitor.

## 2018-11-30 NOTE — PROGRESS NOTES
[facility]  Neurology-Epilepsy  Progress Note    Patient Name: Micheal Chirinos Jr.  MRN: [id number]  Admission Date: 2018  Hospital Length of Stay: 5 days  Code Status: Full Code   Attending Provider: Galen Lopes MD  Primary Care Physician: Azikiwe K Lombard, MD   Principal Problem:Status epilepticus    Subjective:     Hospital Course:   EE/26: continuous generalized epileptiform discharges when off propofol, suggestive of NCSE  : Cap EEG - intermittent rhythmic GPEDs, suggestive of post anoxic NCSE  : Fluctuating, periods of sharp GPEDs which improved ~0300 , when Propofol increased to 75 mKm. Subsequently, light burst suppression pattern.  : light burst suppression pattern, sharp GPEDs intermittently. Subcortical myoclonus + EEG artifact as well.     Interval History: Increased clinical twitching, NCC paralyzed and EEG showed evidence of some subcortical myoclonus. EEG with frequent GPDs off Propofol. Plan to continue current anesthetic, can transiton to ketamine as needed. VPA increased.    Current Facility-Administered Medications   Medication Dose Route Frequency Provider Last Rate Last Dose    acetaminophen oral solution 650 mg  650 mg Per NG tube Q6H PRN Xenia Gaffney MD        aspirin chewable tablet 81 mg  81 mg Per OG tube Daily Xenia Gaffney MD   81 mg at 18 0959    atorvastatin tablet 80 mg  80 mg Per OG tube Daily Xenia Gaffney MD   80 mg at 18 0959    chlorhexidine 0.12 % solution 15 mL  15 mL Mouth/Throat BID Lamberto Howard NP   15 mL at 18 0959    clonazePAM tablet 0.5 mg  0.5 mg Per OG tube Q8H Mayank Patel MD   0.5 mg at 18 1309    clopidogrel tablet 75 mg  75 mg Per OG tube Daily Xenia Gaffney MD   75 mg at 18 0959    dextrose 50% injection 12.5 g  12.5 g Intravenous PRN Delia Taveras MD        enoxaparin injection 40 mg  40 mg Subcutaneous Daily Xenia Gaffney MD   40  mg at 11/29/18 1650    famotidine tablet 20 mg  20 mg Per OG tube BID Xenia Gaffney MD   20 mg at 11/30/18 0959    glucagon (human recombinant) injection 1 mg  1 mg Intramuscular PRN Delia Taveras MD        lacosamide (VIMPAT) 200 mg in sodium chloride 0.9% 100 mL IVPB  200 mg Intravenous Q12H Tae Lux  mL/hr at 11/30/18 0854 200 mg at 11/30/18 0854    levETIRAcetam (KEPPRA) 2,000 mg in dextrose 5 % 250 mL IVPB  2,000 mg Intravenous Q12H Xenia Gaffney  mL/hr at 11/30/18 0853 2,000 mg at 11/30/18 0853    lorazepam injection 2 mg  2 mg Intravenous Q2H PRN Delia Taveras MD   2 mg at 11/30/18 0048    magnesium oxide tablet 800 mg  800 mg Per NG tube PRN Lamberto Howard NP        magnesium oxide tablet 800 mg  800 mg Per NG tube PRN Lamberto Howard NP        potassium chloride 10% oral solution 40 mEq  40 mEq Per NG tube PRN Lamberto Howard NP   40 mEq at 11/30/18 0451    potassium chloride 10% oral solution 40 mEq  40 mEq Per NG tube PRN Lamberto Howard NP   40 mEq at 11/28/18 1440    potassium chloride 10% oral solution 60 mEq  60 mEq Per NG tube PRN Lamberto Howard NP        potassium, sodium phosphates 280-160-250 mg packet 2 packet  2 packet Per NG tube PRN Lamberto Howard NP        potassium, sodium phosphates 280-160-250 mg packet 2 packet  2 packet Per NG tube PRN Lamberto Howard NP   2 packet at 11/29/18 1322    potassium, sodium phosphates 280-160-250 mg packet 2 packet  2 packet Per NG tube PRN Lamberto Howard NP        propofol (DIPRIVAN) 10 mg/mL infusion (NON-TITRATING)  40 mcg/kg/min (Dosing Weight) Intravenous Continuous Xenia Gaffney MD 16.2 mL/hr at 11/30/18 1400 40 mcg/kg/min at 11/30/18 1400    senna-docusate 8.6-50 mg per tablet 1 tablet  1 tablet Per OG tube BID Galen Lopes MD   1 tablet at 11/30/18 0959    sodium chloride 0.9% flush 3 mL  3 mL Intravenous PRN Lamberto Howard NP        valproate (DEPACON) 1,000 mg in  dextrose 5 % 100 mL IVPB  1,000 mg Intravenous Q8H Mayank Patel MD         Continuous Infusions:   propofol 40 mcg/kg/min (11/30/18 1400)       Review of Systems   Unable to perform ROS: Intubated     Objective:     Vital Signs (Most Recent):  Temp: 99 °F (37.2 °C) (11/30/18 1400)  Pulse: 74 (11/30/18 1400)  Resp: 20 (11/30/18 1400)  BP: 118/65 (11/30/18 1400)  SpO2: 100 % (11/30/18 1400) Vital Signs (24h Range):  Temp:  [96.4 °F (35.8 °C)-99.3 °F (37.4 °C)] 99 °F (37.2 °C)  Pulse:  [59-79] 74  Resp:  [20-34] 20  SpO2:  [99 %-100 %] 100 %  BP: (105-133)/(57-80) 118/65  Arterial Line BP: (106-156)/(53-77) 136/69     Weight: 67.4 kg (148 lb 9.4 oz)  Body mass index is 21.94 kg/m².    Physical Exam   Constitutional: He appears well-developed and well-nourished.   HENT:   Head: Normocephalic and atraumatic.   Eyes: Conjunctivae are normal. Pupils are equal, round, and reactive to light.   Neck: No thyromegaly present.   Cardiovascular: Intact distal pulses.   Musculoskeletal: He exhibits no deformity.   Skin: Skin is warm and dry. He is not diaphoretic.   Psychiatric:   Unable to assess       NEUROLOGICAL EXAMINATION:     MENTAL STATUS   Level of consciousness: unresponsive to painful stimuli    CRANIAL NERVES     CN III, IV, VI   Pupils are equal, round, and reactive to light.       Comatose, myoclonus of mouth, BLE with stimulation   CN:   No blink to threat OU   SÁNCHEZ OU    Corneal absent OU   Face symmetric   Tongue midline   No withdrawal to noxious stimuli in any extremity     Exam findings to suggest seizures:  Myoclonus - yes - mouth, feet  eye twitching - no   Nystagmus - no   gaze deviation - no   waxy rigidity - no        Significant Labs: All pertinent lab results from the past 24 hours have been reviewed.    Significant Studies: I have reviewed all pertinent imaging results/findings within the past 24 hours.    Assessment and Plan:     * Status epilepticus    Transfer from [facility] for evaluation of  NCSE noted on EEG 11/26. Likely secondary to anoxia in setting of cardiac arrest.    Recommendations:  - Continue vEEG  - Continue Propofol 40 mKm  - Continue Vimpat 200 mg BID  - Keppra increased to 2000 mg BID, VPA added 11/29. Optimize VPA to level of ~60  - Ok with addition of clonazepam for subcortical myoclonus  - Further recommendations pending continued EEG read    Plan of care discussed with NCC team, family at bedside. Will continue to follow.      Cardiac arrest with ventricular fibrillation    - 11/25 at home, troponin peaked at 9  - Cardiology consulted and following. Recommend guideline directed therapy for now, will need interventional cardiology/cardiac catheterization for further investigation to determine if there is ischemic disease if neurological recovery takes palce  - TTM completed at OSH, target temp reached at 0300 on 11/26, now rewarmed  - Management per primary     Anoxic brain injury    - Likely given myoclonic jerks at OSH, NCSE on EEG off propofol  - CT head 11/28 showing interval diffuse loss of gray-white matter differentiation with diffuse cerebral edema resulting in sulcal effacement of the cerebral hemispheres and smaller caliber ventricular system, concerning for global anoxic hypoxic injury.  - Will eventually need MRI brain     Acute hypoxemic respiratory failure    - ET tube placed in OR at [facility] due to neck rigidity from AS  - Vent management per [facility]     Leukocytosis    - Trending down since initial presentation, WBC 10.68 today           VTE Risk Mitigation (From admission, onward)        Ordered     enoxaparin injection 40 mg  Daily      11/28/18 0965          Cathryn Ramirez PA-C  Neurology-Epilepsy  [facility]  Staff: Dr. Whitfield

## 2018-11-30 NOTE — SUBJECTIVE & OBJECTIVE
Interval History:   Neuro: 1 episode of lip and neck twitching overnight. This morning on rounds patient with myoclonic jerks in LE, facial twitching. Administered rocuronium which stopped the twitching to better observe EEG changes without artifact. Propofol was paused and EEG monitored, EEG showed occassional spike discharges. Restarted propofol at 40.   Resp: intubated, CXR looks better  No fevers overnight.   Electrolytes replaced overnight    Review of Systems   Unable to perform ROS: Intubated     Unable to obtain a complete ROS due to level of consciousness.  Objective:     Vitals:  Temp: 99.1 °F (37.3 °C)  Pulse: 76  Rhythm: normal sinus rhythm  BP: 125/73  MAP (mmHg): 94  Resp: (!) 21  SpO2: 100 %  Oxygen Concentration (%): 40  O2 Device (Oxygen Therapy): ventilator  Vent Mode: SIMV  Set Rate: 20 bmp  Vt Set: 380 mL  Pressure Support: 10 cmH20  PEEP/CPAP: 5 cmH20  Peak Airway Pressure: 15 cmH2O  Mean Airway Pressure: 11 cmH20  Plateau Pressure: 0 cmH20    Temp  Min: 96.4 °F (35.8 °C)  Max: 99.1 °F (37.3 °C)  Pulse  Min: 59  Max: 76  BP  Min: 105/57  Max: 133/75  MAP (mmHg)  Min: 74  Max: 100  Resp  Min: 20  Max: 34  SpO2  Min: 99 %  Max: 100 %  Oxygen Concentration (%)  Min: 40  Max: 40    11/29 0701 - 11/30 0700  In: 3292.4 [I.V.:842.4]  Out: 2100 [Urine:2100]   Unmeasured Output  Stool Occurrence: 1       Physical Exam   Constitutional: He appears well-developed and well-nourished.   HENT:   Head: Normocephalic.   Eyes:   Pupils sluggish BL   Cardiovascular: Normal rate and regular rhythm.   Pulmonary/Chest:   intubated   Abdominal: Soft.   Neurological:   On sedation, limited exam.  Other than sluggish pupillary exam, no other brainstem reflexes       Unable to test orientation, language, memory, judgment, insight, fund of knowledge, hearing, shoulder shrug, tongue protrusion, coordination, gait due to level of consciousness.    Medications:  Continuous  propofol Last Rate: 40 mcg/kg/min (11/30/18 1200)    Scheduled  aspirin 81 mg Daily   atorvastatin 80 mg Daily   chlorhexidine 15 mL BID   clonazePAM 0.5 mg Q8H   clopidogrel 75 mg Daily   enoxaparin 40 mg Daily   famotidine 20 mg BID   lacosamide (VIMPAT) IVPB 200 mg Q12H   levetiracetam IVPB 2,000 mg Q12H   senna-docusate 8.6-50 mg 1 tablet BID   valproate sodium (DEPACON) IVPB 1,000 mg Q8H   PRN  acetaminophen 650 mg Q6H PRN   dextrose 50% 12.5 g PRN   glucagon (human recombinant) 1 mg PRN   lorazepam 2 mg Q2H PRN   magnesium oxide 800 mg PRN   magnesium oxide 800 mg PRN   potassium chloride 10% 40 mEq PRN   potassium chloride 10% 40 mEq PRN   potassium chloride 10% 60 mEq PRN   potassium, sodium phosphates 2 packet PRN   potassium, sodium phosphates 2 packet PRN   potassium, sodium phosphates 2 packet PRN   sodium chloride 0.9% 3 mL PRN     Today I personally reviewed pertinent medications, lines/drains/airways, imaging, cardiology results, laboratory results, microbiology results, notably:    Diet  Diet NPO  Diet NPO

## 2018-11-30 NOTE — PROGRESS NOTES
0900 - [facility] notified pt exhibiting seizure like activity - mouth and neck twitching, rhythmic toe movements. Khandker to bedside. 75 of TEE given as ordered. Propofol stopped.    0920 - 50 bolus of propofol, restarted at 40/hr

## 2018-11-30 NOTE — SUBJECTIVE & OBJECTIVE
Interval History: Increased clinical twitching, NCC paralyzed and EEG showed evidence of some subcortical myoclonus. EEG with frequent GPDs off Propofol. Plan to continue current anesthetic, can transiton to ketamine as needed. VPA increased.    Current Facility-Administered Medications   Medication Dose Route Frequency Provider Last Rate Last Dose    acetaminophen oral solution 650 mg  650 mg Per NG tube Q6H PRN Xenia Gaffney MD        aspirin chewable tablet 81 mg  81 mg Per OG tube Daily Xenia Gaffney MD   81 mg at 11/30/18 0959    atorvastatin tablet 80 mg  80 mg Per OG tube Daily Xenia Gaffney MD   80 mg at 11/30/18 0959    chlorhexidine 0.12 % solution 15 mL  15 mL Mouth/Throat BID Lamberto Howard NP   15 mL at 11/30/18 0959    clonazePAM tablet 0.5 mg  0.5 mg Per OG tube Q8H Mayank Patel MD   0.5 mg at 11/30/18 1309    clopidogrel tablet 75 mg  75 mg Per OG tube Daily Xenia Gaffney MD   75 mg at 11/30/18 0959    dextrose 50% injection 12.5 g  12.5 g Intravenous PRN Delia Taveras MD        enoxaparin injection 40 mg  40 mg Subcutaneous Daily Xenia Gaffney MD   40 mg at 11/29/18 1650    famotidine tablet 20 mg  20 mg Per OG tube BID Xenia Gaffney MD   20 mg at 11/30/18 0959    glucagon (human recombinant) injection 1 mg  1 mg Intramuscular PRN Delia Taveras MD        lacosamide (VIMPAT) 200 mg in sodium chloride 0.9% 100 mL IVPB  200 mg Intravenous Q12H Tae Lux  mL/hr at 11/30/18 0854 200 mg at 11/30/18 0854    levETIRAcetam (KEPPRA) 2,000 mg in dextrose 5 % 250 mL IVPB  2,000 mg Intravenous Q12H Xenia Gaffney  mL/hr at 11/30/18 0853 2,000 mg at 11/30/18 0853    lorazepam injection 2 mg  2 mg Intravenous Q2H PRN Delia Taveras MD   2 mg at 11/30/18 0048    magnesium oxide tablet 800 mg  800 mg Per NG tube PRN Antonya Lee, NP        magnesium oxide tablet 800 mg  800 mg Per NG tube PRN Lamberto Howard, NP        potassium chloride 10%  oral solution 40 mEq  40 mEq Per NG tube PRN Lamberto Howard NP   40 mEq at 11/30/18 0451    potassium chloride 10% oral solution 40 mEq  40 mEq Per NG tube PRN Lamberto Howard NP   40 mEq at 11/28/18 1440    potassium chloride 10% oral solution 60 mEq  60 mEq Per NG tube PRN Lamberto Howard NP        potassium, sodium phosphates 280-160-250 mg packet 2 packet  2 packet Per NG tube PRN Lamberto Howard NP        potassium, sodium phosphates 280-160-250 mg packet 2 packet  2 packet Per NG tube PRN Lamberto Howard NP   2 packet at 11/29/18 1322    potassium, sodium phosphates 280-160-250 mg packet 2 packet  2 packet Per NG tube PRN Lamberto Howard NP        propofol (DIPRIVAN) 10 mg/mL infusion (NON-TITRATING)  40 mcg/kg/min (Dosing Weight) Intravenous Continuous Xenia BONNIE Gaffney MD 16.2 mL/hr at 11/30/18 1400 40 mcg/kg/min at 11/30/18 1400    senna-docusate 8.6-50 mg per tablet 1 tablet  1 tablet Per OG tube BID Galen Lopes MD   1 tablet at 11/30/18 0959    sodium chloride 0.9% flush 3 mL  3 mL Intravenous PRN Lamberto Howard NP        valproate (DEPACON) 1,000 mg in dextrose 5 % 100 mL IVPB  1,000 mg Intravenous Q8H Mayank Patel MD         Continuous Infusions:   propofol 40 mcg/kg/min (11/30/18 1400)       Review of Systems   Unable to perform ROS: Intubated     Objective:     Vital Signs (Most Recent):  Temp: 99 °F (37.2 °C) (11/30/18 1400)  Pulse: 74 (11/30/18 1400)  Resp: 20 (11/30/18 1400)  BP: 118/65 (11/30/18 1400)  SpO2: 100 % (11/30/18 1400) Vital Signs (24h Range):  Temp:  [96.4 °F (35.8 °C)-99.3 °F (37.4 °C)] 99 °F (37.2 °C)  Pulse:  [59-79] 74  Resp:  [20-34] 20  SpO2:  [99 %-100 %] 100 %  BP: (105-133)/(57-80) 118/65  Arterial Line BP: (106-156)/(53-77) 136/69     Weight: 67.4 kg (148 lb 9.4 oz)  Body mass index is 21.94 kg/m².    Physical Exam   Constitutional: He appears well-developed and well-nourished.   HENT:   Head: Normocephalic and atraumatic.   Eyes:  Conjunctivae are normal. Pupils are equal, round, and reactive to light.   Neck: No thyromegaly present.   Cardiovascular: Intact distal pulses.   Musculoskeletal: He exhibits no deformity.   Skin: Skin is warm and dry. He is not diaphoretic.   Psychiatric:   Unable to assess       NEUROLOGICAL EXAMINATION:     MENTAL STATUS   Level of consciousness: unresponsive to painful stimuli    CRANIAL NERVES     CN III, IV, VI   Pupils are equal, round, and reactive to light.       Comatose, myoclonus of mouth, BLE with stimulation   CN:   No blink to threat OU   SÁNCHEZ OU    Corneal absent OU   Face symmetric   Tongue midline   No withdrawal to noxious stimuli in any extremity     Exam findings to suggest seizures:  Myoclonus - yes - mouth, feet  eye twitching - no   Nystagmus - no   gaze deviation - no   waxy rigidity - no        Significant Labs: All pertinent lab results from the past 24 hours have been reviewed.    Significant Studies: I have reviewed all pertinent imaging results/findings within the past 24 hours.

## 2018-11-30 NOTE — ASSESSMENT & PLAN NOTE
Admit to [facility] for ventilator management. Intubated. (difficulty in intubation in field, patient received LMA, intubated here- prolonged hypoxia possible  Cardiac monitoring to maintain hemodynamic stability  Continuous Oxygen saturation monitoring to promote adequate perfusion  Ventilator monitoring and management to assist with proper gas exchange  ABG to assess oxygenation

## 2018-11-30 NOTE — PROGRESS NOTES
[facility]  Neurocritical Care  Progress Note    Admit Date: 11/25/2018  Service Date: 11/30/2018  Length of Stay: 5    Subjective:     Chief Complaint: Status epilepticus    History of Present Illness: Patient is a 57yo male presented as a transfer from [facility]. Original admission to [facility] was due to s/p cardiac arrest. The patient was found down by his wife who then called EMS. On arrival to the home, assessment showed the patient to be in Vfib for which a shock was delivered along with 3 rounds of Epi and 300mg Amiodarone x1. ROSC achieved. Cooling procedures were initiated with target tempeture obtained 11/26 0030. Due to ankylosing spondylitis and severely stiff neck, EMS was unable to intubate in the field therefore an LMA was placed. The patient was subsequently intubated in the OR with the use of fiberoptic. While in PACU (11/25) awaiting an ICU bed the patient began to exhibit myoclonic jerks.  The jerks resolved with Ativan and he was loaded on Keppra. CT head showed significant motion artifact. An EEG later showed generalized epileptiform discharges suggestive of non convulsive status. A decision was made to transfer to [facility] for critical care epilepsy management.     Patient was received in the NCC unit vented on 50mcg of propofol. EEG monitoring in progress. Unable to perform a complete neuro assessment due to the critical nature posed towards the patient when propofol is halted. No family at bedside to contribute to patient's history.        Hospital Course: 11/28 Admit to [facility]; Epilepsy consulted; EEG monitoring in place  11/29: increased keppra. Load valproate and valproate TID started. Advanced tube feeds.  11/30: A-gilma placed, electrolytes replaced overnight. On rounds patient started having twitches in LE and face. Muscle paralysis with rocuronium 75, paused propofol and monitored EEG. EEG with occassional spike discharges. Restarted propofol at 40. AEDs as is.  Ordered CK and TG levels    Interval History:   Neuro: 1 episode of lip and neck twitching overnight. This morning on rounds patient with myoclonic jerks in LE, facial twitching. Administered rocuronium which stopped the twitching to better observe EEG changes without artifact. Propofol was paused and EEG monitored, EEG showed occassional spike discharges. Restarted propofol at 40.   Resp: intubated, CXR looks better  No fevers overnight.   Electrolytes replaced overnight    Review of Systems   Unable to perform ROS: Intubated     Unable to obtain a complete ROS due to level of consciousness.  Objective:     Vitals:  Temp: 99.1 °F (37.3 °C)  Pulse: 76  Rhythm: normal sinus rhythm  BP: 125/73  MAP (mmHg): 94  Resp: (!) 21  SpO2: 100 %  Oxygen Concentration (%): 40  O2 Device (Oxygen Therapy): ventilator  Vent Mode: SIMV  Set Rate: 20 bmp  Vt Set: 380 mL  Pressure Support: 10 cmH20  PEEP/CPAP: 5 cmH20  Peak Airway Pressure: 15 cmH2O  Mean Airway Pressure: 11 cmH20  Plateau Pressure: 0 cmH20    Temp  Min: 96.4 °F (35.8 °C)  Max: 99.1 °F (37.3 °C)  Pulse  Min: 59  Max: 76  BP  Min: 105/57  Max: 133/75  MAP (mmHg)  Min: 74  Max: 100  Resp  Min: 20  Max: 34  SpO2  Min: 99 %  Max: 100 %  Oxygen Concentration (%)  Min: 40  Max: 40    11/29 0701 - 11/30 0700  In: 3292.4 [I.V.:842.4]  Out: 2100 [Urine:2100]   Unmeasured Output  Stool Occurrence: 1       Physical Exam   Constitutional: He appears well-developed and well-nourished.   HENT:   Head: Normocephalic.   Eyes:   Pupils sluggish BL   Cardiovascular: Normal rate and regular rhythm.   Pulmonary/Chest:   intubated   Abdominal: Soft.   Neurological:   On sedation, limited exam.  Other than sluggish pupillary exam, no other brainstem reflexes       Unable to test orientation, language, memory, judgment, insight, fund of knowledge, hearing, shoulder shrug, tongue protrusion, coordination, gait due to level of consciousness.    Medications:  Continuous  propofol Last Rate: 40  mcg/kg/min (11/30/18 1200)   Scheduled  aspirin 81 mg Daily   atorvastatin 80 mg Daily   chlorhexidine 15 mL BID   clonazePAM 0.5 mg Q8H   clopidogrel 75 mg Daily   enoxaparin 40 mg Daily   famotidine 20 mg BID   lacosamide (VIMPAT) IVPB 200 mg Q12H   levetiracetam IVPB 2,000 mg Q12H   senna-docusate 8.6-50 mg 1 tablet BID   valproate sodium (DEPACON) IVPB 1,000 mg Q8H   PRN  acetaminophen 650 mg Q6H PRN   dextrose 50% 12.5 g PRN   glucagon (human recombinant) 1 mg PRN   lorazepam 2 mg Q2H PRN   magnesium oxide 800 mg PRN   magnesium oxide 800 mg PRN   potassium chloride 10% 40 mEq PRN   potassium chloride 10% 40 mEq PRN   potassium chloride 10% 60 mEq PRN   potassium, sodium phosphates 2 packet PRN   potassium, sodium phosphates 2 packet PRN   potassium, sodium phosphates 2 packet PRN   sodium chloride 0.9% 3 mL PRN     Today I personally reviewed pertinent medications, lines/drains/airways, imaging, cardiology results, laboratory results, microbiology results, notably:    Diet  Diet NPO  Diet NPO        Assessment/Plan:     Neuro   * Status epilepticus    gpds w clinical correlate consistent with ictal myoclonus  11/29: twitching of face and lower extremities on morning rounds. Administered rocuronium which stopped muscle twitching. Observed EEG post muscle artifact ceased and when patient was off propofol (paused) - it showed spikes gpeds occassional. Restarted propofol at 40. Continue aeds.  Epilepsy following \  Appreciate recs     Anoxic brain injury    Initial rhythm vfib s/p shock  Difficult intubation, unclear if there was prolonged hypoxia  Pupils reactive on propofol  eeg variable and reactive  Repeat ct w diffuse edema  Prognosis indeterminate, no obviously poor features    Target euthermia, slight hypercapnia, eunatremia, euglycemia       Pulmonary   Pleural effusion    cxr today improved     Acute hypoxemic respiratory failure    Admit to [facility] for ventilator management. Intubated. (difficulty in  intubation in field, patient received LMA, intubated here- prolonged hypoxia possible  Cardiac monitoring to maintain hemodynamic stability  Continuous Oxygen saturation monitoring to promote adequate perfusion  Ventilator monitoring and management to assist with proper gas exchange  ABG to assess oxygenation       Cardiac/Vascular   NSTEMI (non-ST elevated myocardial infarction)    Cards consulted, appreciate recs  On asa, plavix, statin  Over the weekend will consider adding low dose BB and ace-i     Cardiac arrest with ventricular fibrillation    Seen by cards  Unclear if vf induced hypoperfusion or acs caused nstemi    Will add plavix, continue asa, statin  Avoiding arb/bb at the moment due to cerebral edema. Over the weekend, will consider adding low dose BB and ace-i     Oncology   Leukocytosis    Resolved  No fevers     Orthopedic   AS (ankylosing spondylitis)    c-collar           The patient is being Prophylaxed for:  Venous Thromboembolism with: Chemical  Stress Ulcer with: H2B  Ventilator Pneumonia with: chlorhexidine oral care    Activity Orders          None        Full Code    Xenia Gaffney MD  Neurocritical Care  [facility]

## 2018-11-30 NOTE — PLAN OF CARE
Problem: Patient Care Overview  Goal: Plan of Care Review  POC reviewed with pt at 0515. Pt unable to verbalize understanding  Intubated. cEEG continued. Propofol continued @ 50. Questions and concerns addressed. No acute events overnight. Pt progressing toward goals. Will continue to monitor. See flowsheets for full assessment and VS info

## 2018-11-30 NOTE — NURSING
NSCCU contacted pt neck and face quivering appears to be more seizure like activity. BINTA Clements came to see pt. At bedside. No new orders. Will continue to monitor.

## 2018-11-30 NOTE — ASSESSMENT & PLAN NOTE
gpds w clinical correlate consistent with ictal myoclonus  11/29: twitching of face and lower extremities on morning rounds. Administered rocuronium which stopped muscle twitching. Observed EEG post muscle artifact ceased and when patient was off propofol (paused) - it showed spikes gpeds occassional. Restarted propofol at 40. Continue aeds.  Epilepsy following \  Appreciate recs

## 2018-11-30 NOTE — ASSESSMENT & PLAN NOTE
gpds w clinical correlate consistent with ictal myoclonus    Inc keppra and add vpa today  Maintain propfol today and assess for weaning tomorrow

## 2018-11-30 NOTE — ASSESSMENT & PLAN NOTE
Initial rhythm vfib s/p shock  Difficult intubation, unclear if there was prolonged hypoxia  Pupils reactive on propofol  eeg variable and reactive  Repeat ct w diffuse edema  Prognosis indeterminate, no obviously poor features    Target euthermia, slight hypercapnia, eunatremia, euglycemia

## 2018-11-30 NOTE — PLAN OF CARE
Problem: Patient Care Overview  Goal: Plan of Care Review  Outcome: Ongoing (interventions implemented as appropriate)  POC reviewed with pt and family at 1600. Family verbalized understanding. Questions and concerns addressed. No acute events today. Pt has warmers on to keep body temp up, propofol infusion adjusted, seizure like activity (shoulder, facial twitching and pupil dilation with hippus) when over stimulated, pt did well with little stimulation and care clustered,  Spontaneously urinating well with condom catheter, iv access updated ( midline left upper arm, and 2 peripheral on the left arm) art-line placed this morning (right radial). Tube feeds at 10ml/hr.Pt progressing toward goals. Will continue to monitor. See flowsheets for full assessment and VS info.

## 2018-11-30 NOTE — ASSESSMENT & PLAN NOTE
Transfer from [facility] for evaluation of NCSE noted on EEG 11/26. Likely secondary to anoxia in setting of cardiac arrest.    Recommendations:  - Continue vEEG  - Continue Propofol 40 mKm  - Continue Vimpat 200 mg BID  - Keppra increased to 2000 mg BID, VPA added 11/29. Optimize VPA to level of ~60  - Ok with addition of clonazepam for subcortical myoclonus  - Further recommendations pending continued EEG read    Plan of care discussed with NCC team, family at bedside. Will continue to follow.

## 2018-11-30 NOTE — ASSESSMENT & PLAN NOTE
Seen by cards  Unclear if vf induced hypoperfusion or acs caused nstemi    Will add plavix  Avoiding arb/bb at the moment due to cerebral edema

## 2018-11-30 NOTE — ASSESSMENT & PLAN NOTE
Cards consulted, appreciate recs  On asa, plavix, statin  Over the weekend will consider adding low dose BB and ace-i

## 2018-11-30 NOTE — PROGRESS NOTES
[facility]  Neurocritical Care  Progress Note    Admit Date: 11/25/2018  Service Date: 11/29/2018  Length of Stay: 4    Subjective:     Chief Complaint: Status epilepticus    History of Present Illness: Patient is a 59yo male presented as a transfer from [facility]. Original admission to [facility] was due to s/p cardiac arrest. The patient was found down by his wife who then called EMS. On arrival to the home, assessment showed the patient to be in Vfib for which a shock was delivered along with 3 rounds of Epi and 300mg Amiodarone x1. ROSC achieved. Cooling procedures were initiated with target tempeture obtained 11/26 0030. Due to ankylosing spondylitis and severely stiff neck, EMS was unable to intubate in the field therefore an LMA was placed. The patient was subsequently intubated in the OR with the use of fiberoptic. While in PACU (11/25) awaiting an ICU bed the patient began to exhibit myoclonic jerks.  The jerks resolved with Ativan and he was loaded on Keppra. CT head showed significant motion artifact. An EEG later showed generalized epileptiform discharges suggestive of non convulsive status. A decision was made to transfer to [facility] for critical care epilepsy management.     Patient was received in the NCC unit vented on 50mcg of propofol. EEG monitoring in progress. Unable to perform a complete neuro assessment due to the critical nature posed towards the patient when propofol is halted. No family at bedside to contribute to patient's history.        Hospital Course: 11/28 Admit to [facility]; Epilepsy consulted; EEG monitoring in place  11/29: increased keppra. Load valproate and valproate TID started. Advanced tube feeds.    Interval History:   Clinical seizures overnight, propofol increased  Fever overnight  Exam unchanged  Seen by cardiology    Objective:     Vitals:  Temp: 96.6 °F (35.9 °C)  Pulse: (!) 59  Rhythm: normal sinus rhythm  BP: 121/75  MAP (mmHg): 92  Resp: (!) 26  SpO2:  100 %  Oxygen Concentration (%): 40  O2 Device (Oxygen Therapy): ventilator  Vent Mode: SIMV  Set Rate: 20 bmp  Vt Set: 380 mL  Pressure Support: 10 cmH20  PEEP/CPAP: 5 cmH20  Peak Airway Pressure: 21 cmH2O  Mean Airway Pressure: 10 cmH20  Plateau Pressure: 0 cmH20    Temp  Min: 96.6 °F (35.9 °C)  Max: 101.1 °F (38.4 °C)  Pulse  Min: 59  Max: 95  BP  Min: 97/59  Max: 171/95  MAP (mmHg)  Min: 73  Max: 125  Resp  Min: 11  Max: 50  SpO2  Min: 99 %  Max: 100 %  Oxygen Concentration (%)  Min: 40  Max: 40    11/28 0701 - 11/29 0700  In: 2739.8 [I.V.:704.8]  Out: 1450 [Urine:1450]   Unmeasured Output  Stool Occurrence: 1       Physical Exam   Constitutional: He appears well-developed and well-nourished.   HENT:   Head: Normocephalic.   Cardiovascular: Normal rate and regular rhythm.   Pulmonary/Chest:   intubated   Neuro exam: limited as patient on sedation  Sluggish pupils  No spontaneous movement of extremities noted  Unable to test orientation, language, memory, judgment, insight, fund of knowledge, hearing, shoulder shrug, tongue protrusion, coordination, gait due to level of consciousness.    Medications:  Continuous  propofol Last Rate: 50 mcg/kg/min (11/29/18 1701)   Scheduled  aspirin 81 mg Daily   atorvastatin 80 mg Daily   chlorhexidine 15 mL BID   clopidogrel 75 mg Daily   enoxaparin 40 mg Daily   famotidine 20 mg BID   lacosamide (VIMPAT) IVPB 200 mg Q12H   levetiracetam IVPB 2,000 mg Q12H   senna-docusate 8.6-50 mg 1 tablet BID   valproate sodium (DEPACON) IVPB 750 mg Q8H   PRN  acetaminophen 650 mg Q6H PRN   dextrose 50% 12.5 g PRN   glucagon (human recombinant) 1 mg PRN   lorazepam 2 mg Q2H PRN   magnesium oxide 800 mg PRN   magnesium oxide 800 mg PRN   potassium chloride 10% 40 mEq PRN   potassium chloride 10% 40 mEq PRN   potassium chloride 10% 60 mEq PRN   potassium, sodium phosphates 2 packet PRN   potassium, sodium phosphates 2 packet PRN   potassium, sodium phosphates 2 packet PRN   sodium chloride  0.9% 3 mL PRN     Diet  Diet NPO  Diet NPO          Assessment/Plan:     Neuro   * Status epilepticus    gpds w clinical correlate consistent with ictal myoclonus    Inc keppra and add vpa today  Maintain propfol today and assess for weaning tomorrow     Anoxic brain injury    Initial rhythm vfib s/p shock  Difficult intubation, unclear if there was prolonged hypoxia  Pupils reactive on propofol  eeg variable and reactive  Repeat ct w diffuse edema  Prognosis indeterminate, no obviously poor features    Target euthermia, slight hypercapnia, eunatremia, euglycemia     Cardiac/Vascular   Cardiac arrest with ventricular fibrillation    Seen by cards  Unclear if vf induced hypoperfusion or acs caused nstemi    Will add plavix  Avoiding arb/bb at the moment due to cerebral edema     Oncology   Leukocytosis    improving           The patient is being Prophylaxed for:  Venous Thromboembolism with: Mechanical or Chemical  Stress Ulcer with: H2B  Ventilator Pneumonia with: chlorhexidine oral care     Activity Orders          None        Full Code    I spent 30 min of uninterrupted critical care time caring for this critically ill patient exclusive of procedures and teaching.       Mayank Patel MD  Neurocritical Care  [facility]

## 2018-11-30 NOTE — ASSESSMENT & PLAN NOTE
Seen by cards  Unclear if vf induced hypoperfusion or acs caused nstemi    Will add plavix, continue asa, statin  Avoiding arb/bb at the moment due to cerebral edema. Over the weekend, will consider adding low dose BB and ace-i

## 2018-11-30 NOTE — ASSESSMENT & PLAN NOTE
- Likely given myoclonic jerks at OSH, NCSE on EEG off propofol  - CT head 11/28 showing interval diffuse loss of gray-white matter differentiation with diffuse cerebral edema resulting in sulcal effacement of the cerebral hemispheres and smaller caliber ventricular system, concerning for global anoxic hypoxic injury.  - Will eventually need MRI brain

## 2018-12-01 NOTE — ASSESSMENT & PLAN NOTE
Transfer from [facility] for evaluation of NCSE noted on EEG 11/26. Likely secondary to anoxia in setting of cardiac arrest.    Recommendations:  - Continue vEEG  - Transitioning Propofol to Ketamine today - currently on Ketamine 40, plan is to taper off Propofol  - Continue Vimpat 200 mg BID  - Keppra increased to 2000 mg BID, VPA added 11/29. Optimize VPA to level of ~60  - Ok with addition of clonazepam for subcortical myoclonus  - Further recommendations pending continued EEG read    Plan of care discussed with NCC team, family at bedside. Will continue to follow.

## 2018-12-01 NOTE — NURSING
NSCCU contacted pt. Neck and mouth twiching w/ Copious oral secretions. Elias GELLER contacted glycopyrrolate 0.2mg for secretions. MD also ordered propofol increase to 50. Will continue to monitor.

## 2018-12-01 NOTE — PLAN OF CARE
Problem: Patient Care Overview  Goal: Plan of Care Review  Outcome: Ongoing (interventions implemented as appropriate)  POC reviewed with pt at 1600. Pt unable to verbalized understanding. Tube feeds trickle at 10/hr. Pt progressing toward goals. Will continue to monitor. See flowsheets for full assessment and VS info.

## 2018-12-01 NOTE — SUBJECTIVE & OBJECTIVE
Interval History:    Twitching overnight, prop increased  Increased secretions overnight    Review of Systems   Unable to perform ROS: Intubated       Objective:     Vitals:  Temp: 99.5 °F (37.5 °C)  Pulse: 82  Rhythm: normal sinus rhythm  BP: 139/78  MAP (mmHg): 103  Resp: 20  SpO2: 96 %  Oxygen Concentration (%): 40  O2 Device (Oxygen Therapy): ventilator  Vent Mode: SIMV  Set Rate: 20 bmp  Vt Set: 400 mL  Pressure Support: 10 cmH20  PEEP/CPAP: 8 cmH20  Peak Airway Pressure: 27 cmH2O  Mean Airway Pressure: 14 cmH20  Plateau Pressure: 0 cmH20    Temp  Min: 96.6 °F (35.9 °C)  Max: 99.9 °F (37.7 °C)  Pulse  Min: 62  Max: 91  BP  Min: 116/65  Max: 153/72  MAP (mmHg)  Min: 84  Max: 114  Resp  Min: 20  Max: 42  SpO2  Min: 93 %  Max: 100 %  Oxygen Concentration (%)  Min: 40  Max: 100    11/30 0701 - 12/01 0700  In: 2957.7 [I.V.:657.7]  Out: 1100 [Urine:1100]   Unmeasured Output  Stool Occurrence: 1     Vital signs, lab studies, and imaging reviewed by me  Constitutional: He appears well-developed and well-nourished.   HENT:   Head: Normocephalic.   Eyes:   Pupils sluggish BL   Cardiovascular: Normal rate and regular rhythm.   Pulmonary/Chest:   Intubated, breath sounds clear bilaterally  Abdominal: Soft.   Neurological:   On sedation, limited exam.  Other than sluggish pupillary exam, no other brainstem reflexes    Medications:  Continuous  ketamine (KETALAR) infusion Last Rate: 40 mcg/kg/min (12/01/18 1400)   Scheduled  aspirin 81 mg Daily   atorvastatin 80 mg Daily   chlorhexidine 15 mL BID   clonazePAM 1 mg Q8H   clopidogrel 75 mg Daily   enoxaparin 40 mg Daily   famotidine 20 mg BID   fentaNYL     lacosamide (VIMPAT) IVPB 200 mg Q12H   levetiracetam IVPB 2,000 mg Q12H   lisinopril 5 mg Daily   senna-docusate 8.6-50 mg 1 tablet BID   valproate sodium (DEPACON) IVPB 1,250 mg Q8H   PRN  acetaminophen 650 mg Q6H PRN   dextrose 50% 12.5 g PRN   glucagon (human recombinant) 1 mg PRN   lorazepam 2 mg Q2H PRN   magnesium  oxide 800 mg PRN   magnesium oxide 800 mg PRN   potassium chloride 10% 40 mEq PRN   potassium chloride 10% 40 mEq PRN   potassium chloride 10% 60 mEq PRN   potassium, sodium phosphates 2 packet PRN   potassium, sodium phosphates 2 packet PRN   potassium, sodium phosphates 2 packet PRN   sodium chloride 0.9% 3 mL PRN     Diet  Diet NPO  Diet NPO

## 2018-12-01 NOTE — PROGRESS NOTES
NCC notified pt still exhibiting rhythmic seizure like movement of face. SBP sustaining >160. 4MG ativan given as ordered.

## 2018-12-01 NOTE — PROGRESS NOTES
NCC notified pt VSS but rhythmic facial twitching has resumed. Ketamine received from pharmacy. NCC will come to bedside to administer.

## 2018-12-01 NOTE — PROGRESS NOTES
[facility]  Neurology-Epilepsy  Progress Note    Patient Name: Micheal Chirinos Jr.  MRN: [id number]  Admission Date: 2018  Hospital Length of Stay: 6 days  Code Status: Full Code   Attending Provider: Galen Lopes MD  Primary Care Physician: Azikiwe K Lombard, MD   Principal Problem:Status epilepticus    Subjective:     Hospital Course:   EE/26: continuous generalized epileptiform discharges when off propofol, suggestive of NCSE  : Cap EEG - intermittent rhythmic GPEDs, suggestive of post anoxic NCSE  : Fluctuating, periods of sharp GPEDs which improved ~0300 , when Propofol increased to 75 mKm. Subsequently, light burst suppression pattern.  : light burst suppression pattern, sharp GPEDs intermittently. Subcortical myoclonus + EEG artifact as well.   >: Periodic generalized epileptiform discharges. More suppressed on Ketamine/Propofol, plan is to taper off Propofol and optimize Ketamine    Interval History: Ketamine initiated this am, plan to taper off Propofol per NCC.     Current Facility-Administered Medications   Medication Dose Route Frequency Provider Last Rate Last Dose    acetaminophen oral solution 650 mg  650 mg Per NG tube Q6H PRN Xenia Gaffney MD   650 mg at 18 1116    aspirin chewable tablet 81 mg  81 mg Per OG tube Daily Xenia Gaffney MD   81 mg at 18 0827    atorvastatin tablet 80 mg  80 mg Per OG tube Daily Xenia Gaffney MD   80 mg at 18 0821    chlorhexidine 0.12 % solution 15 mL  15 mL Mouth/Throat BID Lamberto Howard NP   15 mL at 18 0821    clonazePAM tablet 1 mg  1 mg Per OG tube Q8H Alpa Rodriguez NP   1 mg at 18 1113    clopidogrel tablet 75 mg  75 mg Per OG tube Daily Xenia Gaffney MD   75 mg at 18 0821    dextrose 50% injection 12.5 g  12.5 g Intravenous PRN Delia Taveras MD        enoxaparin injection 40 mg  40 mg Subcutaneous Daily Xenia Gaffney MD    40 mg at 11/30/18 1713    famotidine tablet 20 mg  20 mg Per OG tube BID Xenia Gaffney MD   20 mg at 12/01/18 0821    fentaNYL (SUBLIMAZE) 50 mcg/mL injection             glucagon (human recombinant) injection 1 mg  1 mg Intramuscular PRN Delia Taveras MD        ketamine (KETALAR) 5,000 mg in sodium chloride 0.9% 500 mL infusion  40 mcg/kg/min (Dosing Weight) Intravenous Continuous Alpa Rodriguez NP 16.2 mL/hr at 12/01/18 1200 40 mcg/kg/min at 12/01/18 1200    lacosamide (VIMPAT) 200 mg in sodium chloride 0.9% 100 mL IVPB  200 mg Intravenous Q12H Tae Lux  mL/hr at 12/01/18 0903 200 mg at 12/01/18 0903    levETIRAcetam (KEPPRA) 2,000 mg in dextrose 5 % 250 mL IVPB  2,000 mg Intravenous Q12H Xenia Gaffney  mL/hr at 12/01/18 0821 2,000 mg at 12/01/18 0821    lisinopril tablet 5 mg  5 mg Per NG tube Daily Alpa Rodriguez NP   5 mg at 12/01/18 1113    lorazepam injection 2 mg  2 mg Intravenous Q2H PRN Delia Taveras MD   2 mg at 12/01/18 0421    magnesium oxide tablet 800 mg  800 mg Per NG tube PRN Lamberto Howard NP        magnesium oxide tablet 800 mg  800 mg Per NG tube PRN Lamberto Howard NP        potassium chloride 10% oral solution 40 mEq  40 mEq Per NG tube PRN Lamberto Howard NP   40 mEq at 11/30/18 0451    potassium chloride 10% oral solution 40 mEq  40 mEq Per NG tube PRN Lamberto Howard NP   40 mEq at 11/28/18 1440    potassium chloride 10% oral solution 60 mEq  60 mEq Per NG tube PRN Lamberto Howard NP        potassium, sodium phosphates 280-160-250 mg packet 2 packet  2 packet Per NG tube PRN Lamberto Howard NP        potassium, sodium phosphates 280-160-250 mg packet 2 packet  2 packet Per NG tube PRN Lamberto Howard NP   2 packet at 11/29/18 1322    potassium, sodium phosphates 280-160-250 mg packet 2 packet  2 packet Per NG tube PRN Lamberto Howard NP        propofol (DIPRIVAN) 10 mg/mL infusion (NON-TITRATING)  20 mcg/kg/min (Dosing Weight)  Intravenous Continuous Alpa Rodriguez NP 8.1 mL/hr at 12/01/18 1200 20 mcg/kg/min at 12/01/18 1200    senna-docusate 8.6-50 mg per tablet 1 tablet  1 tablet Per OG tube BID Galen Lopes MD   1 tablet at 12/01/18 0821    sodium chloride 0.9% flush 3 mL  3 mL Intravenous PRN Lamberto Howard NP        valproate (DEPACON) 1,250 mg in dextrose 5 % 100 mL IVPB  1,250 mg Intravenous Q8H Alpa Rodriguez NP         Continuous Infusions:   ketamine (KETALAR) infusion 40 mcg/kg/min (12/01/18 1200)    propofol 20 mcg/kg/min (12/01/18 1200)       Review of Systems   Unable to perform ROS: Intubated     Objective:     Vital Signs (Most Recent):  Temp: 99.5 °F (37.5 °C) (12/01/18 1200)  Pulse: 80 (12/01/18 1200)  Resp: 20 (12/01/18 1200)  BP: 136/80 (12/01/18 1200)  SpO2: 99 % (12/01/18 1200) Vital Signs (24h Range):  Temp:  [96.6 °F (35.9 °C)-99.9 °F (37.7 °C)] 99.5 °F (37.5 °C)  Pulse:  [62-91] 80  Resp:  [20-42] 20  SpO2:  [93 %-100 %] 99 %  BP: (115-153)/(64-87) 136/80  Arterial Line BP: (132-170)/(66-82) 153/74     Weight: 67.4 kg (148 lb 9.4 oz)  Body mass index is 21.94 kg/m².    Physical Exam   Constitutional: He appears well-developed and well-nourished.   HENT:   Head: Normocephalic and atraumatic.   Eyes: Conjunctivae are normal. Pupils are equal, round, and reactive to light.   Neck: No thyromegaly present.   Cardiovascular: Intact distal pulses.   Musculoskeletal: He exhibits no deformity.   Skin: Skin is warm and dry. He is not diaphoretic.   Psychiatric:   Unable to assess       NEUROLOGICAL EXAMINATION:     MENTAL STATUS   Level of consciousness: unresponsive to painful stimuli    CRANIAL NERVES     CN III, IV, VI   Pupils are equal, round, and reactive to light.       Comatose, myoclonus of mouth  CN:   No blink to threat OU   SÁNCHEZ OU    Corneal absent OU   No cough, gag  Face symmetric   Tongue midline   No withdrawal to noxious stimuli in any extremity     Exam findings to suggest seizures:  Myoclonus  - yes - mouth, feet  eye twitching - no   Nystagmus - no   gaze deviation - no   waxy rigidity - no        Significant Labs: All pertinent lab results from the past 24 hours have been reviewed.    Significant Studies: I have reviewed all pertinent imaging results/findings within the past 24 hours.    Assessment and Plan:     * Status epilepticus    Transfer from [facility] for evaluation of NCSE noted on EEG 11/26. Likely secondary to anoxia in setting of cardiac arrest.    Recommendations:  - Continue vEEG  - Transitioning Propofol to Ketamine today - currently on Ketamine 40, plan is to taper off Propofol  - Continue Vimpat 200 mg BID  - Keppra increased to 2000 mg BID, VPA added 11/29. Optimize VPA to level of ~60  - Ok with addition of clonazepam for subcortical myoclonus  - Further recommendations pending continued EEG read    Plan of care discussed with NCC team, family at bedside. Will continue to follow.      Cardiac arrest with ventricular fibrillation    - 11/25 at home, troponin peaked at 9  - Cardiology consulted and following. Recommend guideline directed therapy for now, will need interventional cardiology/cardiac catheterization for further investigation to determine if there is ischemic disease if neurological recovery takes palce  - TTM completed at OSH, target temp reached at 0300 on 11/26, now rewarmed  - Management per primary     Anoxic brain injury    - Likely given myoclonic jerks at OSH, NCSE on EEG off propofol  - CT head 11/28 showing interval diffuse loss of gray-white matter differentiation with diffuse cerebral edema resulting in sulcal effacement of the cerebral hemispheres and smaller caliber ventricular system, concerning for global anoxic hypoxic injury.  - Will eventually need MRI brain     Acute hypoxemic respiratory failure    - ET tube placed in OR at [facility] due to neck rigidity from AS  - Vent management per NCC     Leukocytosis    - Trending down since initial presentation,  WBC 10.03 today           VTE Risk Mitigation (From admission, onward)        Ordered     enoxaparin injection 40 mg  Daily      11/28/18 0950          Cathryn Ramirez PA-C  Neurology-Epilepsy  [facility]  Staff: Dr. Whitfield

## 2018-12-01 NOTE — PROGRESS NOTES
Pt sustaining sats <88, BP >160, RR 40. Rhythmic chin and lip quivering. RRT and NCC to bedside. 2 MG ativan given as ordered. Ketamine to be started. Will continue to monitor.

## 2018-12-01 NOTE — SUBJECTIVE & OBJECTIVE
Interval History: Ketamine initiated this am, plan to taper off Propofol per NCC.     Current Facility-Administered Medications   Medication Dose Route Frequency Provider Last Rate Last Dose    acetaminophen oral solution 650 mg  650 mg Per NG tube Q6H PRN Xenia Gaffney MD   650 mg at 12/01/18 1116    aspirin chewable tablet 81 mg  81 mg Per OG tube Daily Xenia Gaffney MD   81 mg at 12/01/18 0827    atorvastatin tablet 80 mg  80 mg Per OG tube Daily Xenia Gaffney MD   80 mg at 12/01/18 0821    chlorhexidine 0.12 % solution 15 mL  15 mL Mouth/Throat BID Lamberto Howard NP   15 mL at 12/01/18 0821    clonazePAM tablet 1 mg  1 mg Per OG tube Q8H Alpa Rodriguez NP   1 mg at 12/01/18 1113    clopidogrel tablet 75 mg  75 mg Per OG tube Daily Xenia Gaffney MD   75 mg at 12/01/18 0821    dextrose 50% injection 12.5 g  12.5 g Intravenous PRN Delia Taveras MD        enoxaparin injection 40 mg  40 mg Subcutaneous Daily Xenia Gaffney MD   40 mg at 11/30/18 1713    famotidine tablet 20 mg  20 mg Per OG tube BID Xenia Gaffney MD   20 mg at 12/01/18 0821    fentaNYL (SUBLIMAZE) 50 mcg/mL injection             glucagon (human recombinant) injection 1 mg  1 mg Intramuscular PRN Delia Taveras MD        ketamine (KETALAR) 5,000 mg in sodium chloride 0.9% 500 mL infusion  40 mcg/kg/min (Dosing Weight) Intravenous Continuous Alpa Rodriguez NP 16.2 mL/hr at 12/01/18 1200 40 mcg/kg/min at 12/01/18 1200    lacosamide (VIMPAT) 200 mg in sodium chloride 0.9% 100 mL IVPB  200 mg Intravenous Q12H Tae Lux  mL/hr at 12/01/18 0903 200 mg at 12/01/18 0903    levETIRAcetam (KEPPRA) 2,000 mg in dextrose 5 % 250 mL IVPB  2,000 mg Intravenous Q12H Xenia Gaffney  mL/hr at 12/01/18 0821 2,000 mg at 12/01/18 0821    lisinopril tablet 5 mg  5 mg Per NG tube Daily Alpa Rodriguez NP   5 mg at 12/01/18 1113    lorazepam injection 2 mg  2 mg Intravenous Q2H PRN Delia Taveras MD   2 mg at  12/01/18 0421    magnesium oxide tablet 800 mg  800 mg Per NG tube PRN Lamberto Howard NP        magnesium oxide tablet 800 mg  800 mg Per NG tube PRN Lamberto Howard NP        potassium chloride 10% oral solution 40 mEq  40 mEq Per NG tube PRN Lamberto Howard NP   40 mEq at 11/30/18 0451    potassium chloride 10% oral solution 40 mEq  40 mEq Per NG tube PRN Lamberto Howard NP   40 mEq at 11/28/18 1440    potassium chloride 10% oral solution 60 mEq  60 mEq Per NG tube PRN Lamberto Howard NP        potassium, sodium phosphates 280-160-250 mg packet 2 packet  2 packet Per NG tube PRN Lamberto Howard NP        potassium, sodium phosphates 280-160-250 mg packet 2 packet  2 packet Per NG tube PRN Lamberto Howard NP   2 packet at 11/29/18 1322    potassium, sodium phosphates 280-160-250 mg packet 2 packet  2 packet Per NG tube PRKARMEN Howard NP        propofol (DIPRIVAN) 10 mg/mL infusion (NON-TITRATING)  20 mcg/kg/min (Dosing Weight) Intravenous Continuous Alpa Rodriguez NP 8.1 mL/hr at 12/01/18 1200 20 mcg/kg/min at 12/01/18 1200    senna-docusate 8.6-50 mg per tablet 1 tablet  1 tablet Per OG tube BID Galen Lopes MD   1 tablet at 12/01/18 0821    sodium chloride 0.9% flush 3 mL  3 mL Intravenous PRN Lamberto Howard NP        valproate (DEPACON) 1,250 mg in dextrose 5 % 100 mL IVPB  1,250 mg Intravenous Q8H Alpa Rodriguez NP         Continuous Infusions:   ketamine (KETALAR) infusion 40 mcg/kg/min (12/01/18 1200)    propofol 20 mcg/kg/min (12/01/18 1200)       Review of Systems   Unable to perform ROS: Intubated     Objective:     Vital Signs (Most Recent):  Temp: 99.5 °F (37.5 °C) (12/01/18 1200)  Pulse: 80 (12/01/18 1200)  Resp: 20 (12/01/18 1200)  BP: 136/80 (12/01/18 1200)  SpO2: 99 % (12/01/18 1200) Vital Signs (24h Range):  Temp:  [96.6 °F (35.9 °C)-99.9 °F (37.7 °C)] 99.5 °F (37.5 °C)  Pulse:  [62-91] 80  Resp:  [20-42] 20  SpO2:  [93 %-100 %] 99 %  BP: (115-153)/(64-87)  136/80  Arterial Line BP: (132-170)/(66-82) 153/74     Weight: 67.4 kg (148 lb 9.4 oz)  Body mass index is 21.94 kg/m².    Physical Exam   Constitutional: He appears well-developed and well-nourished.   HENT:   Head: Normocephalic and atraumatic.   Eyes: Conjunctivae are normal. Pupils are equal, round, and reactive to light.   Neck: No thyromegaly present.   Cardiovascular: Intact distal pulses.   Musculoskeletal: He exhibits no deformity.   Skin: Skin is warm and dry. He is not diaphoretic.   Psychiatric:   Unable to assess       NEUROLOGICAL EXAMINATION:     MENTAL STATUS   Level of consciousness: unresponsive to painful stimuli    CRANIAL NERVES     CN III, IV, VI   Pupils are equal, round, and reactive to light.       Comatose, myoclonus of mouth  CN:   No blink to threat OU   SÁNCHEZ OU    Corneal absent OU   No cough, gag  Face symmetric   Tongue midline   No withdrawal to noxious stimuli in any extremity     Exam findings to suggest seizures:  Myoclonus - yes - mouth, feet  eye twitching - no   Nystagmus - no   gaze deviation - no   waxy rigidity - no        Significant Labs: All pertinent lab results from the past 24 hours have been reviewed.    Significant Studies: I have reviewed all pertinent imaging results/findings within the past 24 hours.

## 2018-12-02 NOTE — SUBJECTIVE & OBJECTIVE
Interval History: Several episodes of vomiting overnight, briefly put back on propofol due to clonic movement of mouth.    Current Facility-Administered Medications   Medication Dose Route Frequency Provider Last Rate Last Dose    acetaminophen oral solution 650 mg  650 mg Per NG tube Q6H PRN Xenia Gaffney MD   650 mg at 12/01/18 1116    aspirin chewable tablet 81 mg  81 mg Per OG tube Daily Xenia Gaffney MD   81 mg at 12/02/18 0951    atorvastatin tablet 80 mg  80 mg Per OG tube Daily Xenia Gaffney MD   80 mg at 12/02/18 0951    chlorhexidine 0.12 % solution 15 mL  15 mL Mouth/Throat BID Lamberto Howard NP   15 mL at 12/02/18 0950    clonazePAM tablet 2 mg  2 mg Per OG tube Q8H Jazmin Pendleton NP   2 mg at 12/02/18 0950    clopidogrel tablet 75 mg  75 mg Per OG tube Daily Xenia Gaffney MD   75 mg at 12/02/18 0950    dextrose 50% injection 12.5 g  12.5 g Intravenous PRN Delia Taveras MD        enoxaparin injection 40 mg  40 mg Subcutaneous Daily Xenia Gaffney MD   40 mg at 12/01/18 1730    famotidine tablet 20 mg  20 mg Per OG tube BID Xenia Gaffney MD   20 mg at 12/02/18 0950    fentaNYL (SUBLIMAZE) 50 mcg/mL injection             fentaNYL injection 25 mcg  25 mcg Intravenous Once Mayank Patel MD        glucagon (human recombinant) injection 1 mg  1 mg Intramuscular PRN Delia Taveras MD        ketamine (KETALAR) 5,000 mg in sodium chloride 0.9% 500 mL infusion  50 mcg/kg/min (Dosing Weight) Intravenous Continuous Jazmin Pendleton NP 20.2 mL/hr at 12/02/18 1100 50 mcg/kg/min at 12/02/18 1100    lacosamide (VIMPAT) 200 mg in sodium chloride 0.9% 100 mL IVPB  200 mg Intravenous Q12H Tae Lux  mL/hr at 12/02/18 0952 200 mg at 12/02/18 0952    levETIRAcetam (KEPPRA) 2,000 mg in dextrose 5 % 250 mL IVPB  2,000 mg Intravenous Q12H Xenia Gaffney  mL/hr at 12/02/18 0805 2,000 mg at 12/02/18 0805    lidocaine HCL 20 mg/ml (2%) injection 100 mg  100 mg Tracheal  Tube Once Mayank Patel MD        lisinopril tablet 5 mg  5 mg Per NG tube Daily Alpa Rodriguez NP   5 mg at 12/02/18 0951    lorazepam injection 2 mg  2 mg Intravenous Q2H PRN Delia Taveras MD   2 mg at 12/02/18 0442    magnesium oxide tablet 800 mg  800 mg Per NG tube PRN Lamberto Howard NP        magnesium oxide tablet 800 mg  800 mg Per NG tube PRN Lamberto Howard NP        metoprolol tartrate (LOPRESSOR) split tablet 12.5 mg  12.5 mg Per NG tube Q12H Jazmin Pendleton NP   12.5 mg at 12/02/18 0951    phenylephrine HCl in 0.9% NaCl 1 mg/10 mL (100 mcg/mL) syringe             phenylephrine HCl in 0.9% NaCl 1 mg/10 mL (100 mcg/mL) syringe             potassium chloride 10% oral solution 40 mEq  40 mEq Per NG tube PRN Lamberto Howard NP   40 mEq at 11/30/18 0451    potassium chloride 10% oral solution 40 mEq  40 mEq Per NG tube PRN Lamberto Howard NP   40 mEq at 11/28/18 1440    potassium chloride 10% oral solution 60 mEq  60 mEq Per NG tube PRN Lamberto Howard NP        potassium, sodium phosphates 280-160-250 mg packet 2 packet  2 packet Per NG tube PRN Lamberto Howard NP        potassium, sodium phosphates 280-160-250 mg packet 2 packet  2 packet Per NG tube PRN Lamberto Howard NP   2 packet at 11/29/18 1322    potassium, sodium phosphates 280-160-250 mg packet 2 packet  2 packet Per NG tube PRN Lamberto Howard NP        senna-docusate 8.6-50 mg per tablet 1 tablet  1 tablet Per OG tube BID Galen Lopes MD   1 tablet at 12/02/18 0951    sodium chloride 0.9% flush 3 mL  3 mL Intravenous PRN Lamberto Howard NP        valproate (DEPACON) 1,250 mg in dextrose 5 % 100 mL IVPB  1,250 mg Intravenous Q8H Alpa Rodriguez  mL/hr at 12/02/18 0840 1,250 mg at 12/02/18 0840     Continuous Infusions:   ketamine (KETALAR) infusion 50 mcg/kg/min (12/02/18 1100)       Review of Systems   Unable to perform ROS: Intubated     Objective:     Vital Signs (Most Recent):  Temp: 96.1  °F (35.6 °C) (12/02/18 1124)  Pulse: 69 (12/02/18 1124)  Resp: 16 (12/02/18 1124)  BP: 122/72 (12/02/18 1100)  SpO2: 99 % (12/02/18 1124) Vital Signs (24h Range):  Temp:  [96.1 °F (35.6 °C)-100 °F (37.8 °C)] 96.1 °F (35.6 °C)  Pulse:  [] 69  Resp:  [0-47] 16  SpO2:  [90 %-100 %] 99 %  BP: (109-151)/(66-90) 122/72  Arterial Line BP: (125-168)/() 131/63     Weight: 67.4 kg (148 lb 9.4 oz)  Body mass index is 21.94 kg/m².    Physical Exam   Constitutional: He appears well-developed and well-nourished.   HENT:   Head: Normocephalic and atraumatic.   Eyes: Conjunctivae are normal. Pupils are equal, round, and reactive to light.   Neck: No thyromegaly present.   Cardiovascular: Intact distal pulses.   Musculoskeletal: He exhibits no deformity.   Skin: Skin is warm and dry. He is not diaphoretic.   Psychiatric:   Unable to assess       NEUROLOGICAL EXAMINATION:     MENTAL STATUS   Level of consciousness: unresponsive to painful stimuli    CRANIAL NERVES     CN III, IV, VI   Pupils are equal, round, and reactive to light.       Comatose, myoclonus of mouth  CN:   No blink to threat OU   SÁNCHEZ OU    Corneal absent OU   No cough, gag  Face symmetric   Tongue midline   No withdrawal to noxious stimuli in any extremity     Exam findings to suggest seizures:  Myoclonus - yes - mouth, feet  eye twitching - no   Nystagmus - no   gaze deviation - no   waxy rigidity - no        Significant Labs: All pertinent lab results from the past 24 hours have been reviewed.    Significant Studies: I have reviewed all pertinent imaging results/findings within the past 24 hours.

## 2018-12-02 NOTE — ASSESSMENT & PLAN NOTE
Will discontinue propofol today, increase Ketamine to 40  Continue VPA 1250 Q8 hours, level today therapeutic at 63, will repeat tomorrow and adjust accordingly   Increase clonazepam for subcortical myoclonus

## 2018-12-02 NOTE — ASSESSMENT & PLAN NOTE
Initial rhythm vfib s/p shock  Difficult intubation, unclear if there was prolonged hypoxia  Pupils reactive on propofol. Will d/c propofol today.   eeg variable and reactive  Repeat ct in 11/28 w diffuse edema  Prognosis indeterminate, no obviously poor features    Target euthermia, slight hypercapnia, eunatremia, euglycemia

## 2018-12-02 NOTE — ASSESSMENT & PLAN NOTE
Maintain ett  CXR improved on 12/1, will repeat after bronch   High risk for vap and/or mucous plugging, will bronch, low threshold to start abx

## 2018-12-02 NOTE — PROGRESS NOTES
BINTA Wu notified pt carter to 35 sustained 60 seconds followed by moderate amount of black emesis. No orders given. VSS. Will continue to monitor.

## 2018-12-02 NOTE — PROGRESS NOTES
[facility]  Neurology-Epilepsy  Progress Note    Patient Name: Micheal Chirinos Jr.  MRN: [id number]  Admission Date: 2018  Hospital Length of Stay: 7 days  Code Status: Full Code   Attending Provider: Mayank Patel MD  Primary Care Physician: Azikiwe K Lombard, MD   Principal Problem:Status epilepticus    Subjective:     Hospital Course:   EE/26: continuous generalized epileptiform discharges when off propofol, suggestive of NCSE  : Cap EEG - intermittent rhythmic GPEDs, suggestive of post anoxic NCSE  >: Fluctuating, periods of sharp GPEDs which improved ~0300 , when Propofol increased to 75 mKm. Subsequently, light burst suppression pattern.  >: light burst suppression pattern, sharp GPEDs intermittently. Subcortical myoclonus + EEG artifact as well.   >: Periodic generalized epileptiform discharges. More suppressed on Ketamine/Propofol, plan is to taper off Propofol and optimize Ketamine  >2: Burst suppression, epileptiform discharges in bursts    Interval History: Several episodes of vomiting overnight, briefly put back on propofol due to clonic movement of mouth.    Current Facility-Administered Medications   Medication Dose Route Frequency Provider Last Rate Last Dose    acetaminophen oral solution 650 mg  650 mg Per NG tube Q6H PRN Xenia Gaffney MD   650 mg at 18 1116    aspirin chewable tablet 81 mg  81 mg Per OG tube Daily Xenia Gaffney MD   81 mg at 18 0951    atorvastatin tablet 80 mg  80 mg Per OG tube Daily Xenia Gaffney MD   80 mg at 18 0951    chlorhexidine 0.12 % solution 15 mL  15 mL Mouth/Throat BID Lamberto Howard NP   15 mL at 18 0950    clonazePAM tablet 2 mg  2 mg Per OG tube Q8H Jazmin Pendleton NP   2 mg at 18 0950    clopidogrel tablet 75 mg  75 mg Per OG tube Daily Xenia Gaffney MD   75 mg at 18 0950    dextrose 50% injection 12.5 g  12.5 g Intravenous PRN Delia  MD Nitin        enoxaparin injection 40 mg  40 mg Subcutaneous Daily Xenia Gaffney MD   40 mg at 12/01/18 1730    famotidine tablet 20 mg  20 mg Per OG tube BID Xenia Gaffney MD   20 mg at 12/02/18 0950    fentaNYL (SUBLIMAZE) 50 mcg/mL injection             fentaNYL injection 25 mcg  25 mcg Intravenous Once Mayank Patel MD        glucagon (human recombinant) injection 1 mg  1 mg Intramuscular PRN Delia Taveras MD        ketamine (KETALAR) 5,000 mg in sodium chloride 0.9% 500 mL infusion  50 mcg/kg/min (Dosing Weight) Intravenous Continuous Jazmin Pendleton NP 20.2 mL/hr at 12/02/18 1100 50 mcg/kg/min at 12/02/18 1100    lacosamide (VIMPAT) 200 mg in sodium chloride 0.9% 100 mL IVPB  200 mg Intravenous Q12H Tae Lux  mL/hr at 12/02/18 0952 200 mg at 12/02/18 0952    levETIRAcetam (KEPPRA) 2,000 mg in dextrose 5 % 250 mL IVPB  2,000 mg Intravenous Q12H Xenia Gaffney  mL/hr at 12/02/18 0805 2,000 mg at 12/02/18 0805    lidocaine HCL 20 mg/ml (2%) injection 100 mg  100 mg Tracheal Tube Once Mayank Patel MD        lisinopril tablet 5 mg  5 mg Per NG tube Daily Alpa Rodriguez NP   5 mg at 12/02/18 0951    lorazepam injection 2 mg  2 mg Intravenous Q2H PRN Delia Taveras MD   2 mg at 12/02/18 0442    magnesium oxide tablet 800 mg  800 mg Per NG tube PRN Lamberto Howard NP        magnesium oxide tablet 800 mg  800 mg Per NG tube PRN Lamberto Howard NP        metoprolol tartrate (LOPRESSOR) split tablet 12.5 mg  12.5 mg Per NG tube Q12H Jazmin Pendleton NP   12.5 mg at 12/02/18 0951    phenylephrine HCl in 0.9% NaCl 1 mg/10 mL (100 mcg/mL) syringe             phenylephrine HCl in 0.9% NaCl 1 mg/10 mL (100 mcg/mL) syringe             potassium chloride 10% oral solution 40 mEq  40 mEq Per NG tube PRN Lamberto Howard NP   40 mEq at 11/30/18 0451    potassium chloride 10% oral solution 40 mEq  40 mEq Per NG tube PRN Lamberto Howard NP   40 mEq at 11/28/18  1440    potassium chloride 10% oral solution 60 mEq  60 mEq Per NG tube PRN Lamberto Howard NP        potassium, sodium phosphates 280-160-250 mg packet 2 packet  2 packet Per NG tube PRN Lamberto Howard NP        potassium, sodium phosphates 280-160-250 mg packet 2 packet  2 packet Per NG tube PRN Lamberto Howard NP   2 packet at 11/29/18 1322    potassium, sodium phosphates 280-160-250 mg packet 2 packet  2 packet Per NG tube PRN Lamberto Howard NP        senna-docusate 8.6-50 mg per tablet 1 tablet  1 tablet Per OG tube BID Galen Lopes MD   1 tablet at 12/02/18 0951    sodium chloride 0.9% flush 3 mL  3 mL Intravenous PRN Lamberto Howard NP        valproate (DEPACON) 1,250 mg in dextrose 5 % 100 mL IVPB  1,250 mg Intravenous Q8H Alpa Rodriguez  mL/hr at 12/02/18 0840 1,250 mg at 12/02/18 0840     Continuous Infusions:   ketamine (KETALAR) infusion 50 mcg/kg/min (12/02/18 1100)       Review of Systems   Unable to perform ROS: Intubated     Objective:     Vital Signs (Most Recent):  Temp: 96.1 °F (35.6 °C) (12/02/18 1124)  Pulse: 69 (12/02/18 1124)  Resp: 16 (12/02/18 1124)  BP: 122/72 (12/02/18 1100)  SpO2: 99 % (12/02/18 1124) Vital Signs (24h Range):  Temp:  [96.1 °F (35.6 °C)-100 °F (37.8 °C)] 96.1 °F (35.6 °C)  Pulse:  [] 69  Resp:  [0-47] 16  SpO2:  [90 %-100 %] 99 %  BP: (109-151)/(66-90) 122/72  Arterial Line BP: (125-168)/() 131/63     Weight: 67.4 kg (148 lb 9.4 oz)  Body mass index is 21.94 kg/m².    Physical Exam   Constitutional: He appears well-developed and well-nourished.   HENT:   Head: Normocephalic and atraumatic.   Eyes: Conjunctivae are normal. Pupils are equal, round, and reactive to light.   Neck: No thyromegaly present.   Cardiovascular: Intact distal pulses.   Musculoskeletal: He exhibits no deformity.   Skin: Skin is warm and dry. He is not diaphoretic.   Psychiatric:   Unable to assess       NEUROLOGICAL EXAMINATION:     MENTAL STATUS   Level of  consciousness: unresponsive to painful stimuli    CRANIAL NERVES     CN III, IV, VI   Pupils are equal, round, and reactive to light.       Comatose, myoclonus of mouth  CN:   No blink to threat OU   SÁNCHEZ OU    Corneal absent OU   No cough, gag  Face symmetric   Tongue midline   No withdrawal to noxious stimuli in any extremity     Exam findings to suggest seizures:  Myoclonus - yes - mouth, feet  eye twitching - no   Nystagmus - no   gaze deviation - no   waxy rigidity - no        Significant Labs: All pertinent lab results from the past 24 hours have been reviewed.    Significant Studies: I have reviewed all pertinent imaging results/findings within the past 24 hours.    Assessment and Plan:     * Status epilepticus    Transfer from [facility] for evaluation of NCSE noted on EEG 11/26. Likely secondary to anoxia in setting of cardiac arrest.    Recommendations:  - Continue vEEG  - Continue Ketamine 40 mKm  - Continue Vimpat 200 mg BID, Keppra 2000 mg BID, VPA 1250 mg TID  - Clonazepam increased for subcortical myoclonus  - Further recommendations pending continued EEG read    Plan of care discussed with NCC team, family at bedside. Will continue to follow.      Cardiac arrest with ventricular fibrillation    - 11/25 at home, troponin peaked at 9  - Cardiology consulted and following. Recommend guideline directed therapy for now, will need interventional cardiology/cardiac catheterization for further investigation to determine if there is ischemic disease if neurological recovery takes palce  - TTM completed at OSH, target temp reached at 0300 on 11/26, now rewarmed  - Management per primary     Anoxic brain injury    - Likely given myoclonic jerks at OSH, NCSE on EEG off propofol  - CT head 11/28 showing interval diffuse loss of gray-white matter differentiation with diffuse cerebral edema resulting in sulcal effacement of the cerebral hemispheres and smaller caliber ventricular system, concerning for global  anoxic hypoxic injury.  - Will eventually need MRI brain     Acute hypoxemic respiratory failure    - ET tube placed in OR at [facility] due to neck rigidity from AS  - Vent management per [facility]     Leukocytosis    - Increased to 13.90 today           VTE Risk Mitigation (From admission, onward)        Ordered     enoxaparin injection 40 mg  Daily      11/28/18 0930          Cathryn Ramirez PA-C  Neurology-Epilepsy  [facility]  Staff: Dr. Whitfield

## 2018-12-02 NOTE — PROGRESS NOTES
[facility]  Neurocritical Care  Progress Note    Admit Date: 11/25/2018  Service Date: 12/02/2018  Length of Stay: 7    Subjective:     Chief Complaint: Status epilepticus    History of Present Illness: Patient is a 57yo male presented as a transfer from [facility]. Original admission to [facility] was due to s/p cardiac arrest. The patient was found down by his wife who then called EMS. On arrival to the home, assessment showed the patient to be in Vfib for which a shock was delivered along with 3 rounds of Epi and 300mg Amiodarone x1. ROSC achieved. Cooling procedures were initiated with target tempeture obtained 11/26 0030. Due to ankylosing spondylitis and severely stiff neck, EMS was unable to intubate in the field therefore an LMA was placed. The patient was subsequently intubated in the OR with the use of fiberoptic. While in PACU (11/25) awaiting an ICU bed the patient began to exhibit myoclonic jerks.  The jerks resolved with Ativan and he was loaded on Keppra. CT head showed significant motion artifact. An EEG later showed generalized epileptiform discharges suggestive of non convulsive status. A decision was made to transfer to [facility] for critical care epilepsy management.     Patient was received in the NCC unit vented on 50mcg of propofol. EEG monitoring in progress. Unable to perform a complete neuro assessment due to the critical nature posed towards the patient when propofol is halted. No family at bedside to contribute to patient's history.        Hospital Course: 11/28 Admit to [facility]; Epilepsy consulted; EEG monitoring in place  11/29: increased keppra. Load valproate and valproate TID started. Advanced tube feeds.  11/30: A-gilma placed, electrolytes replaced overnight. On rounds patient started having twitches in LE and face. Muscle paralysis with rocuronium 75, paused propofol and monitored EEG. EEG with occassional spike discharges. Restarted propofol at 40. AEDs as is.  Ordered CK and TG levels  12/2: Had vomiting overnight, also bowel movement, myoclonus right after, propofol was re-started overnight. CXR without aspiration. Will D/C propofol today, will increase clonazepam, increase Ketamine to 50. Decrease RR to 60, will repeat ABG today.    Interval History:  Vomiting overnight, bowel movement also overnight, had myoclonus again after vomiting and was placed back on propofol by night team.     Review of Systems   Unable to obtain a complete ROS due to level of consciousness.  Objective:     Vitals:  Temp: 97.5 °F (36.4 °C)  Pulse: 76  Rhythm: normal sinus rhythm  BP: 114/73  MAP (mmHg): 88  Resp: 20  SpO2: 99 %  Oxygen Concentration (%): 40  O2 Device (Oxygen Therapy): ventilator  Vent Mode: SIMV  Set Rate: 20 bmp  Vt Set: 400 mL  Pressure Support: 10 cmH20  PEEP/CPAP: 8 cmH20  Peak Airway Pressure: 24 cmH2O  Mean Airway Pressure: 14 cmH20  Plateau Pressure: 0 cmH20    Temp  Min: 97.5 °F (36.4 °C)  Max: 100 °F (37.8 °C)  Pulse  Min: 76  Max: 104  BP  Min: 109/67  Max: 153/72  MAP (mmHg)  Min: 82  Max: 114  Resp  Min: 0  Max: 47  SpO2  Min: 94 %  Max: 100 %  Oxygen Concentration (%)  Min: 40  Max: 100    12/01 0701 - 12/02 0700  In: 2629.3 [I.V.:869.3]  Out: 2315 [Urine:1615; Drains:100]   Unmeasured Output  Stool Occurrence: 1  Emesis Occurrence: 1       Physical Exam  Vital signs, lab studies, and imaging reviewed by me.   Constitutional: He appears well-developed and well-nourished.   HENT:   Head: Normocephalic.   Eyes:   Pupils sluggish BL   Cardiovascular: Normal rate and regular rhythm.   Pulmonary/Chest:   Intubated, breath sounds clear bilaterally  Abdominal: Soft.   Neurological:   On sedation, limited exam.  Other than sluggish pupillary exam, no other brainstem reflexes       Medications:  Continuous  ketamine (KETALAR) infusion Last Rate: 40 mcg/kg/min (12/02/18 0700)   Scheduled  aspirin 81 mg Daily   atorvastatin 80 mg Daily   chlorhexidine 15 mL BID   clonazePAM  2 mg Q8H   clopidogrel 75 mg Daily   enoxaparin 40 mg Daily   famotidine 20 mg BID   lacosamide (VIMPAT) IVPB 200 mg Q12H   levetiracetam IVPB 2,000 mg Q12H   lisinopril 5 mg Daily   metoprolol tartrate 12.5 mg Q12H   senna-docusate 8.6-50 mg 1 tablet BID   valproate sodium (DEPACON) IVPB 1,250 mg Q8H   PRN  acetaminophen 650 mg Q6H PRN   dextrose 50% 12.5 g PRN   glucagon (human recombinant) 1 mg PRN   lorazepam 2 mg Q2H PRN   magnesium oxide 800 mg PRN   magnesium oxide 800 mg PRN   potassium chloride 10% 40 mEq PRN   potassium chloride 10% 40 mEq PRN   potassium chloride 10% 60 mEq PRN   potassium, sodium phosphates 2 packet PRN   potassium, sodium phosphates 2 packet PRN   potassium, sodium phosphates 2 packet PRN   sodium chloride 0.9% 3 mL PRN     Today I personally reviewed pertinent medications, lines/drains/airways, imaging, cardiology results, laboratory results, microbiology results, notably:    Diet  Diet NPO  Diet NPO          Assessment/Plan:     Neuro   * Status epilepticus    Will discontinue propofol today, increase Ketamine to 40  Continue VPA 1250 Q8 hours, level today therapeutic at 63, will repeat tomorrow and adjust accordingly   Increase clonazepam for subcortical myoclonus     Anoxic brain injury    Initial rhythm vfib s/p shock  Difficult intubation, unclear if there was prolonged hypoxia  Pupils reactive on propofol. Will d/c propofol today.   eeg variable and reactive  Repeat ct in 11/28 w diffuse edema  Prognosis indeterminate, no obviously poor features    Target euthermia, slight hypercapnia, eunatremia, euglycemia       Pulmonary   Acute hypoxemic respiratory failure    Maintain ett  CXR improved on 12/1, will repeat after bronch   High risk for vap and/or mucous plugging, will bronch, low threshold to start abx       Cardiac/Vascular   Cardiac arrest with ventricular fibrillation    Unclear if vf induced hypoperfusion or acs caused nstemi    Asa, plavix, statin, Lisinopril 5  mg  Cath/aicd eval if neurological recovery           The patient is being Prophylaxed for:  Venous Thromboembolism with: Chemical  Stress Ulcer with: H2B  Ventilator Pneumonia with: chlorhexidine oral care    Activity Orders          None        Full Code    Eduardo Rodriguez MD  Neurocritical Care  [facility]

## 2018-12-02 NOTE — PLAN OF CARE
Problem: Patient Care Overview  Goal: Plan of Care Review  Outcome: Ongoing (interventions implemented as appropriate)  POC reviewed with pt and family at 1600. Pt family verbalized understanding. Questions and concerns addressed. Pt had three episodes of bradycardia followed by vomiting of coffee ground emesis. PT brought to stat CT. Tube feeds and Q6 30cc h20 boluses held. Ketamine at 40/hr. cEEG in place. Will continue to monitor. See flowsheets for full assessment and VS info.

## 2018-12-02 NOTE — PROCEDURES
"Micheal Chirinos Jr. is a 58 y.o. male patient.    Temp: 96.4 °F (35.8 °C) (12/02/18 1200)  Pulse: 69 (12/02/18 1200)  Resp: 16 (12/02/18 1200)  BP: 117/73 (12/02/18 1200)  SpO2: 98 % (12/02/18 1200)  Weight: 67.4 kg (148 lb 9.4 oz) (11/28/18 0405)  Height: 5' 9" (175.3 cm) (11/28/18 0405)       Procedures  Procedure: Bronchoscopy    Procedure in detail: The patient is intubated and sedated, given 5cc 2% lidocaine. The bronch was then advanced through the ETT tube to the abdirahman.Copious thick secretions were suctioned from R main, bi, and R ll bronchi as well as L lower. 15cc of flush was administered to loosen mucous. Mucosa appeared normal. After suction there were no collapsing bronchioles. The patient tolerated the procedure well.     Complications: None    Mayank Patel  12/2/2018    "

## 2018-12-02 NOTE — ASSESSMENT & PLAN NOTE
Unclear if vf induced hypoperfusion or acs caused nstemi    Asa, plavix, statin, Lisinopril 5 mg  Cath/aicd eval if neurological recovery

## 2018-12-02 NOTE — PROGRESS NOTES
Pt placed on portable monitor and vent. Transported via bed with RNx2 and RRT x1 to CT. Pt returned to 9065 and placed on bedside monitor and vent. Pt tolerated well. No adverse events, VSS. EEG tech replacing leads. [facility] notified CT complete.

## 2018-12-02 NOTE — ASSESSMENT & PLAN NOTE
Transfer from [facility] for evaluation of NCSE noted on EEG 11/26. Likely secondary to anoxia in setting of cardiac arrest.    Recommendations:  - Continue vEEG  - Continue Ketamine 40 mKm  - Continue Vimpat 200 mg BID, Keppra 2000 mg BID, VPA 1250 mg TID  - Clonazepam increased for subcortical myoclonus  - Further recommendations pending continued EEG read    Plan of care discussed with NCC team, family at bedside. Will continue to follow.

## 2018-12-02 NOTE — PROGRESS NOTES
Jorge to bedside performing bronchoscopy. Time out performed prior to procedure. Allergies reviewed and consent verified. Pt given 100mg lidocaine 2% and 25 mcg fentanyl. Tolerated well. VSS. Will continue to monitor.

## 2018-12-02 NOTE — PROGRESS NOTES
[facility]  Neurocritical Care  Progress Note    Admit Date: 11/25/2018  Service Date: 12/01/2018  Length of Stay: 6    Subjective:     Chief Complaint: Status epilepticus    History of Present Illness: Patient is a 57yo male presented as a transfer from [facility]. Original admission to [facility] was due to s/p cardiac arrest. The patient was found down by his wife who then called EMS. On arrival to the home, assessment showed the patient to be in Vfib for which a shock was delivered along with 3 rounds of Epi and 300mg Amiodarone x1. ROSC achieved. Cooling procedures were initiated with target tempeture obtained 11/26 0030. Due to ankylosing spondylitis and severely stiff neck, EMS was unable to intubate in the field therefore an LMA was placed. The patient was subsequently intubated in the OR with the use of fiberoptic. While in PACU (11/25) awaiting an ICU bed the patient began to exhibit myoclonic jerks.  The jerks resolved with Ativan and he was loaded on Keppra. CT head showed significant motion artifact. An EEG later showed generalized epileptiform discharges suggestive of non convulsive status. A decision was made to transfer to [facility] for critical care epilepsy management.     Patient was received in the NCC unit vented on 50mcg of propofol. EEG monitoring in progress. Unable to perform a complete neuro assessment due to the critical nature posed towards the patient when propofol is halted. No family at bedside to contribute to patient's history.        Hospital Course: 11/28 Admit to [facility]; Epilepsy consulted; EEG monitoring in place  11/29: increased keppra. Load valproate and valproate TID started. Advanced tube feeds.  11/30: A-gilma placed, electrolytes replaced overnight. On rounds patient started having twitches in LE and face. Muscle paralysis with rocuronium 75, paused propofol and monitored EEG. EEG with occassional spike discharges. Restarted propofol at 40. AEDs as is.  Ordered CK and TG levels    Interval History:    Twitching overnight, prop increased  Increased secretions overnight    Review of Systems   Unable to perform ROS: Intubated       Objective:     Vitals:  Temp: 99.5 °F (37.5 °C)  Pulse: 82  Rhythm: normal sinus rhythm  BP: 139/78  MAP (mmHg): 103  Resp: 20  SpO2: 96 %  Oxygen Concentration (%): 40  O2 Device (Oxygen Therapy): ventilator  Vent Mode: SIMV  Set Rate: 20 bmp  Vt Set: 400 mL  Pressure Support: 10 cmH20  PEEP/CPAP: 8 cmH20  Peak Airway Pressure: 27 cmH2O  Mean Airway Pressure: 14 cmH20  Plateau Pressure: 0 cmH20    Temp  Min: 96.6 °F (35.9 °C)  Max: 99.9 °F (37.7 °C)  Pulse  Min: 62  Max: 91  BP  Min: 116/65  Max: 153/72  MAP (mmHg)  Min: 84  Max: 114  Resp  Min: 20  Max: 42  SpO2  Min: 93 %  Max: 100 %  Oxygen Concentration (%)  Min: 40  Max: 100    11/30 0701 - 12/01 0700  In: 2957.7 [I.V.:657.7]  Out: 1100 [Urine:1100]   Unmeasured Output  Stool Occurrence: 1     Vital signs, lab studies, and imaging reviewed by me  Constitutional: He appears well-developed and well-nourished.   HENT:   Head: Normocephalic.   Eyes:   Pupils sluggish BL   Cardiovascular: Normal rate and regular rhythm.   Pulmonary/Chest:   Intubated, breath sounds clear bilaterally  Abdominal: Soft.   Neurological:   On sedation, limited exam.  Other than sluggish pupillary exam, no other brainstem reflexes    Medications:  Continuous  ketamine (KETALAR) infusion Last Rate: 40 mcg/kg/min (12/01/18 1400)   Scheduled  aspirin 81 mg Daily   atorvastatin 80 mg Daily   chlorhexidine 15 mL BID   clonazePAM 1 mg Q8H   clopidogrel 75 mg Daily   enoxaparin 40 mg Daily   famotidine 20 mg BID   fentaNYL     lacosamide (VIMPAT) IVPB 200 mg Q12H   levetiracetam IVPB 2,000 mg Q12H   lisinopril 5 mg Daily   senna-docusate 8.6-50 mg 1 tablet BID   valproate sodium (DEPACON) IVPB 1,250 mg Q8H   PRN  acetaminophen 650 mg Q6H PRN   dextrose 50% 12.5 g PRN   glucagon (human recombinant) 1 mg PRN   lorazepam  2 mg Q2H PRN   magnesium oxide 800 mg PRN   magnesium oxide 800 mg PRN   potassium chloride 10% 40 mEq PRN   potassium chloride 10% 40 mEq PRN   potassium chloride 10% 60 mEq PRN   potassium, sodium phosphates 2 packet PRN   potassium, sodium phosphates 2 packet PRN   potassium, sodium phosphates 2 packet PRN   sodium chloride 0.9% 3 mL PRN     Diet  Diet NPO  Diet NPO          Assessment/Plan:     Neuro   * Status epilepticus    Attempt transitioning to ketamine  Increase vpa  Increase clonazepam for subcortical myoclonus     Anoxic brain injury    Initial rhythm vfib s/p shock  Difficult intubation, unclear if there was prolonged hypoxia  Pupils reactive on propofol  eeg variable and reactive  Repeat ct w diffuse edema  Prognosis indeterminate, no obviously poor features    Target euthermia, slight hypercapnia, eunatremia, euglycemia  Consider ssep       Pulmonary   Acute hypoxemic respiratory failure    Maintain ett  cxr improving  High risk for vap and/or mucous plugging, low threshold to bronch and start abx       Cardiac/Vascular   Cardiac arrest with ventricular fibrillation    Unclear if vf induced hypoperfusion or acs caused nstemi    Asa, plavix, statin, add low dose acei today  Cath/aicd eval if neurological recovery           The patient is being Prophylaxed for:  Venous Thromboembolism with: Mechanical or Chemical  Stress Ulcer with: H2B  Ventilator Pneumonia with: chlorhexidine oral care    Activity Orders          None        Full Code     I spent 30 min of uninterrupted critical care time caring for this critically ill patient exclusive of procedures and teaching.       Mayank Patel MD  Neurocritical Care  [facility]

## 2018-12-02 NOTE — PROGRESS NOTES
Break through seizure like activity occurred for the second time this evening. PRN ativan given. Episode of emesis soon after. Tube feedings stopped and OG placed to low wall suction. CHICA Whitehead notified. Orders to notify her if seizure like activity happens again. Will continue to monitor.

## 2018-12-02 NOTE — PLAN OF CARE
Problem: Patient Care Overview  Goal: Plan of Care Review  Outcome: Ongoing (interventions implemented as appropriate)  POC reviewed with pt and family at 1650. Pt family verbalized understanding. Questions and concerns addressed.     Pt given 6 of Ativan for seizure like activity this a.m.    Ketamine started, propofol weaned off.     Tube feeds advanced to 30, goal 40.    Pt progressing toward goals. Will continue to monitor. See flowsheets for full assessment and VS info.

## 2018-12-02 NOTE — ASSESSMENT & PLAN NOTE
Unclear if vf induced hypoperfusion or acs caused nstemi    Asa, plavix, statin, add low dose acei today  Cath/aicd eval if neurological recovery

## 2018-12-02 NOTE — SUBJECTIVE & OBJECTIVE
Interval History:  Vomiting overnight, bowel movement also overnight, had myoclonus again after vomiting and was placed back on propofol by night team.     Review of Systems   Unable to obtain a complete ROS due to level of consciousness.  Objective:     Vitals:  Temp: 97.5 °F (36.4 °C)  Pulse: 76  Rhythm: normal sinus rhythm  BP: 114/73  MAP (mmHg): 88  Resp: 20  SpO2: 99 %  Oxygen Concentration (%): 40  O2 Device (Oxygen Therapy): ventilator  Vent Mode: SIMV  Set Rate: 20 bmp  Vt Set: 400 mL  Pressure Support: 10 cmH20  PEEP/CPAP: 8 cmH20  Peak Airway Pressure: 24 cmH2O  Mean Airway Pressure: 14 cmH20  Plateau Pressure: 0 cmH20    Temp  Min: 97.5 °F (36.4 °C)  Max: 100 °F (37.8 °C)  Pulse  Min: 76  Max: 104  BP  Min: 109/67  Max: 153/72  MAP (mmHg)  Min: 82  Max: 114  Resp  Min: 0  Max: 47  SpO2  Min: 94 %  Max: 100 %  Oxygen Concentration (%)  Min: 40  Max: 100    12/01 0701 - 12/02 0700  In: 2629.3 [I.V.:869.3]  Out: 2315 [Urine:1615; Drains:100]   Unmeasured Output  Stool Occurrence: 1  Emesis Occurrence: 1       Physical Exam  Vital signs, lab studies, and imaging reviewed by me.   Constitutional: He appears well-developed and well-nourished.   HENT:   Head: Normocephalic.   Eyes:   Pupils sluggish BL   Cardiovascular: Normal rate and regular rhythm.   Pulmonary/Chest:   Intubated, breath sounds clear bilaterally  Abdominal: Soft.   Neurological:   On sedation, limited exam.  Other than sluggish pupillary exam, no other brainstem reflexes       Medications:  Continuous  ketamine (KETALAR) infusion Last Rate: 40 mcg/kg/min (12/02/18 0700)   Scheduled  aspirin 81 mg Daily   atorvastatin 80 mg Daily   chlorhexidine 15 mL BID   clonazePAM 2 mg Q8H   clopidogrel 75 mg Daily   enoxaparin 40 mg Daily   famotidine 20 mg BID   lacosamide (VIMPAT) IVPB 200 mg Q12H   levetiracetam IVPB 2,000 mg Q12H   lisinopril 5 mg Daily   metoprolol tartrate 12.5 mg Q12H   senna-docusate 8.6-50 mg 1 tablet BID   valproate sodium  (DEPACON) IVPB 1,250 mg Q8H   PRN  acetaminophen 650 mg Q6H PRN   dextrose 50% 12.5 g PRN   glucagon (human recombinant) 1 mg PRN   lorazepam 2 mg Q2H PRN   magnesium oxide 800 mg PRN   magnesium oxide 800 mg PRN   potassium chloride 10% 40 mEq PRN   potassium chloride 10% 40 mEq PRN   potassium chloride 10% 60 mEq PRN   potassium, sodium phosphates 2 packet PRN   potassium, sodium phosphates 2 packet PRN   potassium, sodium phosphates 2 packet PRN   sodium chloride 0.9% 3 mL PRN     Today I personally reviewed pertinent medications, lines/drains/airways, imaging, cardiology results, laboratory results, microbiology results, notably:    Diet  Diet NPO  Diet NPO

## 2018-12-02 NOTE — ASSESSMENT & PLAN NOTE
Maintain ett  cxr improving  High risk for vap and/or mucous plugging, low threshold to bronch and start abx

## 2018-12-03 NOTE — ASSESSMENT & PLAN NOTE
Initial rhythm vfib s/p shock  Difficult intubation, unclear if there was prolonged hypoxia  Pupils reactive on propofol. Will d/c propofol today.   eeg variable and reactive  Repeat ct in 11/28 w diffuse edema  Prognosis indeterminate, no obviously poor features  , CT head evolving diffuse cerebral edema, seizures refractory to escalating doses of aeds    Target euthermia, slight hypercapnia, eunatremia, euglycemia

## 2018-12-03 NOTE — PLAN OF CARE
Problem: Patient Care Overview  Goal: Plan of Care Review  Outcome: Ongoing (interventions implemented as appropriate)  POC reviewed with pt and family at 1300. Patient intubated and sedated. Wife and sister at bedside verbalize understanding. Questions and concerns addressed. Will continue to monitor. See flowsheets for full assessment and VS info. Neuro status unchanged. cEEG remains in place. . Ketamine gtt continued, currently 60 mcg/kg/min. See previous note for outcome of discontinuing ketamine gtt. SBP < 180 MAP > 65 maintained. Vent assist continued. OGT remains at low intermittent suction. Strict NPO. KUB ordered. 12 lead EKG complete. Code status changed to DNR. Family discussion complete via MD Teodora with NCC at bedside.

## 2018-12-03 NOTE — PROGRESS NOTES
" [facility]  Adult Nutrition  Progress Note    SUMMARY       Recommendations    Recommendation/Intervention:   As medically able to restart TF, recommend Isosource 1.5 @ goal rate 50mL/hr.   - Initiate @ 10mL/hr and increase by 10mL q4hrs, or as tolerated, until goal rate is reached.   - Hold for residuals >500mL.   - Provides 1800kcals, 82g protein and 917mL free water.     RD to monitor.    Goals: Initiate nutrition within 48 hrs  Nutrition Goal Status: goal met  Communication of RD Recs: reviewed with RN(POC)    Reason for Assessment    Reason for Assessment: RD follow-up  Diagnosis: (Cardiac Arrest)  Relevant Medical History: HTN  Interdisciplinary Rounds: attended  General Information Comments: Pt remains intubated. TF canceled this morning, NG tube to LIWS. NFPE completed on 11/26- exam remains unchanged. pt with mild loss of lean body mass evident in temporal, clavicles, but adequate in extremities; adequate fat mass.   Nutrition Discharge Planning: unable to determine at this time    Nutrition Risk Screen    Nutrition Risk Screen: tube feeding or parenteral nutrition    Nutrition/Diet History    Food Preferences: MARNIE  Do you have any cultural, spiritual, Confucianism conflicts, given your current situation?: none  Food Allergies: NKFA  Factors Affecting Nutritional Intake: NPO, on mechanical ventilation    Anthropometrics    Temp: 97.9 °F (36.6 °C)  Height Method: Measured  Height: 5' 9" (175.3 cm)  Height (inches): 69 in  Weight Method: Bed Scale  Weight: 67.4 kg (148 lb 9.4 oz)  Weight (lb): 148.59 lb  Ideal Body Weight (IBW), Male: 160 lb  % Ideal Body Weight, Male (lb): 92.87 lb  BMI (Calculated): 22  BMI Grade: 18.5-24.9 - normal       Lab/Procedures/Meds    Pertinent Labs Reviewed: reviewed  Pertinent Labs Comments: noted  Pertinent Medications Reviewed: reviewed  Pertinent Medications Comments: vimpat, keppra    Physical Findings/Assessment    Overall Physical Appearance: nourished, " on ventilator support  Tubes: orogastric tube  Skin: intact    Estimated/Assessed Needs    Weight Used For Calorie Calculations: 67.4 kg (148 lb 9.4 oz)  Energy Calorie Requirements (kcal): 1785  Energy Need Method: [facility]  Protein Requirements: 81-101g(1.2-1.5g/kg)  Weight Used For Protein Calculations: 67.4 kg (148 lb 9.4 oz)  Fluid Requirements (mL): 1mL/kcal or per MD  Fluid Need Method: RDA Method  RDA Method (mL): 1785         Nutrition Prescription Ordered    Current Diet Order: NPO  Nutrition Order Comments: TF canceled  Current Nutrition Support Formula Ordered: Other (Comment)  Current Nutrition Support Rate Ordered: 0 (ml)  Current Nutrition Support Frequency Ordered: .    Evaluation of Received Nutrient/Fluid Intake      I/O: +I/O, good UOP, LBM 12/2    Energy Calories Required: not meeting needs  Protein Required: not meeting needs  Fluid Required: other (see comments)(per MD)    % Intake of Estimated Energy Needs: 0 - 25 %  % Meal Intake: NPO    Nutrition Risk    Level of Risk/Frequency of Follow-up: (f/u 2x/week)     Assessment and Plan    Nutrition Problem  Inadequate energy intake     Related to (etiology):   Mechanical Ventilation     Signs and Symptoms (as evidenced by):   NPO without nutrition support     Interventions:  Collaboration with providers     Nutrition Diagnosis Status:   Worsening           Monitor and Evaluation    Food and Nutrient Intake: enteral nutrition intake  Food and Nutrient Adminstration: enteral and parenteral nutrition administration  Anthropometric Measurements: weight, weight change  Biochemical Data, Medical Tests and Procedures: electrolyte and renal panel  Nutrition-Focused Physical Findings: overall appearance     Nutrition Follow-Up    RD Follow-up?: Yes

## 2018-12-03 NOTE — PLAN OF CARE
12/03/18 1122   Discharge Reassessment   Assessment Type Discharge Planning Reassessment   Do you have any problems affording any of your prescribed medications? No   Discharge Plan A Long-term acute care facility (LTAC)   Discharge Plan B Skilled Nursing Facility   Patient choice form signed by patient/caregiver N/A   Anticipated Discharge Disposition LTAC   Can the patient answer the patient profile reliably? No, cognitively impaired   How does the patient rate their overall health at the present time? (rene)   Describe the patient's ability to walk at the present time. Does not walk or unable to take any steps at all   How often would a person be available to care for the patient? Whenever needed   Number of comorbid conditions (as recorded on the chart) Three   During the past month, has the patient often been bothered by feeling down, depressed or hopeless? (rene)   During the past month, has the patient often been bothered by little interest or pleasure in doing things? (rene)   Post-Acute Status   Post-Acute Authorization Placement   Post-Acute Placement Status (Patient on vent.  Not medically stable for discharge. )         Gabbi Yuan RN, CCRN-K, [facility]  Neuro-Critical Care   [phone]

## 2018-12-03 NOTE — ASSESSMENT & PLAN NOTE
Will discontinue propofol today, increase Ketamine to 40  Continue VPA 1250 Q8 hours, level today therapeutic at 63, will repeat tomorrow and adjust accordingly   Increase clonazepam for subcortical myoclonus  12/3: likely not absorbing clonazepam, vimpat and keppra at max doses, escalate valproate to level ,sabril once gut motility re-established

## 2018-12-03 NOTE — PROGRESS NOTES
Patient noted to have facial twitching with diaphragm spasm. Pupils sluggish bilaterally per pupillometer. EEG button pressed. BINTA Wu with NCC notified and at bedside to assess with MD Teodora with NCC. Will continue to monitor closely. VSS.

## 2018-12-03 NOTE — ASSESSMENT & PLAN NOTE
Resolved  No fevers  12/3 recurrent leukocytosis, all lines relatively new,vomiting( may be secondary to ileus, check le doppler

## 2018-12-03 NOTE — ASSESSMENT & PLAN NOTE
Transfer from [facility] for evaluation of NCSE noted on EEG 11/26. Likely secondary to anoxia in setting of cardiac arrest.    Recommendations:  - Continue vEEG  - Ketamine briefly turned off, patient became tachycardic, episode of emesis, increased clinical twitching, restarted at 60 mKm  - Continue Vimpat 200 mg BID, Keppra 2000 mg BID, VPA 1250 mg TID  - Consider Vigabatrin tomorrow for additional AED coverage if GI absorption improves  - Clonazepam increased for subcortical myoclonus  - Further recommendations pending continued EEG read    Plan of care discussed with NCC team, family at bedside. Will continue to follow.

## 2018-12-03 NOTE — SUBJECTIVE & OBJECTIVE
Interval History: Ketamine briefly turned off this morning for better assessment of neurological status and baseline EEG, restarted at 60 mKm due to tachycardia, hypertension, increased twitching of face/neck. Wife at bedside.     Current Facility-Administered Medications   Medication Dose Route Frequency Provider Last Rate Last Dose    acetaminophen oral solution 650 mg  650 mg Per NG tube Q6H PRN Xenia Gaffney MD   650 mg at 12/01/18 1116    aspirin chewable tablet 81 mg  81 mg Per OG tube Daily Xenia Gaffney MD   81 mg at 12/03/18 0933    atorvastatin tablet 80 mg  80 mg Per OG tube Daily Xenia Gaffney MD   80 mg at 12/03/18 0932    atropine injection 0.5 mg  0.5 mg Intravenous PRN Jazmin Pendleton NP        chlorhexidine 0.12 % solution 15 mL  15 mL Mouth/Throat BID Lamberto Howard NP   15 mL at 12/03/18 0808    clonazePAM tablet 2 mg  2 mg Per OG tube Q8H Jazmin Pendleton NP   2 mg at 12/03/18 0545    clopidogrel tablet 75 mg  75 mg Per OG tube Daily Xenia Gaffney MD   75 mg at 12/03/18 0933    dextrose 50% injection 12.5 g  12.5 g Intravenous PRN Delia Taveras MD        enoxaparin injection 40 mg  40 mg Subcutaneous Daily Xenia Gaffney MD   40 mg at 12/02/18 1729    glucagon (human recombinant) injection 1 mg  1 mg Intramuscular PRN Delia Taveras MD        ketamine (KETALAR) 5,000 mg in sodium chloride 0.9% 500 mL infusion  60 mcg/kg/min (Dosing Weight) Intravenous Continuous Jazmin Pendleton NP 24.3 mL/hr at 12/03/18 0952 60 mcg/kg/min at 12/03/18 0952    lacosamide (VIMPAT) 200 mg in sodium chloride 0.9% 100 mL IVPB  200 mg Intravenous Q12H Tae Lux  mL/hr at 12/03/18 0808 200 mg at 12/03/18 0808    levETIRAcetam (KEPPRA) 2,000 mg in dextrose 5 % 250 mL IVPB  2,000 mg Intravenous Q12H Xenia Gaffney  mL/hr at 12/03/18 0808 2,000 mg at 12/03/18 0808    lidocaine HCL 20 mg/ml (2%) injection 100 mg  100 mg Tracheal Tube Once Mayank Patel MD         lisinopril tablet 5 mg  5 mg Per NG tube Daily Alpa Rodriguez NP   5 mg at 12/03/18 0933    lorazepam injection 2 mg  2 mg Intravenous Q2H PRN Delia Taveras MD   2 mg at 12/03/18 0438    magnesium oxide tablet 800 mg  800 mg Per NG tube PRN Lamberto Howard NP        magnesium oxide tablet 800 mg  800 mg Per NG tube PRN Lamberto Howard NP        metoclopramide HCl injection 10 mg  10 mg Intravenous Q8H Jazmin Pendleton NP        metoprolol tartrate (LOPRESSOR) split tablet 12.5 mg  12.5 mg Per NG tube Q12H Jazmin Pendleton NP   12.5 mg at 12/03/18 0933    midazolam (VERSED) 1 mg/mL injection 4 mg  4 mg Intravenous Once Jazmin Pendleton NP        midazolam (VERSED) 1 mg/mL injection             pantoprazole injection 40 mg  40 mg Intravenous Daily Jazmin Pendleton NP   40 mg at 12/03/18 0808    potassium chloride 10% oral solution 40 mEq  40 mEq Per NG tube PRN Lamberto Howard NP   40 mEq at 11/30/18 0451    potassium chloride 10% oral solution 40 mEq  40 mEq Per NG tube PRN Lamberto Howard NP   40 mEq at 11/28/18 1440    potassium chloride 10% oral solution 60 mEq  60 mEq Per NG tube PRN Lamberto Howard NP        potassium, sodium phosphates 280-160-250 mg packet 2 packet  2 packet Per NG tube PRN Lamberto Howard NP        potassium, sodium phosphates 280-160-250 mg packet 2 packet  2 packet Per NG tube PRN Lamberto Howard NP   2 packet at 11/29/18 1322    potassium, sodium phosphates 280-160-250 mg packet 2 packet  2 packet Per NG tube PRN Lamberto Howard NP        senna-docusate 8.6-50 mg per tablet 1 tablet  1 tablet Per OG tube BID Galen Lopes MD   1 tablet at 12/03/18 0933    sodium chloride 0.9% flush 3 mL  3 mL Intravenous PRN Lamberto Howard NP        valproate (DEPACON) 1,250 mg in dextrose 5 % 100 mL IVPB  1,250 mg Intravenous Q8H Alpa Rodriguez  mL/hr at 12/03/18 0545 1,250 mg at 12/03/18 0545     Continuous Infusions:   ketamine (KETALAR) infusion 60  mcg/kg/min (12/03/18 0952)       Review of Systems   Unable to perform ROS: Intubated     Objective:     Vital Signs (Most Recent):  Temp: 98.6 °F (37 °C) (12/03/18 1002)  Pulse: 85 (12/03/18 1002)  Resp: 16 (12/03/18 1002)  BP: 133/79 (12/03/18 1002)  SpO2: 99 % (12/03/18 1002) Vital Signs (24h Range):  Temp:  [96.1 °F (35.6 °C)-99.7 °F (37.6 °C)] 98.6 °F (37 °C)  Pulse:  [69-93] 85  Resp:  [13-19] 16  SpO2:  [98 %-100 %] 99 %  BP: (102-143)/(65-85) 133/79  Arterial Line BP: (117-176)/(60-77) 155/70     Weight: 67.4 kg (148 lb 9.4 oz)  Body mass index is 21.94 kg/m².    Physical Exam   Constitutional: He appears well-developed and well-nourished. He is intubated.   HENT:   Head: Normocephalic and atraumatic.   Eyes: Conjunctivae are normal. Pupils are equal, round, and reactive to light.   Neck: No thyromegaly present.   Cardiovascular: Intact distal pulses.   Pulmonary/Chest: He is intubated.   Musculoskeletal: He exhibits no deformity.   Skin: Skin is warm and dry. He is not diaphoretic.   Psychiatric:   Unable to assess       NEUROLOGICAL EXAMINATION:     MENTAL STATUS   Level of consciousness: unresponsive to painful stimuli    CRANIAL NERVES     CN III, IV, VI   Pupils are equal, round, and reactive to light.       Comatose, intermittent myoclonus of mouth  CN:   No blink to threat OU   SÁNCHEZ OU    Corneal absent OU   No cough, gag  Face symmetric   Tongue midline   No withdrawal to noxious stimuli in any extremity     Exam findings to suggest seizures:  Myoclonus - yes - mouth, feet  eye twitching - no   Nystagmus - no   gaze deviation - no   waxy rigidity - no        Significant Labs: All pertinent lab results from the past 24 hours have been reviewed.    Significant Studies: I have reviewed all pertinent imaging results/findings within the past 24 hours.

## 2018-12-03 NOTE — PROGRESS NOTES
approx 500 cc of coffee ground like emesis coming from OG tube. Concerned that PO meds are not being absorbed and just being sucked back out after being clamped. Peter COTO aware and will pass along to day team.

## 2018-12-03 NOTE — PROGRESS NOTES
On assessment with pupillometer pupils were unequal and L pupil sluggish. Pt known to have pupil changes in the past. Peter COTO aware. No changes at this time and will continue to monitor.

## 2018-12-03 NOTE — PLAN OF CARE
Problem: Patient Care Overview  Goal: Plan of Care Review  Outcome: Ongoing (interventions implemented as appropriate)  POC reviewed with pt at 0500. Pt sedated and intubated. Questions and concerns unable to be addressed. No acute events overnight. Pt progressing toward goals. Ketamine at 40, cEEG in place, ativan given x2 for seizure like movement with VS changes, OG to LIWS getting coffee ground like output, minimal urine output, afebrile. Will continue to monitor. See flowsheets for full assessment and VS info.

## 2018-12-03 NOTE — PROGRESS NOTES
[facility]  Neurology-Epilepsy  Progress Note    Patient Name: Micheal Chirinos Jr.  MRN: [id number]  Admission Date: 2018  Hospital Length of Stay: 8 days  Code Status: Full Code   Attending Provider: Kj Araiza MD  Primary Care Physician: Azikiwe K Lombard, MD   Principal Problem:Status epilepticus    Subjective:     Hospital Course:   EE/26: continuous generalized epileptiform discharges when off propofol, suggestive of NCSE  : Cap EEG - intermittent rhythmic GPEDs, suggestive of post anoxic NCSE  >: Fluctuating, periods of sharp GPEDs which improved ~0300 , when Propofol increased to 75 mKm. Subsequently, light burst suppression pattern.  >: light burst suppression pattern, sharp GPEDs intermittently. Subcortical myoclonus + EEG artifact as well.   >: Periodic generalized epileptiform discharges. More suppressed on Ketamine/Propofol, plan is to taper off Propofol and optimize Ketamine  >2: Burst suppression, epileptiform discharges in bursts  >3: Burst suppression pattern, however frequent GPEDs when ketamine stopped consistent with SE    Interval History: Ketamine briefly turned off this morning for better assessment of neurological status and baseline EEG, restarted at 60 mKm due to tachycardia, hypertension, increased twitching of face/neck. Wife at bedside.     Current Facility-Administered Medications   Medication Dose Route Frequency Provider Last Rate Last Dose    acetaminophen oral solution 650 mg  650 mg Per NG tube Q6H PRN Xenia Gaffney MD   650 mg at 18 1116    aspirin chewable tablet 81 mg  81 mg Per OG tube Daily Xenia Gaffney MD   81 mg at 18 0933    atorvastatin tablet 80 mg  80 mg Per OG tube Daily Xenia Gaffney MD   80 mg at 18 0932    atropine injection 0.5 mg  0.5 mg Intravenous PRN Jazmin Pendleton NP        chlorhexidine 0.12 % solution 15 mL  15 mL Mouth/Throat BID Lamberto  BINTA Howard   15 mL at 12/03/18 0808    clonazePAM tablet 2 mg  2 mg Per OG tube Q8H Jazmin Pendleton NP   2 mg at 12/03/18 0545    clopidogrel tablet 75 mg  75 mg Per OG tube Daily Xenia Gaffney MD   75 mg at 12/03/18 0933    dextrose 50% injection 12.5 g  12.5 g Intravenous PRN Delia Taveras MD        enoxaparin injection 40 mg  40 mg Subcutaneous Daily Xenia Gaffney MD   40 mg at 12/02/18 1729    glucagon (human recombinant) injection 1 mg  1 mg Intramuscular PRN Delia Taveras MD        ketamine (KETALAR) 5,000 mg in sodium chloride 0.9% 500 mL infusion  60 mcg/kg/min (Dosing Weight) Intravenous Continuous Jazmin Pendleton NP 24.3 mL/hr at 12/03/18 0952 60 mcg/kg/min at 12/03/18 0952    lacosamide (VIMPAT) 200 mg in sodium chloride 0.9% 100 mL IVPB  200 mg Intravenous Q12H Tae Lux  mL/hr at 12/03/18 0808 200 mg at 12/03/18 0808    levETIRAcetam (KEPPRA) 2,000 mg in dextrose 5 % 250 mL IVPB  2,000 mg Intravenous Q12H Xenia Gaffney  mL/hr at 12/03/18 0808 2,000 mg at 12/03/18 0808    lidocaine HCL 20 mg/ml (2%) injection 100 mg  100 mg Tracheal Tube Once Mayank Patel MD        lisinopril tablet 5 mg  5 mg Per NG tube Daily Alpa Rodriguez NP   5 mg at 12/03/18 0933    lorazepam injection 2 mg  2 mg Intravenous Q2H PRN Delia Taveras MD   2 mg at 12/03/18 0438    magnesium oxide tablet 800 mg  800 mg Per NG tube PRN Lamberto Howard NP        magnesium oxide tablet 800 mg  800 mg Per NG tube PRN Lamberto Howard NP        metoclopramide HCl injection 10 mg  10 mg Intravenous Q8H Jazmin Pendleton NP        metoprolol tartrate (LOPRESSOR) split tablet 12.5 mg  12.5 mg Per NG tube Q12H Jazmin Pendleton NP   12.5 mg at 12/03/18 0933    midazolam (VERSED) 1 mg/mL injection 4 mg  4 mg Intravenous Once Jazmin Pendleton NP        midazolam (VERSED) 1 mg/mL injection             pantoprazole injection 40 mg  40 mg Intravenous Daily Jazmin Pendleton NP   40 mg at  12/03/18 0808    potassium chloride 10% oral solution 40 mEq  40 mEq Per NG tube PRN Lamberto Howard NP   40 mEq at 11/30/18 0451    potassium chloride 10% oral solution 40 mEq  40 mEq Per NG tube PRN Lamberto Howard NP   40 mEq at 11/28/18 1440    potassium chloride 10% oral solution 60 mEq  60 mEq Per NG tube PRN Lamberto Howard NP        potassium, sodium phosphates 280-160-250 mg packet 2 packet  2 packet Per NG tube PRKARMEN Howard NP        potassium, sodium phosphates 280-160-250 mg packet 2 packet  2 packet Per NG tube PRN Lamberto Howard NP   2 packet at 11/29/18 1322    potassium, sodium phosphates 280-160-250 mg packet 2 packet  2 packet Per NG tube PRKARMEN Howard NP        senna-docusate 8.6-50 mg per tablet 1 tablet  1 tablet Per OG tube BID Galen Lopes MD   1 tablet at 12/03/18 0933    sodium chloride 0.9% flush 3 mL  3 mL Intravenous PRN Lamberto Howard NP        valproate (DEPACON) 1,250 mg in dextrose 5 % 100 mL IVPB  1,250 mg Intravenous Q8H Alpa Rodriguez  mL/hr at 12/03/18 0545 1,250 mg at 12/03/18 0545     Continuous Infusions:   ketamine (KETALAR) infusion 60 mcg/kg/min (12/03/18 0952)       Review of Systems   Unable to perform ROS: Intubated     Objective:     Vital Signs (Most Recent):  Temp: 98.6 °F (37 °C) (12/03/18 1002)  Pulse: 85 (12/03/18 1002)  Resp: 16 (12/03/18 1002)  BP: 133/79 (12/03/18 1002)  SpO2: 99 % (12/03/18 1002) Vital Signs (24h Range):  Temp:  [96.1 °F (35.6 °C)-99.7 °F (37.6 °C)] 98.6 °F (37 °C)  Pulse:  [69-93] 85  Resp:  [13-19] 16  SpO2:  [98 %-100 %] 99 %  BP: (102-143)/(65-85) 133/79  Arterial Line BP: (117-176)/(60-77) 155/70     Weight: 67.4 kg (148 lb 9.4 oz)  Body mass index is 21.94 kg/m².    Physical Exam   Constitutional: He appears well-developed and well-nourished. He is intubated.   HENT:   Head: Normocephalic and atraumatic.   Eyes: Conjunctivae are normal. Pupils are equal, round, and reactive to light.    Neck: No thyromegaly present.   Cardiovascular: Intact distal pulses.   Pulmonary/Chest: He is intubated.   Musculoskeletal: He exhibits no deformity.   Skin: Skin is warm and dry. He is not diaphoretic.   Psychiatric:   Unable to assess       NEUROLOGICAL EXAMINATION:     MENTAL STATUS   Level of consciousness: unresponsive to painful stimuli    CRANIAL NERVES     CN III, IV, VI   Pupils are equal, round, and reactive to light.       Comatose, intermittent myoclonus of mouth  CN:   No blink to threat OU   SÁNCHEZ OU    Corneal absent OU   No cough, gag  Face symmetric   Tongue midline   No withdrawal to noxious stimuli in any extremity     Exam findings to suggest seizures:  Myoclonus - yes - mouth, feet  eye twitching - no   Nystagmus - no   gaze deviation - no   waxy rigidity - no        Significant Labs: All pertinent lab results from the past 24 hours have been reviewed.    Significant Studies: I have reviewed all pertinent imaging results/findings within the past 24 hours.    Assessment and Plan:     * Status epilepticus    Transfer from [facility] for evaluation of NCSE noted on EEG 11/26. Likely secondary to anoxia in setting of cardiac arrest.    Recommendations:  - Continue vEEG  - Ketamine briefly turned off, patient became tachycardic, episode of emesis, increased clinical twitching, restarted at 60 mKm  - Continue Vimpat 200 mg BID, Keppra 2000 mg BID  - Optimization of VPA - additional bolus given 12/3 and maintenance dose increased to 1500 mg TID  - Consider Vigabatrin tomorrow for additional AED coverage if GI absorption improves  - Clonazepam increased for subcortical myoclonus  - Further recommendations pending continued EEG read    Plan of care discussed with NCC team, family at bedside. Will continue to follow.      Cardiac arrest with ventricular fibrillation    - 11/25 at home, troponin peaked at 9  - Cardiology consulted and following. Recommend guideline directed therapy for now, will need  interventional cardiology/cardiac catheterization for further investigation to determine if there is ischemic disease if neurological recovery takes palce  - TTM completed at OSH, target temp reached at 0300 on 11/26, now rewarmed  - Management per primary     Anoxic brain injury    - Likely given myoclonic jerks at OSH, NCSE on EEG off propofol  - CT head 11/28 showing interval diffuse loss of gray-white matter differentiation with diffuse cerebral edema resulting in sulcal effacement of the cerebral hemispheres and smaller caliber ventricular system, concerning for global anoxic hypoxic injury.  - Will eventually need MRI brain     Acute hypoxemic respiratory failure    - ET tube placed in OR at [facility] due to neck rigidity from AS  - Vent management per [facility]     Leukocytosis    - Trending up, 18.85 today  - Afebrile           VTE Risk Mitigation (From admission, onward)        Ordered     enoxaparin injection 40 mg  Daily      11/28/18 0972          Cathryn Ramirez PA-C  Neurology-Epilepsy  [facility]  Staff: Dr. Almazan

## 2018-12-03 NOTE — PROGRESS NOTES
[facility]  Neurocritical Care  Progress Note    Admit Date: 11/25/2018  Service Date: 12/03/2018  Length of Stay: 8    Subjective:     Chief Complaint: Status epilepticus    History of Present Illness: Patient is a 59yo male presented as a transfer from [facility]. Original admission to [facility] was due to s/p cardiac arrest. The patient was found down by his wife who then called EMS. On arrival to the home, assessment showed the patient to be in Vfib for which a shock was delivered along with 3 rounds of Epi and 300mg Amiodarone x1. ROSC achieved. Cooling procedures were initiated with target tempeture obtained 11/26 0030. Due to ankylosing spondylitis and severely stiff neck, EMS was unable to intubate in the field therefore an LMA was placed. The patient was subsequently intubated in the OR with the use of fiberoptic. While in PACU (11/25) awaiting an ICU bed the patient began to exhibit myoclonic jerks.  The jerks resolved with Ativan and he was loaded on Keppra. CT head showed significant motion artifact. An EEG later showed generalized epileptiform discharges suggestive of non convulsive status. A decision was made to transfer to [facility] for critical care epilepsy management.     Patient was received in the NCC unit vented on 50mcg of propofol. EEG monitoring in progress. Unable to perform a complete neuro assessment due to the critical nature posed towards the patient when propofol is halted. No family at bedside to contribute to patient's history.        Hospital Course: 11/28 Admit to [facility]; Epilepsy consulted; EEG monitoring in place  11/29: increased keppra. Load valproate and valproate TID started. Advanced tube feeds.  11/30: A-gilma placed, electrolytes replaced overnight. On rounds patient started having twitches in LE and face. Muscle paralysis with rocuronium 75, paused propofol and monitored EEG. EEG with occassional spike discharges. Restarted propofol at 40. AEDs as is.  Ordered CK and TG levels  12/2: Had vomiting overnight, also bowel movement, myoclonus right after, propofol was re-started overnight. CXR without aspiration. Will D/C propofol today, will increase clonazepam, increase Ketamine to 50. Decrease RR to 60, will repeat ABG today.  12/3: went into SE when ketamine stopped, responded to 1 mg/kg ketamine bolus and resumption of gtt at 60mcg/kg, valproate increased for target level of , reglan started        Review of Systems   Unable to perform ROS: Intubated       Objective:     Vitals:  Temp: 97.9 °F (36.6 °C)  Pulse: 77  Rhythm: normal sinus rhythm  BP: 129/79  MAP (mmHg): 99  Resp: 16  SpO2: 99 %  Oxygen Concentration (%): 40  O2 Device (Oxygen Therapy): ventilator  Vent Mode: SIMV  Set Rate: 16 bmp  Vt Set: 400 mL  Pressure Support: 10 cmH20  PEEP/CPAP: 8 cmH20  Peak Airway Pressure: 23 cmH2O  Mean Airway Pressure: 13 cmH20  Plateau Pressure: 0 cmH20    Temp  Min: 97.9 °F (36.6 °C)  Max: 99.7 °F (37.6 °C)  Pulse  Min: 77  Max: 93  BP  Min: 102/65  Max: 143/85  MAP (mmHg)  Min: 79  Max: 109  Resp  Min: 13  Max: 19  SpO2  Min: 98 %  Max: 100 %  Oxygen Concentration (%)  Min: 40  Max: 60    12/02 0701 - 12/03 0700  In: 2676.7 [I.V.:856.7]  Out: 1500 [Urine:900; Drains:600]   Unmeasured Output  Urine Occurrence: 0  Stool Occurrence: 0  Emesis Occurrence: 0  Pad Count: 0       Physical Exam   Constitutional: He appears well-developed.   HENT:   Head: Normocephalic.   Eyes:   Right pupil 3.5 to 3mm and left 2mm to 1mm   Neck: No JVD present.   Cardiovascular: Normal rate and regular rhythm.   Pulmonary/Chest: Breath sounds normal.   Abdominal: Soft. Bowel sounds are decreased.   Musculoskeletal: He exhibits no edema.   Neurological:   Coma, GCS 3  Pupils react and has corneals  eoms absent to cold water caloric test  No movement in extremities   Skin: Skin is warm.         Medications:  Continuous  ketamine (KETALAR) infusion Last Rate: 60 mcg/kg/min (12/03/18 1202)    Scheduled  aspirin 81 mg Daily   atorvastatin 80 mg Daily   chlorhexidine 15 mL BID   clonazePAM 2 mg Q8H   clopidogrel 75 mg Daily   enoxaparin 40 mg Daily   lacosamide (VIMPAT) IVPB 200 mg Q12H   levetiracetam IVPB 2,000 mg Q12H   lidocaine HCL 20 mg/ml (2%) 100 mg Once   lisinopril 5 mg Daily   metoclopramide HCl 10 mg Q8H   metoprolol tartrate 12.5 mg Q12H   pantoprazole 40 mg Daily   senna-docusate 8.6-50 mg 1 tablet BID   valproate sodium (DEPACON) IVPB 1,500 mg Q8H   valproate sodium (DEPACON) IVPB 600 mg Once   PRN  acetaminophen 650 mg Q6H PRN   atropine 0.5 mg PRN   dextrose 50% 12.5 g PRN   glucagon (human recombinant) 1 mg PRN   lorazepam 2 mg Q2H PRN   magnesium oxide 800 mg PRN   magnesium oxide 800 mg PRN   potassium chloride 10% 40 mEq PRN   potassium chloride 10% 40 mEq PRN   potassium chloride 10% 60 mEq PRN   potassium, sodium phosphates 2 packet PRN   potassium, sodium phosphates 2 packet PRN   potassium, sodium phosphates 2 packet PRN   sodium chloride 0.9% 3 mL PRN     Today I personally reviewed pertinent medications, lines/drains/airways, imaging, cardiology results, laboratory results, microbiology results, notably:    Diet  Diet NPO  Diet NPO          Assessment/Plan:     Neuro   * Status epilepticus    Will discontinue propofol today, increase Ketamine to 40  Continue VPA 1250 Q8 hours, level today therapeutic at 63, will repeat tomorrow and adjust accordingly   Increase clonazepam for subcortical myoclonus  12/3: likely not absorbing clonazepam, vimpat and keppra at max doses, escalate valproate to level ,sabril once gut motility re-established     Anoxic brain injury    Initial rhythm vfib s/p shock  Difficult intubation, unclear if there was prolonged hypoxia  Pupils reactive on propofol. Will d/c propofol today.   eeg variable and reactive  Repeat ct in 11/28 w diffuse edema  Prognosis indeterminate, no obviously poor features  , CT head evolving diffuse cerebral edema,  seizures refractory to escalating doses of aeds    Target euthermia, slight hypercapnia, eunatremia, euglycemia       Pulmonary   Acute hypoxemic respiratory failure    Maintain ett  CXR improved on 12/1, will repeat after bronch   High risk for vap and/or mucous plugging, will bronch, low threshold to start abx         Cardiac/Vascular   Cardiac arrest with ventricular fibrillation    Unclear if vf induced hypoperfusion or acs caused nstemi    Asa, plavix, statin,beta blocker, Lisinopril 5 mg  Cath/aicd eval if neurological recovery     Oncology   Leukocytosis    Resolved  No fevers  12/3 recurrent leukocytosis, all lines relatively new,vomiting( may be secondary to ileus, check le doppler           The patient is being Prophylaxed for:  Venous Thromboembolism with: Chemical  Stress Ulcer with: PPI  Ventilator Pneumonia with: chlorhexidine oral care    Activity Orders          None      family updated  CRC 45 min  Full Code    Kj Prakash MD  Neurocritical Care  [facility]

## 2018-12-03 NOTE — SUBJECTIVE & OBJECTIVE
Review of Systems   Unable to perform ROS: Intubated       Objective:     Vitals:  Temp: 97.9 °F (36.6 °C)  Pulse: 77  Rhythm: normal sinus rhythm  BP: 129/79  MAP (mmHg): 99  Resp: 16  SpO2: 99 %  Oxygen Concentration (%): 40  O2 Device (Oxygen Therapy): ventilator  Vent Mode: SIMV  Set Rate: 16 bmp  Vt Set: 400 mL  Pressure Support: 10 cmH20  PEEP/CPAP: 8 cmH20  Peak Airway Pressure: 23 cmH2O  Mean Airway Pressure: 13 cmH20  Plateau Pressure: 0 cmH20    Temp  Min: 97.9 °F (36.6 °C)  Max: 99.7 °F (37.6 °C)  Pulse  Min: 77  Max: 93  BP  Min: 102/65  Max: 143/85  MAP (mmHg)  Min: 79  Max: 109  Resp  Min: 13  Max: 19  SpO2  Min: 98 %  Max: 100 %  Oxygen Concentration (%)  Min: 40  Max: 60    12/02 0701 - 12/03 0700  In: 2676.7 [I.V.:856.7]  Out: 1500 [Urine:900; Drains:600]   Unmeasured Output  Urine Occurrence: 0  Stool Occurrence: 0  Emesis Occurrence: 0  Pad Count: 0       Physical Exam   Constitutional: He appears well-developed.   HENT:   Head: Normocephalic.   Eyes:   Right pupil 3.5 to 3mm and left 2mm to 1mm   Neck: No JVD present.   Cardiovascular: Normal rate and regular rhythm.   Pulmonary/Chest: Breath sounds normal.   Abdominal: Soft. Bowel sounds are decreased.   Musculoskeletal: He exhibits no edema.   Neurological:   Coma, GCS 3  Pupils react and has corneals  eoms absent to cold water caloric test  No movement in extremities   Skin: Skin is warm.         Medications:  Continuous  ketamine (KETALAR) infusion Last Rate: 60 mcg/kg/min (12/03/18 1202)   Scheduled  aspirin 81 mg Daily   atorvastatin 80 mg Daily   chlorhexidine 15 mL BID   clonazePAM 2 mg Q8H   clopidogrel 75 mg Daily   enoxaparin 40 mg Daily   lacosamide (VIMPAT) IVPB 200 mg Q12H   levetiracetam IVPB 2,000 mg Q12H   lidocaine HCL 20 mg/ml (2%) 100 mg Once   lisinopril 5 mg Daily   metoclopramide HCl 10 mg Q8H   metoprolol tartrate 12.5 mg Q12H   pantoprazole 40 mg Daily   senna-docusate 8.6-50 mg 1 tablet BID   valproate sodium  (DEPACON) IVPB 1,500 mg Q8H   valproate sodium (DEPACON) IVPB 600 mg Once   PRN  acetaminophen 650 mg Q6H PRN   atropine 0.5 mg PRN   dextrose 50% 12.5 g PRN   glucagon (human recombinant) 1 mg PRN   lorazepam 2 mg Q2H PRN   magnesium oxide 800 mg PRN   magnesium oxide 800 mg PRN   potassium chloride 10% 40 mEq PRN   potassium chloride 10% 40 mEq PRN   potassium chloride 10% 60 mEq PRN   potassium, sodium phosphates 2 packet PRN   potassium, sodium phosphates 2 packet PRN   potassium, sodium phosphates 2 packet PRN   sodium chloride 0.9% 3 mL PRN     Today I personally reviewed pertinent medications, lines/drains/airways, imaging, cardiology results, laboratory results, microbiology results, notably:    Diet  Diet NPO  Diet NPO

## 2018-12-03 NOTE — PROGRESS NOTES
Pt having rhythmic lip movements and minimal upper body jerking. Vent alarming for low tidal volume. Possible seizure like activity with coffee ground emesis coming from OG tube and bloody secretions coming from mouth. Peter COTO aware and ativan PRN administered. Will monitor closely.

## 2018-12-04 PROBLEM — K13.0 CHAPPED LIPS: Status: ACTIVE | Noted: 2018-01-01

## 2018-12-04 NOTE — SUBJECTIVE & OBJECTIVE
Interval History: Ketamine at 75 mKm overnight, EEG with burst suppression pattern with bursts of high amplitude spike/wave activity and periods of generalized suppression. Intermittent associated myoclonic activity.    Current Facility-Administered Medications   Medication Dose Route Frequency Provider Last Rate Last Dose    acetaminophen oral solution 650 mg  650 mg Per NG tube Q6H PRN Xenia Gaffney MD   650 mg at 12/01/18 1116    aspirin chewable tablet 81 mg  81 mg Per OG tube Daily Xenia Gaffney MD   81 mg at 12/03/18 0933    atorvastatin tablet 80 mg  80 mg Per OG tube Daily Xenia Gaffney MD   80 mg at 12/03/18 0932    atropine injection 0.5 mg  0.5 mg Intravenous PRN Jazmin Pendleton NP        chlorhexidine 0.12 % solution 15 mL  15 mL Mouth/Throat BID Lamberto Howard NP   15 mL at 12/04/18 0816    clonazePAM tablet 2 mg  2 mg Per OG tube Q8H Jazmin Pendleton NP   Stopped at 12/03/18 1400    clopidogrel tablet 75 mg  75 mg Per OG tube Daily Xenia Gaffney MD   75 mg at 12/03/18 0933    dextrose 50% injection 12.5 g  12.5 g Intravenous PRN Delia Taveras MD        enoxaparin injection 40 mg  40 mg Subcutaneous Daily Xenia Gaffney MD   40 mg at 12/03/18 1710    glucagon (human recombinant) injection 1 mg  1 mg Intramuscular PRN Delia Taveras MD        ketamine (KETALAR) 5,000 mg in sodium chloride 0.9% 500 mL infusion  100 mcg/kg/min (Dosing Weight) Intravenous Continuous Jazmin Pendleton NP 40.4 mL/hr at 12/04/18 1055 100 mcg/kg/min at 12/04/18 1055    lacosamide (VIMPAT) 200 mg in sodium chloride 0.9% 100 mL IVPB  200 mg Intravenous Q12H Tae Lux  mL/hr at 12/04/18 0953 200 mg at 12/04/18 0953    levETIRAcetam (KEPPRA) 2,000 mg in dextrose 5 % 250 mL IVPB  2,000 mg Intravenous Q12H Xenia Gaffney  mL/hr at 12/04/18 0817 2,000 mg at 12/04/18 0817    lidocaine HCL 20 mg/ml (2%) injection 100 mg  100 mg Tracheal Tube Once Mayank Patel MD         lisinopril tablet 5 mg  5 mg Per NG tube Daily Alpa Rodriguez NP   5 mg at 12/03/18 0933    lorazepam injection 2 mg  2 mg Intravenous Q2H PRN Delia Taveras MD   2 mg at 12/03/18 0438    magnesium oxide tablet 800 mg  800 mg Per NG tube PRN Lamberto Howard NP        magnesium oxide tablet 800 mg  800 mg Per NG tube PRN Lamberto Howard NP        metoclopramide HCl injection 10 mg  10 mg Intravenous Q8H Jazmin Pendleton NP   10 mg at 12/04/18 0609    metoprolol tartrate (LOPRESSOR) split tablet 12.5 mg  12.5 mg Per NG tube Q12H Jazmin Pendleton NP   Stopped at 12/03/18 2100    pantoprazole injection 40 mg  40 mg Intravenous Daily Jazmin Pendleton NP   40 mg at 12/04/18 0816    potassium chloride 10% oral solution 40 mEq  40 mEq Per NG tube PRN Lamberto Howard NP   40 mEq at 11/30/18 0451    potassium chloride 10% oral solution 40 mEq  40 mEq Per NG tube PRN Lamberto Howard NP   40 mEq at 11/28/18 1440    potassium chloride 10% oral solution 60 mEq  60 mEq Per NG tube PRN Lamberto Howard NP        potassium, sodium phosphates 280-160-250 mg packet 2 packet  2 packet Per NG tube PRN Lamberto Howard NP        potassium, sodium phosphates 280-160-250 mg packet 2 packet  2 packet Per NG tube PRN Lamberto Howard NP   2 packet at 11/29/18 1322    potassium, sodium phosphates 280-160-250 mg packet 2 packet  2 packet Per NG tube PRN Lamberto Howard NP        senna-docusate 8.6-50 mg per tablet 1 tablet  1 tablet Per OG tube BID Galen Lopes MD   Stopped at 12/03/18 2100    sodium chloride 0.9% flush 3 mL  3 mL Intravenous PRN Lamberto Howard NP        sodium chloride 3% nebulizer solution 4 mL  4 mL Nebulization Q6H Kj Araiza MD        valproate (DEPACON) 1,500 mg in dextrose 5 % 100 mL IVPB  1,500 mg Intravenous Q8H Kj Araiza  mL/hr at 12/04/18 0609 1,500 mg at 12/04/18 0609     Continuous Infusions:   ketamine (KETALAR) infusion 100 mcg/kg/min (12/04/18  1055)       Review of Systems   Unable to perform ROS: Intubated     Objective:     Vital Signs (Most Recent):  Temp: 97.5 °F (36.4 °C) (12/04/18 0907)  Pulse: 85 (12/04/18 0907)  Resp: 18 (12/04/18 0907)  BP: 123/74 (12/04/18 0902)  SpO2: (!) 93 % (12/04/18 0907) Vital Signs (24h Range):  Temp:  [97.3 °F (36.3 °C)-98.1 °F (36.7 °C)] 97.5 °F (36.4 °C)  Pulse:  [75-93] 85  Resp:  [0-18] 18  SpO2:  [93 %-100 %] 93 %  BP: (123-152)/(74-88) 123/74  Arterial Line BP: (146-164)/(67-77) 146/67     Weight: 67.4 kg (148 lb 9.4 oz)  Body mass index is 21.94 kg/m².    Physical Exam   Constitutional: He appears well-developed and well-nourished. He is sedated and intubated.   HENT:   Head: Normocephalic and atraumatic.   Eyes: Conjunctivae are normal. Pupils are equal, round, and reactive to light.   Neck: No thyromegaly present.   Cardiovascular: Intact distal pulses.   Pulmonary/Chest: He is intubated.   Musculoskeletal: He exhibits no deformity.   Skin: Skin is warm and dry. He is not diaphoretic.   Psychiatric:   Unable to assess       NEUROLOGICAL EXAMINATION:     MENTAL STATUS   Level of consciousness: unresponsive to painful stimuli    CRANIAL NERVES     CN III, IV, VI   Pupils are equal, round, and reactive to light.       Comatose, intermittent myoclonus of mouth  CN:   No blink to threat OU   S[place]NCHEZ OU    Corneal absent OU   No cough, gag  Face symmetric   Tongue midline   No withdrawal to noxious stimuli in any extremity     Exam findings to suggest seizures:  Myoclonus - yes - mouth, feet  eye twitching - no   Nystagmus - no   gaze deviation - no   waxy rigidity - no        Significant Labs: All pertinent lab results from the past 24 hours have been reviewed.    Significant Studies: I have reviewed all pertinent imaging results/findings within the past 24 hours.

## 2018-12-04 NOTE — PROGRESS NOTES
On hourly assessment, PT with diaphgram spasms and facial twitching. Pupils sluggish bilaterally 3/4 per pupillometer. BINTA Wu and MD Teodora with NCC notified and at bedside to assess. No new orders provided. Will continue to monitor closely.

## 2018-12-04 NOTE — PROGRESS NOTES
[facility]  Neurology-Epilepsy  Progress Note    Patient Name: Micheal Chirinos Jr.  MRN: [id number]  Admission Date: 2018  Hospital Length of Stay: 9 days  Code Status: DNR   Attending Provider: Kj Araiza MD  Primary Care Physician: Azikiwe K Lombard, MD   Principal Problem:Status epilepticus    Subjective:     Hospital Course:   EE/26: continuous generalized epileptiform discharges when off propofol, suggestive of NCSE  : Cap EEG - intermittent rhythmic GPEDs, suggestive of post anoxic NCSE  >: Fluctuating, periods of sharp GPEDs which improved ~0300 , when Propofol increased to 75 mKm. Subsequently, light burst suppression pattern.  >: light burst suppression pattern, sharp GPEDs intermittently. Subcortical myoclonus + EEG artifact as well.   >: Periodic generalized epileptiform discharges. More suppressed on Ketamine/Propofol, plan is to taper off Propofol and optimize Ketamine  >2: Burst suppression, epileptiform discharges in bursts  >3: Burst suppression pattern  12/3>4: burst suppression pattern with epileptiform bursts and associated clinical correlate    Interval History: Ketamine at 75 mKm overnight, EEG with burst suppression pattern with bursts of high amplitude spike/wave activity and periods of generalized suppression. Intermittent associated myoclonic activity.    Current Facility-Administered Medications   Medication Dose Route Frequency Provider Last Rate Last Dose    acetaminophen oral solution 650 mg  650 mg Per NG tube Q6H PRN Xenia Gaffney MD   650 mg at 18 1116    aspirin chewable tablet 81 mg  81 mg Per OG tube Daily Xenia Gaffney MD   81 mg at 18 0933    atorvastatin tablet 80 mg  80 mg Per OG tube Daily Xenia Gaffney MD   80 mg at 18 0932    atropine injection 0.5 mg  0.5 mg Intravenous PRN Jazmin Pendleton NP        chlorhexidine 0.12 % solution 15 mL  15 mL Mouth/Throat BID  Lamberto Howard NP   15 mL at 12/04/18 0816    clonazePAM tablet 2 mg  2 mg Per OG tube Q8H Jazmin Pendleton NP   Stopped at 12/03/18 1400    clopidogrel tablet 75 mg  75 mg Per OG tube Daily Xenia Gaffney MD   75 mg at 12/03/18 0933    dextrose 50% injection 12.5 g  12.5 g Intravenous PRN Delia Taveras MD        enoxaparin injection 40 mg  40 mg Subcutaneous Daily Xenia Gaffney MD   40 mg at 12/03/18 1710    glucagon (human recombinant) injection 1 mg  1 mg Intramuscular PRN Delia Taveras MD        ketamine (KETALAR) 5,000 mg in sodium chloride 0.9% 500 mL infusion  100 mcg/kg/min (Dosing Weight) Intravenous Continuous Jazmin Pendleton NP 40.4 mL/hr at 12/04/18 1055 100 mcg/kg/min at 12/04/18 1055    lacosamide (VIMPAT) 200 mg in sodium chloride 0.9% 100 mL IVPB  200 mg Intravenous Q12H Tae Lux  mL/hr at 12/04/18 0953 200 mg at 12/04/18 0953    levETIRAcetam (KEPPRA) 2,000 mg in dextrose 5 % 250 mL IVPB  2,000 mg Intravenous Q12H Xenia Gaffney  mL/hr at 12/04/18 0817 2,000 mg at 12/04/18 0817    lidocaine HCL 20 mg/ml (2%) injection 100 mg  100 mg Tracheal Tube Once Mayank Patel MD        lisinopril tablet 5 mg  5 mg Per NG tube Daily Alpa Rodriguez NP   5 mg at 12/03/18 0933    lorazepam injection 2 mg  2 mg Intravenous Q2H PRN Delia Taveras MD   2 mg at 12/03/18 0438    magnesium oxide tablet 800 mg  800 mg Per NG tube PRN Lamberto Howard NP        magnesium oxide tablet 800 mg  800 mg Per NG tube PRN Lamberto Howard NP        metoclopramide HCl injection 10 mg  10 mg Intravenous Q8H Jazmin Pendleton NP   10 mg at 12/04/18 0609    metoprolol tartrate (LOPRESSOR) split tablet 12.5 mg  12.5 mg Per NG tube Q12H Jazmin Pendleton NP   Stopped at 12/03/18 2100    pantoprazole injection 40 mg  40 mg Intravenous Daily Jazmin Pnedleton NP   40 mg at 12/04/18 0816    potassium chloride 10% oral solution 40 mEq  40 mEq Per NG tube PRN Lamberto Howard NP    40 mEq at 11/30/18 0451    potassium chloride 10% oral solution 40 mEq  40 mEq Per NG tube PRN Lamberto Howard NP   40 mEq at 11/28/18 1440    potassium chloride 10% oral solution 60 mEq  60 mEq Per NG tube PRN Lamberto Howard NP        potassium, sodium phosphates 280-160-250 mg packet 2 packet  2 packet Per NG tube PRN Lamberto Howard NP        potassium, sodium phosphates 280-160-250 mg packet 2 packet  2 packet Per NG tube PRN Lamberto Howard NP   2 packet at 11/29/18 1322    potassium, sodium phosphates 280-160-250 mg packet 2 packet  2 packet Per NG tube PRN Lamberto Howard NP        senna-docusate 8.6-50 mg per tablet 1 tablet  1 tablet Per OG tube BID Galen Lopes MD   Stopped at 12/03/18 2100    sodium chloride 0.9% flush 3 mL  3 mL Intravenous PRN Lamberto Howard NP        sodium chloride 3% nebulizer solution 4 mL  4 mL Nebulization Q6H Kj Araiza MD        valproate (DEPACON) 1,500 mg in dextrose 5 % 100 mL IVPB  1,500 mg Intravenous Q8H Kj Araiza  mL/hr at 12/04/18 0609 1,500 mg at 12/04/18 0609     Continuous Infusions:   ketamine (KETALAR) infusion 100 mcg/kg/min (12/04/18 1055)       Review of Systems   Unable to perform ROS: Intubated     Objective:     Vital Signs (Most Recent):  Temp: 97.5 °F (36.4 °C) (12/04/18 0907)  Pulse: 85 (12/04/18 0907)  Resp: 18 (12/04/18 0907)  BP: 123/74 (12/04/18 0902)  SpO2: (!) 93 % (12/04/18 0907) Vital Signs (24h Range):  Temp:  [97.3 °F (36.3 °C)-98.1 °F (36.7 °C)] 97.5 °F (36.4 °C)  Pulse:  [75-93] 85  Resp:  [0-18] 18  SpO2:  [93 %-100 %] 93 %  BP: (123-152)/(74-88) 123/74  Arterial Line BP: (146-164)/(67-77) 146/67     Weight: 67.4 kg (148 lb 9.4 oz)  Body mass index is 21.94 kg/m².    Physical Exam   Constitutional: He appears well-developed and well-nourished. He is sedated and intubated.   HENT:   Head: Normocephalic and atraumatic.   Eyes: Conjunctivae are normal. Pupils are equal, round, and reactive to  light.   Neck: No thyromegaly present.   Cardiovascular: Intact distal pulses.   Pulmonary/Chest: He is intubated.   Musculoskeletal: He exhibits no deformity.   Skin: Skin is warm and dry. He is not diaphoretic.   Psychiatric:   Unable to assess       NEUROLOGICAL EXAMINATION:     MENTAL STATUS   Level of consciousness: unresponsive to painful stimuli    CRANIAL NERVES     CN III, IV, VI   Pupils are equal, round, and reactive to light.       Comatose, intermittent myoclonus of mouth  CN:   No blink to threat OU   SÁNCHEZ OU    Corneal absent OU   No cough, gag  Face symmetric   Tongue midline   No withdrawal to noxious stimuli in any extremity     Exam findings to suggest seizures:  Myoclonus - yes - mouth, feet  eye twitching - no   Nystagmus - no   gaze deviation - no   waxy rigidity - no        Significant Labs: All pertinent lab results from the past 24 hours have been reviewed.    Significant Studies: I have reviewed all pertinent imaging results/findings within the past 24 hours.    Assessment and Plan:     * Status epilepticus    Transfer from [facility] for evaluation of NCSE noted on EEG 11/26. Likely secondary to anoxia in setting of cardiac arrest.    Recommendations:  - Continue vEEG  - Ketamine increased to 100 mKm this morning  - Continue Vimpat 200 mg BID, Keppra 2000 mg BID, VPA 1250 mg TID  - Add Vigabatrin - discussed with NCC, family. Family to discuss further, will go over any additional questions/information, submit consent forms if family decides to pursue  - Further recommendations pending continued EEG read    Plan of care discussed with NCC team, family at bedside. Will continue to follow.      Cardiac arrest with ventricular fibrillation    - 11/25 at home, troponin peaked at 9  - Cardiology consulted and following. Recommend guideline directed therapy for now, will need interventional cardiology/cardiac catheterization for further investigation to determine if there is ischemic disease if  neurological recovery takes palce  - TTM completed at OSH, target temp reached at 0300 on 11/26, now rewarmed  - Management per primary     Anoxic brain injury    - Likely given myoclonic jerks at OSH, NCSE on EEG off propofol  - CT head 11/28 showing interval diffuse loss of gray-white matter differentiation with diffuse cerebral edema resulting in sulcal effacement of the cerebral hemispheres and smaller caliber ventricular system, concerning for global anoxic hypoxic injury.  - Will eventually need MRI brain     Acute hypoxemic respiratory failure    - ET tube placed in OR at [facility] due to neck rigidity from AS  - Vent management per [facility]     Leukocytosis    - Trending up, 22.57 today  - Afebrile, repeat blood cultures drawn today           VTE Risk Mitigation (From admission, onward)        Ordered     enoxaparin injection 40 mg  Daily      11/28/18 0927          Cathryn Ramirez PA-C  Neurology-Epilepsy  [facility]  Staff: Dr. Almazan

## 2018-12-04 NOTE — PLAN OF CARE
Problem: Patient Care Overview  Goal: Plan of Care Review  Outcome: Ongoing (interventions implemented as appropriate)  POC reviewed with pt at 0500. Pt unarousable, and unable to verbalize understanding. Pt experienced multiple seizures overnight. Ketamine gtt increased per MD. More seizure activity noted. MD Janiya consulting with team for new orders. Pt afebrile throughout the night.  Ogt suction still in place. Will continue to monitor. See flowsheets for full assessment and VS info.

## 2018-12-04 NOTE — ASSESSMENT & PLAN NOTE
Maintain ett  CXR improved on 12/1, will repeat after bronch   High risk for vap and/or mucous plugging, will bronch, low threshold to start abx  12/04: start scheduled mucolytic and chest pt

## 2018-12-04 NOTE — ASSESSMENT & PLAN NOTE
Will discontinue propofol today, increase Ketamine to 40  Continue VPA 1250 Q8 hours, level today therapeutic at 63, will repeat tomorrow and adjust accordingly   Increase clonazepam for subcortical myoclonus  12/3: likely not absorbing clonazepam, vimpat and keppra at max doses, escalate valproate to level ,sabril once gut motility re-established  12/4: gut motility re-established, oral aed with load to be added

## 2018-12-04 NOTE — PROCEDURES
ICU EEG/VIDEO MONITORING REPORT    Micheal Chirinos Jr.  [id number]  1960    DATE OF SERVICE: 12/3-4/18    DATE OF ADMISSION: 11/25/2018  6:11 AM    ADMITTING PROVIDER: Les Mar Jr., MD    REASON FOR CONSULT: 57yo M s/p cardiac arrest    METHODOLOGY   Electroencephalographic (EEG) recording is with electrodes placed according to the International 10-20 placement system.  Thirty two (32) channels of digital signal are simultaneously recorded from the scalp and may include EKG, EMG, and/or eye monitors.   Recording band pass was 0.1 to 512 hz.  Digital video recording of the patient is simultaneously recorded with the EEG.  The nursing staff report clinical symptoms and may press an event button when the patient has symptoms of clinical interest to the treating physicians.  EEG and video recording is stored and archived in digital format.  The entire recording is visually reviewed and the times identified by computer analysis as being spikes or seizures are reviewed again.  Activation procedures which include photic stimulation, hyperventilation and instructing patients to perform simple task are done in selected patients.   Compresses spectral analysis (CSA) is also performed on the activity recorded from each individual channel.  This is displayed as a power display of frequencies from 0 to 30 Hz over time.   The CSA analysis is done and displayed continuously.  This is reviewed for asymmetries in power between homologous areas of the scalp and for presence of changes in power which canbe seen when seizures occur.  Sections of suspected abnormalities on the CSA is then compared with the original EEG recording.     Impulse[employer] software was also utilized in the review of this study.  This software suite analyzes the EEG recording in multiple domains.  Coherence and rhythmicity is computed to identify EEG sections which may contain organized seizures.  Each channel undergoes analysis to detect presence  of spike and sharp waves which have special and morphological characteristic of epileptic activity.  The routine EEG recording is converted from spacial into frequency domain.  This is then displayed comparing homologous areas to identify areas of significant asymmetry.  Algorithm to identify non-cortically generated artifact is used to separate eye movement, EMG and other artifact from the EEG.      Recording Times  Start on 12/3/18, 07:00  Stop on 12/4/18, 07:00    A total of 24 hours of EEG was recorded.    EEG FINDINGS  Burst suppression pattern is seen throughout:  At onset, bursts comprise of sharply contoured delta-theta (4-5 Hz) activity intermixed with spike/wave activity.        However, latter parts of the study shows bursts of high amplitude spike/wave activity alternating with periods of generalized suppression  These are also associated with intermittent myoclonic activity.        IMPRESSION:   This is an abnormal C-EEG due to the burst suppression pattern seen throughout with epileptiform bursts and associated clinical correlate, suggestive of severe cerebral dysfunction.    CLINICAL CORRELATION IS RECOMMENDED.    Delilah Almazan MD, HERSON(), KAUSHAL BROWN.  Neurology-Epilepsy.  [facility].

## 2018-12-04 NOTE — SUBJECTIVE & OBJECTIVE
Review of Systems   Unable to perform ROS: Intubated       Objective:     Vitals:  Temp: 97.3 °F (36.3 °C)  Pulse: 84  Rhythm: normal sinus rhythm  BP: (!) 144/82  MAP (mmHg): 97  Resp: 17  SpO2: 98 %  Oxygen Concentration (%): 40  O2 Device (Oxygen Therapy): ventilator  Vent Mode: A/C  Set Rate: 18 bmp  Vt Set: 400 mL  Pressure Support: 0 cmH20  PEEP/CPAP: 8 cmH20  Peak Airway Pressure: 22 cmH2O  Mean Airway Pressure: 13 cmH20  Plateau Pressure: 0 cmH20    Temp  Min: 97 °F (36.1 °C)  Max: 98.1 °F (36.7 °C)  Pulse  Min: 75  Max: 93  BP  Min: 123/74  Max: 152/88  MAP (mmHg)  Min: 94  Max: 114  Resp  Min: 0  Max: 18  SpO2  Min: 93 %  Max: 100 %  Oxygen Concentration (%)  Min: 40  Max: 40    12/03 0701 - 12/04 0700  In: 2056 [I.V.:1006]  Out: 1670 [Urine:970]   Unmeasured Output  Urine Occurrence: 1  Stool Occurrence: 0  Emesis Occurrence: 0  Pad Count: 0       Physical Exam   Constitutional: He appears well-developed.   HENT:   Head: Normocephalic.   Eyes:   Right pupil 3.5 to 3mm and left 2mm to 1mm   Neck: No JVD present.   Cardiovascular: Normal rate and regular rhythm.   Pulmonary/Chest: Breath sounds normal.   Abdominal: Soft. Bowel sounds are decreased.   Musculoskeletal: He exhibits no edema.   Neurological:   Coma, GCS 3  Pupils react and has corneals, albeit sluggish  Still having occasional non rythmic movements of diaphragm, face, and arm  No movement in extremities   Skin: Skin is warm.         Medications:  Continuous    ketamine (KETALAR) infusion Last Rate: 100 mcg/kg/min (12/04/18 1202)   Scheduled    aspirin 81 mg Daily   atorvastatin 80 mg Daily   chlorhexidine 15 mL BID   clonazePAM 2 mg Q8H   clopidogrel 75 mg Daily   enoxaparin 40 mg Daily   lacosamide (VIMPAT) IVPB 200 mg Q12H   levetiracetam IVPB 2,000 mg Q12H   lidocaine HCL 20 mg/ml (2%) 100 mg Once   lisinopril 5 mg Daily   metoclopramide HCl 10 mg Q8H   metoprolol tartrate 12.5 mg Q12H   pantoprazole 40 mg Daily   senna-docusate 8.6-50  mg 1 tablet BID   sodium chloride 3% 4 mL Q6H   valproate sodium (DEPACON) IVPB 1,500 mg Q8H   PRN    acetaminophen 650 mg Q6H PRN   atropine 0.5 mg PRN   dextrose 50% 12.5 g PRN   glucagon (human recombinant) 1 mg PRN   lorazepam 2 mg Q2H PRN   magnesium oxide 800 mg PRN   magnesium oxide 800 mg PRN   potassium chloride 10% 40 mEq PRN   potassium chloride 10% 40 mEq PRN   potassium chloride 10% 60 mEq PRN   potassium, sodium phosphates 2 packet PRN   potassium, sodium phosphates 2 packet PRN   potassium, sodium phosphates 2 packet PRN   sodium chloride 0.9% 3 mL PRN     Today I personally reviewed pertinent medications, lines/drains/airways, imaging, cardiology results, laboratory results, microbiology results, notably:    Diet  Diet NPO  Diet NPO

## 2018-12-04 NOTE — ASSESSMENT & PLAN NOTE
Resolved  No fevers  12/3 recurrent leukocytosis, all lines relatively new,vomiting( may be secondary to ileus, check le doppler  12/4: no dvt or evolving ileus, afebrile, screening blood cultures sent

## 2018-12-04 NOTE — PLAN OF CARE
Called to bedside for clinical seizure activity. R facial twitch noted with electrographic correlation. Ketamine increased to 75 mcg/kg/min.

## 2018-12-04 NOTE — ASSESSMENT & PLAN NOTE
Unclear if vf induced hypoperfusion or acs caused nstemi    Asa, plavix, statin,beta blocker, Lisinopril 5 mg  Cath/aicd eval if neurological recovery

## 2018-12-04 NOTE — NURSING
Pt experiencing facial, neck and upper diaphragm twitching. EEG button pressed. NCC made aware. Dr. Trung MD at bedside. Seizure activity confirmed. Ketamine gtt increased per MD Trung. Will continue to monitor.

## 2018-12-04 NOTE — PROGRESS NOTES
[facility]  Neurocritical Care  Progress Note    Admit Date: 11/25/2018  Service Date: 12/04/2018  Length of Stay: 9    Subjective:     Chief Complaint: Status epilepticus    History of Present Illness: Patient is a 57yo male presented as a transfer from [facility]. Original admission to [facility] was due to s/p cardiac arrest. The patient was found down by his wife who then called EMS. On arrival to the home, assessment showed the patient to be in Vfib for which a shock was delivered along with 3 rounds of Epi and 300mg Amiodarone x1. ROSC achieved. Cooling procedures were initiated with target tempeture obtained 11/26 0030. Due to ankylosing spondylitis and severely stiff neck, EMS was unable to intubate in the field therefore an LMA was placed. The patient was subsequently intubated in the OR with the use of fiberoptic. While in PACU (11/25) awaiting an ICU bed the patient began to exhibit myoclonic jerks.  The jerks resolved with Ativan and he was loaded on Keppra. CT head showed significant motion artifact. An EEG later showed generalized epileptiform discharges suggestive of non convulsive status. A decision was made to transfer to [facility] for critical care epilepsy management.     Patient was received in the NCC unit vented on 50mcg of propofol. EEG monitoring in progress. Unable to perform a complete neuro assessment due to the critical nature posed towards the patient when propofol is halted. No family at bedside to contribute to patient's history.         Hospital Course: 11/28 Admit to [facility]; Epilepsy consulted; EEG monitoring in place  11/29: increased keppra. Load valproate and valproate TID started. Advanced tube feeds.  11/30: A-gilma placed, electrolytes replaced overnight. On rounds patient started having twitches in LE and face. Muscle paralysis with rocuronium 75, paused propofol and monitored EEG. EEG with occassional spike discharges. Restarted propofol at 40. AEDs as is.  Ordered CK and TG levels  12/2: Had vomiting overnight, also bowel movement, myoclonus right after, propofol was re-started overnight. CXR without aspiration. Will D/C propofol today, will increase clonazepam, increase Ketamine to 50. Decrease RR to 60, will repeat ABG today.  12/3: went into SE when ketamine stopped, responded to 1 mg/kg ketamine bolus and resumption of gtt at 60mcg/kg, valproate increased for target level of , reglan started  12/4: burst suppression pattern consistent at 4 HZ separation between 1 Hz bursts, intervening myoclonic activity, reglan helping gastroparesis, probable start of sabril today        Review of Systems   Unable to perform ROS: Intubated       Objective:     Vitals:  Temp: 97.3 °F (36.3 °C)  Pulse: 84  Rhythm: normal sinus rhythm  BP: (!) 144/82  MAP (mmHg): 97  Resp: 17  SpO2: 98 %  Oxygen Concentration (%): 40  O2 Device (Oxygen Therapy): ventilator  Vent Mode: A/C  Set Rate: 18 bmp  Vt Set: 400 mL  Pressure Support: 0 cmH20  PEEP/CPAP: 8 cmH20  Peak Airway Pressure: 22 cmH2O  Mean Airway Pressure: 13 cmH20  Plateau Pressure: 0 cmH20    Temp  Min: 97 °F (36.1 °C)  Max: 98.1 °F (36.7 °C)  Pulse  Min: 75  Max: 93  BP  Min: 123/74  Max: 152/88  MAP (mmHg)  Min: 94  Max: 114  Resp  Min: 0  Max: 18  SpO2  Min: 93 %  Max: 100 %  Oxygen Concentration (%)  Min: 40  Max: 40    12/03 0701 - 12/04 0700  In: 2056 [I.V.:1006]  Out: 1670 [Urine:970]   Unmeasured Output  Urine Occurrence: 1  Stool Occurrence: 0  Emesis Occurrence: 0  Pad Count: 0       Physical Exam   Constitutional: He appears well-developed.   HENT:   Head: Normocephalic.   Eyes:   Right pupil 3.5 to 3mm and left 2mm to 1mm   Neck: No JVD present.   Cardiovascular: Normal rate and regular rhythm.   Pulmonary/Chest: Breath sounds normal.   Abdominal: Soft. Bowel sounds are decreased.   Musculoskeletal: He exhibits no edema.   Neurological:   Coma, GCS 3  Pupils react and has corneals, albeit sluggish  Still having  occasional non rythmic movements of diaphragm, face, and arm  No movement in extremities   Skin: Skin is warm.         Medications:  Continuous    ketamine (KETALAR) infusion Last Rate: 100 mcg/kg/min (12/04/18 1202)   Scheduled    aspirin 81 mg Daily   atorvastatin 80 mg Daily   chlorhexidine 15 mL BID   clonazePAM 2 mg Q8H   clopidogrel 75 mg Daily   enoxaparin 40 mg Daily   lacosamide (VIMPAT) IVPB 200 mg Q12H   levetiracetam IVPB 2,000 mg Q12H   lidocaine HCL 20 mg/ml (2%) 100 mg Once   lisinopril 5 mg Daily   metoclopramide HCl 10 mg Q8H   metoprolol tartrate 12.5 mg Q12H   pantoprazole 40 mg Daily   senna-docusate 8.6-50 mg 1 tablet BID   sodium chloride 3% 4 mL Q6H   valproate sodium (DEPACON) IVPB 1,500 mg Q8H   PRN    acetaminophen 650 mg Q6H PRN   atropine 0.5 mg PRN   dextrose 50% 12.5 g PRN   glucagon (human recombinant) 1 mg PRN   lorazepam 2 mg Q2H PRN   magnesium oxide 800 mg PRN   magnesium oxide 800 mg PRN   potassium chloride 10% 40 mEq PRN   potassium chloride 10% 40 mEq PRN   potassium chloride 10% 60 mEq PRN   potassium, sodium phosphates 2 packet PRN   potassium, sodium phosphates 2 packet PRN   potassium, sodium phosphates 2 packet PRN   sodium chloride 0.9% 3 mL PRN     Today I personally reviewed pertinent medications, lines/drains/airways, imaging, cardiology results, laboratory results, microbiology results, notably:    Diet  Diet NPO  Diet NPO          Assessment/Plan:     Neuro   * Status epilepticus    Will discontinue propofol today, increase Ketamine to 40  Continue VPA 1250 Q8 hours, level today therapeutic at 63, will repeat tomorrow and adjust accordingly   Increase clonazepam for subcortical myoclonus  12/3: likely not absorbing clonazepam, vimpat and keppra at max doses, escalate valproate to level ,sabril once gut motility re-established  12/4: gut motility re-established, oral aed with load to be added     Pulmonary   Acute hypoxemic respiratory failure    Maintain  ett  CXR improved on 12/1, will repeat after bronch   High risk for vap and/or mucous plugging, will bronch, low threshold to start abx  12/04: start scheduled mucolytic and chest pt         Cardiac/Vascular   Cardiac arrest with ventricular fibrillation    Unclear if vf induced hypoperfusion or acs caused nstemi    Asa, plavix, statin,beta blocker, Lisinopril 5 mg  Cath/aicd eval if neurological recovery     Oncology   Leukocytosis    Resolved  No fevers  12/3 recurrent leukocytosis, all lines relatively new,vomiting( may be secondary to ileus, check le doppler  12/4: no dvt or evolving ileus, afebrile, screening blood cultures sent           The patient is being Prophylaxed for:  Venous Thromboembolism with: Chemical  Stress Ulcer with: H2B  Ventilator Pneumonia with: chlorhexidine oral care    Activity Orders          None      CRC 40 min  DNR    Kj Prakash MD  Neurocritical Care  [facility]

## 2018-12-04 NOTE — NURSING
Patient experiencing same facial, neck and upper body twitching as previous seizing event. Patient also making gurgling noises. EEG button pressed. Patient suctioned. NCC made aware. Awaiting new orders. Will continue to monitor closely.

## 2018-12-05 NOTE — PLAN OF CARE
Problem: Patient Care Overview  Goal: Plan of Care Review  Outcome: Ongoing (interventions implemented as appropriate)  POC reviewed with pt and pt's wife at 1600. Pt is unable to verbalize understanding of the POC. Pt is on ketamine gtt and intubated. Pt's wife verbalized understanding of the POC. Arterial line discontinued. Bowel regimen increased. Started on sabril. Electrolytes replaced. Still on continuous EEG. Patient progressing towards goals of care. Questions and concerns addressed. Please see flow sheet for detailed nursing assessment and VS. Will continue to monitor.

## 2018-12-05 NOTE — ASSESSMENT & PLAN NOTE
Transfer from [facility] for evaluation of NCSE noted on EEG 11/26. Likely secondary to anoxia in setting of cardiac arrest.    Recommendations:  - Continue vEEG  - Continue Ketamine 100 mKm  - Continue Vimpat 200 mg BID, Keppra 2000 mg BID, VPA 1250 mg TID  - Vigabatrin started 12/5 - discussed with NCC, family, consents signed.  - Further recommendations pending continued EEG read    Plan of care discussed with NCC team, family at bedside. Will continue to follow.

## 2018-12-05 NOTE — PLAN OF CARE
Problem: Patient Care Overview  Goal: Plan of Care Review  Outcome: Ongoing (interventions implemented as appropriate)   12/05/18 0647   Coping/Psychosocial   Plan Of Care Reviewed With patient;family     POC reviewed with pt at 0500. No evidence of learning. No acute events overnight. Pt progressing toward goals. Will continue to monitor. See flowsheets for full assessment and VS info     Goal: Individualization & Mutuality  Admit Date: 11/28/18    Admit Dx: s/p cardiac arrest, status epilepticus     Past Medical History:  No date: Anemia  No date: Degenerative disc disease  No date: Hemorrhage of rectum and anus  No date: Hypertension  2000: Idiopathic ankylosing spondylitis  No date: Joint pain    Past Surgical History:  2/27/2018: COLONOSCOPY; N/A      Comment:  Procedure: COLONOSCOPY;  Surgeon: Jaswant Godfrey MD;                 Location: [address]);  Service: Endoscopy;                 Laterality: N/A;  original order from 5/1/17 per Dr Godfrey-unable to use that order, new order placed  10/31/2018: COLONOSCOPY; N/A      Comment:  Procedure: COLONOSCOPY;  Surgeon: Jaswant Godfrey MD;                 Location: [address]);  Service: Endoscopy;                 Laterality: N/A;  constipation prep: miralax for a week                prior and low residue diet 1 week prior. handout given to               pt  10/31/2018: COLONOSCOPY; N/A      Comment:  Performed by Jaswant Godfrey MD at [facility] ([address] [place] [address])  2/27/2018: COLONOSCOPY; N/A      Comment:  Performed by Jaswant Godfrey MD at [facility] ([address] [place] [address])  3/18/2013: COLONOSCOPY; N/A      Comment:  Performed by Radames Huston MD at [facility] ([address] [place] [address])  10/31/2018: EGD (ESOPHAGOGASTRODUODENOSCOPY); N/A      Comment:  Performed by Jaswant Godfrey MD at [facility] ([address] [place] [address])  10/31/2018: ESOPHAGOGASTRODUODENOSCOPY; N/A      Comment:  Procedure: EGD (ESOPHAGOGASTRODUODENOSCOPY);  Surgeon:                Jaswant Godfrey MD;  Location: [address]);   Service:                Endoscopy;  Laterality: N/A;  11/25/2018: INSERTION OF ENDOTRACHEAL TUBE WITH FIBEROPTIC GUIDANCE      Comment:  Procedure: INTUBATION, ENDOTRACHEAL;  Surgeon: Teja Davila MD;  Location: [facility] OR;  Service: General;;  11/25/2018: INTUBATION, ENDOTRACHEAL      Comment:  Performed by Teja Davila MD at [facility] OR  No date: MOUTH SURGERY    Individualization:   1.     Restraints: (date/time/why or N/A)       Outcome: Ongoing (interventions implemented as appropriate)   11/26/18 1300   Mutuality/Individual Preferences   What Anxieties, Fears, Concerns, or Questions Do You Have About Your Care? keep updated   What Information Would Help Us Give You More Personalized Care? keep updated

## 2018-12-05 NOTE — PROCEDURES
ICU EEG/VIDEO MONITORING REPORT    Micheal Chirinos Jr.  [id number]  1960    DATE OF SERVICE: 12/4-5/18    DATE OF ADMISSION: 11/25/2018  6:11 AM    ADMITTING PROVIDER: Les Mar Jr., MD    REASON FOR CONSULT: 59yo M s/p cardiac arrest    METHODOLOGY   Electroencephalographic (EEG) recording is with electrodes placed according to the International 10-20 placement system.  Thirty two (32) channels of digital signal are simultaneously recorded from the scalp and may include EKG, EMG, and/or eye monitors.   Recording band pass was 0.1 to 512 hz.  Digital video recording of the patient is simultaneously recorded with the EEG.  The nursing staff report clinical symptoms and may press an event button when the patient has symptoms of clinical interest to the treating physicians.  EEG and video recording is stored and archived in digital format.  The entire recording is visually reviewed and the times identified by computer analysis as being spikes or seizures are reviewed again.  Activation procedures which include photic stimulation, hyperventilation and instructing patients to perform simple task are done in selected patients.   Compresses spectral analysis (CSA) is also performed on the activity recorded from each individual channel.  This is displayed as a power display of frequencies from 0 to 30 Hz over time.   The CSA analysis is done and displayed continuously.  This is reviewed for asymmetries in power between homologous areas of the scalp and for presence of changes in power which canbe seen when seizures occur.  Sections of suspected abnormalities on the CSA is then compared with the original EEG recording.     Apofore software was also utilized in the review of this study.  This software suite analyzes the EEG recording in multiple domains.  Coherence and rhythmicity is computed to identify EEG sections which may contain organized seizures.  Each channel undergoes analysis to detect presence  of spike and sharp waves which have special and morphological characteristic of epileptic activity.  The routine EEG recording is converted from spacial into frequency domain.  This is then displayed comparing homologous areas to identify areas of significant asymmetry.  Algorithm to identify non-cortically generated artifact is used to separate eye movement, EMG and other artifact from the EEG.      Recording Times  Start on 12/4/18, 07:00  Stop on 12/5/18, 07:00    A total of 24 hours of EEG was recorded.    EEG FINDINGS  Burst suppression pattern is seen throughout:  The bursts comprise of sharply contoured delta-theta (4-5 Hz) activity intermixed with spike/wave activity, lasting 1-3 seconds.        IMPRESSION:   This is an abnormal C-EEG due to the burst suppression pattern seen throughout with epileptiform bursts, suggestive of severe cerebral dysfunction.    CLINICAL CORRELATION IS RECOMMENDED.    Delilah Almazan MD, HERSON(), KAUSHAL BROWN.  Neurology-Epilepsy.  [facility].

## 2018-12-05 NOTE — PLAN OF CARE
Problem: Patient Care Overview  Goal: Plan of Care Review  Outcome: Ongoing (interventions implemented as appropriate)  POC reviewed with pt and family at 1200. Patient intubated and sedated. Unresponsive. Family at bedside verbalizes understanding.   Questions and concerns addressed. Will continue to monitor. See flowsheets for full assessment and VS info. Patient remains in SE, Ketamine gtt increased to 100 mcg/kg/min. Sabril to be started, family consent maintained. cEEG remains in place. Education printout provided. SBP < 180 maintained. Vent assist continued. Trickle TF restarted. Blood CX completed d/t increase WBC.

## 2018-12-05 NOTE — NURSING
Consult received for lip wounds.   Patient is a 57yo male presented as a transfer from [facility]. Original admission to [facility] was due to s/p cardiac arrest.     Patient is seen in the neuro ICU intubated with ETT in place. Lip assessment is difficult due to ET tube placement. The lips appear dry with some cracks noted.     Recommend:    Sween Cream BID to the Lips (pink top moisturizer)    Wound care signing off. Please re-consult if we can be of further assistance.   Lea Grimaldo RN CN [facility]   [phone]

## 2018-12-05 NOTE — PROGRESS NOTES
[facility]  Neurology-Epilepsy  Progress Note    Patient Name: Micheal Chirinos Jr.  MRN: [id number]  Admission Date: 2018  Hospital Length of Stay: 10 days  Code Status: DNR   Attending Provider: Kj Araiza MD  Primary Care Physician: Azikiwe K Lombard, MD   Principal Problem:Status epilepticus    Subjective:     Hospital Course:   EE/26: continuous generalized epileptiform discharges when off propofol, suggestive of NCSE  : Cap EEG - intermittent rhythmic GPEDs, suggestive of post anoxic NCSE  >: Fluctuating, periods of sharp GPEDs which improved ~0300 , when Propofol increased to 75 mKm. Subsequently, light burst suppression pattern.  : light burst suppression pattern, sharp GPEDs intermittently. Subcortical myoclonus + EEG artifact as well.   >: Periodic generalized epileptiform discharges. More suppressed on Ketamine/Propofol, plan is to taper off Propofol and optimize Ketamine  >2: Burst suppression, epileptiform discharges in bursts  >3: Burst suppression pattern  12/3>4: burst suppression pattern with epileptiform bursts and associated clinical correlate  >5: burst suppression pattern with epileptiform bursts, fluctuating with periods of more prolonged suppression    Interval History: Sabril initiated this morning, plan to continue all other AEDs at current doses for now.     Current Facility-Administered Medications   Medication Dose Route Frequency Provider Last Rate Last Dose    acetaminophen oral solution 650 mg  650 mg Per NG tube Q6H PRN Xenia Gaffney MD   650 mg at 18 1116    albuterol-ipratropium 2.5 mg-0.5 mg/3 mL nebulizer solution 3 mL  3 mL Nebulization Q6H Michoacano Hanks NP   3 mL at 18 1216    aspirin chewable tablet 81 mg  81 mg Per OG tube Daily Xenia Gaffney MD   81 mg at 18 0830    atorvastatin tablet 80 mg  80 mg Per OG tube Daily Xenia Gaffney MD   80 mg at 18 0830     atropine injection 0.5 mg  0.5 mg Intravenous PRN Jazmin Pendleton NP        chlorhexidine 0.12 % solution 15 mL  15 mL Mouth/Throat BID Lamberto Howard NP   15 mL at 12/05/18 0830    clonazePAM tablet 2 mg  2 mg Per OG tube Q8H Jazmin Pendleton NP   2 mg at 12/05/18 1309    clopidogrel tablet 75 mg  75 mg Per OG tube Daily Xenia Gaffney MD   75 mg at 12/05/18 0830    dextrose 50% injection 12.5 g  12.5 g Intravenous PRN Delia Taveras MD        enoxaparin injection 40 mg  40 mg Subcutaneous Daily Xenia Gaffney MD   40 mg at 12/04/18 1745    glucagon (human recombinant) injection 1 mg  1 mg Intramuscular PRN Delia Taveras MD        ketamine (KETALAR) 5,000 mg in sodium chloride 0.9% 500 mL infusion  100 mcg/kg/min (Dosing Weight) Intravenous Continuous Jazmin Pendleton NP 40.4 mL/hr at 12/05/18 1400 100 mcg/kg/min at 12/05/18 1400    lacosamide (VIMPAT) 200 mg in sodium chloride 0.9% 100 mL IVPB  200 mg Intravenous Q12H Tae Lux  mL/hr at 12/05/18 0908 200 mg at 12/05/18 0908    levETIRAcetam (KEPPRA) 2,000 mg in dextrose 5 % 250 mL IVPB  2,000 mg Intravenous Q12H Xenia Gaffney  mL/hr at 12/05/18 0830 2,000 mg at 12/05/18 0830    lisinopril tablet 5 mg  5 mg Per NG tube Daily Alpa Rodriguez NP   5 mg at 12/05/18 0830    magnesium oxide tablet 800 mg  800 mg Per NG tube VALERYN Lamberto Howard NP        magnesium oxide tablet 800 mg  800 mg Per NG tube PRKARMEN Howard NP        metoclopramide HCl injection 10 mg  10 mg Intravenous Q8H Jazmin Pendleton NP   10 mg at 12/05/18 1308    metoprolol tartrate (LOPRESSOR) split tablet 12.5 mg  12.5 mg Per NG tube Q12H Jazmin Pendleton NP   12.5 mg at 12/05/18 0830    pantoprazole injection 40 mg  40 mg Intravenous Daily Jazmin Pendleton NP   40 mg at 12/05/18 0830    polyethylene glycol packet 17 g  17 g Per NG tube Daily Jazmin Pendleton NP   17 g at 12/05/18 1015    potassium chloride 10% oral solution 40 mEq  40  mEq Per NG tube PRN Lamberto Howard NP   40 mEq at 12/05/18 0831    potassium chloride 10% oral solution 40 mEq  40 mEq Per NG tube PRN Lamberto Howard NP   40 mEq at 11/28/18 1440    potassium chloride 10% oral solution 60 mEq  60 mEq Per NG tube PRN Lamberto Howard NP        potassium, sodium phosphates 280-160-250 mg packet 2 packet  2 packet Per NG tube PRN Lamberto Howard NP   2 packet at 12/05/18 1227    potassium, sodium phosphates 280-160-250 mg packet 2 packet  2 packet Per NG tube PRN Lamberto Howard NP   2 packet at 11/29/18 1322    potassium, sodium phosphates 280-160-250 mg packet 2 packet  2 packet Per NG tube PRN Lamberto Howard NP        senna-docusate 8.6-50 mg per tablet 1 tablet  1 tablet Per OG tube BID Galen Lopes MD   1 tablet at 12/05/18 0830    sodium chloride 0.9% flush 3 mL  3 mL Intravenous PRN Lamberto Howard NP        sodium chloride 3% nebulizer solution 4 mL  4 mL Nebulization Q6H Kj Araiza MD   4 mL at 12/05/18 1216    valproate (DEPACON) 1,500 mg in dextrose 5 % 100 mL IVPB  1,500 mg Intravenous Q8H Kj Araiza  mL/hr at 12/05/18 0830 1,500 mg at 12/05/18 0830    vigabatrin (SABRIL) powder packet 1,500 mg  1,500 mg Per OG tube TID Kj Araiza MD   1,500 mg at 12/05/18 1401    Followed by    [START ON 12/6/2018] vigabatrin (SABRIL) powder packet 1,500 mg  1,500 mg Per OG tube BID Kj Araiza MD         Continuous Infusions:   ketamine (KETALAR) infusion 100 mcg/kg/min (12/05/18 1400)       Review of Systems   Unable to perform ROS: Intubated     Objective:     Vital Signs (Most Recent):  Temp: 97.9 °F (36.6 °C) (12/05/18 1400)  Pulse: 89 (12/05/18 1400)  Resp: 20 (12/05/18 1400)  BP: 98/63 (12/05/18 1400)  SpO2: 100 % (12/05/18 1400) Vital Signs (24h Range):  Temp:  [97.5 °F (36.4 °C)-99.7 °F (37.6 °C)] 97.9 °F (36.6 °C)  Pulse:  [83-99] 89  Resp:  [0-25] 20  SpO2:  [93 %-100 %] 100 %  BP: ()/(58-86)  98/63  Arterial Line BP: (110-172)/(56-83) 110/56     Weight: 67.4 kg (148 lb 9.4 oz)  Body mass index is 21.94 kg/m².    Physical Exam   Constitutional: He appears well-developed and well-nourished. He is sedated and intubated.   HENT:   Head: Normocephalic and atraumatic.   Eyes: Conjunctivae are normal. Pupils are equal, round, and reactive to light.   Neck: No thyromegaly present.   Cardiovascular: Intact distal pulses.   Pulmonary/Chest: He is intubated.   Musculoskeletal: He exhibits no deformity.   Skin: Skin is warm and dry. He is not diaphoretic.   Psychiatric:   Unable to assess       NEUROLOGICAL EXAMINATION:     MENTAL STATUS   Level of consciousness: unresponsive to painful stimuli    CRANIAL NERVES     CN III, IV, VI   Pupils are equal, round, and reactive to light.       Comatose  CN:   No blink to threat OU   SÁNCHEZ OU    Corneal absent OU   No cough, gag  Face symmetric   Tongue midline   No withdrawal to noxious stimuli in any extremity     Exam findings to suggest seizures:  Myoclonus - yes - mouth, feet  eye twitching - no   Nystagmus - no   gaze deviation - no   waxy rigidity - no        Significant Labs: All pertinent lab results from the past 24 hours have been reviewed.    Significant Studies: I have reviewed all pertinent imaging results/findings within the past 24 hours.    Assessment and Plan:     * Status epilepticus    Transfer from [facility] for evaluation of NCSE noted on EEG 11/26. Likely secondary to anoxia in setting of cardiac arrest.    Recommendations:  - Continue vEEG  - Continue Ketamine 100 mKm  - Continue Vimpat 200 mg BID, Keppra 2000 mg BID, VPA 1250 mg TID  - Vigabatrin started 12/5 - discussed with NCC, family, consents signed.  - Further recommendations pending continued EEG read    Plan of care discussed with NCC team, family at bedside. Will continue to follow.      Cardiac arrest with ventricular fibrillation    - 11/25 at home, troponin peaked at 9  - Cardiology  consulted and following. Recommend guideline directed therapy for now, will need interventional cardiology/cardiac catheterization for further investigation to determine if there is ischemic disease if neurological recovery takes palce  - TTM completed at OSH, target temp reached at 0300 on 11/26, now rewarmed  - Management per primary     Anoxic brain injury    - Likely given myoclonic jerks at OSH, NCSE on EEG off propofol  - CT head 11/28 showing interval diffuse loss of gray-white matter differentiation with diffuse cerebral edema resulting in sulcal effacement of the cerebral hemispheres and smaller caliber ventricular system, concerning for global anoxic hypoxic injury.  - Will eventually need MRI brain     Acute hypoxemic respiratory failure    - ET tube placed in OR at [facility] due to neck rigidity from AS  - Vent management per [facility]     Leukocytosis    - Trending up, 22.57 today  - Afebrile, repeat blood cultures drawn today           VTE Risk Mitigation (From admission, onward)        Ordered     enoxaparin injection 40 mg  Daily      11/28/18 0950          Cathryn Ramirez PA-C  Neurology-Epilepsy  [facility]  Staff: Dr. Almazan

## 2018-12-05 NOTE — PROGRESS NOTES
Notified Dr. Polanco regarding pt's increasing WBC. Reported 12/4 22 and this am 31. Pt has lowe grade temp of 99.2 and Blood Cultures obtained yesterday x2. No new orders at this time.

## 2018-12-05 NOTE — SUBJECTIVE & OBJECTIVE
Interval History: Sabril initiated this morning, plan to continue all other AEDs at current doses for now.     Current Facility-Administered Medications   Medication Dose Route Frequency Provider Last Rate Last Dose    acetaminophen oral solution 650 mg  650 mg Per NG tube Q6H PRN Xenia Gaffney MD   650 mg at 12/01/18 1116    albuterol-ipratropium 2.5 mg-0.5 mg/3 mL nebulizer solution 3 mL  3 mL Nebulization Q6H Michoacano Hanks NP   3 mL at 12/05/18 1216    aspirin chewable tablet 81 mg  81 mg Per OG tube Daily Xenia Gaffney MD   81 mg at 12/05/18 0830    atorvastatin tablet 80 mg  80 mg Per OG tube Daily Xenia BONNIE Gaffney MD   80 mg at 12/05/18 0830    atropine injection 0.5 mg  0.5 mg Intravenous PRN Jazmin Pendleton NP        chlorhexidine 0.12 % solution 15 mL  15 mL Mouth/Throat BID Lamberto Howard NP   15 mL at 12/05/18 0830    clonazePAM tablet 2 mg  2 mg Per OG tube Q8H Jazmin Pendleton NP   2 mg at 12/05/18 1309    clopidogrel tablet 75 mg  75 mg Per OG tube Daily Xenia Gaffney MD   75 mg at 12/05/18 0830    dextrose 50% injection 12.5 g  12.5 g Intravenous PRN Dleia Taveras MD        enoxaparin injection 40 mg  40 mg Subcutaneous Daily Xenia Gaffney MD   40 mg at 12/04/18 1745    glucagon (human recombinant) injection 1 mg  1 mg Intramuscular PRN Delia Taveras MD        ketamine (KETALAR) 5,000 mg in sodium chloride 0.9% 500 mL infusion  100 mcg/kg/min (Dosing Weight) Intravenous Continuous Jazmin Pendleton NP 40.4 mL/hr at 12/05/18 1400 100 mcg/kg/min at 12/05/18 1400    lacosamide (VIMPAT) 200 mg in sodium chloride 0.9% 100 mL IVPB  200 mg Intravenous Q12H Tae Lux  mL/hr at 12/05/18 0908 200 mg at 12/05/18 0908    levETIRAcetam (KEPPRA) 2,000 mg in dextrose 5 % 250 mL IVPB  2,000 mg Intravenous Q12H Xenia Gaffney  mL/hr at 12/05/18 0830 2,000 mg at 12/05/18 0830    lisinopril tablet 5 mg  5 mg Per NG tube Daily Alpa Rodriguez NP   5 mg at 12/05/18  0830    magnesium oxide tablet 800 mg  800 mg Per NG tube PRN Lamberto Howard NP        magnesium oxide tablet 800 mg  800 mg Per NG tube PRN Lamberto Howard NP        metoclopramide HCl injection 10 mg  10 mg Intravenous Q8H Jazmin Pendleton NP   10 mg at 12/05/18 1308    metoprolol tartrate (LOPRESSOR) split tablet 12.5 mg  12.5 mg Per NG tube Q12H Jazmin Pendleton NP   12.5 mg at 12/05/18 0830    pantoprazole injection 40 mg  40 mg Intravenous Daily Jazmin Pendleton NP   40 mg at 12/05/18 0830    polyethylene glycol packet 17 g  17 g Per NG tube Daily Jazmin Pendleton NP   17 g at 12/05/18 1015    potassium chloride 10% oral solution 40 mEq  40 mEq Per NG tube PRN Lamberto Howard NP   40 mEq at 12/05/18 0831    potassium chloride 10% oral solution 40 mEq  40 mEq Per NG tube PRN Lamberto Howard NP   40 mEq at 11/28/18 1440    potassium chloride 10% oral solution 60 mEq  60 mEq Per NG tube PRN Lamberto Howard NP        potassium, sodium phosphates 280-160-250 mg packet 2 packet  2 packet Per NG tube PRN Lamberto Howard NP   2 packet at 12/05/18 1227    potassium, sodium phosphates 280-160-250 mg packet 2 packet  2 packet Per NG tube PRN Lamberto Howard NP   2 packet at 11/29/18 1322    potassium, sodium phosphates 280-160-250 mg packet 2 packet  2 packet Per NG tube PRN Lamberto Howard NP        senna-docusate 8.6-50 mg per tablet 1 tablet  1 tablet Per OG tube BID Galen Lopes MD   1 tablet at 12/05/18 0830    sodium chloride 0.9% flush 3 mL  3 mL Intravenous PRN Lamberto Hwoard NP        sodium chloride 3% nebulizer solution 4 mL  4 mL Nebulization Q6H Kj Araiza MD   4 mL at 12/05/18 1216    valproate (DEPACON) 1,500 mg in dextrose 5 % 100 mL IVPB  1,500 mg Intravenous Q8H Kj Araiza  mL/hr at 12/05/18 0830 1,500 mg at 12/05/18 0830    vigabatrin (SABRIL) powder packet 1,500 mg  1,500 mg Per OG tube TID Kj Araiza MD   1,500 mg at 12/05/18  1401    Followed by    [START ON 12/6/2018] vigabatrin (SABRIL) powder packet 1,500 mg  1,500 mg Per OG tube BID Kj Araiza MD         Continuous Infusions:   ketamine (KETALAR) infusion 100 mcg/kg/min (12/05/18 1400)       Review of Systems   Unable to perform ROS: Intubated     Objective:     Vital Signs (Most Recent):  Temp: 97.9 °F (36.6 °C) (12/05/18 1400)  Pulse: 89 (12/05/18 1400)  Resp: 20 (12/05/18 1400)  BP: 98/63 (12/05/18 1400)  SpO2: 100 % (12/05/18 1400) Vital Signs (24h Range):  Temp:  [97.5 °F (36.4 °C)-99.7 °F (37.6 °C)] 97.9 °F (36.6 °C)  Pulse:  [83-99] 89  Resp:  [0-25] 20  SpO2:  [93 %-100 %] 100 %  BP: ()/(58-86) 98/63  Arterial Line BP: (110-172)/(56-83) 110/56     Weight: 67.4 kg (148 lb 9.4 oz)  Body mass index is 21.94 kg/m².    Physical Exam   Constitutional: He appears well-developed and well-nourished. He is sedated and intubated.   HENT:   Head: Normocephalic and atraumatic.   Eyes: Conjunctivae are normal. Pupils are equal, round, and reactive to light.   Neck: No thyromegaly present.   Cardiovascular: Intact distal pulses.   Pulmonary/Chest: He is intubated.   Musculoskeletal: He exhibits no deformity.   Skin: Skin is warm and dry. He is not diaphoretic.   Psychiatric:   Unable to assess       NEUROLOGICAL EXAMINATION:     MENTAL STATUS   Level of consciousness: unresponsive to painful stimuli    CRANIAL NERVES     CN III, IV, VI   Pupils are equal, round, and reactive to light.       Comatose  CN:   No blink to threat OU   SÁNCHEZ OU    Corneal absent OU   No cough, gag  Face symmetric   Tongue midline   No withdrawal to noxious stimuli in any extremity     Exam findings to suggest seizures:  Myoclonus - yes - mouth, feet  eye twitching - no   Nystagmus - no   gaze deviation - no   waxy rigidity - no        Significant Labs: All pertinent lab results from the past 24 hours have been reviewed.    Significant Studies: I have reviewed all pertinent imaging results/findings  within the past 24 hours.

## 2018-12-06 NOTE — PROGRESS NOTES
1207 Called CT scan 2nd floor, spoke to Ann Marie, pt needs to be NPO for 4 hours for the scan. Waiting for protocol. CT will call me back. Will continue to monitor.     1509 Called CT scan 2nd floor, spoke to Michelle. CT scan still not available for pt. Will follow up.     1535 Transported pt to CT scan with RT Huma. Hooked to portable cardiac monitor and portable vent.     1555 Transported pt back to ICU room. No acute events during the scan.

## 2018-12-06 NOTE — ASSESSMENT & PLAN NOTE
Resolved  No fevers  12/3 recurrent leukocytosis, all lines relatively new,vomiting( may be secondary to ileus, check le doppler  12/4: no dvt or evolving ileus, afebrile, screening blood cultures sent  12/5: pro joann up, sed rate and crp sent, may need ct chest/abd/pelvis, emperic abx started  12/6: elevated sed rate and crp, ct abd/pelvis today

## 2018-12-06 NOTE — PLAN OF CARE
12/06/18 1338   Discharge Reassessment   Assessment Type Discharge Planning Reassessment   Provided patient/caregiver education on the expected discharge date and the discharge plan No   Do you have any problems affording any of your prescribed medications? No   Discharge Plan A Long-term acute care facility (LTAC)   Discharge Plan B Skilled Nursing Facility   Patient choice form signed by patient/caregiver N/A   Anticipated Discharge Disposition LTAC   Can the patient answer the patient profile reliably? No, cognitively impaired   How does the patient rate their overall health at the present time? (rene)   Describe the patient's ability to walk at the present time. Does not walk or unable to take any steps at all   How often would a person be available to care for the patient? Whenever needed   Number of comorbid conditions (as recorded on the chart) Three   During the past month, has the patient often been bothered by feeling down, depressed or hopeless? (rene)   During the past month, has the patient often been bothered by little interest or pleasure in doing things? (rene)       Patient on vent.  Not medically stable for discharge     Gabbi Yuan RN, CCRN-K, [facility]  Neuro-Critical Care   [phone]

## 2018-12-06 NOTE — ASSESSMENT & PLAN NOTE
Will discontinue propofol today, increase Ketamine to 40  Continue VPA 1250 Q8 hours, level today therapeutic at 63, will repeat tomorrow and adjust accordingly   Increase clonazepam for subcortical myoclonus  12/3: likely not absorbing clonazepam, vimpat and keppra at max doses, escalate valproate to level ,sabril once gut motility re-established  12/4: gut motility re-established, oral aed with load to be added  12/5: sabril load, ketamine wean  12/6: ketamine decreased to 50mcg/kg, probable d/c this afternoon

## 2018-12-06 NOTE — ASSESSMENT & PLAN NOTE
Will discontinue propofol today, increase Ketamine to 40  Continue VPA 1250 Q8 hours, level today therapeutic at 63, will repeat tomorrow and adjust accordingly   Increase clonazepam for subcortical myoclonus  12/3: likely not absorbing clonazepam, vimpat and keppra at max doses, escalate valproate to level ,sabril once gut motility re-established  12/4: gut motility re-established, oral aed with load to be added  12/5: sabril load, ketamine wean

## 2018-12-06 NOTE — PROGRESS NOTES
Pt's residuals 350cc. Residuals discarded for risk of aspiration r/t AED's and cont Ketamine gtt.

## 2018-12-06 NOTE — PROGRESS NOTES
[facility]  Neurocritical Care  Progress Note    Admit Date: 11/25/2018  Service Date: 12/06/2018  Length of Stay: 11    Subjective:     Chief Complaint: Status epilepticus    History of Present Illness: Patient is a 59yo male presented as a transfer from [facility]. Original admission to [facility] was due to s/p cardiac arrest. The patient was found down by his wife who then called EMS. On arrival to the home, assessment showed the patient to be in Vfib for which a shock was delivered along with 3 rounds of Epi and 300mg Amiodarone x1. ROSC achieved. Cooling procedures were initiated with target tempeture obtained 11/26 0030. Due to ankylosing spondylitis and severely stiff neck, EMS was unable to intubate in the field therefore an LMA was placed. The patient was subsequently intubated in the OR with the use of fiberoptic. While in PACU (11/25) awaiting an ICU bed the patient began to exhibit myoclonic jerks.  The jerks resolved with Ativan and he was loaded on Keppra. CT head showed significant motion artifact. An EEG later showed generalized epileptiform discharges suggestive of non convulsive status. A decision was made to transfer to [facility] for critical care epilepsy management.     Patient was received in the NCC unit vented on 50mcg of propofol. EEG monitoring in progress. Unable to perform a complete neuro assessment due to the critical nature posed towards the patient when propofol is halted. No family at bedside to contribute to patient's history.         Hospital Course: 11/28 Admit to [facility]; Epilepsy consulted; EEG monitoring in place  11/29: increased keppra. Load valproate and valproate TID started. Advanced tube feeds.  11/30: A-gilma placed, electrolytes replaced overnight. On rounds patient started having twitches in LE and face. Muscle paralysis with rocuronium 75, paused propofol and monitored EEG. EEG with occassional spike discharges. Restarted propofol at 40. AEDs as is.  Ordered CK and TG levels  12/2: Had vomiting overnight, also bowel movement, myoclonus right after, propofol was re-started overnight. CXR without aspiration. Will D/C propofol today, will increase clonazepam, increase Ketamine to 50. Decrease RR to 60, will repeat ABG today.  12/3: went into SE when ketamine stopped, responded to 1 mg/kg ketamine bolus and resumption of gtt at 60mcg/kg, valproate increased for target level of , reglan started  12/4: burst suppression pattern consistent at 4 HZ separation between 1 Hz bursts, intervening myoclonic activity, reglan helping gastroparesis, probable start of sabril today  12/5: sabril load delayed until today, ketamine wean,peristant elevated wbc, pro joann up, abx started  12/6: still with peristant leukocytosis, elevated pro joann, elevated sed rate and crp, abd/pelvis ct with/without ordered, wean ketamine to off by afternoon        Review of Systems   Unable to perform ROS: Intubated     Objective:     Vitals:  Temp: 99.7 °F (37.6 °C)  Pulse: 100  Rhythm: normal sinus rhythm  BP: (!) 100/56  MAP (mmHg): 72  Resp: 18  SpO2: 100 %  Oxygen Concentration (%): 50  O2 Device (Oxygen Therapy): ventilator  Vent Mode: A/C  Set Rate: 18 bmp  Vt Set: 400 mL  Pressure Support: 0 cmH20  PEEP/CPAP: 10 cmH20  Peak Airway Pressure: 16 cmH2O  Mean Airway Pressure: 13 cmH20  Plateau Pressure: 16 cmH20    Temp  Min: 97.3 °F (36.3 °C)  Max: 100 °F (37.8 °C)  Pulse  Min: 83  Max: 100  BP  Min: 94/60  Max: 110/68  MAP (mmHg)  Min: 70  Max: 84  Resp  Min: 0  Max: 26  SpO2  Min: 100 %  Max: 100 %  Oxygen Concentration (%)  Min: 50  Max: 60    12/05 0701 - 12/06 0700  In: 3639.6 [I.V.:1449.6]  Out: 1620 [Urine:1620]   Unmeasured Output  Urine Occurrence: 1  Stool Occurrence: 0  Emesis Occurrence: 0  Pad Count: 0       Physical Exam   Constitutional: He appears well-developed.   HENT:   Head: Normocephalic.   Eyes: Pupils are equal, round, and reactive to light.   Neck: No JVD present.    Cardiovascular: Regular rhythm and normal heart sounds.   Pulmonary/Chest: Breath sounds normal.   Abdominal: Soft. Bowel sounds are normal.   Musculoskeletal: He exhibits no edema.   Neurological:   Coma, GCS 3  No movement in extremities   Skin: Skin is warm. Capillary refill takes less than 2 seconds.   Nursing note and vitals reviewed.        Medications:  Continuous    sodium chloride 0.9%    ketamine (KETALAR) infusion Last Rate: 50 mcg/kg/min (12/06/18 1100)   Scheduled    albuterol-ipratropium 3 mL Q6H   aspirin 81 mg Daily   atorvastatin 80 mg Daily   ceFEPime (MAXIPIME) IVPB 2 g Q12H   chlorhexidine 15 mL BID   clopidogrel 75 mg Daily   enoxaparin 40 mg Daily   lacosamide (VIMPAT) IVPB 200 mg Q12H   levetiracetam IVPB 2,000 mg Q12H   lisinopril 5 mg Daily   metoclopramide HCl 10 mg Q6H   metoprolol tartrate 12.5 mg Q12H   pantoprazole 40 mg Daily   polyethylene glycol 17 g Daily   senna-docusate 8.6-50 mg 1 tablet BID   sodium chloride 3% 4 mL Q6H   valproate sodium (DEPACON) IVPB 1,500 mg Q8H   [START ON 12/7/2018] vancomycin (VANCOCIN) IVPB 15 mg/kg (Dosing Weight) Q24H   vigabatrin 1,500 mg BID   PRN    acetaminophen 650 mg Q6H PRN   atropine 0.5 mg PRN   dextrose 50% 12.5 g PRN   glucagon (human recombinant) 1 mg PRN   magnesium oxide 800 mg PRN   magnesium oxide 800 mg PRN   potassium chloride 10% 40 mEq PRN   potassium chloride 10% 40 mEq PRN   potassium chloride 10% 60 mEq PRN   potassium, sodium phosphates 2 packet PRN   potassium, sodium phosphates 2 packet PRN   potassium, sodium phosphates 2 packet PRN   sodium chloride 0.9% 3 mL PRN     Today I personally reviewed pertinent medications, lines/drains/airways, imaging, laboratory results, notably:    Diet  Diet NPO  Diet NPO        Assessment/Plan:     Neuro   * Status epilepticus    Will discontinue propofol today, increase Ketamine to 40  Continue VPA 1250 Q8 hours, level today therapeutic at 63, will repeat tomorrow and adjust accordingly    Increase clonazepam for subcortical myoclonus  12/3: likely not absorbing clonazepam, vimpat and keppra at max doses, escalate valproate to level ,sabril once gut motility re-established  12/4: gut motility re-established, oral aed with load to be added  12/5: sabril load, ketamine wean  12/6: ketamine decreased to 50mcg/kg, probable d/c this afternoon     Pulmonary   Acute hypoxemic respiratory failure    Maintain ett  CXR improved on 12/1, will repeat after bronch   High risk for vap and/or mucous plugging, will bronch, low threshold to start abx  12/04: start scheduled mucolytic and chest pt         Cardiac/Vascular   Cardiac arrest with ventricular fibrillation    Unclear if vf induced hypoperfusion or acs caused nstemi    Asa, plavix, statin,beta blocker, Lisinopril 5 mg  Cath/aicd eval if neurological recovery     Oncology   Leukocytosis    Resolved  No fevers  12/3 recurrent leukocytosis, all lines relatively new,vomiting( may be secondary to ileus, check le doppler  12/4: no dvt or evolving ileus, afebrile, screening blood cultures sent  12/5: pro joann up, sed rate and crp sent, may need ct chest/abd/pelvis, emperic abx started  12/6: elevated sed rate and crp, ct abd/pelvis today           The patient is being Prophylaxed for:  Venous Thromboembolism with: Chemical  Stress Ulcer with: PPI  Ventilator Pneumonia with: chlorhexidine oral care    Activity Orders          None        DNR    Kj Prakash MD  Neurocritical Care  [facility]

## 2018-12-06 NOTE — SUBJECTIVE & OBJECTIVE
Interval History: EEG mostly suppressed overnight. Ketamine decreased, then discontinued today. Plan to hold Sabril tonight, tomorrow morning, and start tapering back on Keppra tomorrow.     Current Facility-Administered Medications   Medication Dose Route Frequency Provider Last Rate Last Dose    0.9%  NaCl infusion   Intravenous Continuous Kj Araiza MD 75 mL/hr at 12/06/18 1700      acetaminophen oral solution 650 mg  650 mg Per NG tube Q6H PRN Xenia Gaffney MD   650 mg at 12/06/18 1153    albuterol-ipratropium 2.5 mg-0.5 mg/3 mL nebulizer solution 3 mL  3 mL Nebulization Q6H [place]oacano Hanks, BINTA   3 mL at 12/06/18 1228    aspirin chewable tablet 81 mg  81 mg Per OG tube Daily Xenia Gaffney MD   81 mg at 12/06/18 0800    atorvastatin tablet 80 mg  80 mg Per OG tube Daily Xenia Gaffney MD   80 mg at 12/06/18 0800    atropine injection 0.5 mg  0.5 mg Intravenous PRN Jazmin Pendleton NP        ceFEPIme injection 2 g  2 g Intravenous Q12H Kj Araiza MD   2 g at 12/06/18 0757    chlorhexidine 0.12 % solution 15 mL  15 mL Mouth/Throat BID Lamberto Howard NP   15 mL at 12/06/18 0800    clopidogrel tablet 75 mg  75 mg Per OG tube Daily Xenia Gaffney MD   75 mg at 12/06/18 0801    dextrose 50% injection 12.5 g  12.5 g Intravenous PRN Delia Taveras MD        enoxaparin injection 40 mg  40 mg Subcutaneous Daily Xenia Gaffney MD   40 mg at 12/06/18 1636    glucagon (human recombinant) injection 1 mg  1 mg Intramuscular PRN Delia Taveras MD        ketamine (KETALAR) 5,000 mg in sodium chloride 0.9% 500 mL infusion  50 mcg/kg/min (Dosing Weight) Intravenous Continuous Jules Denson MD   Stopped at 12/06/18 1156    lacosamide (VIMPAT) 200 mg in sodium chloride 0.9% 100 mL IVPB  200 mg Intravenous Q12H Tae Lux  mL/hr at 12/06/18 0922 200 mg at 12/06/18 0922    levETIRAcetam (KEPPRA) 2,000 mg in dextrose 5 % 250 mL IVPB  2,000 mg Intravenous Q12H Cathryn  Fine, PA-C 500 mL/hr at 12/06/18 0801 2,000 mg at 12/06/18 0801    [START ON 12/7/2018] levETIRAcetam in NaCl (iso-os) IVPB 1,000 mg  1,000 mg Intravenous Q12H Cathryn Ramirez PA-C        lisinopril tablet 5 mg  5 mg Per NG tube Daily Alpa Rodriguez NP   Stopped at 12/06/18 0900    magnesium oxide tablet 800 mg  800 mg Per NG tube PRN Lamberto Howard NP        magnesium oxide tablet 800 mg  800 mg Per NG tube PRKARMEN Howard NP        metoclopramide HCl injection 10 mg  10 mg Intravenous Q6H Jules Denson MD   10 mg at 12/06/18 1153    metoprolol tartrate (LOPRESSOR) split tablet 12.5 mg  12.5 mg Per NG tube Q12H Jazmin Pendleton NP   12.5 mg at 12/06/18 0823    pantoprazole injection 40 mg  40 mg Intravenous Daily Jazmin Pendleton NP   40 mg at 12/06/18 0800    polyethylene glycol packet 17 g  17 g Per NG tube Daily Jazmin Pendleton NP   17 g at 12/06/18 0800    potassium chloride 10% oral solution 40 mEq  40 mEq Per NG tube PRN Lamberto Howard NP   40 mEq at 12/05/18 0831    potassium chloride 10% oral solution 40 mEq  40 mEq Per NG tube PRKARMEN Howard NP   40 mEq at 11/28/18 1440    potassium chloride 10% oral solution 60 mEq  60 mEq Per NG tube PRKARMEN Howard NP        potassium, sodium phosphates 280-160-250 mg packet 2 packet  2 packet Per NG tube PRKARMEN Howard NP   2 packet at 12/05/18 1227    potassium, sodium phosphates 280-160-250 mg packet 2 packet  2 packet Per NG tube PRKARMEN Howard NP   2 packet at 11/29/18 1322    potassium, sodium phosphates 280-160-250 mg packet 2 packet  2 packet Per NG tube PRKARMEN Howard NP        senna-docusate 8.6-50 mg per tablet 1 tablet  1 tablet Per OG tube BID Galen Lopes MD   1 tablet at 12/06/18 0801    sodium chloride 0.9% flush 3 mL  3 mL Intravenous PRN Antonya Lee, NP        sodium chloride 3% nebulizer solution 4 mL  4 mL Nebulization Q6H Kj Araiza MD   4 mL at 12/06/18 122     valproate (DEPACON) 1,500 mg in dextrose 5 % 100 mL IVPB  1,500 mg Intravenous Q8H Kj Araiza  mL/hr at 12/06/18 1636 1,500 mg at 12/06/18 1636    [START ON 12/7/2018] vancomycin in dextrose 5 % 1 gram/250 mL IVPB 1,000 mg  15 mg/kg (Dosing Weight) Intravenous Q24H Kj Araiza MD         Continuous Infusions:   sodium chloride 0.9% 75 mL/hr at 12/06/18 1700    ketamine (KETALAR) infusion Stopped (12/06/18 1156)       Review of Systems   Unable to perform ROS: Intubated     Objective:     Vital Signs (Most Recent):  Temp: (!) 100.8 °F (38.2 °C)(tylenol given) (12/06/18 1700)  Pulse: 107 (12/06/18 1700)  Resp: (!) 32 (12/06/18 1700)  BP: 112/66 (12/06/18 1700)  SpO2: 100 % (12/06/18 1700) Vital Signs (24h Range):  Temp:  [97.3 °F (36.3 °C)-100.8 °F (38.2 °C)] 100.8 °F (38.2 °C)  Pulse:  [] 107  Resp:  [18-32] 32  SpO2:  [100 %] 100 %  BP: ()/(55-71) 112/66     Weight: 67.4 kg (148 lb 9.4 oz)  Body mass index is 21.94 kg/m².    Physical Exam   Constitutional: He appears well-developed and well-nourished. He is sedated and intubated.   HENT:   Head: Normocephalic and atraumatic.   Eyes: Conjunctivae are normal. Pupils are equal, round, and reactive to light.   Neck: No thyromegaly present.   Cardiovascular: Intact distal pulses.   Pulmonary/Chest: He is intubated.   Musculoskeletal: He exhibits no deformity.   Skin: Skin is warm and dry. He is not diaphoretic.   Psychiatric:   Unable to assess       NEUROLOGICAL EXAMINATION:     MENTAL STATUS   Level of consciousness: unresponsive to painful stimuli    CRANIAL NERVES     CN III, IV, VI   Pupils are equal, round, and reactive to light.       Comatose  CN:   No blink to threat OU   SÁNCHEZ OU    Corneal absent OU   No cough, gag  Face symmetric   Tongue midline   No withdrawal to noxious stimuli in any extremity     Exam findings to suggest seizures:  Myoclonus - yes - mouth, feet  eye twitching - no   Nystagmus - no   gaze deviation - no    waxy rigidity - no        Significant Labs: All pertinent lab results from the past 24 hours have been reviewed.    Significant Studies: I have reviewed all pertinent imaging results/findings within the past 24 hours.

## 2018-12-06 NOTE — PLAN OF CARE
Problem: Patient Care Overview  Goal: Plan of Care Review  Outcome: Ongoing (interventions implemented as appropriate)  POC reviewed with pt's wife at 1450 and verbalized undertsanding of the POC. Pt is intubated and unconscious. CT abdomen done this afternoon. Tube feeds on hold. Started on NS x 75 cc/hr. Ketamine stopped by Dr Messina at 1157 AM. Questions and concerns addressed. Please see flow sheet for detailed nursing assessment and VS. Will continue to monitor.       1740 Sputum culture order acknowledged. Sputum sample sent to lab.   1836 UA collected and sent.

## 2018-12-06 NOTE — PROGRESS NOTES
[facility]  Neurocritical Care  Progress Note    Admit Date: 11/25/2018  Service Date: 12/05/2018  Length of Stay: 10    Subjective:     Chief Complaint: Status epilepticus    History of Present Illness: Patient is a 57yo male presented as a transfer from [facility]. Original admission to [facility] was due to s/p cardiac arrest. The patient was found down by his wife who then called EMS. On arrival to the home, assessment showed the patient to be in Vfib for which a shock was delivered along with 3 rounds of Epi and 300mg Amiodarone x1. ROSC achieved. Cooling procedures were initiated with target tempeture obtained 11/26 0030. Due to ankylosing spondylitis and severely stiff neck, EMS was unable to intubate in the field therefore an LMA was placed. The patient was subsequently intubated in the OR with the use of fiberoptic. While in PACU (11/25) awaiting an ICU bed the patient began to exhibit myoclonic jerks.  The jerks resolved with Ativan and he was loaded on Keppra. CT head showed significant motion artifact. An EEG later showed generalized epileptiform discharges suggestive of non convulsive status. A decision was made to transfer to [facility] for critical care epilepsy management.     Patient was received in the NCC unit vented on 50mcg of propofol. EEG monitoring in progress. Unable to perform a complete neuro assessment due to the critical nature posed towards the patient when propofol is halted. No family at bedside to contribute to patient's history.         Hospital Course: 11/28 Admit to [facility]; Epilepsy consulted; EEG monitoring in place  11/29: increased keppra. Load valproate and valproate TID started. Advanced tube feeds.  11/30: A-gilma placed, electrolytes replaced overnight. On rounds patient started having twitches in LE and face. Muscle paralysis with rocuronium 75, paused propofol and monitored EEG. EEG with occassional spike discharges. Restarted propofol at 40. AEDs as is.  Ordered CK and TG levels  12/2: Had vomiting overnight, also bowel movement, myoclonus right after, propofol was re-started overnight. CXR without aspiration. Will D/C propofol today, will increase clonazepam, increase Ketamine to 50. Decrease RR to 60, will repeat ABG today.  12/3: went into SE when ketamine stopped, responded to 1 mg/kg ketamine bolus and resumption of gtt at 60mcg/kg, valproate increased for target level of , reglan started  12/4: burst suppression pattern consistent at 4 HZ separation between 1 Hz bursts, intervening myoclonic activity, reglan helping gastroparesis, probable start of sabril today  12/5: sabril load delayed until today, ketamine wean,peristant elevated wbc, pro joann up, abx started        Review of Systems   Unable to perform ROS: Intubated     Objective:     Vitals:  Temp: 99.1 °F (37.3 °C)  Pulse: 92  Rhythm: normal sinus rhythm  BP: 105/65  MAP (mmHg): 79  Resp: (!) 23  SpO2: 100 %  Oxygen Concentration (%): 60  O2 Device (Oxygen Therapy): ventilator  Vent Mode: A/C  Set Rate: 18 bmp  Vt Set: 400 mL  Pressure Support: 0 cmH20  PEEP/CPAP: 10 cmH20  Peak Airway Pressure: 18 cmH2O  Mean Airway Pressure: 14 cmH20  Plateau Pressure: 0 cmH20    Temp  Min: 97.7 °F (36.5 °C)  Max: 99.7 °F (37.6 °C)  Pulse  Min: 83  Max: 99  BP  Min: 94/60  Max: 138/82  MAP (mmHg)  Min: 72  Max: 104  Resp  Min: 0  Max: 25  SpO2  Min: 100 %  Max: 100 %  Oxygen Concentration (%)  Min: 60  Max: 60    12/04 0701 - 12/05 0700  In: 2707.1 [I.V.:1462.1]  Out: 1850 [Urine:1800]   Unmeasured Output  Urine Occurrence: 1  Stool Occurrence: 0  Emesis Occurrence: 0  Pad Count: 0       Physical Exam   Constitutional: He appears well-developed.   HENT:   Head: Normocephalic.   Eyes: Pupils are equal, round, and reactive to light.   Neck: No JVD present.   Cardiovascular: Regular rhythm and normal heart sounds.   Pulmonary/Chest: Breath sounds normal.   Abdominal: Soft. Bowel sounds are normal.   Musculoskeletal:  He exhibits no edema.   Neurological:   Coma, GCS 3  No movement in extremities   Skin: Skin is warm. Capillary refill takes less than 2 seconds.   Nursing note and vitals reviewed.        Medications:  Continuous  ketamine (KETALAR) infusion Last Rate: 100 mcg/kg/min (12/05/18 1800)   Scheduled  albuterol-ipratropium 3 mL Q6H   aspirin 81 mg Daily   atorvastatin 80 mg Daily   ceFEPime (MAXIPIME) IVPB 1 g Q12H   chlorhexidine 15 mL BID   clonazePAM 2 mg Q8H   clopidogrel 75 mg Daily   enoxaparin 40 mg Daily   lacosamide (VIMPAT) IVPB 200 mg Q12H   levetiracetam IVPB 2,000 mg Q12H   lisinopril 5 mg Daily   metoclopramide HCl 10 mg Q8H   metoprolol tartrate 12.5 mg Q12H   pantoprazole 40 mg Daily   polyethylene glycol 17 g Daily   senna-docusate 8.6-50 mg 1 tablet BID   sodium chloride 3% 4 mL Q6H   valproate sodium (DEPACON) IVPB 1,500 mg Q8H   vancomycin (VANCOCIN) IVPB 15 mg/kg (Dosing Weight) Q12H   vigabatrin 1,500 mg TID   Followed by     [START ON 12/6/2018] vigabatrin 1,500 mg BID   PRN  acetaminophen 650 mg Q6H PRN   atropine 0.5 mg PRN   dextrose 50% 12.5 g PRN   glucagon (human recombinant) 1 mg PRN   magnesium oxide 800 mg PRN   magnesium oxide 800 mg PRN   potassium chloride 10% 40 mEq PRN   potassium chloride 10% 40 mEq PRN   potassium chloride 10% 60 mEq PRN   potassium, sodium phosphates 2 packet PRN   potassium, sodium phosphates 2 packet PRN   potassium, sodium phosphates 2 packet PRN   sodium chloride 0.9% 3 mL PRN     Today I personally reviewed pertinent medications, lines/drains/airways, imaging, laboratory results, notably:    Diet  Diet NPO  Diet NPO        Assessment/Plan:     Neuro   * Status epilepticus    Will discontinue propofol today, increase Ketamine to 40  Continue VPA 1250 Q8 hours, level today therapeutic at 63, will repeat tomorrow and adjust accordingly   Increase clonazepam for subcortical myoclonus  12/3: likely not absorbing clonazepam, vimpat and keppra at max doses, escalate  valproate to level ,sabril once gut motility re-established  12/4: gut motility re-established, oral aed with load to be added  12/5: sabril load, ketamine wean     Pulmonary   Acute hypoxemic respiratory failure    Maintain ett  CXR improved on 12/1, will repeat after bronch   High risk for vap and/or mucous plugging, will bronch, low threshold to start abx  12/04: start scheduled mucolytic and chest pt         Cardiac/Vascular   Cardiac arrest with ventricular fibrillation    Unclear if vf induced hypoperfusion or acs caused nstemi    Asa, plavix, statin,beta blocker, Lisinopril 5 mg  Cath/aicd eval if neurological recovery     Oncology   Leukocytosis    Resolved  No fevers  12/3 recurrent leukocytosis, all lines relatively new,vomiting( may be secondary to ileus, check le doppler  12/4: no dvt or evolving ileus, afebrile, screening blood cultures sent  12/5: pro joann up, sed rate and crp sent, may need ct chest/abd/pelvis, emperic abx started           The patient is being Prophylaxed for:  Venous Thromboembolism with: Chemical  Stress Ulcer with: PPI  Ventilator Pneumonia with: chlorhexidine oral care    Activity Orders          None        DNR    Kj Prakash MD  Neurocritical Care  [facility]

## 2018-12-06 NOTE — PROCEDURES
ICU EEG/VIDEO MONITORING REPORT    Micheal Chirinos Jr.  [id number]  1960    DATE OF SERVICE: 12/5-6/18    DATE OF ADMISSION: 11/25/2018  6:11 AM    ADMITTING PROVIDER: Les Mar Jr., MD    REASON FOR CONSULT: 59yo M s/p cardiac arrest    METHODOLOGY   Electroencephalographic (EEG) recording is with electrodes placed according to the International 10-20 placement system.  Thirty two (32) channels of digital signal are simultaneously recorded from the scalp and may include EKG, EMG, and/or eye monitors.   Recording band pass was 0.1 to 512 hz.  Digital video recording of the patient is simultaneously recorded with the EEG.  The nursing staff report clinical symptoms and may press an event button when the patient has symptoms of clinical interest to the treating physicians.  EEG and video recording is stored and archived in digital format.  The entire recording is visually reviewed and the times identified by computer analysis as being spikes or seizures are reviewed again.  Activation procedures which include photic stimulation, hyperventilation and instructing patients to perform simple task are done in selected patients.   Compresses spectral analysis (CSA) is also performed on the activity recorded from each individual channel.  This is displayed as a power display of frequencies from 0 to 30 Hz over time.   The CSA analysis is done and displayed continuously.  This is reviewed for asymmetries in power between homologous areas of the scalp and for presence of changes in power which canbe seen when seizures occur.  Sections of suspected abnormalities on the CSA is then compared with the original EEG recording.     [employer] software was also utilized in the review of this study.  This software suite analyzes the EEG recording in multiple domains.  Coherence and rhythmicity is computed to identify EEG sections which may contain organized seizures.  Each channel undergoes analysis to detect presence  of spike and sharp waves which have special and morphological characteristic of epileptic activity.  The routine EEG recording is converted from spacial into frequency domain.  This is then displayed comparing homologous areas to identify areas of significant asymmetry.  Algorithm to identify non-cortically generated artifact is used to separate eye movement, EMG and other artifact from the EEG.      Recording Times  Start on 12/5/18, 07:00  Stop on 12/6/18, 07:00    A total of 24 hours of EEG was recorded.    EEG FINDINGS  Burst suppression pattern is seen throughout:  The bursts comprise of generalized 0.5-1 Hz spike/wave activity and is seen infrequently in the latter parts (after ~23:00) of the study with prolonged (> 10 minutes) of generalized suppression.    IMPRESSION:   This is an abnormal C-EEG due to the burst suppression pattern with epileptiform bursts and prolonged periods of suppression in the latter parts, suggestive of severe cerebral dysfunction.    CLINICAL CORRELATION IS RECOMMENDED.    Delilah Almazan MD, HERSON(), KAUSHAL BROWN.  Neurology-Epilepsy.  [facility].

## 2018-12-06 NOTE — ASSESSMENT & PLAN NOTE
Resolved  No fevers  12/3 recurrent leukocytosis, all lines relatively new,vomiting( may be secondary to ileus, check le doppler  12/4: no dvt or evolving ileus, afebrile, screening blood cultures sent  12/5: pro joann up, sed rate and crp sent, may need ct chest/abd/pelvis, emperic abx started

## 2018-12-06 NOTE — ASSESSMENT & PLAN NOTE
Transfer from [facility] for evaluation of NCSE noted on EEG 11/26. Likely secondary to anoxia in setting of cardiac arrest.    Recommendations:  - Continue vEEG  - Ketamine tapered off 12/6, EEG still with mostly suppression pattern  - Continue Vimpat 200 mg BID, VPA 1250 mg TID  - Decrease Keppra to 1000 mg BID starting tomorrow morning  - Vigabatrin started 12/5 - plan to hold dose tonight, tomorrow am for now and will reassess  - Further recommendations pending continued EEG read    Plan of care discussed with NCC team, family at bedside. Will continue to follow.

## 2018-12-06 NOTE — PROGRESS NOTES
[facility]  Neurology-Epilepsy  Progress Note    Patient Name: Micheal Chirinos Jr.  MRN: [id number]  Admission Date: 2018  Hospital Length of Stay: 11 days  Code Status: DNR   Attending Provider: Kj Araiza MD  Primary Care Physician: Azikiwe K Lombard, MD   Principal Problem:Status epilepticus    Subjective:     Hospital Course:   EE/26: continuous generalized epileptiform discharges when off propofol, suggestive of NCSE  : Cap EEG - intermittent rhythmic GPEDs, suggestive of post anoxic NCSE  >: Fluctuating, periods of sharp GPEDs which improved ~0300 , when Propofol increased to 75 mKm. Subsequently, light burst suppression pattern.  : light burst suppression pattern, sharp GPEDs intermittently. Subcortical myoclonus + EEG artifact as well.   >: Periodic generalized epileptiform discharges. More suppressed on Ketamine/Propofol, plan is to taper off Propofol and optimize Ketamine  1>2: Burst suppression, epileptiform discharges in bursts  2>3: Burst suppression pattern  3>4: burst suppression pattern with epileptiform bursts and associated clinical correlate  >5: burst suppression pattern with epileptiform bursts, fluctuating with periods of more prolonged suppression  5>6: mostly suppressed overnight    Interval History: EEG mostly suppressed overnight. Ketamine decreased, then discontinued today. Plan to hold Sabril tonight, tomorrow morning, and start tapering back on Keppra tomorrow.     Current Facility-Administered Medications   Medication Dose Route Frequency Provider Last Rate Last Dose    0.9%  NaCl infusion   Intravenous Continuous Kj Araiza MD 75 mL/hr at 18 1700      acetaminophen oral solution 650 mg  650 mg Per NG tube Q6H PRN Xenia Gaffney MD   650 mg at 18 1153    albuterol-ipratropium 2.5 mg-0.5 mg/3 mL nebulizer solution 3 mL  3 mL Nebulization Q6H Michoacano Hanks NP   3 mL at  12/06/18 1228    aspirin chewable tablet 81 mg  81 mg Per OG tube Daily Xenia Gaffney MD   81 mg at 12/06/18 0800    atorvastatin tablet 80 mg  80 mg Per OG tube Daily Xenia BONNIE Gaffney MD   80 mg at 12/06/18 0800    atropine injection 0.5 mg  0.5 mg Intravenous PRN Jazmin Pendleton NP        ceFEPIme injection 2 g  2 g Intravenous Q12H Kj Araiza MD   2 g at 12/06/18 0757    chlorhexidine 0.12 % solution 15 mL  15 mL Mouth/Throat BID Lamberto Howard NP   15 mL at 12/06/18 0800    clopidogrel tablet 75 mg  75 mg Per OG tube Daily Xenia BONNIE Gaffney MD   75 mg at 12/06/18 0801    dextrose 50% injection 12.5 g  12.5 g Intravenous PRN Delia Taveras MD        enoxaparin injection 40 mg  40 mg Subcutaneous Daily Xenia Gaffney MD   40 mg at 12/06/18 1636    glucagon (human recombinant) injection 1 mg  1 mg Intramuscular PRN Delia Taveras MD        ketamine (KETALAR) 5,000 mg in sodium chloride 0.9% 500 mL infusion  50 mcg/kg/min (Dosing Weight) Intravenous Continuous Jules Denson MD   Stopped at 12/06/18 1156    lacosamide (VIMPAT) 200 mg in sodium chloride 0.9% 100 mL IVPB  200 mg Intravenous Q12H Tae Lux  mL/hr at 12/06/18 0922 200 mg at 12/06/18 0922    levETIRAcetam (KEPPRA) 2,000 mg in dextrose 5 % 250 mL IVPB  2,000 mg Intravenous Q12H Cathryn Ramirez PA-C 500 mL/hr at 12/06/18 0801 2,000 mg at 12/06/18 0801    [START ON 12/7/2018] levETIRAcetam in NaCl (iso-os) IVPB 1,000 mg  1,000 mg Intravenous Q12H Cathryn Ramirez PA-C        lisinopril tablet 5 mg  5 mg Per NG tube Daily Alpa Rodriguez NP   Stopped at 12/06/18 0900    magnesium oxide tablet 800 mg  800 mg Per NG tube PRN Lamberto Howard NP        magnesium oxide tablet 800 mg  800 mg Per NG tube PRN Lamberto Howard NP        metoclopramide HCl injection 10 mg  10 mg Intravenous Q6H Jules Denson MD   10 mg at 12/06/18 1153    metoprolol tartrate (LOPRESSOR) split tablet 12.5 mg  12.5 mg Per NG tube  Q12H Jazmin Pendleton NP   12.5 mg at 12/06/18 0823    pantoprazole injection 40 mg  40 mg Intravenous Daily Jazmin Pendleton NP   40 mg at 12/06/18 0800    polyethylene glycol packet 17 g  17 g Per NG tube Daily Jazmin Pendleton NP   17 g at 12/06/18 0800    potassium chloride 10% oral solution 40 mEq  40 mEq Per NG tube PRN Lamberto Howard NP   40 mEq at 12/05/18 0831    potassium chloride 10% oral solution 40 mEq  40 mEq Per NG tube PRN Lamberto Howard NP   40 mEq at 11/28/18 1440    potassium chloride 10% oral solution 60 mEq  60 mEq Per NG tube PRN Lamberto Howard NP        potassium, sodium phosphates 280-160-250 mg packet 2 packet  2 packet Per NG tube PRN Lamberto Howard NP   2 packet at 12/05/18 1227    potassium, sodium phosphates 280-160-250 mg packet 2 packet  2 packet Per NG tube PRN Lamberto Howard NP   2 packet at 11/29/18 1322    potassium, sodium phosphates 280-160-250 mg packet 2 packet  2 packet Per NG tube PRN Lamberto Howard NP        senna-docusate 8.6-50 mg per tablet 1 tablet  1 tablet Per OG tube BID Galen Lopes MD   1 tablet at 12/06/18 0801    sodium chloride 0.9% flush 3 mL  3 mL Intravenous PRN Lamberto Howard NP        sodium chloride 3% nebulizer solution 4 mL  4 mL Nebulization Q6H Kj Araiza MD   4 mL at 12/06/18 1228    valproate (DEPACON) 1,500 mg in dextrose 5 % 100 mL IVPB  1,500 mg Intravenous Q8H Kj Araiza  mL/hr at 12/06/18 1636 1,500 mg at 12/06/18 1636    [START ON 12/7/2018] vancomycin in dextrose 5 % 1 gram/250 mL IVPB 1,000 mg  15 mg/kg (Dosing Weight) Intravenous Q24H Kj Araiza MD         Continuous Infusions:   sodium chloride 0.9% 75 mL/hr at 12/06/18 1700    ketamine (KETALAR) infusion Stopped (12/06/18 1156)       Review of Systems   Unable to perform ROS: Intubated     Objective:     Vital Signs (Most Recent):  Temp: (!) 100.8 °F (38.2 °C)(tylenol given) (12/06/18 1700)  Pulse: 107 (12/06/18  1700)  Resp: (!) 32 (12/06/18 1700)  BP: 112/66 (12/06/18 1700)  SpO2: 100 % (12/06/18 1700) Vital Signs (24h Range):  Temp:  [97.3 °F (36.3 °C)-100.8 °F (38.2 °C)] 100.8 °F (38.2 °C)  Pulse:  [] 107  Resp:  [18-32] 32  SpO2:  [100 %] 100 %  BP: ()/(55-71) 112/66     Weight: 67.4 kg (148 lb 9.4 oz)  Body mass index is 21.94 kg/m².    Physical Exam   Constitutional: He appears well-developed and well-nourished. He is sedated and intubated.   HENT:   Head: Normocephalic and atraumatic.   Eyes: Conjunctivae are normal. Pupils are equal, round, and reactive to light.   Neck: No thyromegaly present.   Cardiovascular: Intact distal pulses.   Pulmonary/Chest: He is intubated.   Musculoskeletal: He exhibits no deformity.   Skin: Skin is warm and dry. He is not diaphoretic.   Psychiatric:   Unable to assess       NEUROLOGICAL EXAMINATION:     MENTAL STATUS   Level of consciousness: unresponsive to painful stimuli    CRANIAL NERVES     CN III, IV, VI   Pupils are equal, round, and reactive to light.       Comatose  CN:   No blink to threat OU   SÁNCHEZ OU    Corneal absent OU   No cough, gag  Face symmetric   Tongue midline   No withdrawal to noxious stimuli in any extremity     Exam findings to suggest seizures:  Myoclonus - yes - mouth, feet  eye twitching - no   Nystagmus - no   gaze deviation - no   waxy rigidity - no        Significant Labs: All pertinent lab results from the past 24 hours have been reviewed.    Significant Studies: I have reviewed all pertinent imaging results/findings within the past 24 hours.    Assessment and Plan:     * Status epilepticus    Transfer from [facility] for evaluation of NCSE noted on EEG 11/26. Likely secondary to anoxia in setting of cardiac arrest.    Recommendations:  - Continue vEEG  - Ketamine tapered off 12/6, EEG still with mostly suppression pattern  - Continue Vimpat 200 mg BID, VPA 1250 mg TID  - Decrease Keppra to 1000 mg BID starting tomorrow morning  - Vigabatrin  started 12/5 - plan to hold dose tonight, tomorrow am for now and will reassess  - Further recommendations pending continued EEG read    Plan of care discussed with NCC team, family at bedside. Will continue to follow.      Cardiac arrest with ventricular fibrillation    - 11/25 at home, troponin peaked at 9  - Cardiology consulted and following. Recommend guideline directed therapy for now, will need interventional cardiology/cardiac catheterization for further investigation to determine if there is ischemic disease if neurological recovery takes palce  - TTM completed at OSH, target temp reached at 0300 on 11/26, now rewarmed  - Management per primary     Anoxic brain injury    - Likely given myoclonic jerks at OSH, NCSE on EEG off propofol  - CT head 11/28 showing interval diffuse loss of gray-white matter differentiation with diffuse cerebral edema resulting in sulcal effacement of the cerebral hemispheres and smaller caliber ventricular system, concerning for global anoxic hypoxic injury.  - Will eventually need MRI brain     Acute hypoxemic respiratory failure    - ET tube placed in OR at [facility] due to neck rigidity from AS  - Vent management per [facility]     Leukocytosis    - Trending up, 30.77 today  - Tmax 100.8 today  - Procalcitonin, CRP elevated         VTE Risk Mitigation (From admission, onward)        Ordered     enoxaparin injection 40 mg  Daily      11/28/18 0974          Cathryn Ramirez PA-C  Neurology-Epilepsy  [facility]  Staff: Dr. Almazan

## 2018-12-06 NOTE — SUBJECTIVE & OBJECTIVE
Review of Systems   Unable to perform ROS: Intubated     Objective:     Vitals:  Temp: 99.7 °F (37.6 °C)  Pulse: 100  Rhythm: normal sinus rhythm  BP: (!) 100/56  MAP (mmHg): 72  Resp: 18  SpO2: 100 %  Oxygen Concentration (%): 50  O2 Device (Oxygen Therapy): ventilator  Vent Mode: A/C  Set Rate: 18 bmp  Vt Set: 400 mL  Pressure Support: 0 cmH20  PEEP/CPAP: 10 cmH20  Peak Airway Pressure: 16 cmH2O  Mean Airway Pressure: 13 cmH20  Plateau Pressure: 16 cmH20    Temp  Min: 97.3 °F (36.3 °C)  Max: 100 °F (37.8 °C)  Pulse  Min: 83  Max: 100  BP  Min: 94/60  Max: 110/68  MAP (mmHg)  Min: 70  Max: 84  Resp  Min: 0  Max: 26  SpO2  Min: 100 %  Max: 100 %  Oxygen Concentration (%)  Min: 50  Max: 60    12/05 0701 - 12/06 0700  In: 3639.6 [I.V.:1449.6]  Out: 1620 [Urine:1620]   Unmeasured Output  Urine Occurrence: 1  Stool Occurrence: 0  Emesis Occurrence: 0  Pad Count: 0       Physical Exam   Constitutional: He appears well-developed.   HENT:   Head: Normocephalic.   Eyes: Pupils are equal, round, and reactive to light.   Neck: No JVD present.   Cardiovascular: Regular rhythm and normal heart sounds.   Pulmonary/Chest: Breath sounds normal.   Abdominal: Soft. Bowel sounds are normal.   Musculoskeletal: He exhibits no edema.   Neurological:   Coma, GCS 3  No movement in extremities   Skin: Skin is warm. Capillary refill takes less than 2 seconds.   Nursing note and vitals reviewed.        Medications:  Continuous    sodium chloride 0.9%    ketamine (KETALAR) infusion Last Rate: 50 mcg/kg/min (12/06/18 1100)   Scheduled    albuterol-ipratropium 3 mL Q6H   aspirin 81 mg Daily   atorvastatin 80 mg Daily   ceFEPime (MAXIPIME) IVPB 2 g Q12H   chlorhexidine 15 mL BID   clopidogrel 75 mg Daily   enoxaparin 40 mg Daily   lacosamide (VIMPAT) IVPB 200 mg Q12H   levetiracetam IVPB 2,000 mg Q12H   lisinopril 5 mg Daily   metoclopramide HCl 10 mg Q6H   metoprolol tartrate 12.5 mg Q12H   pantoprazole 40 mg Daily   polyethylene glycol 17  g Daily   senna-docusate 8.6-50 mg 1 tablet BID   sodium chloride 3% 4 mL Q6H   valproate sodium (DEPACON) IVPB 1,500 mg Q8H   [START ON 12/7/2018] vancomycin (VANCOCIN) IVPB 15 mg/kg (Dosing Weight) Q24H   vigabatrin 1,500 mg BID   PRN    acetaminophen 650 mg Q6H PRN   atropine 0.5 mg PRN   dextrose 50% 12.5 g PRN   glucagon (human recombinant) 1 mg PRN   magnesium oxide 800 mg PRN   magnesium oxide 800 mg PRN   potassium chloride 10% 40 mEq PRN   potassium chloride 10% 40 mEq PRN   potassium chloride 10% 60 mEq PRN   potassium, sodium phosphates 2 packet PRN   potassium, sodium phosphates 2 packet PRN   potassium, sodium phosphates 2 packet PRN   sodium chloride 0.9% 3 mL PRN     Today I personally reviewed pertinent medications, lines/drains/airways, imaging, laboratory results, notably:    Diet  Diet NPO  Diet NPO

## 2018-12-06 NOTE — SUBJECTIVE & OBJECTIVE
Review of Systems   Unable to perform ROS: Intubated     Objective:     Vitals:  Temp: 99.1 °F (37.3 °C)  Pulse: 92  Rhythm: normal sinus rhythm  BP: 105/65  MAP (mmHg): 79  Resp: (!) 23  SpO2: 100 %  Oxygen Concentration (%): 60  O2 Device (Oxygen Therapy): ventilator  Vent Mode: A/C  Set Rate: 18 bmp  Vt Set: 400 mL  Pressure Support: 0 cmH20  PEEP/CPAP: 10 cmH20  Peak Airway Pressure: 18 cmH2O  Mean Airway Pressure: 14 cmH20  Plateau Pressure: 0 cmH20    Temp  Min: 97.7 °F (36.5 °C)  Max: 99.7 °F (37.6 °C)  Pulse  Min: 83  Max: 99  BP  Min: 94/60  Max: 138/82  MAP (mmHg)  Min: 72  Max: 104  Resp  Min: 0  Max: 25  SpO2  Min: 100 %  Max: 100 %  Oxygen Concentration (%)  Min: 60  Max: 60    12/04 0701 - 12/05 0700  In: 2707.1 [I.V.:1462.1]  Out: 1850 [Urine:1800]   Unmeasured Output  Urine Occurrence: 1  Stool Occurrence: 0  Emesis Occurrence: 0  Pad Count: 0       Physical Exam   Constitutional: He appears well-developed.   HENT:   Head: Normocephalic.   Eyes: Pupils are equal, round, and reactive to light.   Neck: No JVD present.   Cardiovascular: Regular rhythm and normal heart sounds.   Pulmonary/Chest: Breath sounds normal.   Abdominal: Soft. Bowel sounds are normal.   Musculoskeletal: He exhibits no edema.   Neurological:   Coma, GCS 3  No movement in extremities   Skin: Skin is warm. Capillary refill takes less than 2 seconds.   Nursing note and vitals reviewed.        Medications:  Continuous  ketamine (KETALAR) infusion Last Rate: 100 mcg/kg/min (12/05/18 1800)   Scheduled  albuterol-ipratropium 3 mL Q6H   aspirin 81 mg Daily   atorvastatin 80 mg Daily   ceFEPime (MAXIPIME) IVPB 1 g Q12H   chlorhexidine 15 mL BID   clonazePAM 2 mg Q8H   clopidogrel 75 mg Daily   enoxaparin 40 mg Daily   lacosamide (VIMPAT) IVPB 200 mg Q12H   levetiracetam IVPB 2,000 mg Q12H   lisinopril 5 mg Daily   metoclopramide HCl 10 mg Q8H   metoprolol tartrate 12.5 mg Q12H   pantoprazole 40 mg Daily   polyethylene glycol 17 g  Daily   senna-docusate 8.6-50 mg 1 tablet BID   sodium chloride 3% 4 mL Q6H   valproate sodium (DEPACON) IVPB 1,500 mg Q8H   vancomycin (VANCOCIN) IVPB 15 mg/kg (Dosing Weight) Q12H   vigabatrin 1,500 mg TID   Followed by     [START ON 12/6/2018] vigabatrin 1,500 mg BID   PRN  acetaminophen 650 mg Q6H PRN   atropine 0.5 mg PRN   dextrose 50% 12.5 g PRN   glucagon (human recombinant) 1 mg PRN   magnesium oxide 800 mg PRN   magnesium oxide 800 mg PRN   potassium chloride 10% 40 mEq PRN   potassium chloride 10% 40 mEq PRN   potassium chloride 10% 60 mEq PRN   potassium, sodium phosphates 2 packet PRN   potassium, sodium phosphates 2 packet PRN   potassium, sodium phosphates 2 packet PRN   sodium chloride 0.9% 3 mL PRN     Today I personally reviewed pertinent medications, lines/drains/airways, imaging, laboratory results, notably:    Diet  Diet NPO  Diet NPO

## 2018-12-06 NOTE — PROGRESS NOTES
Notified NCC, spoke to Jules Powell MD, pt's urine output is concentrated orange with some blood clots. No new orders made. Will continue to monitor.

## 2018-12-07 NOTE — PROGRESS NOTES
Notified NCC spoke to Jules Denson MD, pt's temperature is 100.9. PRN tylenol was given earlier. Ok to use cooling blanket per NCC. Cooling blanket placed under pt. Will continue to monitor.

## 2018-12-07 NOTE — PROCEDURES
ICU EEG/VIDEO MONITORING REPORT    Micheal Chirinos Jr.  [id number]  1960    DATE OF SERVICE: 12/6-7/18    DATE OF ADMISSION: 11/25/2018  6:11 AM    ADMITTING PROVIDER: Les Mar Jr., MD    REASON FOR CONSULT: 57yo M s/p cardiac arrest    METHODOLOGY   Electroencephalographic (EEG) recording is with electrodes placed according to the International 10-20 placement system.  Thirty two (32) channels of digital signal are simultaneously recorded from the scalp and may include EKG, EMG, and/or eye monitors.   Recording band pass was 0.1 to 512 hz.  Digital video recording of the patient is simultaneously recorded with the EEG.  The nursing staff report clinical symptoms and may press an event button when the patient has symptoms of clinical interest to the treating physicians.  EEG and video recording is stored and archived in digital format.  The entire recording is visually reviewed and the times identified by computer analysis as being spikes or seizures are reviewed again.  Activation procedures which include photic stimulation, hyperventilation and instructing patients to perform simple task are done in selected patients.   Compresses spectral analysis (CSA) is also performed on the activity recorded from each individual channel.  This is displayed as a power display of frequencies from 0 to 30 Hz over time.   The CSA analysis is done and displayed continuously.  This is reviewed for asymmetries in power between homologous areas of the scalp and for presence of changes in power which canbe seen when seizures occur.  Sections of suspected abnormalities on the CSA is then compared with the original EEG recording.     [employer] software was also utilized in the review of this study.  This software suite analyzes the EEG recording in multiple domains.  Coherence and rhythmicity is computed to identify EEG sections which may contain organized seizures.  Each channel undergoes analysis to detect presence  of spike and sharp waves which have special and morphological characteristic of epileptic activity.  The routine EEG recording is converted from spacial into frequency domain.  This is then displayed comparing homologous areas to identify areas of significant asymmetry.  Algorithm to identify non-cortically generated artifact is used to separate eye movement, EMG and other artifact from the EEG.      Recording Times  Start on 12/6/18, 07:00  Stop on 12/7/18, 07:00    A total of 24 hours of EEG was recorded.    EEG FINDINGS  Burst suppression pattern with epileptiform bursts, is seen throughout:  At onset very prolonged periods of generalized suppression (> 10 minutes) is noted with low amplitude generalized epileptiform (0.5 Hz spikes) bursts seen rarely.    After Ketamine was discontinued, epileptiform bursts start to re-appear (after ~18:00) with shorter periods of generalized suppression.    Ketamine was then re-started with infrequent bursts noted subsequently.    IMPRESSION:   This is an abnormal C-EEG due to the burst suppression pattern with epileptiform bursts and periods of suppression as described, suggestive of severe cerebral dysfunction.    CLINICAL CORRELATION IS RECOMMENDED.    Delilah Almazan MD, HERSON(), KAUSHAL BROWN.  Neurology-Epilepsy.  [facility].

## 2018-12-07 NOTE — ASSESSMENT & PLAN NOTE
Resolved  No fevers  12/3 recurrent leukocytosis, all lines relatively new,vomiting( may be secondary to ileus, check le doppler  12/4: no dvt or evolving ileus, afebrile, screening blood cultures sent  12/5: pro joann up, sed rate and crp sent, may need ct chest/abd/pelvis, emperic abx started  12/6: elevated sed rate and crp, ct abd/pelvis today  12/7: cipro added to cefepime, ct abd/pelvis unremarkable for fluid collection suggestive of abscess

## 2018-12-07 NOTE — PROCEDURES
DATE OF SERVICE:  11/29/2018 and 11/30/2018    ICU EEG/VIDEO MONITORING REPORT    METHODOLOGY:  Electroencephalographic (EEG) is recorded with electrodes placed   according to the International 10-20 placement system.  Thirty two (32) channels   of digital signal (sampling rate of 512/sec), including T1 and T2, were   simultaneously recorded from the scalp and may include EKG, EMG, and/or eye   monitors.  Recording band pass was 0.1 to 512 Hz.  Digital video recording of   the patient is simultaneously recorded with the EEG.  The patient is instructed   to report clinical symptoms which may occur during the recording session.  EEG   and video recording are stored and archived in digital format.  Activation   procedures, which include photic stimulation, hyperventilation and instructing   patients to perform simple tasks, are done in selected patients.    The EEG is displayed on a monitor screen and can be reviewed using different   montages.  Computer-assisted analysis is employed to detect spike and   electrographic seizure activity.  The entire record is submitted for computer   analysis.  The entire recording is visually reviewed, and the times identified   by computer analysis as being spikes or seizures are reviewed again.    Compressed spectral analysis (CSA) is also performed on the activity recorded   from each individual channel.  This is displayed as a power display of   frequencies from 0 to 30 Hz over time.  The CSA is reviewed looking for   asymmetries in power between homologous areas of the scalp, then compared with   the original EEG recording.    [employer] software was also utilized in the review of this study.  This software   suite analyzes the EEG recording in multiple domains.  Coherence and rhythmicity   are computed to identify EEG sections which may contain organized seizures.    Each channel undergoes analysis to detect the presence of spike and sharp waves   which have special and  morphological characteristics of epileptic activity.  The   routine EEG recording is converted from special into frequency domain.  This is   then displayed comparing homologous areas to identify areas of significant   asymmetry.  Algorithm to identify non-cortically generated artifact is used to   separate artifact from the EEG.    RECORDING TIMES:  The study begins on 11/29/2018 at 07:00 and ends on 12/01/2018   at 07:00.  Total recording duration is 47 hours and 50 minutes.    EEG FINDINGS:  This record consists of a medium amplitude background with a   light burst-suppression pattern seen at the onset with bursts of delta activity   in the 3 to 4 Hz range primarily seen and relatively flat suppression   backgrounds.  The bursts last from 1 to 2 seconds in the suppressions from 4 to   10 seconds initially reflecting a moderate degree of burst suppression.  As the   record progresses over time, a few sharper morphology waves eventually emerge in   the bursts and the bursts become somewhat longer at times.    The record changes in character at about 2100 hours on 11/29/2018, at which   time, the burst-suppression pattern is replaced by diffuse pattern of   generalized periodic epileptiform discharges occurring at approximately once   every 1 to 2 seconds.  These are sharp bisynchronous epileptiform appearing   discharges and this part of the record is also accompanied by myogenic EMG   artifact.  This goes on until 0100 hours on 11/30/2018, at which time, a deeper   level of burst suppression is again seen similar to that seen in the beginning   of this record.  Over the next several hours, variable degrees of burst   suppression are again seen with a few appearances of brief runs of GPEDs as   before.  Again, GPEDs become more prominent on 11/30/2018 in the evening hours   from 2000 hours through the early morning hours of 12/01/2018.  After 01:00 a.m.   on 12/01/2018, slightly higher level of burst suppression  with occasional GPEDs   is then seen and the record ends with light burst suppression and sharp GPEDs   seen approximately once every 2 seconds.    INTERPRETATION:  Abnormal EEG due to burst-suppression pattern with at times   deeper levels of burst suppression in a non-epileptiform burst pattern and at   times, lighter levels of burst suppression.  Sharp epileptiform appearing   generalized periodic discharges seen.  No normal background is seen at any point   during this record.  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  No significant improvement seen from beginning to end and   changes are thought to reflect more of a variable response to sedation over the   course of this 48 hours.      NBB/IN  dd: 12/07/2018 15:08:09 (CST)  td: 12/07/2018 15:29:48 (CST)  Doc ID   #[id number]  Job ID #[id number]    CC:

## 2018-12-07 NOTE — PLAN OF CARE
Problem: Patient Care Overview  Goal: Plan of Care Review  POC reviewed with pt and family.Family verbalized understanding. Questions and concerns addressed. No acute events today. After family meeting family decided to withdraw care on 12/8/18. EEG cap removed. No seizure like activity noted during shift ketamine at 25mcq/kg/min. Blood transfusion cancelled per family request. Will continue to monitor. See flowsheets for full assessment and VS info.

## 2018-12-07 NOTE — SUBJECTIVE & OBJECTIVE
Interval History: EEG with return of more frequent epileptiform bursts overnight, ketamine restarted. Family discussion today by NCC, family likely wants to pursue comfort care.    Current Facility-Administered Medications   Medication Dose Route Frequency Provider Last Rate Last Dose    0.9%  NaCl infusion (for blood administration)   Intravenous Q24H PRN Michoacano Hanks NP        acetaminophen oral solution 650 mg  650 mg Per NG tube Q6H PRN Xenia Gaffney MD   650 mg at 12/06/18 1700    albuterol-ipratropium 2.5 mg-0.5 mg/3 mL nebulizer solution 3 mL  3 mL Nebulization Q6H Michoacano Hanks NP   3 mL at 12/07/18 1230    aspirin chewable tablet 81 mg  81 mg Per OG tube Daily Xenia Gaffney MD   81 mg at 12/07/18 0825    atorvastatin tablet 80 mg  80 mg Per OG tube Daily Xenia Gaffney MD   80 mg at 12/07/18 0825    atropine injection 0.5 mg  0.5 mg Intravenous PRN Jazmin Pendleton NP        ceFEPIme injection 2 g  2 g Intravenous Q12H Kj Araiza MD   2 g at 12/07/18 0825    chlorhexidine 0.12 % solution 15 mL  15 mL Mouth/Throat BID Lamberto Howard NP   15 mL at 12/07/18 0825    ciprofloxacin (CIPRO)400mg/200ml D5W IVPB 400 mg  400 mg Intravenous Q12H Jules Denson  mL/hr at 12/07/18 0940 400 mg at 12/07/18 0940    clopidogrel tablet 75 mg  75 mg Per OG tube Daily Xenia Gaffney MD   75 mg at 12/07/18 0826    dextrose 50% injection 12.5 g  12.5 g Intravenous PRN Delia Taveras MD        enoxaparin injection 40 mg  40 mg Subcutaneous Daily Xenia Gaffney MD   40 mg at 12/06/18 1636    glucagon (human recombinant) injection 1 mg  1 mg Intramuscular PRN Delia Taveras MD        ketamine (KETALAR) 5,000 mg in sodium chloride 0.9% 500 mL infusion  25 mcg/kg/min (Dosing Weight) Intravenous Continuous Michoacano Hanks NP 10.1 mL/hr at 12/07/18 1300 25 mcg/kg/min at 12/07/18 1300    lacosamide (VIMPAT) 200 mg in sodium chloride 0.9% 100 mL IVPB  200 mg Intravenous Q12H  Tae Lux  mL/hr at 12/07/18 0940 200 mg at 12/07/18 0940    levETIRAcetam in NaCl (iso-os) IVPB 1,000 mg  1,000 mg Intravenous Q12H Cathryn Ramirez PA-C 200 mL/hr at 12/07/18 0826 1,000 mg at 12/07/18 0826    lisinopril tablet 5 mg  5 mg Per NG tube Daily Alpa Rodriguez NP   5 mg at 12/07/18 0826    magnesium oxide tablet 800 mg  800 mg Per NG tube PRKARMEN Howard NP        magnesium oxide tablet 800 mg  800 mg Per NG tube PRKARMEN Howard NP        metoclopramide HCl injection 10 mg  10 mg Intravenous Q6H Jules Denson MD   10 mg at 12/07/18 1213    metoprolol tartrate (LOPRESSOR) split tablet 12.5 mg  12.5 mg Per NG tube Q12H Jazmin Pendleton NP   Stopped at 12/06/18 2100    pantoprazole injection 40 mg  40 mg Intravenous Daily Jazmin Pendleton NP   40 mg at 12/07/18 0826    polyethylene glycol packet 17 g  17 g Per NG tube Daily Jazmin Pendleton NP   17 g at 12/07/18 0826    potassium chloride 10% oral solution 40 mEq  40 mEq Per NG tube PRKARMEN Howard NP   40 mEq at 12/06/18 1834    potassium chloride 10% oral solution 40 mEq  40 mEq Per NG tube PRKARMEN Howard NP   40 mEq at 11/28/18 1440    potassium chloride 10% oral solution 60 mEq  60 mEq Per NG tube PRKARMEN Howard NP        potassium, sodium phosphates 280-160-250 mg packet 2 packet  2 packet Per NG tube PRKARMEN Howard NP   2 packet at 12/06/18 1835    potassium, sodium phosphates 280-160-250 mg packet 2 packet  2 packet Per NG tube PRKARMEN Howard NP   2 packet at 11/29/18 1322    potassium, sodium phosphates 280-160-250 mg packet 2 packet  2 packet Per NG tube PRKARMEN Howard NP        senna-docusate 8.6-50 mg per tablet 1 tablet  1 tablet Per OG tube BID Galen Lopes MD   1 tablet at 12/07/18 0826    sodium chloride 0.9% flush 3 mL  3 mL Intravenous PRN Lamberto Howard NP        sodium chloride 3% nebulizer solution 4 mL  4 mL Nebulization Q6H Kj Araiza MD    4 mL at 12/07/18 1230    valproate (DEPACON) 1,500 mg in dextrose 5 % 100 mL IVPB  1,500 mg Intravenous Q8H Kj Araiza  mL/hr at 12/07/18 0825 1,500 mg at 12/07/18 0825    vancomycin in dextrose 5 % 1 gram/250 mL IVPB 1,000 mg  15 mg/kg (Dosing Weight) Intravenous Q24H Kj Araiza MD   1,000 mg at 12/07/18 0624    vigabatrin (SABRIL) powder packet 1,500 mg  1,500 mg Per NG tube BID Cathryn Ramirez PA-C   1,500 mg at 12/07/18 0940     Continuous Infusions:   ketamine (KETALAR) infusion 25 mcg/kg/min (12/07/18 1300)       Review of Systems   Unable to perform ROS: Intubated     Objective:     Vital Signs (Most Recent):  Temp: 98.4 °F (36.9 °C) (12/07/18 1102)  Pulse: 89 (12/07/18 1302)  Resp: (!) 23 (12/07/18 1302)  BP: 135/78 (12/07/18 1302)  SpO2: 100 % (12/07/18 1302) Vital Signs (24h Range):  Temp:  [98.3 °F (36.8 °C)-100.9 °F (38.3 °C)] 98.4 °F (36.9 °C)  Pulse:  [] 89  Resp:  [22-34] 23  SpO2:  [99 %-100 %] 100 %  BP: (111-144)/(66-82) 135/78     Weight: 67.4 kg (148 lb 9.4 oz)  Body mass index is 21.94 kg/m².    Physical Exam   Constitutional: He appears well-developed and well-nourished. He is sedated and intubated.   HENT:   Head: Normocephalic and atraumatic.   Eyes: Conjunctivae are normal. Pupils are equal, round, and reactive to light.   Neck: No thyromegaly present.   Cardiovascular: Intact distal pulses.   Pulmonary/Chest: He is intubated.   Musculoskeletal: He exhibits no deformity.   Skin: Skin is warm and dry. He is not diaphoretic.   Psychiatric:   Unable to assess       NEUROLOGICAL EXAMINATION:     MENTAL STATUS   Level of consciousness: unresponsive to painful stimuli    CRANIAL NERVES     CN III, IV, VI   Pupils are equal, round, and reactive to light.       Comatose  CN:   No blink to threat OU   SÁNCHEZ OU    Corneal absent OU   No cough, gag  Face symmetric   Tongue midline   No withdrawal to noxious stimuli in any extremity     Exam findings to suggest  seizures:  Myoclonus - yes - mouth, feet  eye twitching - no   Nystagmus - no   gaze deviation - no   waxy rigidity - no        Significant Labs: All pertinent lab results from the past 24 hours have been reviewed.    Significant Studies: I have reviewed all pertinent imaging results/findings within the past 24 hours.

## 2018-12-07 NOTE — ASSESSMENT & PLAN NOTE
Transfer from [facility] for evaluation of NCSE noted on EEG 11/26. Likely secondary to anoxia in setting of cardiac arrest.    Recommendations:  - Will discontinue vEEG as family planning to transition to comfort care  - Ketamine tapered off 12/6, EEG with return of epileptiform bursts overnight, restarted  - Continue Vimpat 200 mg BID, VPA 1250 mg TID, Keppra 1000 mg BID   - Vigabatrin started 12/5 - evening dose held 12/6, restarted 1500 mg BID 12/7  - Further recommendations pending continued EEG read    Plan of care discussed with NCC team, family at bedside. Will sign off.

## 2018-12-07 NOTE — PROGRESS NOTES
Notified Michoacano Hanks NP of pt's seizure like activity. Left side neck and facial twitches observed. Button pressed on EEG. Per orders call back if activity last longer than 5 min.     2148- Notified Michoacano Hanks NP and reported seizure like activity lasting greater than 5 min. Reported will come to bedside now.    2152- BINTA Hanks at bedside assessed activity and EEG. Notified Dr. Almazan to review EEG. Reported pt in status epilepticus and Ketamine gtt will be reordered.

## 2018-12-07 NOTE — PROGRESS NOTES
Spoke w/ Michoacano Hanks NP regarding drop in pt's H/H. Refer to chart review for values. Only sources of bleeding is pt's mouth and penis, which was reported to day team yesterday by day shift [facility] [place]. Per orders will order a type and screen and blood products. Blood consent needed.

## 2018-12-07 NOTE — PROGRESS NOTES
Spoke w/ Michoacano Hanks NP regarding pt's transfusion orders. Per orders will relay to day team that blood consent is needed. Do not release blood until consent is obtained. Please relay to day shift nurse

## 2018-12-07 NOTE — PROGRESS NOTES
[facility]  Neurology-Epilepsy  Progress Note    Patient Name: Micheal Chirinos Jr.  MRN: [id number]  Admission Date: 2018  Hospital Length of Stay: 12 days  Code Status: DNR   Attending Provider: Kj Araiza MD  Primary Care Physician: Azikiwe K Lombard, MD   Principal Problem:Status epilepticus    Subjective:     Hospital Course:   EE/26: continuous generalized epileptiform discharges when off propofol, suggestive of NCSE  : Cap EEG - intermittent rhythmic GPEDs, suggestive of post anoxic NCSE  >: Fluctuating, periods of sharp GPEDs which improved ~0300 , when Propofol increased to 75 mKm. Subsequently, light burst suppression pattern.  : light burst suppression pattern, sharp GPEDs intermittently. Subcortical myoclonus + EEG artifact as well.   >: Periodic generalized epileptiform discharges. More suppressed on Ketamine/Propofol, plan is to taper off Propofol and optimize Ketamine  >2: Burst suppression, epileptiform discharges in bursts  2>3: Burst suppression pattern  3>4: burst suppression pattern with epileptiform bursts and associated clinical correlate  >5: burst suppression pattern with epileptiform bursts, fluctuating with periods of more prolonged suppression  >6: mostly suppressed overnight  >7: burst suppression pattern with epileptiform bursts, at onset prolonged periods of genrealized suppression with low amplitude generalized epileptiform bursts seen rarely, after ketamine discontinued bursts start to re-appear    Interval History: EEG with return of more frequent epileptiform bursts overnight, ketamine restarted. Family discussion today by NCC, family likely wants to pursue comfort care.    Current Facility-Administered Medications   Medication Dose Route Frequency Provider Last Rate Last Dose    0.9%  NaCl infusion (for blood administration)   Intravenous Q24H PRN [place]oacano Hanks, BINTA         acetaminophen oral solution 650 mg  650 mg Per NG tube Q6H PRN Xenia Gaffney MD   650 mg at 12/06/18 1700    albuterol-ipratropium 2.5 mg-0.5 mg/3 mL nebulizer solution 3 mL  3 mL Nebulization Q6H Michoacano Hanks NP   3 mL at 12/07/18 1230    aspirin chewable tablet 81 mg  81 mg Per OG tube Daily Xenia Gaffney MD   81 mg at 12/07/18 0825    atorvastatin tablet 80 mg  80 mg Per OG tube Daily Xenia BONNIE Gaffney MD   80 mg at 12/07/18 0825    atropine injection 0.5 mg  0.5 mg Intravenous PRN Jazmin Pendleton NP        ceFEPIme injection 2 g  2 g Intravenous Q12H Kj Araiza MD   2 g at 12/07/18 0825    chlorhexidine 0.12 % solution 15 mL  15 mL Mouth/Throat BID Lamberto Howard NP   15 mL at 12/07/18 0825    ciprofloxacin (CIPRO)400mg/200ml D5W IVPB 400 mg  400 mg Intravenous Q12H Jules Denson  mL/hr at 12/07/18 0940 400 mg at 12/07/18 0940    clopidogrel tablet 75 mg  75 mg Per OG tube Daily Xenia Gaffney MD   75 mg at 12/07/18 0826    dextrose 50% injection 12.5 g  12.5 g Intravenous PRN Delia Taveras MD        enoxaparin injection 40 mg  40 mg Subcutaneous Daily Xeina Gaffney MD   40 mg at 12/06/18 1636    glucagon (human recombinant) injection 1 mg  1 mg Intramuscular PRN Delia Taveras MD        ketamine (KETALAR) 5,000 mg in sodium chloride 0.9% 500 mL infusion  25 mcg/kg/min (Dosing Weight) Intravenous Continuous Michoacano Hanks NP 10.1 mL/hr at 12/07/18 1300 25 mcg/kg/min at 12/07/18 1300    lacosamide (VIMPAT) 200 mg in sodium chloride 0.9% 100 mL IVPB  200 mg Intravenous Q12H Tae Lux  mL/hr at 12/07/18 0940 200 mg at 12/07/18 0940    levETIRAcetam in NaCl (iso-os) IVPB 1,000 mg  1,000 mg Intravenous Q12H Cathryn Ramirez PA-C 200 mL/hr at 12/07/18 0826 1,000 mg at 12/07/18 0826    lisinopril tablet 5 mg  5 mg Per NG tube Daily Alpa Rodriguez NP   5 mg at 12/07/18 0826    magnesium oxide tablet 800 mg  800 mg Per NG tube PRN Lamberto Howard NP         magnesium oxide tablet 800 mg  800 mg Per NG tube PRN Lamberto Howard NP        metoclopramide HCl injection 10 mg  10 mg Intravenous Q6H Jules Denson MD   10 mg at 12/07/18 1213    metoprolol tartrate (LOPRESSOR) split tablet 12.5 mg  12.5 mg Per NG tube Q12H Jazmin Pendleton NP   Stopped at 12/06/18 2100    pantoprazole injection 40 mg  40 mg Intravenous Daily Jazmin Pendleton NP   40 mg at 12/07/18 0826    polyethylene glycol packet 17 g  17 g Per NG tube Daily Jazmin Pendleton NP   17 g at 12/07/18 0826    potassium chloride 10% oral solution 40 mEq  40 mEq Per NG tube PRN Lamberto Howard NP   40 mEq at 12/06/18 1834    potassium chloride 10% oral solution 40 mEq  40 mEq Per NG tube PRN Lamberto Howard NP   40 mEq at 11/28/18 1440    potassium chloride 10% oral solution 60 mEq  60 mEq Per NG tube PRN Lamberto Howard NP        potassium, sodium phosphates 280-160-250 mg packet 2 packet  2 packet Per NG tube PRN Lamberto Howard NP   2 packet at 12/06/18 1835    potassium, sodium phosphates 280-160-250 mg packet 2 packet  2 packet Per NG tube PRN Lamberto Howard NP   2 packet at 11/29/18 1322    potassium, sodium phosphates 280-160-250 mg packet 2 packet  2 packet Per NG tube PRN Lamberto Howard NP        senna-docusate 8.6-50 mg per tablet 1 tablet  1 tablet Per OG tube BID Galen Lopes MD   1 tablet at 12/07/18 0826    sodium chloride 0.9% flush 3 mL  3 mL Intravenous PRN Lamberto Howard NP        sodium chloride 3% nebulizer solution 4 mL  4 mL Nebulization Q6H Kj Araiza MD   4 mL at 12/07/18 1230    valproate (DEPACON) 1,500 mg in dextrose 5 % 100 mL IVPB  1,500 mg Intravenous Q8H Kj Araiza  mL/hr at 12/07/18 0825 1,500 mg at 12/07/18 0825    vancomycin in dextrose 5 % 1 gram/250 mL IVPB 1,000 mg  15 mg/kg (Dosing Weight) Intravenous Q24H Kj Araiza MD   1,000 mg at 12/07/18 0624    vigabatrin (SABRIL) powder packet 1,500 mg   1,500 mg Per NG tube BID Cathryn Ramirez PA-C   1,500 mg at 12/07/18 0940     Continuous Infusions:   ketamine (KETALAR) infusion 25 mcg/kg/min (12/07/18 1300)       Review of Systems   Unable to perform ROS: Intubated     Objective:     Vital Signs (Most Recent):  Temp: 98.4 °F (36.9 °C) (12/07/18 1102)  Pulse: 89 (12/07/18 1302)  Resp: (!) 23 (12/07/18 1302)  BP: 135/78 (12/07/18 1302)  SpO2: 100 % (12/07/18 1302) Vital Signs (24h Range):  Temp:  [98.3 °F (36.8 °C)-100.9 °F (38.3 °C)] 98.4 °F (36.9 °C)  Pulse:  [] 89  Resp:  [22-34] 23  SpO2:  [99 %-100 %] 100 %  BP: (111-144)/(66-82) 135/78     Weight: 67.4 kg (148 lb 9.4 oz)  Body mass index is 21.94 kg/m².    Physical Exam   Constitutional: He appears well-developed and well-nourished. He is sedated and intubated.   HENT:   Head: Normocephalic and atraumatic.   Eyes: Conjunctivae are normal. Pupils are equal, round, and reactive to light.   Neck: No thyromegaly present.   Cardiovascular: Intact distal pulses.   Pulmonary/Chest: He is intubated.   Musculoskeletal: He exhibits no deformity.   Skin: Skin is warm and dry. He is not diaphoretic.   Psychiatric:   Unable to assess       NEUROLOGICAL EXAMINATION:     MENTAL STATUS   Level of consciousness: unresponsive to painful stimuli    CRANIAL NERVES     CN III, IV, VI   Pupils are equal, round, and reactive to light.       Comatose  CN:   No blink to threat OU   SÁNCHEZ OU    Corneal absent OU   No cough, gag  Face symmetric   Tongue midline   No withdrawal to noxious stimuli in any extremity     Exam findings to suggest seizures:  Myoclonus - yes - mouth, feet  eye twitching - no   Nystagmus - no   gaze deviation - no   waxy rigidity - no        Significant Labs: All pertinent lab results from the past 24 hours have been reviewed.    Significant Studies: I have reviewed all pertinent imaging results/findings within the past 24 hours.    Assessment and Plan:     * Status epilepticus    Transfer from [facility]  for evaluation of NCSE noted on EEG 11/26. Likely secondary to anoxia in setting of cardiac arrest.    Recommendations:  - Will discontinue vEEG as family planning to transition to comfort care  - Ketamine tapered off 12/6, EEG with return of epileptiform bursts overnight, restarted  - Continue Vimpat 200 mg BID, VPA 1250 mg TID, Keppra 1000 mg BID   - Vigabatrin started 12/5 - evening dose held 12/6, restarted 1500 mg BID 12/7  - Further recommendations pending continued EEG read    Plan of care discussed with NCC team, family at bedside. Will sign off.     Cardiac arrest with ventricular fibrillation    - 11/25 at home, troponin peaked at 9  - Cardiology consulted and following. Recommend guideline directed therapy for now, will need interventional cardiology/cardiac catheterization for further investigation to determine if there is ischemic disease if neurological recovery takes palce  - TTM completed at OSH, target temp reached at 0300 on 11/26, now rewarmed  - Management per primary     Anoxic brain injury    - Likely given myoclonic jerks at OSH, NCSE on EEG off propofol  - CT head 11/28 showing interval diffuse loss of gray-white matter differentiation with diffuse cerebral edema resulting in sulcal effacement of the cerebral hemispheres and smaller caliber ventricular system, concerning for global anoxic hypoxic injury.     Acute hypoxemic respiratory failure    - ET tube placed in OR at [facility] due to neck rigidity from AS  - Vent management per [facility]     Leukocytosis    - Trending up, 31.32 today  - Cipro, cefepime, vanc per [facility]         VTE Risk Mitigation (From admission, onward)        Ordered     enoxaparin injection 40 mg  Daily      11/28/18 0965          Cathryn Ramirez PA-C  Neurology-Epilepsy  [facility]  Staff: Dr. Almazan

## 2018-12-07 NOTE — PROGRESS NOTES
[facility]  Neurocritical Care  Progress Note    Admit Date: 11/25/2018  Service Date: 12/07/2018  Length of Stay: 12    Subjective:     Chief Complaint: Status epilepticus    History of Present Illness: Patient is a 59yo male presented as a transfer from [facility]. Original admission to [facility] was due to s/p cardiac arrest. The patient was found down by his wife who then called EMS. On arrival to the home, assessment showed the patient to be in Vfib for which a shock was delivered along with 3 rounds of Epi and 300mg Amiodarone x1. ROSC achieved. Cooling procedures were initiated with target tempeture obtained 11/26 0030. Due to ankylosing spondylitis and severely stiff neck, EMS was unable to intubate in the field therefore an LMA was placed. The patient was subsequently intubated in the OR with the use of fiberoptic. While in PACU (11/25) awaiting an ICU bed the patient began to exhibit myoclonic jerks.  The jerks resolved with Ativan and he was loaded on Keppra. CT head showed significant motion artifact. An EEG later showed generalized epileptiform discharges suggestive of non convulsive status. A decision was made to transfer to [facility] for critical care epilepsy management.     Patient was received in the NCC unit vented on 50mcg of propofol. EEG monitoring in progress. Unable to perform a complete neuro assessment due to the critical nature posed towards the patient when propofol is halted. No family at bedside to contribute to patient's history.         Hospital Course: 11/28 Admit to [facility]; Epilepsy consulted; EEG monitoring in place  11/29: increased keppra. Load valproate and valproate TID started. Advanced tube feeds.  11/30: A-gilma placed, electrolytes replaced overnight. On rounds patient started having twitches in LE and face. Muscle paralysis with rocuronium 75, paused propofol and monitored EEG. EEG with occassional spike discharges. Restarted propofol at 40. AEDs as is.  Ordered CK and TG levels  12/2: Had vomiting overnight, also bowel movement, myoclonus right after, propofol was re-started overnight. CXR without aspiration. Will D/C propofol today, will increase clonazepam, increase Ketamine to 50. Decrease RR to 60, will repeat ABG today.  12/3: went into SE when ketamine stopped, responded to 1 mg/kg ketamine bolus and resumption of gtt at 60mcg/kg, valproate increased for target level of , reglan started  12/4: burst suppression pattern consistent at 4 HZ separation between 1 Hz bursts, intervening myoclonic activity, reglan helping gastroparesis, probable start of sabril today  12/5: sabril load delayed until today, ketamine wean,peristant elevated wbc, pro joann up, abx started  12/6: still with peristant leukocytosis, elevated pro joann, elevated sed rate and crp, abd/pelvis ct with/without ordered, wean ketamine to off by afternoon  12/7:ct abd/pelvis with and without contrast negative, bladder changes c/w cystitis, no abscess, cipro added, family discussion about prognosis and directions of care        Review of Systems   Unable to perform ROS: Intubated     Objective:     Vitals:  Temp: 98.4 °F (36.9 °C)  Pulse: 87  Rhythm: normal sinus rhythm  BP: 138/80  MAP (mmHg): 103  Resp: (!) 25  SpO2: 100 %  Oxygen Concentration (%): 40  O2 Device (Oxygen Therapy): ventilator  Vent Mode: A/C  Set Rate: 18 bmp  Vt Set: 400 mL  Pressure Support: 0 cmH20  PEEP/CPAP: 5 cmH20  Peak Airway Pressure: 13 cmH2O  Mean Airway Pressure: 9 cmH20  Plateau Pressure: 0 cmH20    Temp  Min: 98.3 °F (36.8 °C)  Max: 100.9 °F (38.3 °C)  Pulse  Min: 87  Max: 108  BP  Min: 109/59  Max: 144/79  MAP (mmHg)  Min: 77  Max: 104  Resp  Min: 23  Max: 34  SpO2  Min: 99 %  Max: 100 %  Oxygen Concentration (%)  Min: 40  Max: 50    12/06 0701 - 12/07 0700  In: 2971.5 [I.V.:1231.5]  Out: 2760 [Urine:2360; Drains:200]   Unmeasured Output  Urine Occurrence: 1  Stool Occurrence: 1  Emesis Occurrence: 0  Pad Count:  2       Physical Exam   Constitutional: He appears well-developed.   HENT:   Head: Normocephalic.   Eyes: Pupils are equal, round, and reactive to light.   Neck: No JVD present.   Cardiovascular: Regular rhythm and normal heart sounds.   Pulmonary/Chest: Breath sounds normal.   Abdominal: Soft. Bowel sounds are normal.   Musculoskeletal: He exhibits no edema.   Neurological:   Coma, GCS 3  No movement in extremities   Skin: Skin is warm. Capillary refill takes less than 2 seconds.   Nursing note and vitals reviewed.        Medications:  Continuous    ketamine (KETALAR) infusion Last Rate: 25 mcg/kg/min (12/07/18 1200)   Scheduled    albuterol-ipratropium 3 mL Q6H   aspirin 81 mg Daily   atorvastatin 80 mg Daily   ceFEPime (MAXIPIME) IVPB 2 g Q12H   chlorhexidine 15 mL BID   ciprofloxacin 400 mg Q12H   clopidogrel 75 mg Daily   enoxaparin 40 mg Daily   lacosamide (VIMPAT) IVPB 200 mg Q12H   levetiracetam IVPB 1,000 mg Q12H   lisinopril 5 mg Daily   metoclopramide HCl 10 mg Q6H   metoprolol tartrate 12.5 mg Q12H   pantoprazole 40 mg Daily   polyethylene glycol 17 g Daily   senna-docusate 8.6-50 mg 1 tablet BID   sodium chloride 3% 4 mL Q6H   valproate sodium (DEPACON) IVPB 1,500 mg Q8H   vancomycin (VANCOCIN) IVPB 15 mg/kg (Dosing Weight) Q24H   vigabatrin 1,500 mg BID   PRN    sodium chloride  Q24H PRN   acetaminophen 650 mg Q6H PRN   atropine 0.5 mg PRN   dextrose 50% 12.5 g PRN   glucagon (human recombinant) 1 mg PRN   magnesium oxide 800 mg PRN   magnesium oxide 800 mg PRN   potassium chloride 10% 40 mEq PRN   potassium chloride 10% 40 mEq PRN   potassium chloride 10% 60 mEq PRN   potassium, sodium phosphates 2 packet PRN   potassium, sodium phosphates 2 packet PRN   potassium, sodium phosphates 2 packet PRN   sodium chloride 0.9% 3 mL PRN     Today I personally reviewed pertinent medications, lines/drains/airways, imaging, laboratory results, notably:    Diet  Diet NPO  Diet NPO        Assessment/Plan:     Neuro    * Status epilepticus    Will discontinue propofol today, increase Ketamine to 40  Continue VPA 1250 Q8 hours, level today therapeutic at 63, will repeat tomorrow and adjust accordingly   Increase clonazepam for subcortical myoclonus  12/3: likely not absorbing clonazepam, vimpat and keppra at max doses, escalate valproate to level ,sabril once gut motility re-established  12/4: gut motility re-established, oral aed with load to be added  12/5: sabril load, ketamine wean  12/6: ketamine decreased to 50mcg/kg, probable d/c this afternoon  12/7: recurrence of epileptiforms after ketamine d/c'd, EEG pattern with brief potentials superimposed on flat background c/w poor prognosis     Pulmonary   Acute hypoxemic respiratory failure    Maintain ett  CXR improved on 12/1, will repeat after bronch   High risk for vap and/or mucous plugging, will bronch, low threshold to start abx  12/04: start scheduled mucolytic and chest pt         Oncology   Leukocytosis    Resolved  No fevers  12/3 recurrent leukocytosis, all lines relatively new,vomiting( may be secondary to ileus, check le doppler  12/4: no dvt or evolving ileus, afebrile, screening blood cultures sent  12/5: pro joann up, sed rate and crp sent, may need ct chest/abd/pelvis, emperic abx started  12/6: elevated sed rate and crp, ct abd/pelvis today  12/7: cipro added to cefepime, ct abd/pelvis unremarkable for fluid collection suggestive of abscess           The patient is being Prophylaxed for:  Venous Thromboembolism with: Chemical  Stress Ulcer with: PPI  Ventilator Pneumonia with: chlorhexidine oral care    Activity Orders          None      Informed family about severity of neurologic injury suspected from current EEG pattern and results of other diagnostic tests  They will decide on whether to move forward with support or withdraw and start hospice  DNR    Kj Prakash MD  Neurocritical Care  [facility]

## 2018-12-07 NOTE — SUBJECTIVE & OBJECTIVE
Review of Systems   Unable to perform ROS: Intubated     Objective:     Vitals:  Temp: 98.4 °F (36.9 °C)  Pulse: 87  Rhythm: normal sinus rhythm  BP: 138/80  MAP (mmHg): 103  Resp: (!) 25  SpO2: 100 %  Oxygen Concentration (%): 40  O2 Device (Oxygen Therapy): ventilator  Vent Mode: A/C  Set Rate: 18 bmp  Vt Set: 400 mL  Pressure Support: 0 cmH20  PEEP/CPAP: 5 cmH20  Peak Airway Pressure: 13 cmH2O  Mean Airway Pressure: 9 cmH20  Plateau Pressure: 0 cmH20    Temp  Min: 98.3 °F (36.8 °C)  Max: 100.9 °F (38.3 °C)  Pulse  Min: 87  Max: 108  BP  Min: 109/59  Max: 144/79  MAP (mmHg)  Min: 77  Max: 104  Resp  Min: 23  Max: 34  SpO2  Min: 99 %  Max: 100 %  Oxygen Concentration (%)  Min: 40  Max: 50    12/06 0701 - 12/07 0700  In: 2971.5 [I.V.:1231.5]  Out: 2760 [Urine:2360; Drains:200]   Unmeasured Output  Urine Occurrence: 1  Stool Occurrence: 1  Emesis Occurrence: 0  Pad Count: 2       Physical Exam   Constitutional: He appears well-developed.   HENT:   Head: Normocephalic.   Eyes: Pupils are equal, round, and reactive to light.   Neck: No JVD present.   Cardiovascular: Regular rhythm and normal heart sounds.   Pulmonary/Chest: Breath sounds normal.   Abdominal: Soft. Bowel sounds are normal.   Musculoskeletal: He exhibits no edema.   Neurological:   Coma, GCS 3  No movement in extremities   Skin: Skin is warm. Capillary refill takes less than 2 seconds.   Nursing note and vitals reviewed.        Medications:  Continuous    ketamine (KETALAR) infusion Last Rate: 25 mcg/kg/min (12/07/18 1200)   Scheduled    albuterol-ipratropium 3 mL Q6H   aspirin 81 mg Daily   atorvastatin 80 mg Daily   ceFEPime (MAXIPIME) IVPB 2 g Q12H   chlorhexidine 15 mL BID   ciprofloxacin 400 mg Q12H   clopidogrel 75 mg Daily   enoxaparin 40 mg Daily   lacosamide (VIMPAT) IVPB 200 mg Q12H   levetiracetam IVPB 1,000 mg Q12H   lisinopril 5 mg Daily   metoclopramide HCl 10 mg Q6H   metoprolol tartrate 12.5 mg Q12H   pantoprazole 40 mg Daily    polyethylene glycol 17 g Daily   senna-docusate 8.6-50 mg 1 tablet BID   sodium chloride 3% 4 mL Q6H   valproate sodium (DEPACON) IVPB 1,500 mg Q8H   vancomycin (VANCOCIN) IVPB 15 mg/kg (Dosing Weight) Q24H   vigabatrin 1,500 mg BID   PRN    sodium chloride  Q24H PRN   acetaminophen 650 mg Q6H PRN   atropine 0.5 mg PRN   dextrose 50% 12.5 g PRN   glucagon (human recombinant) 1 mg PRN   magnesium oxide 800 mg PRN   magnesium oxide 800 mg PRN   potassium chloride 10% 40 mEq PRN   potassium chloride 10% 40 mEq PRN   potassium chloride 10% 60 mEq PRN   potassium, sodium phosphates 2 packet PRN   potassium, sodium phosphates 2 packet PRN   potassium, sodium phosphates 2 packet PRN   sodium chloride 0.9% 3 mL PRN     Today I personally reviewed pertinent medications, lines/drains/airways, imaging, laboratory results, notably:    Diet  Diet NPO  Diet NPO

## 2018-12-07 NOTE — PROCEDURES
DATE OF SERVICE:  12/01/2018 and 12/02/2018    ICU EEG/VIDEO MONITORING REPORT    METHODOLOGY:  Electroencephalographic (EEG) is recorded with electrodes placed   according to the International 10-20 placement system.  Thirty two (32) channels   of digital signal (sampling rate of 512/sec), including T1 and T2, were   simultaneously recorded from the scalp and may include EKG, EMG, and/or eye   monitors.  Recording band pass was 0.1 to 512 Hz.  Digital video recording of   the patient is simultaneously recorded with the EEG.  The patient is instructed   to report clinical symptoms which may occur during the recording session.  EEG   and video recording are stored and archived in digital format.  Activation   procedures, which include photic stimulation, hyperventilation and instructing   patients to perform simple tasks, are done in selected patients.    The EEG is displayed on a monitor screen and can be reviewed using different   montages.  Computer-assisted analysis is employed to detect spike and   electrographic seizure activity.  The entire record is submitted for computer   analysis.  The entire recording is visually reviewed, and the times identified   by computer analysis as being spikes or seizures are reviewed again.    Compressed spectral analysis (CSA) is also performed on the activity recorded   from each individual channel.  This is displayed as a power display of   frequencies from 0 to 30 Hz over time.  The CSA is reviewed looking for   asymmetries in power between homologous areas of the scalp, then compared with   the original EEG recording.    [employer] software was also utilized in the review of this study.  This software   suite analyzes the EEG recording in multiple domains.  Coherence and rhythmicity   are computed to identify EEG sections which may contain organized seizures.    Each channel undergoes analysis to detect the presence of spike and sharp waves   which have special and  morphological characteristics of epileptic activity.  The   routine EEG recording is converted from special into frequency domain.  This is   then displayed comparing homologous areas to identify areas of significant   asymmetry.  Algorithm to identify non-cortically generated artifact is used to   separate artifact from the EEG.    RECORDING TIMES:  The study begins on 12/01/2018 at 07:00 and ends on 12/03/2018   at 07:00.  Total recording duration is 47 hours and 52 minutes.    EEG FINDINGS:  This record begins as in the prior day with a burst-suppression   pattern consisting of sharp epileptiform bursts in the form of bisynchronous   generalized periodic epileptiform discharges seen lasting from 1 to 3 seconds   and relative suppressions of activity lasting 4 to 5 seconds with abundant   myogenic muscle artifact seen interspersed.  Level of sedation increases at   approximately 1100 hours on 12/01/2018 with a deeper level of burst suppression   then seen with a 10:1 or 20:1 level of suppression to burst for the next several   hours with less epileptiform waveforms appearing in the bursts.  Level of burst   suppression varies on and off throughout the record with a lighter suppression   again seen in the evening hours of 12/01/2018 with sharp GPEDs again appearing   in the bursts, but still with a 10:1 suppression to burst ratio at this point.    The overnight hours of 12/01/2018 until the morning of 12/02/2018, continue to   represent a 10:1 suppression to burst ratio approximately but with highly sharp   epileptiform bursts appearing.  Level of burst suppression lightens in the early   morning hours of 12/02/2018 at about 04:00 forward with more myoclonic activity   in sharper and more regular GPEDs seen at that point.  Suppression becomes   deeper again at 0600 hours for the next several hours and most of the day on   12/02/2018 with a fairly deep burst suppression of 10:1 to 15:1 throughout the   course of  that day.  In the early morning hours of 12/03/2018, burst-suppression   is slightly lighter with sharp-appearing GPEDs again appearing but with   approximately 10:1 level of sedation maintained overnight and this record ends   with a fairly deep burst suppression of 10 to 15:1 suppression to burst with   occasional GPED seen.    No normal waveforms are seen at any point during this study, which reflects   variable degrees of burst suppression and myoclonic discharges.    INTERPRETATION:  Abnormal EEG due to continuing burst-suppression pattern, which   varies only in degree of suppression reflecting diffuse cortical injury   following anoxic event and myoclonic status epilepticus, treated with   pharmacological intervention.  No normal brain waves are seen and no improvement   was seen in this study other than a variable response to levels of sedation   causing deeper burst suppression.      NBB/IN  dd: 12/07/2018 15:18:30 (CST)  td: 12/07/2018 15:44:21 (CST)  Doc ID   #[id number]  Job ID #[id number]    CC:

## 2018-12-07 NOTE — PROGRESS NOTES
Told to hold urine culture pending family meeting planned today, but to continue to give the Cipro per Teodora GELLER order.

## 2018-12-07 NOTE — PROGRESS NOTES
" [facility]  Adult Nutrition  Progress Note    SUMMARY       Recommendations    Recommendation/Intervention:   As medically able to restart TF, recommend increasing goal rate.   Isosource 1.5 @ 55mL/hr.   - Provides 1980kcals, 90g protein and 1008mL free water.   - Hold for residuals >500mL.     RD to monitor.    Goals: Initiate nutrition within 48 hrs  Nutrition Goal Status: goal met  Communication of RD Recs: reviewed with RN(POC)    Reason for Assessment    Reason for Assessment: RD follow-up  Diagnosis: (Cardiac Arrest)  Relevant Medical History: HTN  Interdisciplinary Rounds: attended  General Information Comments: Pt remains intubated. TF re-ordered however held s/p CT scan. NFPE completed on 11/26- exam remains unchanged. pt with mild loss of lean body mass evident in temporal, clavicles, but adequate in extremities; adequate fat mass.   Nutrition Discharge Planning: unable to determine at this time    Nutrition Risk Screen    Nutrition Risk Screen: tube feeding or parenteral nutrition    Nutrition/Diet History    Food Preferences: MARNIE  Do you have any cultural, spiritual, Jewish conflicts, given your current situation?: none  Food Allergies: NKFA  Factors Affecting Nutritional Intake: NPO, on mechanical ventilation    Anthropometrics    Temp: 99.1 °F (37.3 °C)  Height Method: Measured  Height: 5' 9" (175.3 cm)  Height (inches): 69 in  Weight Method: Bed Scale  Weight: 67.4 kg (148 lb 9.4 oz)  Weight (lb): 148.59 lb  Ideal Body Weight (IBW), Male: 160 lb  % Ideal Body Weight, Male (lb): 92.87 lb  BMI (Calculated): 22  BMI Grade: 18.5-24.9 - normal       Lab/Procedures/Meds    Pertinent Labs Reviewed: reviewed  Pertinent Labs Comments: noted  Pertinent Medications Reviewed: reviewed  Pertinent Medications Comments: ketalar, metoclopramide    Physical Findings/Assessment    Overall Physical Appearance: edematous, on ventilator support  Tubes: orogastric tube  Skin: " edema    Estimated/Assessed Needs    Weight Used For Calorie Calculations: 67.4 kg (148 lb 9.4 oz)  Energy Calorie Requirements (kcal): 1957  Energy Need Method: [facility]  Protein Requirements: 81-101g(1.2-1.5g/kg)  Weight Used For Protein Calculations: 67.4 kg (148 lb 9.4 oz)  Fluid Requirements (mL): 1mL/kcal or per MD  Fluid Need Method: RDA Method  RDA Method (mL): 1957         Nutrition Prescription Ordered    Current Diet Order: NPO  Nutrition Order Comments: TF held  Current Nutrition Support Formula Ordered: Isosource 1.5  Current Nutrition Support Rate Ordered: 20 (ml)  Current Nutrition Support Frequency Ordered: mL/hr    Evaluation of Received Nutrient/Fluid Intake    Enteral Calories (kcal): 720  Enteral Protein (gm): 33  Enteral (Free Water) Fluid (mL): 367    % Kcal Needs: 37  % Protein Needs: 41    I/O: +I/O, good UOP, LBM 12/6    Energy Calories Required: not meeting needs  Protein Required: not meeting needs  Fluid Required: other (see comments)(per MD)    Comments: 30mL residuals 12/7    % Intake of Estimated Energy Needs: 25 - 50 %  % Meal Intake: NPO    Nutrition Risk    Level of Risk/Frequency of Follow-up: (f/u 2x/week)     Assessment and Plan    Nutrition Problem  Inadequate energy intake     Related to (etiology):   Mechanical Ventilation     Signs and Symptoms (as evidenced by):   NPO without nutrition support     Interventions:  Collaboration with providers     Nutrition Diagnosis Status:   Worsening           Monitor and Evaluation    Food and Nutrient Intake: enteral nutrition intake  Food and Nutrient Adminstration: enteral and parenteral nutrition administration  Anthropometric Measurements: weight, weight change  Biochemical Data, Medical Tests and Procedures: electrolyte and renal panel  Nutrition-Focused Physical Findings: overall appearance     Nutrition Follow-Up    RD Follow-up?: Yes

## 2018-12-07 NOTE — ASSESSMENT & PLAN NOTE
Will discontinue propofol today, increase Ketamine to 40  Continue VPA 1250 Q8 hours, level today therapeutic at 63, will repeat tomorrow and adjust accordingly   Increase clonazepam for subcortical myoclonus  12/3: likely not absorbing clonazepam, vimpat and keppra at max doses, escalate valproate to level ,sabril once gut motility re-established  12/4: gut motility re-established, oral aed with load to be added  12/5: sabril load, ketamine wean  12/6: ketamine decreased to 50mcg/kg, probable d/c this afternoon  12/7: recurrence of epileptiforms after ketamine d/c'd, EEG pattern with brief potentials superimposed on flat background c/w poor prognosis

## 2018-12-08 PROBLEM — Z30.09 FAMILY PLANNING COUNSELING: Status: ACTIVE | Noted: 2018-01-01

## 2018-12-08 NOTE — PROGRESS NOTES
[facility] representative Ted called notified of planned withdraw of care scheduled for today. Will continue to monitor

## 2018-12-08 NOTE — ASSESSMENT & PLAN NOTE
Family meet with providers to withdraw care 12/8 due to poor prognosis. Comfort measures initiated. Pt extubated.

## 2018-12-08 NOTE — NURSING
RN was preparing pt for a bath and pt began having convulsions in the face, neck, and both arms.  Monitor was unable to get an accurate BP and Spo@ sensor would not work.  BINTA Ríos was notified.  He ordered pt be given 2mg of ativan IVP.  Ativan given and pt's convulsions stopped.  Will continue to monitor.

## 2018-12-08 NOTE — ASSESSMENT & PLAN NOTE
Pt was placed on propofol, Ketamine, vimpat, clonazepam, sabril, and depokate for management of seizures  EEG placed on patient for monitoring  Comfort measures initiated due to poor prognosis of pt neurological function

## 2018-12-08 NOTE — ASSESSMENT & PLAN NOTE
Managed fluids for treatment of nstemi  Asa, plavix, statin,beta blocker, Lisinopril 5 mg were given as medications

## 2018-12-08 NOTE — PLAN OF CARE
Problem: Patient Care Overview  Goal: Plan of Care Review  Outcome: Ongoing (interventions implemented as appropriate)  POC reviewed with pt at 0600. Pt could not an verbalize understanding due to being medically sedated. Questions and concerns not addressed. Ativan given due to witnessed seizure activity. Pt progressing toward goals. Will continue to monitor. See flowsheets for full assessment and VS info

## 2018-12-08 NOTE — ASSESSMENT & PLAN NOTE
Pt admitted to [facility] for mechanical ventilation management and intubated  We continuously monitored oxygen to promote adequate perfusion   Bronch, mucolytics, ABX, ABGs and CXR were performed to assess and treat   Pt extubated per request of family to withdraw care

## 2018-12-08 NOTE — SUBJECTIVE & OBJECTIVE
Interval History: Discussion w/ family withdraw care. Comfort measures initiated. Pt extubated.      Review of Systems   Unable to perform ROS: Patient unresponsive       Objective:     Vitals:  Temp: 99.3 °F (37.4 °C)  Pulse: 99  Rhythm: normal sinus rhythm  BP: 115/70  MAP (mmHg): 87  Resp: (!) 26  SpO2: 100 %  Oxygen Concentration (%): 40  O2 Device (Oxygen Therapy): ventilator  Vent Mode: A/C  Set Rate: 18 bmp  Vt Set: 400 mL  Pressure Support: 0 cmH20  PEEP/CPAP: 5 cmH20  Peak Airway Pressure: 12 cmH2O  Mean Airway Pressure: 9.5 cmH20  Plateau Pressure: 0 cmH20    Temp  Min: 98 °F (36.7 °C)  Max: 99.3 °F (37.4 °C)  Pulse  Min: 88  Max: 104  BP  Min: 115/70  Max: 156/84  MAP (mmHg)  Min: 87  Max: 108  Resp  Min: 20  Max: 30  SpO2  Min: 71 %  Max: 100 %  Oxygen Concentration (%)  Min: 40  Max: 40    12/07 0701 - 12/08 0700  In: 1812.4 [I.V.:462.4]  Out: 2885 [Urine:2685]   Unmeasured Output  Urine Occurrence: 1  Stool Occurrence: 1  Emesis Occurrence: 0  Pad Count: 2       Physical Exam   Constitutional: He appears distressed.   HENT:   Head: Normocephalic and atraumatic.   Pulmonary/Chest: He is in respiratory distress.   Neurological:   E1V1(T1)M1 GCS (3)  Cranial II-XII tested not grossly intact  Negative gag or cough reflex   Sensory, motor, coordination not intact   No withdrawal to pain        Medications:  Continuous  ketamine (KETALAR) infusion Last Rate: 25 mcg/kg/min (12/08/18 1000)   morphine    Scheduled  levetiracetam IVPB 1,000 mg Q12H   lorazepam     metoclopramide HCl 10 mg Q6H   metoprolol tartrate 12.5 mg Q12H   valproate sodium (DEPACON) IVPB 1,500 mg Q8H   PRN  glycopyrrolate 0.1 mg Q4H PRN   lorazepam 2 mg Q30 Min PRN     Today I personally reviewed pertinent medications, lines/drains/airways, imaging, cardiology results, laboratory results, notably:    Diet  No diet orders on file  No diet orders on file

## 2018-12-09 NOTE — PROGRESS NOTES
[facility]  Neurocritical Care  Progress Note    Admit Date: 11/25/2018  Service Date: 12/08/2018  Length of Stay: 13    Subjective:     Chief Complaint: Status epilepticus    History of Present Illness: Patient is a 59yo male presented as a transfer from [facility]. Original admission to [facility] was due to s/p cardiac arrest. The patient was found down by his wife who then called EMS. On arrival to the home, assessment showed the patient to be in Vfib for which a shock was delivered along with 3 rounds of Epi and 300mg Amiodarone x1. ROSC achieved. Cooling procedures were initiated with target tempeture obtained 11/26 0030. Due to ankylosing spondylitis and severely stiff neck, EMS was unable to intubate in the field therefore an LMA was placed. The patient was subsequently intubated in the OR with the use of fiberoptic. While in PACU (11/25) awaiting an ICU bed the patient began to exhibit myoclonic jerks.  The jerks resolved with Ativan and he was loaded on Keppra. CT head showed significant motion artifact. An EEG later showed generalized epileptiform discharges suggestive of non convulsive status. A decision was made to transfer to [facility] for critical care epilepsy management.     Patient was received in the NCC unit vented on 50mcg of propofol. EEG monitoring in progress. Unable to perform a complete neuro assessment due to the critical nature posed towards the patient when propofol is halted. No family at bedside to contribute to patient's history.         Hospital Course: 11/28 Admit to [facility]; Epilepsy consulted; EEG monitoring in place  11/29: increased keppra. Load valproate and valproate TID started. Advanced tube feeds.  11/30: A-gilma placed, electrolytes replaced overnight. On rounds patient started having twitches in LE and face. Muscle paralysis with rocuronium 75, paused propofol and monitored EEG. EEG with occassional spike discharges. Restarted propofol at 40. AEDs as is.  Ordered CK and TG levels  12/2: Had vomiting overnight, also bowel movement, myoclonus right after, propofol was re-started overnight. CXR without aspiration. Will D/C propofol today, will increase clonazepam, increase Ketamine to 50. Decrease RR to 60, will repeat ABG today.  12/3: went into SE when ketamine stopped, responded to 1 mg/kg ketamine bolus and resumption of gtt at 60mcg/kg, valproate increased for target level of , reglan started  12/4: burst suppression pattern consistent at 4 HZ separation between 1 Hz bursts, intervening myoclonic activity, reglan helping gastroparesis, probable start of sabril today  12/5: sabril load delayed until today, ketamine wean,peristant elevated wbc, pro joann up, abx started  12/6: still with peristant leukocytosis, elevated pro joann, elevated sed rate and crp, abd/pelvis ct with/without ordered, wean ketamine to off by afternoon  12/7:ct abd/pelvis with and without contrast negative, bladder changes c/w cystitis, no abscess, cipro added, family discussion about prognosis and directions of care  12/8: family decision to withdraw care, comfort measures initiated    Interval History: Discussion w/ family withdraw care. Comfort measures initiated. Pt extubated.      Review of Systems   Unable to perform ROS: Patient unresponsive       Objective:     Vitals:  Temp: 99.3 °F (37.4 °C)  Pulse: 99  Rhythm: normal sinus rhythm  BP: 115/70  MAP (mmHg): 87  Resp: (!) 26  SpO2: 100 %  Oxygen Concentration (%): 40  O2 Device (Oxygen Therapy): ventilator  Vent Mode: A/C  Set Rate: 18 bmp  Vt Set: 400 mL  Pressure Support: 0 cmH20  PEEP/CPAP: 5 cmH20  Peak Airway Pressure: 12 cmH2O  Mean Airway Pressure: 9.5 cmH20  Plateau Pressure: 0 cmH20    Temp  Min: 98 °F (36.7 °C)  Max: 99.3 °F (37.4 °C)  Pulse  Min: 88  Max: 104  BP  Min: 115/70  Max: 156/84  MAP (mmHg)  Min: 87  Max: 108  Resp  Min: 20  Max: 30  SpO2  Min: 71 %  Max: 100 %  Oxygen Concentration (%)  Min: 40  Max: 40    12/07  0701 - 12/08 0700  In: 1812.4 [I.V.:462.4]  Out: 2885 [Urine:2685]   Unmeasured Output  Urine Occurrence: 1  Stool Occurrence: 1  Emesis Occurrence: 0  Pad Count: 2       Physical Exam   Constitutional: He appears distressed.   HENT:   Head: Normocephalic and atraumatic.   Pulmonary/Chest: He is in respiratory distress.   Neurological:   E1V1(T1)M1 GCS (3)  Cranial II-XII tested not grossly intact  Negative gag or cough reflex   Sensory, motor, coordination not intact   No withdrawal to pain        Medications:  Continuous  ketamine (KETALAR) infusion Last Rate: 25 mcg/kg/min (12/08/18 1000)   morphine    Scheduled  levetiracetam IVPB 1,000 mg Q12H   lorazepam     metoclopramide HCl 10 mg Q6H   metoprolol tartrate 12.5 mg Q12H   valproate sodium (DEPACON) IVPB 1,500 mg Q8H   PRN  glycopyrrolate 0.1 mg Q4H PRN   lorazepam 2 mg Q30 Min PRN     Today I personally reviewed pertinent medications, lines/drains/airways, imaging, cardiology results, laboratory results, notably:    Diet  No diet orders on file  No diet orders on file        Assessment/Plan:     Neuro   * Status epilepticus    Pt was placed on propofol, Ketamine, vimpat, clonazepam, sabril, and depokate for management of seizures  EEG placed on patient for monitoring  Comfort measures initiated due to poor prognosis of pt neurological function     Pulmonary   Pleural effusion    CXRs were performed to assess and treat      Acute hypoxemic respiratory failure    Pt admitted to [facility] for mechanical ventilation management and intubated  We continuously monitored oxygen to promote adequate perfusion   Bronch, mucolytics, ABX, ABGs and CXR were performed to assess and treat   Pt extubated per request of family to withdraw care         Cardiac/Vascular   NSTEMI (non-ST elevated myocardial infarction)    Cardiology was consulted and pt placed on asa, plavix, statin, BB and ace-I      Cardiac arrest with ventricular fibrillation    Managed fluids for treatment of  nstemi  Asa, plavix, statin,beta blocker, Lisinopril 5 mg were given as medications       Renal/   Family planning counseling    Family meet with providers to withdraw care 12/8 due to poor prognosis. Comfort measures initiated. Pt extubated.      Oncology   Leukocytosis    Resolved  No fevers  12/3 recurrent leukocytosis, all lines relatively new,vomiting( may be secondary to ileus, check le doppler  12/4: no dvt or evolving ileus, afebrile, screening blood cultures sent  12/5: pro joann up, sed rate and crp sent, may need ct chest/abd/pelvis, emperic abx started  12/6: elevated sed rate and crp, ct abd/pelvis today  12/7: cipro added to cefepime, ct abd/pelvis unremarkable for fluid collection suggestive of abscess     Orthopedic   AS (ankylosing spondylitis)    c-collar           The patient is being Prophylaxed for:  Venous Thromboembolism with: None  Stress Ulcer with: PPI  Ventilator Pneumonia with: not applicable    Activity Orders          None        DNR    Shin Martin PAMEENAKSHI  Neurocritical Care  [facility]

## 2018-12-11 LAB
BLD PROD TYP BPU: NORMAL
BLOOD UNIT EXPIRATION DATE: NORMAL
BLOOD UNIT TYPE CODE: 5100
BLOOD UNIT TYPE: NORMAL
CODING SYSTEM: NORMAL
DISPENSE STATUS: NORMAL
TRANS ERYTHROCYTES VOL PATIENT: NORMAL ML

## 2018-12-13 NOTE — PHYSICIAN QUERY
PT Name: Micheal Chirinos Jr.  MR #: [id number]     Physician Query Form - Medication-Correlation for Diagnosis      CDS/: Renea Huizar RN            Contact information: [phone]    This form is a permanent document in the medical record.     Query Date: December 13, 2018      By submitting this query, we are merely seeking further clarification of documentation.  Please utilize your independent clinical judgment when addressing the question(s) below.    The medical record contains the following:  The patient has an order for the following medication(s):      1.atorvastatin tablet 80 mg         Please provide the corresponding diagnosis or diagnoses that support(s) the use of the medication(s)   Provider Use Only          Diagnosis (es): 1._cardiac arrest/nstemi___________________________          [ [   ] ] Clinically Undetermined

## 2018-12-13 NOTE — ASSESSMENT & PLAN NOTE
Pt admitted to [facility] for mechanical ventilation management and intubated  We continuously monitored oxygen to promote adequate perfusion   Bronch, mucolytics, ABX, ABGs and CXR were performed to assess and treat   Pt extubated per request of family to withdraw care

## 2018-12-13 NOTE — DISCHARGE SUMMARY
[facility]  Neurocritical Care  Discharge Summary    Admit Date: 11/25/2018    Service Date: 12/13/2018    Discharge Date: 12/9/2018    Length of Stay: 14    Final Active Diagnoses:    Diagnosis Date Noted POA    PRINCIPAL PROBLEM:  Status epilepticus [G40.901] 11/25/2018 No    Family planning counseling [Z30.09] 12/08/2018 Yes    Chapped lips [K13.0] 12/04/2018 Yes    Hypoxia [R09.02]  No    NSTEMI (non-ST elevated myocardial infarction) [I21.4] 11/28/2018 Yes    Leukocytosis [D72.829] 11/25/2018 Yes    Cardiac arrest with ventricular fibrillation [I46.9, I49.01] 11/25/2018 Unknown    Anoxic brain injury [G93.1] 11/25/2018 Yes    Acute hypoxemic respiratory failure [J96.01] 11/25/2018 Yes    Shock liver [K72.00] 11/25/2018 Yes    Sepsis [A41.9] 11/25/2018 Yes    Pleural effusion [J90] 11/25/2018 No    Lactic acidosis [E87.2] 11/25/2018 Yes    Shock, unspecified [R57.9] 11/25/2018 Yes    AS (ankylosing spondylitis) [M45.9] 09/07/2012 Yes     Chronic      Problems Resolved During this Admission:      History of Present Illness: Patient is a 57yo male presented as a transfer from [facility]. Original admission to [facility] was due to s/p cardiac arrest. The patient was found down by his wife who then called EMS. On arrival to the home, assessment showed the patient to be in Vfib for which a shock was delivered along with 3 rounds of Epi and 300mg Amiodarone x1. ROSC achieved. Cooling procedures were initiated with target tempeture obtained 11/26 0030. Due to ankylosing spondylitis and severely stiff neck, EMS was unable to intubate in the field therefore an LMA was placed. The patient was subsequently intubated in the OR with the use of fiberoptic. While in PACU (11/25) awaiting an ICU bed the patient began to exhibit myoclonic jerks.  The jerks resolved with Ativan and he was loaded on Keppra. CT head showed significant motion artifact. An EEG later showed generalized epileptiform discharges  suggestive of non convulsive status. A decision was made to transfer to [facility] for critical care epilepsy management.     Patient was received in the [facility] unit vented on 50mcg of propofol. EEG monitoring in progress. Unable to perform a complete neuro assessment due to the critical nature posed towards the patient when propofol is halted. No family at bedside to contribute to patient's history.         Hospital Course by Event: 11/28 Admit to [facility]; Epilepsy consulted; EEG monitoring in place  11/29: increased keppra. Load valproate and valproate TID started. Advanced tube feeds.  11/30: A-gilma placed, electrolytes replaced overnight. On rounds patient started having twitches in LE and face. Muscle paralysis with rocuronium 75, paused propofol and monitored EEG. EEG with occassional spike discharges. Restarted propofol at 40. AEDs as is. Ordered CK and TG levels  12/2: Had vomiting overnight, also bowel movement, myoclonus right after, propofol was re-started overnight. CXR without aspiration. Will D/C propofol today, will increase clonazepam, increase Ketamine to 50. Decrease RR to 60, will repeat ABG today.  12/3: went into SE when ketamine stopped, responded to 1 mg/kg ketamine bolus and resumption of gtt at 60mcg/kg, valproate increased for target level of , reglan started  12/4: burst suppression pattern consistent at 4 HZ separation between 1 Hz bursts, intervening myoclonic activity, reglan helping gastroparesis, probable start of sabril today  12/5: sabril load delayed until today, ketamine wean,peristant elevated wbc, pro joann up, abx started  12/6: still with peristant leukocytosis, elevated pro joann, elevated sed rate and crp, abd/pelvis ct with/without ordered, wean ketamine to off by afternoon  12/7:ct abd/pelvis with and without contrast negative, bladder changes c/w cystitis, no abscess, cipro added, family discussion about prognosis and directions of care  12/8: family decision to  withdraw care, comfort measures initiated    Hospital Course by Problem:   * Status epilepticus    Pt was placed on propofol, Ketamine, vimpat, clonazepam, sabril, and depokate for management of seizures  EEG placed on patient for monitoring  Comfort measures initiated due to poor prognosis of pt neurological function     Family planning counseling    Family meet with providers to withdraw care 12/8 due to poor prognosis. Comfort measures initiated. Pt extubated.      NSTEMI (non-ST elevated myocardial infarction)    Cardiology was consulted and pt placed on asa, plavix, statin, BB and ace-I      Pleural effusion    CXRs were performed to assess and treat      Acute hypoxemic respiratory failure    Pt admitted to [facility] for mechanical ventilation management and intubated  We continuously monitored oxygen to promote adequate perfusion   Bronch, mucolytics, ABX, ABGs and CXR were performed to assess and treat   Pt extubated per request of family to withdraw care         Anoxic brain injury    Initial rhythm vfib s/p shock  Difficult intubation, unclear if there was prolonged hypoxia  Pupils reactive on propofol. Will d/c propofol today.   eeg variable and reactive  Repeat ct in 11/28 w diffuse edema  Prognosis indeterminate, no obviously poor features  , CT head evolving diffuse cerebral edema, seizures refractory to escalating doses of aeds    Target euthermia, slight hypercapnia, eunatremia, euglycemia       Cardiac arrest with ventricular fibrillation    Managed fluids for treatment of nstemi  Asa, plavix, statin,beta blocker, Lisinopril 5 mg were given as medications       Leukocytosis    Resolved  No fevers  12/3 recurrent leukocytosis, all lines relatively new,vomiting( may be secondary to ileus, check le doppler  12/4: no dvt or evolving ileus, afebrile, screening blood cultures sent  12/5: pro joann up, sed rate and crp sent, may need ct chest/abd/pelvis, emperic abx started  12/6: elevated sed rate and crp,  ct abd/pelvis today  : cipro added to cefepime, ct abd/pelvis unremarkable for fluid collection suggestive of abscess     AS (ankylosing spondylitis)    c-collar       Significant Results:  Imaging:  CT head   diffuse loss of gray-white matter differentiation with cerebral edema resulting in sulcal effacement and compression of the ventricular system.  Findings compatible with global anoxic/hypoxic brain injury.        Laboratory:  Lab Results   Component Value Date    CHOL 135 2018    HDL 34 (L) 2018    LDLCALC 87.0 2018    TRIG 123 2018    TSH 0.927 2010       Pending Results:     Consultations:  IP CONSULT TO CARDIOLOGY  IP CONSULT TO PULMONOLOGY  IP CONSULT TO NEUROLOGY  IP CONSULT TO REGISTERED DIETITIAN/NUTRITIONIST  IP CONSULT TO EPILEPSY  IP CONSULT TO REGISTERED DIETITIAN/NUTRITIONIST  IP CONSULT TO CARDIOLOGY  IP CONSULT TO MIDLINE TEAM  WOUND CARE CONSULT      Procedures:   Procedure(s):  INTUBATION, ENDOTRACHEAL by Teja Davila MD.      Medications:    Micheal Chirinos Jr.   Home Medication Instructions ANUJA:[id number]    Printed on:18 0207   Medication Information                                                                          Disposition: Discharged     Follow Up Plan:  none    This discharge took less than 30 minutes to complete.    Shin Martin PA-C  Neurocritical Care  [facility]

## 2018-12-27 NOTE — PROCEDURES
DATE OF STUDY:  12/07/2018    DURATION:  7 hours and 55 minutes.    ICU EEG AND VIDEO MONITORING REPORT    METHODOLOGY:  Electroencephalographic (EEG) is recorded with electrodes placed   according to the International 10-20 placement system.  Thirty two (32) channels   of digital signal (sampling rate of 512/sec), including T1 and T2, were   simultaneously recorded from the scalp and may include EKG, EMG, and/or eye   monitors.  Recording band pass was 0.1 to 512 Hz.  Digital video recording of   the patient is simultaneously recorded with the EEG.  The patient is instructed   to report clinical symptoms which may occur during the recording session.  EEG   and video recording are stored and archived in digital format.  Activation   procedures, which include photic stimulation, hyperventilation and instructing   patients to perform simple tasks, are done in selected patients.    The EEG is displayed on a monitor screen and can be reviewed using different   montages.  Computer-assisted analysis is employed to detect spike and   electrographic seizure activity.  The entire record is submitted for computer   analysis.  The entire recording is visually reviewed, and the times identified   by computer analysis as being spikes or seizures are reviewed again.    Compressed spectral analysis (CSA) is also performed on the activity recorded   from each individual channel.  This is displayed as a power display of   frequencies from 0 to 30 Hz over time.  The CSA is reviewed looking for   asymmetries in power between homologous areas of the scalp, then compared with   the original EEG recording.    [employer] software was also utilized in the review of this study.  This software   suite analyzes the EEG recording in multiple domains.  Coherence and rhythmicity   are computed to identify EEG sections which may contain organized seizures.    Each channel undergoes analysis to detect the presence of spike and sharp waves   which  have special and morphological characteristics of epileptic activity.  The   routine EEG recording is converted from special into frequency domain.  This is   then displayed comparing homologous areas to identify areas of significant   asymmetry.  Algorithm to identify non-cortically generated artifact is used to   separate artifact from the EEG.    EEG FINDINGS:  This record is profoundly and diffusely suppressed with very low   voltage delta activity and very low voltage beta activity primarily seen at   baseline.  Eventually, frontally predominant sharp discharges emerge   sporadically, although these are indistinguishable from EMG artifact.  As the   record progresses over this extended period, some sharp activity does emerge and   a burst-suppression pattern emerges with highly epileptiform appearing bursts   lasting from less than 1 second to as long as 4 to 5 seconds consisting of   frontally predominant bisynchronous sharp waves that do appear to be of cortical   origin.  No normal brain waves are seen and other than profound diffuse   suppression and epileptiform bursts suppression, no other waveforms can be   identified.    INTERPRETATION:  Abnormal EEG due to severe diffuse slowing and suppression of   the background activity with epileptiform discharges appearing sporadically in a   frontally predominant bisynchronous fashion and as the paroxysms of burst   suppression.  Overall, these results suggest severe diffuse cortical injury.      NBB/IN  dd: 12/27/2018 08:23:01 (CST)  td: 12/27/2018 08:36:33 (CST)  Doc ID   #[id number]  Job ID #[id number]    CC:

## 2020-04-13 NOTE — ASSESSMENT & PLAN NOTE
Transfer from [facility] for evaluation of NCSE noted on EEG . Likely secondary to anoxia in setting of cardiac arrest.    EE/26: continuous generalized epileptiform discharges when off propofol, suggestive of NCSE  : Cap EEG - intermittent rhythmic GPEDs, suggestive of post anoxic NCSE    Recommendations:  - Hook up to formal vEEG  - Continue Propofol 50 mKm - post anoxic status has been responsive to anesthetics at this point  - Continue Keppra 1500 mg BID, Vimpat 200 mg BID  - Further recommendations pending continued EEG read    Plan of care discussed with NCC team, family at bedside. Will continue to follow.    20

## 2022-01-11 NOTE — SIGNIFICANT EVENT
Following family discussion, pt made and started on comfort care. Called to bedside by RN for asystole on the monitor. Assessment at bedside revealed no heart or respiratory sounds. TOD: 0108.  called. Family notified and all questions answered.     Jonathan Polanco MD  
RN states he will joann when ready to withdraw care  
Withdrew care/ Md.orders  
My signature below certifies that the above stated patient is homebound and upon completion of the Face-To-Face encounter, has the need for intermittent skilled nursing, physical therapy and/or speech or occupational therapy services in their home for their current diagnosis as outlined in their initial plan of care. These services will continue to be monitored by myself or another physician.

## 2022-05-02 NOTE — ASSESSMENT & PLAN NOTE
Initial rhythm vfib s/p shock  Difficult intubation, unclear if there was prolonged hypoxia  Pupils reactive on propofol  eeg variable and reactive  Repeat ct w diffuse edema  Prognosis indeterminate, no obviously poor features    Target euthermia, slight hypercapnia, eunatremia, euglycemia  Consider ssep     -c/w home synthroid 88 mcg daily. TSH slightly low 0.253 and Free T4 normal. -C/w home synthroid 88 mcg QD

## 2022-10-10 NOTE — ASSESSMENT & PLAN NOTE
Transfer from [facility] for evaluation of NCSE noted on EEG 11/26. Likely secondary to anoxia in setting of cardiac arrest.    Recommendations:  - Continue vEEG  - Ketamine increased to 100 mKm this morning  - Continue Vimpat 200 mg BID, Keppra 2000 mg BID, VPA 1250 mg TID  - Add Vigabatrin - discussed with NCC, family. Family to discuss further, will go over any additional questions/information, submit consent forms if family decides to pursue  - Further recommendations pending continued EEG read    Plan of care discussed with NCC team, family at bedside. Will continue to follow.    Yes

## 2023-09-11 NOTE — ASSESSMENT & PLAN NOTE
- ET tube placed in OR at [facility] due to neck rigidity from AS  - Vent management per NCC   0 (no pain/absence of nonverbal indicators of pain)

## 2024-11-07 NOTE — CARE UPDATE
Chart reviewed and discussed with nursing at bedside.  No acute events.  Patient was found to be in status on EEG off propofol.  Plan is for transfer to [facility] for Neuro Critical Care.  Again conservative medical therapy currently from CV standpoint.  We will sign off.  
Ketamine gtt turned off per BINTA Wu with NCC at 0850 while at bedside. 0948 - Twitching noted to face/neck and down R arm during diaphragm quivering episode. 0950- large emesis amount x 1, green/yellow/coffee ground in color. Pupils bilaterally sluggish per pupillometer. Tachycardic from  and hypertensive from -185. BINTA Wu with NCC and MD Teodora with NCC notified and at bedside. 1 mg/kg bolus from bag of Ketamine admin via BINTA Wu per MD Teodora verbal order. Ketamine gtt restarted at continuous rate of 60 mcg/kg/min. EEG assessed per NCC. Will continue to monitor closely.  
Patient requests all Lab, Cardiology, and Radiology Results on their Discharge Instructions

## (undated) DEVICE — SEE MEDLINE ITEM 157194

## (undated) DEVICE — SEE L#152161

## (undated) DEVICE — SUT SILK 2-0 PS 18IN BLACK

## (undated) DEVICE — SEE MEDLINE ITEM 154981

## (undated) DEVICE — SUT 3-0 12-18IN SILK

## (undated) DEVICE — SUPPORT ULNA NERVE PROTECTOR

## (undated) DEVICE — SEE L#120831

## (undated) DEVICE — Device

## (undated) DEVICE — SUT SILK 3-0 RB-1 30IN BLK

## (undated) DEVICE — SEE MEDLINE ITEM 157117

## (undated) DEVICE — SYR 12CC CNTRL L-L NO NDL

## (undated) DEVICE — POSITIONER HEAD DONUT 9IN FOAM

## (undated) DEVICE — PACK HEAD & NECK